# Patient Record
Sex: MALE | Race: BLACK OR AFRICAN AMERICAN | NOT HISPANIC OR LATINO | Employment: UNEMPLOYED | URBAN - METROPOLITAN AREA
[De-identification: names, ages, dates, MRNs, and addresses within clinical notes are randomized per-mention and may not be internally consistent; named-entity substitution may affect disease eponyms.]

---

## 2022-11-05 PROBLEM — E87.8 ELECTROLYTE ABNORMALITY: Status: ACTIVE | Noted: 2022-11-05

## 2022-11-05 PROBLEM — R73.9 HYPERGLYCEMIA: Status: ACTIVE | Noted: 2022-11-05

## 2022-11-05 PROBLEM — R00.0 TACHYCARDIA: Status: ACTIVE | Noted: 2022-11-05

## 2022-11-05 PROBLEM — G02: Status: ACTIVE | Noted: 2022-11-05

## 2022-11-06 PROBLEM — I48.91 ATRIAL FIBRILLATION WITH RVR: Status: ACTIVE | Noted: 2022-11-06

## 2022-11-06 PROBLEM — B20 HIV DISEASE: Status: ACTIVE | Noted: 2022-11-06

## 2022-11-06 PROBLEM — R56.9 SEIZURES COMPLICATING INFECTION: Status: ACTIVE | Noted: 2022-11-06

## 2022-11-06 PROBLEM — A53.0 POSITIVE RPR TEST: Status: ACTIVE | Noted: 2022-11-06

## 2022-11-06 PROBLEM — B99.9 SEIZURES COMPLICATING INFECTION: Status: ACTIVE | Noted: 2022-11-06

## 2022-11-06 PROBLEM — E87.1 HYPONATREMIA: Status: ACTIVE | Noted: 2022-11-05

## 2022-11-09 PROBLEM — E87.6 HYPOKALEMIA: Status: ACTIVE | Noted: 2022-11-09

## 2022-11-11 PROBLEM — G93.40 ENCEPHALOPATHY: Status: ACTIVE | Noted: 2022-11-11

## 2022-11-13 PROBLEM — E87.1 HYPONATREMIA: Status: RESOLVED | Noted: 2022-11-05 | Resolved: 2022-11-13

## 2022-11-13 PROBLEM — E87.6 HYPOKALEMIA: Status: RESOLVED | Noted: 2022-11-09 | Resolved: 2022-11-13

## 2022-11-14 PROBLEM — F32.9 CURRENT EPISODE OF MAJOR DEPRESSIVE DISORDER WITHOUT PRIOR EPISODE: Status: ACTIVE | Noted: 2022-11-14

## 2022-11-22 ENCOUNTER — TELEPHONE (OUTPATIENT)
Dept: INFECTIOUS DISEASES | Facility: CLINIC | Age: 27
End: 2022-11-22
Payer: COMMERCIAL

## 2022-11-22 NOTE — TELEPHONE ENCOUNTER
----- Message from Fay Moraes PA-C sent at 11/22/2022  4:16 PM CST -----  Yes, please schedule with Dr. Rush in 2 weeks.    ----- Message -----  From: Meghan Poe LPN  Sent: 11/22/2022   2:52 PM CST  To: Fay Moraes PA-C      ----- Message -----  From: Devi Chacon  Sent: 11/22/2022   2:11 PM CST  To: Victor Manuel GIBSON    Type:  Sooner Appointment Request    Caller is requesting a sooner appointment.  Caller declined first available appointment listed below.  Caller will not accept being placed on the waitlist and is requesting a message be sent to doctor.    Name of Caller:  pt  When is the first available appointment?  none  Symptoms:  hospital f/u dc 11/1 need a 2 week appt--please call and advise  Best Call Back Number:  [phone]    Additional Information:  thank you

## 2022-11-28 ENCOUNTER — TELEPHONE (OUTPATIENT)
Dept: INFECTIOUS DISEASES | Facility: CLINIC | Age: 27
End: 2022-11-28
Payer: COMMERCIAL

## 2022-11-28 NOTE — TELEPHONE ENCOUNTER
----- Message from Chago Herndon sent at 11/28/2022  3:04 PM CST -----  Contact: [facility]  Type: Needs Medical Advice  Who Called:  [facility]  Best Call Back Number: [phone]  Additional Information:  [facility] states pt needs to start get treated for Syphilis. Please advise.

## 2022-11-28 NOTE — TELEPHONE ENCOUNTER
Spoke with Rafaela and advised that patient had been treated at [facility] with 10 days of Rocephin, followed by 4 days of IV PCN, and then received 2.4 million Pen G Benzathine IM. She stated He was treated for Neuro, which was negative. Advised that I will forward Dr Rush's hospital note, then she can let me know if He needs further treatment.

## 2022-11-30 PROBLEM — R51.9 HEADACHE: Status: ACTIVE | Noted: 2022-11-30

## 2022-12-01 PROBLEM — G93.2 INCREASED INTRACRANIAL PRESSURE: Status: ACTIVE | Noted: 2022-11-30

## 2022-12-10 PROBLEM — E83.39 HYPERPHOSPHATEMIA: Status: ACTIVE | Noted: 2022-12-10

## 2022-12-10 PROBLEM — K92.0 COFFEE GROUND EMESIS: Status: ACTIVE | Noted: 2022-12-10

## 2022-12-10 PROBLEM — E83.42 HYPOMAGNESEMIA: Status: ACTIVE | Noted: 2022-12-10

## 2022-12-10 PROBLEM — R11.2 NAUSEA AND VOMITING: Status: ACTIVE | Noted: 2022-12-10

## 2022-12-13 ENCOUNTER — TELEPHONE (OUTPATIENT)
Dept: INFECTIOUS DISEASES | Facility: CLINIC | Age: 27
End: 2022-12-13
Payer: COMMERCIAL

## 2022-12-16 PROBLEM — E87.6 HYPOKALEMIA: Status: ACTIVE | Noted: 2022-12-16

## 2022-12-16 PROBLEM — E87.6 HYPOKALEMIA: Status: RESOLVED | Noted: 2022-11-09 | Resolved: 2022-12-16

## 2022-12-16 PROBLEM — E83.39 HYPERPHOSPHATEMIA: Status: RESOLVED | Noted: 2022-12-10 | Resolved: 2022-12-16

## 2022-12-16 PROBLEM — R11.2 NAUSEA AND VOMITING: Status: RESOLVED | Noted: 2022-12-10 | Resolved: 2022-12-16

## 2022-12-16 PROBLEM — A52.3 NEUROSYPHILIS: Status: ACTIVE | Noted: 2022-11-06

## 2022-12-16 PROBLEM — E83.42 HYPOMAGNESEMIA: Status: RESOLVED | Noted: 2022-12-10 | Resolved: 2022-12-16

## 2022-12-16 PROBLEM — B45.1 CRYPTOCOCCAL MENINGITIS: Status: ACTIVE | Noted: 2022-11-05

## 2022-12-19 ENCOUNTER — TELEPHONE (OUTPATIENT)
Dept: INFECTIOUS DISEASES | Facility: CLINIC | Age: 27
End: 2022-12-19
Payer: COMMERCIAL

## 2022-12-19 NOTE — TELEPHONE ENCOUNTER
Spoke with Ms Puneet and advised that Mr Abreu is currently hospitalized since 11/30. She stated since he only received Pen G 2.4 million Injection x 1 on 11/21/22, too much time has elapsed and must start over and receive the injections weekly x 3 weeks. Advised that I am forwarding this message to Dr Rush for review.

## 2022-12-19 NOTE — TELEPHONE ENCOUNTER
----- Message from Miller Rush MD sent at 12/19/2022 12:50 PM CST -----  Patient is in the hospital  ----- Message -----  From: Meghan Poe LPN  Sent: 12/19/2022  12:23 PM CST  To: Miller Rush MD      ----- Message -----  From: Petra Augustin  Sent: 12/19/2022  11:33 AM CST  To: Victor Manuel GIBSON    Who Called: [facility]    What is the reqeust in detail: Requesting call back to follow up on syphilis treatment for this patient. Please advise.     Can the clinic reply by [facility]? No    Best Call Back Number: [phone]    Additional Information:

## 2022-12-26 PROBLEM — N17.9 AKI (ACUTE KIDNEY INJURY): Status: ACTIVE | Noted: 2022-12-26

## 2022-12-27 PROBLEM — E43 SEVERE MALNUTRITION: Status: ACTIVE | Noted: 2022-12-27

## 2022-12-27 PROBLEM — R11.0 NAUSEA: Status: ACTIVE | Noted: 2022-12-10

## 2022-12-29 PROBLEM — D64.9 ANEMIA: Status: ACTIVE | Noted: 2022-12-29

## 2022-12-31 PROBLEM — R15.9 FECAL INCONTINENCE: Status: ACTIVE | Noted: 2022-12-31

## 2023-01-11 PROBLEM — R29.898 MUSCULAR DECONDITIONING: Status: ACTIVE | Noted: 2023-01-11

## 2023-01-16 ENCOUNTER — HOSPITAL ENCOUNTER (INPATIENT)
Facility: HOSPITAL | Age: 28
LOS: 20 days | Discharge: HOME HEALTH | DRG: 640 | End: 2023-02-07
Attending: EMERGENCY MEDICINE | Admitting: STUDENT IN AN ORGANIZED HEALTH CARE EDUCATION/TRAINING PROGRAM
Payer: COMMERCIAL

## 2023-01-16 DIAGNOSIS — A41.9 SEPSIS: ICD-10-CM

## 2023-01-16 DIAGNOSIS — B45.1 CRYPTOCOCCAL MENINGITIS: ICD-10-CM

## 2023-01-16 DIAGNOSIS — J18.9 PNEUMONIA: ICD-10-CM

## 2023-01-16 DIAGNOSIS — B20 AIDS: ICD-10-CM

## 2023-01-16 DIAGNOSIS — R05.9 COUGH, UNSPECIFIED TYPE: Primary | ICD-10-CM

## 2023-01-16 DIAGNOSIS — R50.9 FEVER, UNSPECIFIED FEVER CAUSE: ICD-10-CM

## 2023-01-16 DIAGNOSIS — E83.42 HYPOMAGNESEMIA: ICD-10-CM

## 2023-01-16 DIAGNOSIS — R53.81 PHYSICAL DECONDITIONING: ICD-10-CM

## 2023-01-16 DIAGNOSIS — R07.9 CHEST PAIN: ICD-10-CM

## 2023-01-16 DIAGNOSIS — D64.9 ANEMIA, UNSPECIFIED TYPE: ICD-10-CM

## 2023-01-16 DIAGNOSIS — R53.1 WEAKNESS: ICD-10-CM

## 2023-01-16 PROBLEM — I48.0 PAROXYSMAL ATRIAL FIBRILLATION: Status: ACTIVE | Noted: 2023-01-16

## 2023-01-16 LAB
ABO + RH BLD: ABNORMAL
ALBUMIN SERPL BCP-MCNC: 3 G/DL (ref 3.5–5.2)
ALP SERPL-CCNC: 76 U/L (ref 55–135)
ALT SERPL W/O P-5'-P-CCNC: 36 U/L (ref 10–44)
ANION GAP SERPL CALC-SCNC: 6 MMOL/L (ref 8–16)
ANISOCYTOSIS BLD QL SMEAR: SLIGHT
AST SERPL-CCNC: 30 U/L (ref 10–40)
BASOPHILS NFR BLD: 0 % (ref 0–1.9)
BILIRUB SERPL-MCNC: 0.4 MG/DL (ref 0.1–1)
BILIRUB UR QL STRIP: NEGATIVE
BLD GP AB SCN CELLS X3 SERPL QL: ABNORMAL
BLOOD GROUP ANTIBODIES SERPL: NORMAL
BUN SERPL-MCNC: 23 MG/DL (ref 6–20)
CALCIUM SERPL-MCNC: 9.3 MG/DL (ref 8.7–10.5)
CHLORIDE SERPL-SCNC: 103 MMOL/L (ref 95–110)
CLARITY UR: CLEAR
CO2 SERPL-SCNC: 26 MMOL/L (ref 23–29)
COLOR UR: YELLOW
CREAT SERPL-MCNC: 1 MG/DL (ref 0.5–1.4)
DIFFERENTIAL METHOD: ABNORMAL
EOSINOPHIL NFR BLD: 2 % (ref 0–8)
ERYTHROCYTE [DISTWIDTH] IN BLOOD BY AUTOMATED COUNT: 14.8 % (ref 11.5–14.5)
EST. GFR  (NO RACE VARIABLE): >60 ML/MIN/1.73 M^2
GLUCOSE SERPL-MCNC: 135 MG/DL (ref 70–110)
GLUCOSE SERPL-MCNC: 210 MG/DL (ref 70–110)
GLUCOSE UR QL STRIP: NEGATIVE
HCT VFR BLD AUTO: 24.1 % (ref 40–54)
HGB BLD-MCNC: 7.9 G/DL (ref 14–18)
HGB UR QL STRIP: NEGATIVE
IMM GRANULOCYTES # BLD AUTO: ABNORMAL K/UL (ref 0–0.04)
IMM GRANULOCYTES NFR BLD AUTO: ABNORMAL % (ref 0–0.5)
KETONES UR QL STRIP: NEGATIVE
LACTATE SERPL-SCNC: 0.9 MMOL/L (ref 0.5–1.9)
LEUKOCYTE ESTERASE UR QL STRIP: NEGATIVE
LYMPHOCYTES NFR BLD: 19 % (ref 18–48)
MAGNESIUM SERPL-MCNC: 1.4 MG/DL (ref 1.6–2.6)
MCH RBC QN AUTO: 27.1 PG (ref 27–31)
MCHC RBC AUTO-ENTMCNC: 32.8 G/DL (ref 32–36)
MCV RBC AUTO: 83 FL (ref 82–98)
METAMYELOCYTES NFR BLD MANUAL: 3 %
MONOCYTES NFR BLD: 7 % (ref 4–15)
MYELOCYTES NFR BLD MANUAL: 5 %
NEUTROPHILS NFR BLD: 58 % (ref 38–73)
NEUTS BAND NFR BLD MANUAL: 6 %
NITRITE UR QL STRIP: NEGATIVE
NRBC BLD-RTO: 0 /100 WBC
PH UR STRIP: 8 [PH] (ref 5–8)
PLATELET # BLD AUTO: 283 K/UL (ref 150–450)
PLATELET BLD QL SMEAR: ABNORMAL
PMV BLD AUTO: 8.9 FL (ref 9.2–12.9)
POTASSIUM SERPL-SCNC: 3.5 MMOL/L (ref 3.5–5.1)
PROT SERPL-MCNC: 6.3 G/DL (ref 6–8.4)
PROT UR QL STRIP: NEGATIVE
RBC # BLD AUTO: 2.92 M/UL (ref 4.6–6.2)
SODIUM SERPL-SCNC: 135 MMOL/L (ref 136–145)
SP GR UR STRIP: 1.01 (ref 1–1.03)
URN SPEC COLLECT METH UR: NORMAL
UROBILINOGEN UR STRIP-ACNC: NEGATIVE EU/DL
WBC # BLD AUTO: 17.7 K/UL (ref 3.9–12.7)

## 2023-01-16 PROCEDURE — G0378 HOSPITAL OBSERVATION PER HR: HCPCS

## 2023-01-16 PROCEDURE — 84145 PROCALCITONIN (PCT): CPT | Performed by: NURSE PRACTITIONER

## 2023-01-16 PROCEDURE — 99285 EMERGENCY DEPT VISIT HI MDM: CPT | Mod: 25

## 2023-01-16 PROCEDURE — 93010 EKG 12-LEAD: ICD-10-PCS | Mod: ,,, | Performed by: SPECIALIST

## 2023-01-16 PROCEDURE — 83735 ASSAY OF MAGNESIUM: CPT | Mod: 91 | Performed by: EMERGENCY MEDICINE

## 2023-01-16 PROCEDURE — 96366 THER/PROPH/DIAG IV INF ADDON: CPT

## 2023-01-16 PROCEDURE — 87040 BLOOD CULTURE FOR BACTERIA: CPT | Performed by: EMERGENCY MEDICINE

## 2023-01-16 PROCEDURE — 81003 URINALYSIS AUTO W/O SCOPE: CPT | Performed by: EMERGENCY MEDICINE

## 2023-01-16 PROCEDURE — 96365 THER/PROPH/DIAG IV INF INIT: CPT

## 2023-01-16 PROCEDURE — 80053 COMPREHEN METABOLIC PANEL: CPT | Mod: 91 | Performed by: EMERGENCY MEDICINE

## 2023-01-16 PROCEDURE — 85007 BL SMEAR W/DIFF WBC COUNT: CPT | Mod: 91 | Performed by: EMERGENCY MEDICINE

## 2023-01-16 PROCEDURE — 83605 ASSAY OF LACTIC ACID: CPT | Performed by: EMERGENCY MEDICINE

## 2023-01-16 PROCEDURE — 86900 BLOOD TYPING SEROLOGIC ABO: CPT | Performed by: EMERGENCY MEDICINE

## 2023-01-16 PROCEDURE — 87154 CUL TYP ID BLD PTHGN 6+ TRGT: CPT | Performed by: EMERGENCY MEDICINE

## 2023-01-16 PROCEDURE — 93010 ELECTROCARDIOGRAM REPORT: CPT | Mod: ,,, | Performed by: SPECIALIST

## 2023-01-16 PROCEDURE — 63600175 PHARM REV CODE 636 W HCPCS: Performed by: NURSE PRACTITIONER

## 2023-01-16 PROCEDURE — 96372 THER/PROPH/DIAG INJ SC/IM: CPT | Performed by: NURSE PRACTITIONER

## 2023-01-16 PROCEDURE — 96367 TX/PROPH/DG ADDL SEQ IV INF: CPT

## 2023-01-16 PROCEDURE — 86870 RBC ANTIBODY IDENTIFICATION: CPT | Performed by: EMERGENCY MEDICINE

## 2023-01-16 PROCEDURE — 82962 GLUCOSE BLOOD TEST: CPT

## 2023-01-16 PROCEDURE — 25000003 PHARM REV CODE 250: Performed by: NURSE PRACTITIONER

## 2023-01-16 PROCEDURE — 85027 COMPLETE CBC AUTOMATED: CPT | Mod: 91 | Performed by: EMERGENCY MEDICINE

## 2023-01-16 PROCEDURE — 93005 ELECTROCARDIOGRAM TRACING: CPT | Performed by: SPECIALIST

## 2023-01-16 PROCEDURE — 36415 COLL VENOUS BLD VENIPUNCTURE: CPT | Performed by: EMERGENCY MEDICINE

## 2023-01-16 PROCEDURE — 63600175 PHARM REV CODE 636 W HCPCS: Performed by: EMERGENCY MEDICINE

## 2023-01-16 RX ORDER — IBUPROFEN 200 MG
16 TABLET ORAL
Status: DISCONTINUED | OUTPATIENT
Start: 2023-01-16 | End: 2023-02-07 | Stop reason: HOSPADM

## 2023-01-16 RX ORDER — CEFEPIME HYDROCHLORIDE 1 G/50ML
2 INJECTION, SOLUTION INTRAVENOUS
Status: DISCONTINUED | OUTPATIENT
Start: 2023-01-16 | End: 2023-01-16

## 2023-01-16 RX ORDER — POLYETHYLENE GLYCOL 3350 17 G/17G
17 POWDER, FOR SOLUTION ORAL 2 TIMES DAILY PRN
Status: DISCONTINUED | OUTPATIENT
Start: 2023-01-16 | End: 2023-02-07 | Stop reason: HOSPADM

## 2023-01-16 RX ORDER — TALC
6 POWDER (GRAM) TOPICAL NIGHTLY PRN
Status: DISCONTINUED | OUTPATIENT
Start: 2023-01-16 | End: 2023-02-07 | Stop reason: HOSPADM

## 2023-01-16 RX ORDER — PANTOPRAZOLE SODIUM 40 MG/1
40 TABLET, DELAYED RELEASE ORAL
Status: DISCONTINUED | OUTPATIENT
Start: 2023-01-17 | End: 2023-02-07 | Stop reason: HOSPADM

## 2023-01-16 RX ORDER — VANCOMYCIN HCL IN 5 % DEXTROSE 1G/250ML
1000 PLASTIC BAG, INJECTION (ML) INTRAVENOUS ONCE
Status: COMPLETED | OUTPATIENT
Start: 2023-01-16 | End: 2023-01-16

## 2023-01-16 RX ORDER — CEFEPIME HYDROCHLORIDE 1 G/50ML
2 INJECTION, SOLUTION INTRAVENOUS ONCE
Status: COMPLETED | OUTPATIENT
Start: 2023-01-16 | End: 2023-01-16

## 2023-01-16 RX ORDER — HYDROCODONE BITARTRATE AND ACETAMINOPHEN 5; 325 MG/1; MG/1
1 TABLET ORAL EVERY 6 HOURS PRN
Status: DISCONTINUED | OUTPATIENT
Start: 2023-01-16 | End: 2023-02-07 | Stop reason: HOSPADM

## 2023-01-16 RX ORDER — FOLIC ACID 1 MG/1
1 TABLET ORAL DAILY
Status: DISCONTINUED | OUTPATIENT
Start: 2023-01-17 | End: 2023-02-07 | Stop reason: HOSPADM

## 2023-01-16 RX ORDER — LEVETIRACETAM 500 MG/1
1500 TABLET ORAL 2 TIMES DAILY
Status: DISCONTINUED | OUTPATIENT
Start: 2023-01-16 | End: 2023-02-07 | Stop reason: HOSPADM

## 2023-01-16 RX ORDER — VANCOMYCIN HCL IN 5 % DEXTROSE 1G/250ML
1000 PLASTIC BAG, INJECTION (ML) INTRAVENOUS
Status: DISCONTINUED | OUTPATIENT
Start: 2023-01-17 | End: 2023-01-17

## 2023-01-16 RX ORDER — CETIRIZINE HYDROCHLORIDE 10 MG/1
10 TABLET ORAL DAILY
Status: DISCONTINUED | OUTPATIENT
Start: 2023-01-17 | End: 2023-01-26

## 2023-01-16 RX ORDER — SULFAMETHOXAZOLE AND TRIMETHOPRIM 800; 160 MG/1; MG/1
1 TABLET ORAL
Status: DISCONTINUED | OUTPATIENT
Start: 2023-01-18 | End: 2023-01-22

## 2023-01-16 RX ORDER — PROCHLORPERAZINE EDISYLATE 5 MG/ML
5 INJECTION INTRAMUSCULAR; INTRAVENOUS EVERY 6 HOURS PRN
Status: DISCONTINUED | OUTPATIENT
Start: 2023-01-16 | End: 2023-02-07 | Stop reason: HOSPADM

## 2023-01-16 RX ORDER — LANOLIN ALCOHOL/MO/W.PET/CERES
100 CREAM (GRAM) TOPICAL DAILY
COMMUNITY

## 2023-01-16 RX ORDER — ENOXAPARIN SODIUM 100 MG/ML
40 INJECTION SUBCUTANEOUS EVERY 24 HOURS
Status: DISCONTINUED | OUTPATIENT
Start: 2023-01-16 | End: 2023-02-07 | Stop reason: HOSPADM

## 2023-01-16 RX ORDER — GUAIFENESIN 600 MG/1
600 TABLET, EXTENDED RELEASE ORAL 2 TIMES DAILY
Status: DISCONTINUED | OUTPATIENT
Start: 2023-01-16 | End: 2023-01-26

## 2023-01-16 RX ORDER — SIMETHICONE 80 MG
1 TABLET,CHEWABLE ORAL 4 TIMES DAILY PRN
Status: DISCONTINUED | OUTPATIENT
Start: 2023-01-16 | End: 2023-02-07 | Stop reason: HOSPADM

## 2023-01-16 RX ORDER — MULTIVITAMIN
1 TABLET ORAL DAILY
COMMUNITY

## 2023-01-16 RX ORDER — CYPROHEPTADINE HYDROCHLORIDE 4 MG/1
4 TABLET ORAL 2 TIMES DAILY
Status: DISCONTINUED | OUTPATIENT
Start: 2023-01-16 | End: 2023-01-27

## 2023-01-16 RX ORDER — ESCITALOPRAM OXALATE 10 MG/1
20 TABLET ORAL DAILY
Status: DISCONTINUED | OUTPATIENT
Start: 2023-01-17 | End: 2023-02-07 | Stop reason: HOSPADM

## 2023-01-16 RX ORDER — LANOLIN ALCOHOL/MO/W.PET/CERES
1 CREAM (GRAM) TOPICAL DAILY
Status: DISCONTINUED | OUTPATIENT
Start: 2023-01-17 | End: 2023-02-07 | Stop reason: HOSPADM

## 2023-01-16 RX ORDER — HYDROCODONE BITARTRATE AND ACETAMINOPHEN 5; 325 MG/1; MG/1
1 TABLET ORAL EVERY 4 HOURS PRN
COMMUNITY

## 2023-01-16 RX ORDER — INSULIN ASPART 100 [IU]/ML
1-10 INJECTION, SOLUTION INTRAVENOUS; SUBCUTANEOUS
Status: DISCONTINUED | OUTPATIENT
Start: 2023-01-16 | End: 2023-02-07 | Stop reason: HOSPADM

## 2023-01-16 RX ORDER — IBUPROFEN 200 MG
16-24 TABLET ORAL
COMMUNITY

## 2023-01-16 RX ORDER — SODIUM BICARBONATE 650 MG/1
650 TABLET ORAL 3 TIMES DAILY
Status: DISCONTINUED | OUTPATIENT
Start: 2023-01-17 | End: 2023-02-07 | Stop reason: HOSPADM

## 2023-01-16 RX ORDER — NALOXONE HCL 0.4 MG/ML
0.02 VIAL (ML) INJECTION
Status: DISCONTINUED | OUTPATIENT
Start: 2023-01-16 | End: 2023-02-07 | Stop reason: HOSPADM

## 2023-01-16 RX ORDER — FERROUS GLUCONATE 324(38)MG
650 TABLET ORAL EVERY OTHER DAY
COMMUNITY

## 2023-01-16 RX ORDER — MAGNESIUM SULFATE HEPTAHYDRATE 40 MG/ML
2 INJECTION, SOLUTION INTRAVENOUS
Status: DISCONTINUED | OUTPATIENT
Start: 2023-01-16 | End: 2023-02-07 | Stop reason: HOSPADM

## 2023-01-16 RX ORDER — MAG HYDROX/ALUMINUM HYD/SIMETH 200-200-20
30 SUSPENSION, ORAL (FINAL DOSE FORM) ORAL EVERY 4 HOURS PRN
COMMUNITY

## 2023-01-16 RX ORDER — CETIRIZINE HYDROCHLORIDE 10 MG/1
10 TABLET ORAL DAILY
Status: ON HOLD | COMMUNITY
End: 2023-02-07 | Stop reason: HOSPADM

## 2023-01-16 RX ORDER — MEGESTROL ACETATE 20 MG/1
40 TABLET ORAL DAILY
Status: DISCONTINUED | OUTPATIENT
Start: 2023-01-17 | End: 2023-02-07 | Stop reason: HOSPADM

## 2023-01-16 RX ORDER — HYDROCODONE BITARTRATE AND ACETAMINOPHEN 10; 325 MG/1; MG/1
1 TABLET ORAL EVERY 6 HOURS PRN
Status: DISCONTINUED | OUTPATIENT
Start: 2023-01-16 | End: 2023-02-07 | Stop reason: HOSPADM

## 2023-01-16 RX ORDER — METOCLOPRAMIDE 10 MG/1
10 TABLET ORAL
Status: DISCONTINUED | OUTPATIENT
Start: 2023-01-16 | End: 2023-01-28

## 2023-01-16 RX ORDER — ONDANSETRON 2 MG/ML
4 INJECTION INTRAMUSCULAR; INTRAVENOUS EVERY 6 HOURS PRN
Status: DISCONTINUED | OUTPATIENT
Start: 2023-01-16 | End: 2023-02-07 | Stop reason: HOSPADM

## 2023-01-16 RX ORDER — ENOXAPARIN SODIUM 100 MG/ML
40 INJECTION SUBCUTANEOUS DAILY
COMMUNITY

## 2023-01-16 RX ORDER — RISPERIDONE 1 MG/1
1 TABLET ORAL NIGHTLY
Status: DISCONTINUED | OUTPATIENT
Start: 2023-01-16 | End: 2023-01-27

## 2023-01-16 RX ORDER — GLUCAGON 1 MG
1 KIT INJECTION
Status: DISCONTINUED | OUTPATIENT
Start: 2023-01-16 | End: 2023-02-07 | Stop reason: HOSPADM

## 2023-01-16 RX ORDER — ACETAMINOPHEN 325 MG/1
650 TABLET ORAL EVERY 4 HOURS PRN
Status: DISCONTINUED | OUTPATIENT
Start: 2023-01-16 | End: 2023-02-07 | Stop reason: HOSPADM

## 2023-01-16 RX ORDER — SODIUM CHLORIDE 0.9 % (FLUSH) 0.9 %
10 SYRINGE (ML) INJECTION
Status: DISCONTINUED | OUTPATIENT
Start: 2023-01-16 | End: 2023-02-07 | Stop reason: HOSPADM

## 2023-01-16 RX ORDER — IBUPROFEN 200 MG
24 TABLET ORAL
Status: DISCONTINUED | OUTPATIENT
Start: 2023-01-16 | End: 2023-02-07 | Stop reason: HOSPADM

## 2023-01-16 RX ORDER — FLUTICASONE PROPIONATE 50 MCG
2 SPRAY, SUSPENSION (ML) NASAL 2 TIMES DAILY
COMMUNITY

## 2023-01-16 RX ORDER — DEXTROMETHORPHAN HYDROBROMIDE, GUAIFENESIN 5; 100 MG/5ML; MG/5ML
650 LIQUID ORAL EVERY 4 HOURS PRN
COMMUNITY

## 2023-01-16 RX ORDER — MAGNESIUM SULFATE HEPTAHYDRATE 40 MG/ML
4 INJECTION, SOLUTION INTRAVENOUS
Status: DISCONTINUED | OUTPATIENT
Start: 2023-01-16 | End: 2023-02-07 | Stop reason: HOSPADM

## 2023-01-16 RX ORDER — RISPERIDONE 0.25 MG/1
0.5 TABLET ORAL DAILY
Status: DISCONTINUED | OUTPATIENT
Start: 2023-01-17 | End: 2023-01-27

## 2023-01-16 RX ORDER — THIAMINE HCL 100 MG
100 TABLET ORAL DAILY
Status: DISCONTINUED | OUTPATIENT
Start: 2023-01-17 | End: 2023-01-17

## 2023-01-16 RX ORDER — FLUCONAZOLE 100 MG/1
800 TABLET ORAL DAILY
Status: DISCONTINUED | OUTPATIENT
Start: 2023-01-17 | End: 2023-02-07 | Stop reason: HOSPADM

## 2023-01-16 RX ORDER — CEFEPIME HYDROCHLORIDE 1 G/50ML
2 INJECTION, SOLUTION INTRAVENOUS
Status: DISCONTINUED | OUTPATIENT
Start: 2023-01-17 | End: 2023-01-17

## 2023-01-16 RX ORDER — POTASSIUM CHLORIDE 20 MEQ/1
20 TABLET, EXTENDED RELEASE ORAL
Status: COMPLETED | OUTPATIENT
Start: 2023-01-17 | End: 2023-01-17

## 2023-01-16 RX ORDER — IPRATROPIUM BROMIDE AND ALBUTEROL SULFATE 2.5; .5 MG/3ML; MG/3ML
3 SOLUTION RESPIRATORY (INHALATION) EVERY 4 HOURS PRN
Status: DISCONTINUED | OUTPATIENT
Start: 2023-01-16 | End: 2023-02-01

## 2023-01-16 RX ORDER — TALC
6 POWDER (GRAM) TOPICAL NIGHTLY PRN
COMMUNITY

## 2023-01-16 RX ADMIN — LEVETIRACETAM 1500 MG: 500 TABLET, FILM COATED ORAL at 09:01

## 2023-01-16 RX ADMIN — METOCLOPRAMIDE 10 MG: 10 TABLET ORAL at 09:01

## 2023-01-16 RX ADMIN — ENOXAPARIN SODIUM 40 MG: 100 INJECTION SUBCUTANEOUS at 10:01

## 2023-01-16 RX ADMIN — RISPERIDONE 1 MG: 1 TABLET ORAL at 09:01

## 2023-01-16 RX ADMIN — GUAIFENESIN 600 MG: 600 TABLET, EXTENDED RELEASE ORAL at 09:01

## 2023-01-16 RX ADMIN — CYPROHEPTADINE HYDROCHLORIDE 4 MG: 4 TABLET ORAL at 09:01

## 2023-01-16 RX ADMIN — INSULIN DETEMIR 10 UNITS: 100 INJECTION, SOLUTION SUBCUTANEOUS at 09:01

## 2023-01-16 RX ADMIN — MAGNESIUM SULFATE HEPTAHYDRATE 4 G: 40 INJECTION, SOLUTION INTRAVENOUS at 10:01

## 2023-01-16 RX ADMIN — VANCOMYCIN HYDROCHLORIDE 1000 MG: 1 INJECTION, POWDER, LYOPHILIZED, FOR SOLUTION INTRAVENOUS at 07:01

## 2023-01-16 RX ADMIN — CEFEPIME HYDROCHLORIDE 2 G: 2 INJECTION, SOLUTION INTRAVENOUS at 05:01

## 2023-01-16 NOTE — ED PROVIDER NOTES
Encounter Date: 1/16/2023       History     Chief Complaint   Patient presents with    Weakness     Reported low H&H and low magnesium level     HPI    Seen and evaluated.  Presents with a chief complaint of anemia and hypomagnesemia.  Patient was sent from skilled nursing facility when he was found to have a downtrending H&H as well as significant hypomagnesemia.  He received IV magnesium prior to arrival.  Here today, he feels generally unwell and fatigued.  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  Chart review shows that on January 11, 2023 he completed amphotericin and fluconazole.    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    Diabetes mellitus     type 2 x 2 months ago dx//    Neurosyphilis 11/06/2022    Paroxysmal atrial fibrillation     Seizures     Strabismus age 4-5//    os since accident     Vision abnormalities     left eye     Past Surgical History:   Procedure Laterality Date    CATARACT EXTRACTION W/  INTRAOCULAR LENS IMPLANT Left age 8    CIRCUMCISION      EYE SURGERY  4-5 yrs of age    toy hit eye os//     Family History   Problem Relation Age of Onset    Diabetes Mother     Diabetes Father     Hypertension Father     Diabetes Paternal Grandmother     Hypertension Paternal Grandmother     Diabetes Paternal Grandfather     Cancer Paternal Grandfather     Stroke Paternal Grandfather     Blindness Paternal Uncle     Amblyopia Neg Hx     Cataracts Neg Hx     Glaucoma Neg Hx     Macular degeneration Neg Hx     Retinal detachment Neg Hx     Strabismus Neg Hx     Thyroid disease Neg Hx      Social History     Tobacco Use    Smoking status: Never   Substance Use Topics    Alcohol use: No    Drug use: No     Review of Systems   Constitutional:  Positive for fatigue. Negative for fever.   HENT:  Negative for sore throat.     Respiratory:  Negative for shortness of breath.    Cardiovascular:  Negative for chest pain.   Gastrointestinal:  Negative for abdominal pain and nausea.   Genitourinary:  Negative for dysuria.   Musculoskeletal:  Negative for back pain.   Skin:  Negative for rash.   Neurological:  Positive for weakness (generalized).   Psychiatric/Behavioral:  Negative for confusion.      Physical Exam     Initial Vitals   BP Pulse Resp Temp SpO2   01/16/23 1621 01/16/23 1621 01/16/23 1621 01/17/23 0330 01/16/23 1621   103/61 85 (!) 22 97.8 °F (36.6 °C) 100 %      MAP       --                Physical Exam    Nursing note and vitals reviewed.  Constitutional: He appears well-developed and well-nourished.   HENT:   Head: Normocephalic and atraumatic.   Eyes: Conjunctivae are normal.   Neck: Neck supple.   Cardiovascular:  Normal rate and regular rhythm.           Pulmonary/Chest: No respiratory distress.   Abdominal: Abdomen is soft.   Musculoskeletal:         General: Normal range of motion.      Cervical back: Neck supple.     Neurological: He is alert and oriented to person, place, and time.   Skin: Skin is warm and dry.   Psychiatric: He has a normal mood and affect. His speech is normal.       ED Course   Procedures  Labs Reviewed   CBC W/ AUTO DIFFERENTIAL - Abnormal; Notable for the following components:       Result Value    WBC 17.70 (*)     RBC 2.92 (*)     Hemoglobin 7.9 (*)     Hematocrit 24.1 (*)     RDW 14.8 (*)     MPV 8.9 (*)     All other components within normal limits   COMPREHENSIVE METABOLIC PANEL - Abnormal; Notable for the following components:    Sodium 135 (*)     Glucose 135 (*)     BUN 23 (*)     Albumin 3.0 (*)     Anion Gap 6 (*)     All other components within normal limits   MAGNESIUM - Abnormal; Notable for the following components:    Magnesium 1.4 (*)     All other components within normal limits   C-REACTIVE PROTEIN - Abnormal; Notable for the following components:    CRP 2.00 (*)     All other  components within normal limits   BASIC METABOLIC PANEL - Abnormal; Notable for the following components:    Glucose 195 (*)     Anion Gap 6 (*)     All other components within normal limits    Narrative:     If not done in last 3 months   CBC W/ AUTO DIFFERENTIAL - Abnormal; Notable for the following components:    WBC 17.35 (*)     RBC 2.69 (*)     Hemoglobin 7.3 (*)     Hematocrit 22.3 (*)     RDW 14.9 (*)     MPV 8.9 (*)     Lymph % 12.0 (*)     Mono % 18.0 (*)     All other components within normal limits    Narrative:     If not done in last 3 months   TYPE & SCREEN - Abnormal; Notable for the following components:    Indirect [place] POS (*)     All other components within normal limits   POCT GLUCOSE - Abnormal; Notable for the following components:    POC Glucose 210 (*)     All other components within normal limits   POCT GLUCOSE - Abnormal; Notable for the following components:    POC Glucose 215 (*)     All other components within normal limits   POCT GLUCOSE - Abnormal; Notable for the following components:    POC Glucose 188 (*)     All other components within normal limits   CULTURE, BLOOD    Narrative:     Aerobic and anaerobic   CULTURE, BLOOD   RESPIRATORY INFECTION PANEL (PCR), NASOPHARYNGEAL   CULTURE, RESPIRATORY   MRSA SCREEN BY PCR   LACTIC ACID, PLASMA   URINALYSIS, REFLEX TO URINE CULTURE    Narrative:     Specimen Source->Urine   MAGNESIUM   PROCALCITONIN   MAGNESIUM    Narrative:     If not done in last 3 months   HEMOGLOBIN A1C    Narrative:     If not done in last 3 months   POTASSIUM   MAGNESIUM   POCT GLUCOSE, HAND-HELD DEVICE   POCT GLUCOSE, HAND-HELD DEVICE   POCT GLUCOSE, HAND-HELD DEVICE   POCT GLUCOSE, HAND-HELD DEVICE   POCT GLUCOSE, HAND-HELD DEVICE   ANTIBODY IDENTIFICATION   POCT GLUCOSE     EKG Readings: (Independently Interpreted)    Normal sinus rhythm 79 beats per minute no ST elevation no ST depressions normal conduction   ECG Results              EKG 12-lead (In process)   Result time 01/17/23 05:24:11      In process by Interface, Lab In [facility] (01/17/23 05:24:11)                   Narrative:    Test Reason : R53.1,    Vent. Rate : 079 BPM     Atrial Rate : 079 BPM     P-R Int : 144 ms          QRS Dur : 090 ms      QT Int : 402 ms       P-R-T Axes : 071 069 062 degrees     QTc Int : 460 ms    Normal sinus rhythm  Normal ECG  When compared with ECG of 12-JAN-2023 12:36,  Nonspecific T wave abnormality no longer evident in Lateral leads  QT has lengthened    Referred By: [email or web address]   SELF           Confirmed By:                                   Imaging Results              X-Ray Chest AP Portable (Final result)  Result time 01/16/23 15:47:10      Final result by Wilfrid Ta MD (01/16/23 15:47:10)                   Narrative:    HISTORY: Sepsis    FINDINGS: Portable chest radiograph at 1518 hours compared to 12/26/2022 shows right PICC with distal portion having flipped into the left brachiocephalic vein, the distal tip looped over the left brachiocephalic vein to the left of midline. The cardiomediastinal silhouette and pulmonary vasculature are within normal limits.    The lungs are normally expanded, with no consolidation, large pleural effusion, or evidence of pulmonary edema. No confluent infiltrates or pneumothorax. There are no significant osseous abnormalities.    IMPRESSION:  1. Right PICC as described.  2. No evidence of active cardiopulmonary disease.    Electronically signed by:  Wilfrid Ta MD  1/16/2023 3:47 PM CST Workstation: [id number]                                     Medications   cetirizine tablet 10 mg (10 mg Oral Given 1/17/23 0927)   cyproheptadine 4 mg tablet 4 mg (4 mg Oral Given 1/17/23 0931)   darunavir-cobicistat tablet 1 tablet (1 tablet Oral Not Given 1/17/23 0900)   emtricitabine-tenofovir alafen 200-25 mg Tab 1 tablet (1 tablet Oral Not Given 1/17/23 0900)   enoxaparin injection 40 mg (40 mg Subcutaneous Given 1/17/23 3231)   EScitalopram oxalate  tablet 20 mg (20 mg Oral Given 1/17/23 0926)   ferrous sulfate tablet 1 each (1 each Oral Given 1/17/23 0927)   fluconazole tablet 800 mg (800 mg Oral Given 1/17/23 0925)   folic acid tablet 1 mg (1 mg Oral Given 1/17/23 0927)   insulin detemir U-100 pen 10 Units (10 Units Subcutaneous Given 1/17/23 0932)   levETIRAcetam tablet 1,500 mg (1,500 mg Oral Given 1/17/23 0929)   megestroL tablet 40 mg (40 mg Oral Given 1/17/23 0930)   melatonin tablet 6 mg (has no administration in time range)   metoclopramide HCl tablet 10 mg (10 mg Oral Given 1/17/23 1731)   multivitamin tablet (1 tablet Oral Given 1/17/23 0928)   pantoprazole EC tablet 40 mg (40 mg Oral Given 1/17/23 0608)   risperiDONE tablet 0.5 mg (0.5 mg Oral Given 1/17/23 0929)   risperiDONE tablet 1 mg (1 mg Oral Given 1/16/23 2149)   sulfamethoxazole-trimethoprim 800-160mg per tablet 1 tablet (has no administration in time range)   sodium chloride 0.9% flush 10 mL (has no administration in time range)   albuterol-ipratropium 2.5 mg-0.5 mg/3 mL nebulizer solution 3 mL (has no administration in time range)   melatonin tablet 6 mg (has no administration in time range)   ondansetron injection 4 mg (has no administration in time range)   prochlorperazine injection Soln 5 mg (has no administration in time range)   polyethylene glycol packet 17 g (has no administration in time range)   simethicone chewable tablet 80 mg (has no administration in time range)   acetaminophen tablet 650 mg (has no administration in time range)   HYDROcodone-acetaminophen 5-325 mg per tablet 1 tablet (has no administration in time range)   HYDROcodone-acetaminophen  mg per tablet 1 tablet (has no administration in time range)   naloxone 0.4 mg/mL injection 0.02 mg (has no administration in time range)   potassium bicarbonate disintegrating tablet 50 mEq (50 mEq Oral Given 1/17/23 1237)   potassium bicarbonate disintegrating tablet 35 mEq (has no administration in time range)    potassium bicarbonate disintegrating tablet 60 mEq (has no administration in time range)   insulin aspart U-100 pen 1-10 Units (has no administration in time range)   glucose chewable tablet 16 g (has no administration in time range)   glucose chewable tablet 24 g (has no administration in time range)   dextrose 10% bolus 125 mL 125 mL (has no administration in time range)   dextrose 10% bolus 250 mL 250 mL (has no administration in time range)   glucagon (human recombinant) injection 1 mg (has no administration in time range)   sodium bicarbonate tablet 650 mg (650 mg Oral Given 1/17/23 1605)   guaiFENesin 12 hr tablet 600 mg (600 mg Oral Given 1/17/23 0927)   magnesium sulfate 2g in water 50mL IVPB (premix) (0 g Intravenous Stopped 1/17/23 1437)   magnesium sulfate 2g in water 50mL IVPB (premix) (0 g Intravenous Stopped 1/17/23 0022)   thiamine tablet 100 mg (100 mg Oral Given 1/17/23 0945)   amoxicillin-clavulanate 875-125mg per tablet 1 tablet (has no administration in time range)   fluticasone propionate 50 mcg/actuation nasal spray 100 mcg (100 mcg Each Nostril Not Given 1/17/23 1415)   cefepime in dextrose 5 % IVPB 2 g (0 g Intravenous Stopped 1/16/23 1815)   vancomycin in dextrose 5 % 1 gram/250 mL IVPB 1,000 mg (0 mg Intravenous Stopped 1/16/23 2000)   potassium chloride SA CR tablet 20 mEq (20 mEq Oral Given 1/17/23 0215)     Medical Decision Making:   Initial Assessment:   Seen and examined.  Presented with report of low magnesium.  His magnesium was 0.8 at the skilled nursing facility.  He has had chronic hypo magnesium.  He also has a history of AIDS with disseminated cryptococcal infection.  Currently on Bactrim.  Discussed with his infectious disease doctor.  Likely still needs to be on fluconazole.  Here today had an up trending leukocytosis as well as an increase in his absolute band percentage, so I covered him broadly with antibiotics.  My differential diagnosis includes electrolyte  "abnormalities, worsening of his disseminated cryptococcal infection, sepsis, chronic malnourishment.  I covered broadly with vancomycin and cefepime.  This was done given the abnormal findings on his CBC.  He also has a history of a previous GI bleed with a worsening anemia.  He has a type and screen that has been completed.  My initial plan was to transfer the patient to the hospital where his infectious disease doctor has privileges, but given that the patient was unable to be accepted at that hospital, and we have services here, I will admit the patient Internal Medicine and off facility.  He is hemodynamically stable.  He is not in any extremis.  Does appear to be chronically ill.  We have treated electrolyte abnormalities.  He will be admitted in guarded condition  Clinical Tests:   Lab Tests: Ordered and Reviewed  The following lab test(s) were unremarkable: CBC, CMP and Lactate  Radiological Study: Ordered and Reviewed  Medical Tests: Ordered and Reviewed  Sepsis Perfusion Assessment: "I attest a sepsis perfusion exam was performed within 6 hours of sepsis, severe sepsis, or septic shock presentation, following fluid resuscitation."                        Clinical Impression:   Final diagnoses:  [E83.42] Hypomagnesemia (Primary)  [D64.9] Anemia, unspecified type  [R53.1] Weakness  [J18.9] Pneumonia        ED Disposition Condition    Observation Stable                Avinash Howe Jr., MD  01/16/23 1905       Avinash Howe Jr., MD  01/16/23 1906       Avinash Howe Jr., MD  01/16/23 2245       Avinash Howe Jr., MD  01/17/23 2013    "

## 2023-01-16 NOTE — ED NOTES
Bed: MAYES 01  Expected date:   Expected time:   Means of arrival:   Comments:  EMS- Abnormal Labs (Low Mag, H&H, Oriented/Drowsy)

## 2023-01-17 PROBLEM — R53.1 WEAKNESS: Status: ACTIVE | Noted: 2023-01-17

## 2023-01-17 LAB
ANION GAP SERPL CALC-SCNC: 6 MMOL/L (ref 8–16)
ANISOCYTOSIS BLD QL SMEAR: SLIGHT
BASOPHILS NFR BLD: 1 % (ref 0–1.9)
BUN SERPL-MCNC: 18 MG/DL (ref 6–20)
CALCIUM SERPL-MCNC: 9.2 MG/DL (ref 8.7–10.5)
CHLORIDE SERPL-SCNC: 107 MMOL/L (ref 95–110)
CO2 SERPL-SCNC: 24 MMOL/L (ref 23–29)
CREAT SERPL-MCNC: 1 MG/DL (ref 0.5–1.4)
CRP SERPL-MCNC: 2 MG/DL
DIFFERENTIAL METHOD: ABNORMAL
EOSINOPHIL NFR BLD: 0 % (ref 0–8)
ERYTHROCYTE [DISTWIDTH] IN BLOOD BY AUTOMATED COUNT: 14.9 % (ref 11.5–14.5)
EST. GFR  (NO RACE VARIABLE): >60 ML/MIN/1.73 M^2
ESTIMATED AVG GLUCOSE: 126 MG/DL (ref 68–131)
GLUCOSE SERPL-MCNC: 188 MG/DL (ref 70–110)
GLUCOSE SERPL-MCNC: 195 MG/DL (ref 70–110)
GLUCOSE SERPL-MCNC: 215 MG/DL (ref 70–110)
GLUCOSE SERPL-MCNC: 91 MG/DL (ref 70–110)
HBA1C MFR BLD: 6 % (ref 4.5–6.2)
HCT VFR BLD AUTO: 22.3 % (ref 40–54)
HGB BLD-MCNC: 7.3 G/DL (ref 14–18)
HYPOCHROMIA BLD QL SMEAR: ABNORMAL
IMM GRANULOCYTES # BLD AUTO: ABNORMAL K/UL (ref 0–0.04)
IMM GRANULOCYTES NFR BLD AUTO: ABNORMAL % (ref 0–0.5)
LYMPHOCYTES NFR BLD: 12 % (ref 18–48)
MAGNESIUM SERPL-MCNC: 1.7 MG/DL (ref 1.6–2.6)
MAGNESIUM SERPL-MCNC: 1.8 MG/DL (ref 1.6–2.6)
MCH RBC QN AUTO: 27.1 PG (ref 27–31)
MCHC RBC AUTO-ENTMCNC: 32.7 G/DL (ref 32–36)
MCV RBC AUTO: 83 FL (ref 82–98)
METAMYELOCYTES NFR BLD MANUAL: 4 %
MONOCYTES NFR BLD: 18 % (ref 4–15)
MYELOCYTES NFR BLD MANUAL: 4 %
NEUTROPHILS NFR BLD: 54 % (ref 38–73)
NEUTS BAND NFR BLD MANUAL: 7 %
NRBC BLD-RTO: 0 /100 WBC
PLATELET # BLD AUTO: 250 K/UL (ref 150–450)
PLATELET BLD QL SMEAR: ABNORMAL
PMV BLD AUTO: 8.9 FL (ref 9.2–12.9)
POIKILOCYTOSIS BLD QL SMEAR: SLIGHT
POTASSIUM SERPL-SCNC: 3.6 MMOL/L (ref 3.5–5.1)
POTASSIUM SERPL-SCNC: 4.2 MMOL/L (ref 3.5–5.1)
PROCALCITONIN SERPL IA-MCNC: 0.15 NG/ML (ref 0–0.5)
RBC # BLD AUTO: 2.69 M/UL (ref 4.6–6.2)
SODIUM SERPL-SCNC: 137 MMOL/L (ref 136–145)
WBC # BLD AUTO: 17.35 K/UL (ref 3.9–12.7)

## 2023-01-17 PROCEDURE — 25000003 PHARM REV CODE 250: Performed by: NURSE PRACTITIONER

## 2023-01-17 PROCEDURE — G0378 HOSPITAL OBSERVATION PER HR: HCPCS

## 2023-01-17 PROCEDURE — 85027 COMPLETE CBC AUTOMATED: CPT | Performed by: NURSE PRACTITIONER

## 2023-01-17 PROCEDURE — 25000003 PHARM REV CODE 250: Performed by: STUDENT IN AN ORGANIZED HEALTH CARE EDUCATION/TRAINING PROGRAM

## 2023-01-17 PROCEDURE — 99223 1ST HOSP IP/OBS HIGH 75: CPT | Mod: ,,, | Performed by: INTERNAL MEDICINE

## 2023-01-17 PROCEDURE — 63600175 PHARM REV CODE 636 W HCPCS: Performed by: NURSE PRACTITIONER

## 2023-01-17 PROCEDURE — 87205 SMEAR GRAM STAIN: CPT | Performed by: NURSE PRACTITIONER

## 2023-01-17 PROCEDURE — 83036 HEMOGLOBIN GLYCOSYLATED A1C: CPT | Performed by: NURSE PRACTITIONER

## 2023-01-17 PROCEDURE — 82962 GLUCOSE BLOOD TEST: CPT

## 2023-01-17 PROCEDURE — 96366 THER/PROPH/DIAG IV INF ADDON: CPT

## 2023-01-17 PROCEDURE — 97530 THERAPEUTIC ACTIVITIES: CPT

## 2023-01-17 PROCEDURE — 25000242 PHARM REV CODE 250 ALT 637 W/ HCPCS: Performed by: INTERNAL MEDICINE

## 2023-01-17 PROCEDURE — 97165 OT EVAL LOW COMPLEX 30 MIN: CPT

## 2023-01-17 PROCEDURE — 80048 BASIC METABOLIC PNL TOTAL CA: CPT | Performed by: NURSE PRACTITIONER

## 2023-01-17 PROCEDURE — 63600175 PHARM REV CODE 636 W HCPCS: Performed by: STUDENT IN AN ORGANIZED HEALTH CARE EDUCATION/TRAINING PROGRAM

## 2023-01-17 PROCEDURE — 97161 PT EVAL LOW COMPLEX 20 MIN: CPT

## 2023-01-17 PROCEDURE — 99223 PR INITIAL HOSPITAL CARE,LEVL III: ICD-10-PCS | Mod: ,,, | Performed by: INTERNAL MEDICINE

## 2023-01-17 PROCEDURE — 25000003 PHARM REV CODE 250: Performed by: INTERNAL MEDICINE

## 2023-01-17 PROCEDURE — 87070 CULTURE OTHR SPECIMN AEROBIC: CPT | Performed by: NURSE PRACTITIONER

## 2023-01-17 PROCEDURE — 63700000 PHARM REV CODE 250 ALT 637 W/O HCPCS: Performed by: NURSE PRACTITIONER

## 2023-01-17 PROCEDURE — 86140 C-REACTIVE PROTEIN: CPT | Performed by: NURSE PRACTITIONER

## 2023-01-17 PROCEDURE — 83735 ASSAY OF MAGNESIUM: CPT | Performed by: NURSE PRACTITIONER

## 2023-01-17 PROCEDURE — 83735 ASSAY OF MAGNESIUM: CPT | Mod: 91 | Performed by: STUDENT IN AN ORGANIZED HEALTH CARE EDUCATION/TRAINING PROGRAM

## 2023-01-17 PROCEDURE — 84132 ASSAY OF SERUM POTASSIUM: CPT | Performed by: STUDENT IN AN ORGANIZED HEALTH CARE EDUCATION/TRAINING PROGRAM

## 2023-01-17 PROCEDURE — 96372 THER/PROPH/DIAG INJ SC/IM: CPT | Performed by: NURSE PRACTITIONER

## 2023-01-17 PROCEDURE — 85007 BL SMEAR W/DIFF WBC COUNT: CPT | Performed by: NURSE PRACTITIONER

## 2023-01-17 RX ORDER — THIAMINE HCL 100 MG
100 TABLET ORAL DAILY
Status: DISCONTINUED | OUTPATIENT
Start: 2023-01-17 | End: 2023-02-07 | Stop reason: HOSPADM

## 2023-01-17 RX ORDER — AMOXICILLIN AND CLAVULANATE POTASSIUM 875; 125 MG/1; MG/1
1 TABLET, FILM COATED ORAL EVERY 12 HOURS
Status: COMPLETED | OUTPATIENT
Start: 2023-01-17 | End: 2023-01-26

## 2023-01-17 RX ORDER — FLUTICASONE PROPIONATE 50 MCG
2 SPRAY, SUSPENSION (ML) NASAL 2 TIMES DAILY
Status: DISCONTINUED | OUTPATIENT
Start: 2023-01-17 | End: 2023-02-07 | Stop reason: HOSPADM

## 2023-01-17 RX ADMIN — LEVETIRACETAM 1500 MG: 500 TABLET, FILM COATED ORAL at 09:01

## 2023-01-17 RX ADMIN — RISPERIDONE 0.5 MG: 0.25 TABLET ORAL at 09:01

## 2023-01-17 RX ADMIN — POTASSIUM CHLORIDE 20 MEQ: 1500 TABLET, EXTENDED RELEASE ORAL at 12:01

## 2023-01-17 RX ADMIN — THERA TABS 1 TABLET: TAB at 09:01

## 2023-01-17 RX ADMIN — METOCLOPRAMIDE 10 MG: 10 TABLET ORAL at 07:01

## 2023-01-17 RX ADMIN — INSULIN DETEMIR 10 UNITS: 100 INJECTION, SOLUTION SUBCUTANEOUS at 10:01

## 2023-01-17 RX ADMIN — ESCITALOPRAM OXALATE 20 MG: 10 TABLET ORAL at 09:01

## 2023-01-17 RX ADMIN — FOLIC ACID 1 MG: 1 TABLET ORAL at 09:01

## 2023-01-17 RX ADMIN — GUAIFENESIN 600 MG: 600 TABLET, EXTENDED RELEASE ORAL at 09:01

## 2023-01-17 RX ADMIN — MEGESTROL ACETATE 40 MG: 20 TABLET ORAL at 09:01

## 2023-01-17 RX ADMIN — SODIUM BICARBONATE 650 MG TABLET 650 MG: at 09:01

## 2023-01-17 RX ADMIN — METOCLOPRAMIDE 10 MG: 10 TABLET ORAL at 10:01

## 2023-01-17 RX ADMIN — CYPROHEPTADINE HYDROCHLORIDE 4 MG: 4 TABLET ORAL at 09:01

## 2023-01-17 RX ADMIN — CETIRIZINE HYDROCHLORIDE 10 MG: 10 TABLET, FILM COATED ORAL at 09:01

## 2023-01-17 RX ADMIN — METOCLOPRAMIDE 10 MG: 10 TABLET ORAL at 12:01

## 2023-01-17 RX ADMIN — AMOXICILLIN AND CLAVULANATE POTASSIUM 1 TABLET: 875; 125 TABLET, FILM COATED ORAL at 09:01

## 2023-01-17 RX ADMIN — MAGNESIUM SULFATE HEPTAHYDRATE 2 G: 40 INJECTION, SOLUTION INTRAVENOUS at 12:01

## 2023-01-17 RX ADMIN — PANTOPRAZOLE SODIUM 40 MG: 40 TABLET, DELAYED RELEASE ORAL at 06:01

## 2023-01-17 RX ADMIN — SODIUM BICARBONATE 650 MG TABLET 650 MG: at 04:01

## 2023-01-17 RX ADMIN — POTASSIUM BICARBONATE 50 MEQ: 977.5 TABLET, EFFERVESCENT ORAL at 12:01

## 2023-01-17 RX ADMIN — RISPERIDONE 1 MG: 1 TABLET ORAL at 09:01

## 2023-01-17 RX ADMIN — ENOXAPARIN SODIUM 40 MG: 100 INJECTION SUBCUTANEOUS at 05:01

## 2023-01-17 RX ADMIN — POTASSIUM CHLORIDE 20 MEQ: 1500 TABLET, EXTENDED RELEASE ORAL at 02:01

## 2023-01-17 RX ADMIN — INSULIN DETEMIR 10 UNITS: 100 INJECTION, SOLUTION SUBCUTANEOUS at 09:01

## 2023-01-17 RX ADMIN — THIAMINE HCL TAB 100 MG 100 MG: 100 TAB at 09:01

## 2023-01-17 RX ADMIN — FLUTICASONE PROPIONATE 100 MCG: 50 SPRAY, METERED NASAL at 09:01

## 2023-01-17 RX ADMIN — VANCOMYCIN HYDROCHLORIDE 1000 MG: 1 INJECTION, POWDER, LYOPHILIZED, FOR SOLUTION INTRAVENOUS at 09:01

## 2023-01-17 RX ADMIN — FLUCONAZOLE 800 MG: 100 TABLET ORAL at 09:01

## 2023-01-17 RX ADMIN — FERROUS SULFATE TAB 325 MG (65 MG ELEMENTAL FE) 1 EACH: 325 (65 FE) TAB at 09:01

## 2023-01-17 RX ADMIN — CEFEPIME HYDROCHLORIDE 2 G: 2 INJECTION, SOLUTION INTRAVENOUS at 01:01

## 2023-01-17 RX ADMIN — CEFEPIME HYDROCHLORIDE 2 G: 2 INJECTION, SOLUTION INTRAVENOUS at 11:01

## 2023-01-17 RX ADMIN — METOCLOPRAMIDE 10 MG: 10 TABLET ORAL at 05:01

## 2023-01-17 NOTE — ASSESSMENT & PLAN NOTE
Repeat CBC in a.m., patient had episodes of bleeding, coffee-ground emesis due to any fungal therapy, this was resolved, monitor for bleeding and transfuse as needed

## 2023-01-17 NOTE — CONSULTS
Consult Note  Infectious Disease    Reason for Consult:  leukocytosis    HPI: El Abreu is a 27 y.o. 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  he was last admitted to [facility] November 30th with vomiting and headache, increased intracranial pressure.  This LP had a positive Sirisha ink opening pressure was 23.  He was then committed to a 6 week course of AmBisome completed 1/10, which was interrupted with AC but resumed and he was discharged to [facility] on January 10th.  He did require an additional LP for fluid removal and reduction of pressure on 12/13 (Sirisha ink was still positive but the culture was negative.  The last positive culture was 11/21).  He has been continued on fluconazole 800 mg daily since 01/11 and has been receiving Pneumocystis prophylaxis and appetite stimulants and antidepressants.  MRI brain on 01/01 showed stability of the right globus pallidus cryptococcal pseudocyst.  He was additionally treated for neurosyphilis 3 doses of Bicillin after an appropriate course of IV penicillin G.  He has not been eating well, withdrawn, seen by Psychiatry late December and started on risperidone.  He was seen in the [facility] emergency room 1/14 when his mandible became stuck open.  This was reduced in their emergency room he was sent back to the [facility].  He was sent to our emergency room yesterday for severe hypomagnesemia, 0.8 While in the emergency room he was noted to have a rising white blood cell count, from normal on 01/08 to 14 on 01/09 to 17 on 01/16 additionally his hemoglobin was 7.3.  An infectious workup was commenced and CRP was 2.0, lactic acid 0.9, procalcitonin 0.15.  Chest x-ray was negative, blood cultures are negative, respiratory PCR is not yet been  collected and he is not produce any sputum but this is not likely relevant..vanc and cefepime were begun.   Unfortunately our hospital is full and he has been waiting in the hallway outside the emergency room.  He is incontinent of urine, and urinalysis was negative.  Magnesium is now 1.7    He feels much improved, tells me that he ate breakfast and lunch. He does endorse some small amount of yellow sputum and he does find that his sinuses are congested. It is relevant to note that he had pansinusitis on MRI brain 1/1.         Review of patient's allergies indicates:  No Known Allergies  Past Medical History:   Diagnosis Date    AIDS 11/6/2022    Cryptococcal meningitis 11/5/2022    Diabetes mellitus     type 2 x 2 months ago dx//    Fungal meningitis     Human immunodeficiency virus (HIV) disease     Neurosyphilis 11/6/2022    Paroxysmal atrial fibrillation     Seizures     Strabismus age 4-5//    os since accident     Vision abnormalities     left eye     Past Surgical History:   Procedure Laterality Date    CATARACT EXTRACTION W/  INTRAOCULAR LENS IMPLANT Left age 8    CIRCUMCISION      EYE SURGERY  4-5 yrs of age    toy hit eye os//     Social History     Socioeconomic History    Marital status: Single   Tobacco Use    Smoking status: Never   Substance and Sexual Activity    Alcohol use: No    Drug use: No     Social Determinants of Health     Financial Resource Strain: Low Risk     Difficulty of Paying Living Expenses: Not very hard   Food Insecurity: No Food Insecurity    Worried About Running Out of Food in the Last Year: Never true    Ran Out of Food in the Last Year: Never true   Transportation Needs: No Transportation Needs    Lack of Transportation (Medical): No    Lack of Transportation (Non-Medical): No   Physical Activity: Sufficiently Active    Days of Exercise per Week: 7 days    Minutes of Exercise per Session: 30 min   Stress: No Stress Concern Present    Feeling of Stress  : Not at all   Social Connections: Socially Isolated    Frequency of Communication with Friends and Family: More than three times a week    Frequency of Social Gatherings with Friends and Family: More than three times a week    Attends Cheondoism Services: Never    Active Member of Clubs or Organizations: No    Marital Status: Never    Housing Stability: Unknown    Unable to Pay for Housing in the Last Year: No    Number of Places Lived in the Last Year: 1     Family History   Problem Relation Age of Onset    Diabetes Mother     Diabetes Father     Hypertension Father     Diabetes Paternal Grandmother     Hypertension Paternal Grandmother     Diabetes Paternal Grandfather     Cancer Paternal Grandfather     Stroke Paternal Grandfather     Blindness Paternal Uncle     Amblyopia Neg Hx     Cataracts Neg Hx     Glaucoma Neg Hx     Macular degeneration Neg Hx     Retinal detachment Neg Hx     Strabismus Neg Hx     Thyroid disease Neg Hx          Review of Systems:   No chills, fever, sweats, but he has had weight loss  No change in vision, loss of vision or diplopia    sinus congestion, no purulent nasal discharge, with post nasal drip no facial pain and yellow sputum  No pain in mouth or throat.   No chest pain, palpitations, syncope   cough, small amount of yellow sputum production, noshortness of breath, dyspnea on exertion, pleurisy, hemoptysis  No nausea, vomiting, diarrhea,  , or focal abd pain,  No dysphagia, odynophagia. Appetite is improved today  No dysuria, hematuria, strangury, but has had some incontinence     No swelling of joints, redness of joints, injuries, or new focal pain  No unusual headaches, dizziness, vertigo,    History of anxiety, depression,    History of diabetes  No bleeding,  malignancy, unusual bruising  No new rashes, lesions, or wounds     No recent/prior steroids  Outdoor activities:n/a  Travel:   Implants: none  Antibiotic History: see HPI     EXAM &  DIAGNOSTICS REVIEWED:   Vitals:     Temp:  [97.7 °F (36.5 °C)-98.7 °F (37.1 °C)]   Temp: 98.7 °F (37.1 °C) (01/17/23 1700)  Pulse: 83 (01/17/23 1700)  Resp: 16 (01/17/23 1700)  BP: (!) 110/57 (01/17/23 1700)  SpO2: 99 % (01/17/23 1700)    Intake/Output Summary (Last 24 hours) at 1/17/2023 1757  Last data filed at 1/17/2023 1437  Gross per 24 hour   Intake 510 ml   Output 750 ml   Net -240 ml       General:  In NAD. Alert and attentive, cooperative, comfortable, completely non toxic  Eyes:  Anicteric, PERRL, EOMI  ENT:  No ulcers, exudates, thrush, nares patent, nasal congestion is evident  Neck:  supple, no masses or adenopathy appreciated  Lungs: Clear, no consolidation, rales, wheezes, rub  Heart:  RRR, no gallop/murmur/rub noted  Abd:  Soft, NT, ND, normal BS, no masses or organomegaly appreciated.  :  Voids , no flank tenderness  Musc:  Joints without effusion, swelling, erythema, synovitis, with mild generalized muscle wasting.   Skin:  No rashes. No palmar or plantar lesions. No subungual petechiae  Neuro:             Alert, attentive, speech fluent, face symmetric, moves all extremities, no focal weakness.   Psych: Calm, cooperative  Lymphatic:     No cervical, supraclavicular,  nodes  Extrem: No edema, erythema, phlebitis, cellulitis, warm and well perfused  VAD:  Right arm midline     Isolation:  none  Wound: none      Lines/Tubes/Drains:    General Labs reviewed:  Recent Labs   Lab 01/16/23  0450 01/16/23  1533 01/17/23  0448   WBC 17.26* 17.70* 17.35*   HGB 7.3* 7.9* 7.3*   HCT 22.0* 24.1* 22.3*    283 250       Recent Labs   Lab 01/13/23  0631 01/16/23  0450 01/16/23  1533 01/17/23  0448    133* 135* 137   K 3.6 3.6 3.5 3.6    102 103 107   CO2 21* 22* 26 24   BUN 29* 22* 23* 18   CREATININE 1.8* 1.1 1.0 1.0   CALCIUM 10.4 9.1 9.3 9.2   PROT 6.8 5.9* 6.3  --    BILITOT 0.3 0.2 0.4  --    ALKPHOS 90 71 76  --    ALT 39 31 36  --    AST 31 28 30  --      Recent Labs   Lab  01/17/23  0104   CRP 2.00*         Micro:  Microbiology Results (last 7 days)       Procedure Component Value Units Date/Time    Blood culture x two cultures. Draw prior to antibiotics. [[id number]] Collected: 01/16/23 1455    Order Status: Completed Specimen: Blood from Line, PICC Right Basilic Updated: 01/17/23 1632     Blood Culture, Routine No Growth to date      No Growth to date    Narrative:      Aerobic and anaerobic    Culture, Respiratory with Gram Stain [[id number]] Collected: 01/17/23 1456    Order Status: Sent Specimen: Sputum Updated: 01/17/23 1507    MRSA Screen by PCR [[id number]]     Order Status: No result Specimen: Nasopharyngeal Swab from Nasal     Respiratory Infection Panel (PCR), Nasopharyngeal [[id number]]     Order Status: No result Specimen: Nasopharyngeal Swab     Blood culture x two cultures. Draw prior to antibiotics. [[id number]] Collected: 01/16/23 1527    Order Status: Sent Specimen: Blood from Line, PICC Right Basilic Updated: 01/16/23 1527            Imaging Reviewed:   CXR   MRI brain 1/1    Cardiology:    IMPRESSION & PLAN   1. Severe hypomagnesemia, corrected   Electrolyte abnormalities likely from ampho B    2. Cryptococcal meningitis, now controlled, s/p 6 weeks of ambisome and continues on high dose oral fluconazole   Cryptococcoma, stable  3. Cachexia with failure to thrive, with improvement in appetite today  4. Pansinusitis, may be cause of leukocytosis        Recommendations:  He should remain on diflucan 800 mg per day  He should remain on bactrim DS TIW for PJP prophylaxis and his anti-retrovirals ASAP  I am stopping the vanc and cefepime and substituting augmentin for his sinusitis plus flonase  I believe [facility]  MRSA nasal screen and sputum culture if obtainable    Medical Decision Making during this encounter was  [_] Low Complexity  [_] Moderate Complexity  [xxx  ] High Complexity

## 2023-01-17 NOTE — PLAN OF CARE
Goals to be met by: 23     Patient will increase functional independence with mobility by performin. Supine to sit with Supervision  2. Sit to stand transfer with Supervision  3. Bed to chair transfer with Supervision using Rolling Walker  4. Gait  x 150 feet with Supervision using Rolling Walker.

## 2023-01-17 NOTE — PROGRESS NOTES
Pharmacist Renal Dose Adjustment Note    El Abreu is a 27 y.o. male being treated with the medication cefepime     Patient Data:    Vital Signs (Most Recent):  Pulse: 83 (01/16/23 1700)  Resp: (!) 23 (01/16/23 1700)  BP: 109/64 (01/16/23 1700)  SpO2: 100 % (01/16/23 1700)   Vital Signs (72h Range):  Temp:  [97.6 °F (36.4 °C)-99.7 °F (37.6 °C)]   Pulse:  [78-90]   Resp:  [15-23]   BP: (103-120)/(55-67)   SpO2:  [97 %-100 %]      Recent Labs   Lab 01/13/23  0631 01/16/23  0450 01/16/23  1533   CREATININE 1.8* 1.1 1.0     Serum creatinine: 1 mg/dL 01/16/23 1533  Estimated creatinine clearance: 82.8 mL/min    Per [facility] renal dosing protocol:     Previous Order: cefepime 2 g Q 12 hours    Will be changed to:     New Order:cefepime 2 g Q 8 hours,    Due to: crcl > 60 ml/min    Renal dose adjustments performed as noted above.    We will continue monitoring and adjusting as necessary.    Pharmacist: Imtiaz Oconnell, PharmD  Ext: [phone]

## 2023-01-17 NOTE — H&P
[facility] - Emergency Dept  Hospital Medicine  History & Physical    Patient Name: El Abreu  MRN: [id number]  Patient Class: OP- Observation  Admission Date: 1/16/2023  Attending Physician: Anais Ibanez MD   Primary Care Provider: [facility]         Patient information was obtained from patient and ER records.     Subjective:     Principal Problem:Pneumonia    Chief Complaint:   Chief Complaint   Patient presents with    Weakness     Reported low H&H and low magnesium level        HPI: El Abreu is a 27-year-old male with chronic medical problems including HIV/aids and diabetes mellitus who recently had a long hospitalization at [facility] with cryptococcal meningitis and neurosyphilis.  Hospitalization was complicated by acute kidney injury, GI bleed, and malnutrition and conditioning and he was transitioned to TCU for continued treatment of fungal meningitis and rehab. He was sent to [facility] Emergency room for evaluation of low magnesium hemoglobin.  Patient is sitting up in bed eating a [employer] chicken sandwich.  He states he is very weak in his unable to stand.  He said he has been in bed for weeks.  He denies headache vision changes, denies shortness of breath but states that he is had a cough brownish base sputum.  He denies fevers or chills, states he gets dizzy when he tries to stand or sit up.  Denies any trouble voiding or diarrhea.  Magnesium was replaced, initially 0.8, last 1 today 1.4, WBC elevated at 17.7, hemoglobin 7.9, in the last 8 days range was 8.2-9.2.  Potassium decreased at 3.5, glucose 135, creatinine 1.0 and estimated GFR greater than 60.  Creatinine clearance 82.8.  Vital signs with temperature 97.6°, heart rate 83, respiratory rate 23, blood pressure 109/64 and O2 sat 100.  Chest x-ray with nothing acute.  He was started on cefepime, vancomycin and lactated Ringer's bolus.  ED physician spoke with  Infectious Disease doctor at [facility] who recommended transfer there but they were on diversion.  Patient will be admitted here for treatment and we will try to transfer as able.    He originally presented to [facility] with lethargy and headaches on 11/05.  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  He was initially started on amphotericin B and flucytosine for 14 days of induction, broad-spectrum antibiotics including ceftriaxone twice daily and acyclovir.  CSF VDRL negative but treponemal antibodies on CSF were positive CD4 count 7, HIV viral load 48,000 K, genotype [id number]. R: DDI, hepatitis-B C and B antigen negative, GC chlamydia negative toxoplasma negative, CMV PCR negative.  He was also placed on atovaquone initially and then changed to Bactrim DS 3 times weekly for PJP prophylaxis.  After completing 14 days of induction with amphotericin and flucytosine he was switched to 800 mg oral daily fluconazole for 8 weeks through January 13 for consolidation and then was to continue fluconazole 200 mg for long-term suppression.  He also completed 10 days of IV ceftriaxone and then 4 days of IV penicillin Recommended starting ART as outpatient after treatment with cryptococcal meningitis for 4-5 weeks.  He was started on Keppra and Vimpat seizures.  He was discharged from [facility] on 11/21 and returned 11/30 with headache in [place] ink stain was still positive and was found to only be on 400 of fluconazole instead of 800 a day.  He was found have crypto and was started again on amphotericin B and flucytosine for 6 weeks.  Apparently he was noncompliant in the hospital and eventually did switch to AmBisome and fluconazole 800 orally until January 11th.  He was started on ART  but was noncompliant so it was held again for a time and he was seen by  psychiatry and meds were adjusted.   He had problems with kidney function and was switched to atovaquone.  He repeated a course of weekly Bicillin LA injections x3 doses and completed treatment on .  Symtuza. (Descovy + Prezcobix) was started on .  He was discharged to skilled nursing to continue treatment after improving renal failure, nausea, coffee-ground and was also seen by Psychiatry.          Past Medical History:   Diagnosis Date    Diabetes mellitus     type 2 x 2 months ago dx//    Fungal meningitis     Human immunodeficiency virus (HIV) disease     Paroxysmal atrial fibrillation     Seizures     Strabismus age 4-5//    os since accident     Vision abnormalities     left eye       Past Surgical History:   Procedure Laterality Date    CATARACT EXTRACTION W/  INTRAOCULAR LENS IMPLANT Left age 8    CIRCUMCISION      EYE SURGERY  4-5 yrs of age    toy hit eye os//       Review of patient's allergies indicates:  No Known Allergies    Current Facility-Administered Medications on File Prior to Encounter   Medication    [MAR Hold - Suspended Admission] acetaminophen tablet 650 mg    [MAR Hold - Suspended Admission] aluminum-magnesium hydroxide-simethicone 200-200-20 mg/5 mL suspension 30 mL    [MAR Hold - Suspended Admission] cetirizine tablet 10 mg    [MAR Hold - Suspended Admission] cyproheptadine 4 mg tablet 4 mg    [MAR Hold - Suspended Admission] darunavir-cobicistat tablet 1 tablet    [] dextrose 5 % and 0.45 % NaCl infusion    [MAR Hold - Suspended Admission] emtricitabine-tenofovir alafen 200-25 mg Tab    [MAR Hold - Suspended Admission] enoxaparin injection 40 mg    [MAR Hold - Suspended Admission] EScitalopram oxalate tablet 20 mg    [MAR Hold - Suspended Admission] ferrous sulfate tablet 2 each    [MAR Hold - Suspended Admission] fluconazole tablet 800 mg    [MAR Hold - Suspended Admission] fluticasone propionate 50 mcg/actuation nasal spray 100 mcg    [MAR Hold -  Suspended Admission] folic acid tablet 1 mg    [MAR Hold - Suspended Admission] glucagon (human recombinant) injection 1 mg    [MAR Hold - Suspended Admission] glucose chewable tablet 16 g    [MAR Hold - Suspended Admission] glucose chewable tablet 24 g    [MAR Hold - Suspended Admission] HYDROcodone-acetaminophen 5-325 mg per tablet 1 tablet    [MAR Hold - Suspended Admission] insulin aspart U-100 pen 1-10 Units    [MAR Hold - Suspended Admission] L.acidophil,parac-S.therm-Bif. (Risaquad) Cap 1 capsule    [MAR Hold - Suspended Admission] levETIRAcetam tablet 1,500 mg    [COMPLETED] magnesium sulfate 2g in water 50mL IVPB (premix)    [MAR Hold - Suspended Admission] megestroL tablet 40 mg    [MAR Hold - Suspended Admission] melatonin tablet 6 mg    [MAR Hold - Suspended Admission] metoclopramide HCl tablet 10 mg    [MAR Hold - Suspended Admission] multivitamin tablet    [MAR Hold - Suspended Admission] ondansetron disintegrating tablet 4 mg    [MAR Hold - Suspended Admission] ondansetron disintegrating tablet 8 mg    [MAR Hold - Suspended Admission] pantoprazole EC tablet 40 mg    [MAR Hold - Suspended Admission] potassium bicarbonate disintegrating tablet 50 mEq    [MAR Hold - Suspended Admission] risperiDONE tablet 0.5 mg    [MAR Hold - Suspended Admission] risperiDONE tablet 1 mg    [MAR Hold - Suspended Admission] sodium bicarbonate tablet 650 mg    [MAR Hold - Suspended Admission] sodium chloride 0.9% flush 10 mL    [MAR Hold - Suspended Admission] sodium chloride 0.9% flush 10 mL    And    [MAR Hold - Suspended Admission] sodium chloride 0.9% flush 10 mL    [MAR Hold - Suspended Admission] sulfamethoxazole-trimethoprim 800-160mg per tablet 1 tablet    [MAR Hold - Suspended Admission] thiamine tablet 100 mg     Current Outpatient Medications on File Prior to Encounter   Medication Sig    acetaminophen (TYLENOL) 650 MG TbSR Take 650 mg by mouth every 4 (four) hours as needed.     aluminum-magnesium hydroxide-simethicone (MAALOX ADVANCED) 200-200-20 mg/5 mL Susp Take 30 mLs by mouth every 4 (four) hours as needed.    cetirizine (ZYRTEC) 10 MG tablet Take 10 mg by mouth once daily.    cyproheptadine (PERIACTIN) 4 mg tablet Take 1 tablet (4 mg total) by mouth 2 (two) times daily.    darunavir-cobicistat (PREZCOBIX) 800-150 mg-mg Tab tablet Take 1 tablet by mouth once daily.    emtricitabine-tenofovir alafen (DESCOVY) 200-25 mg Tab Take 1 tablet by mouth once daily.    enoxaparin (LOVENOX) 40 mg/0.4 mL Syrg Inject 40 mg into the skin once daily.    EScitalopram oxalate (LEXAPRO) 20 MG tablet Take 1 tablet (20 mg total) by mouth once daily.    ferrous gluconate (FERGON) 324 MG tablet Take 650 mg by mouth every other day.    fluconazole (DIFLUCAN) 200 MG Tab Take 4 tablets (800 mg total) by mouth once daily.    fluticasone propionate (FLONASE) 50 mcg/actuation nasal spray 2 sprays by Each Nostril route 2 (two) times a day.    folic acid (FOLVITE) 1 MG tablet Take 1 tablet (1 mg total) by mouth once daily.    glucagon 1 mg/mL SolR Inject 1 mg into the muscle as needed.    glucose 4 GM chewable tablet Take 16-24 g by mouth as needed for Low blood sugar.    HYDROcodone-acetaminophen (NORCO) 5-325 mg per tablet Take 1 tablet by mouth every 4 (four) hours as needed for Pain.    insulin aspart U-100 (NOVOLOG) 100 unit/mL (3 mL) InPn pen Inject 15 Units into the skin 3 (three) times daily. (Patient taking differently: Inject 1-10 Units into the skin 4 (four) times daily before meals and nightly.)    insulin detemir U-100 (LEVEMIR FLEXTOUCH) 100 unit/mL (3 mL) SubQ InPn pen Inject 10 Units into the skin 2 (two) times daily.    Lactobacillus rhamnosus GG (CULTURELLE) 10 billion cell capsule Take 1 capsule by mouth once daily.    levETIRAcetam (KEPPRA) 750 MG Tab Take 2 tablets (1,500 mg total) by mouth 2 (two) times daily.    megestroL (MEGACE) 40 MG Tab Take 1 tablet (40 mg total) by  mouth once daily.    melatonin (MELATIN) 3 mg tablet Take 6 mg by mouth nightly as needed for Insomnia.    metoclopramide HCl (REGLAN) 10 MG tablet Take 1 tablet (10 mg total) by mouth 4 (four) times daily with meals and nightly.    multivitamin (ONE DAILY MULTIVITAMIN) per tablet Take 1 tablet by mouth once daily.    ondansetron (ZOFRAN-ODT) 8 MG TbDL Take 1 tablet (8 mg total) by mouth every 8 (eight) hours. for 10 days    pantoprazole (PROTONIX) 40 MG tablet Take 1 tablet (40 mg total) by mouth once daily.    potassium bicarbonate (K-LYTE) disintegrating tablet Take 50 mEq by mouth once daily. Dissolve tab in 4oz cold water prior to administration    risperiDONE (RISPERDAL) 0.5 MG Tab Take 1 tablet (0.5 mg total) by mouth once daily.    risperiDONE 0.25 mg TbDL Take 4 tablets (1 mg total) by mouth every evening.    sodium bicarbonate 650 MG tablet Take 2 tablets (1,300 mg total) by mouth 3 (three) times daily.    sulfamethoxazole-trimethoprim 800-160mg (BACTRIM DS) 800-160 mg Tab Take 1 tablet by mouth every Mon, Wed, Fri.    thiamine 100 MG tablet Take 100 mg by mouth once daily.    blood sugar diagnostic Strp To check BG 4 times daily, and PRN hypoglycemia to use with insurance preferred meter    blood-glucose meter (ONETOUCH VERIO METER) Misc To check Blood sugars 4 times daily  , DX E 11.65    ferrous sulfate 324 mg (65 mg iron) TbEC Take 1 tablet (324 mg total) by mouth once daily.    lancets Misc To check BG 4 times daily, and PRN low blood sugars to use with insurance preferred meter     Family History       Problem Relation (Age of Onset)    Blindness Paternal Uncle    Cancer Paternal Grandfather    Diabetes Mother, Father, Paternal Grandmother, Paternal Grandfather    Hypertension Father, Paternal Grandmother    Stroke Paternal Grandfather          Tobacco Use    Smoking status: Never    Smokeless tobacco: Not on file   Substance and Sexual Activity    Alcohol use: No    Drug use: No     Sexual activity: Not on file     Review of Systems   All other systems reviewed and are negative.  Twelve point review of systems obtained and negative except as stated above in HPI     Objective:     Vital Signs (Most Recent):  Pulse: 83 (01/16/23 1700)  Resp: (!) 23 (01/16/23 1700)  BP: 109/64 (01/16/23 1700)  SpO2: 100 % (01/16/23 1700)   Vital Signs (24h Range):  Temp:  [97.6 °F (36.4 °C)] 97.6 °F (36.4 °C)  Pulse:  [78-90] 83  Resp:  [15-23] 23  SpO2:  [98 %-100 %] 100 %  BP: (103-114)/(55-64) 109/64     Weight: 52.3 kg (115 lb 3.2 oz)  Body mass index is 14.4 kg/m².    Physical Exam  Vitals and nursing note reviewed.   Constitutional:       Comments: Thin, generally weak, sitting up in bed feeding self, he is in no acute distress, poor historian.   HENT:      Head: Normocephalic.      Right Ear: External ear normal.      Left Ear: External ear normal.      Nose: Nose normal.      Mouth/Throat:      Mouth: Mucous membranes are moist.      Pharynx: Oropharynx is clear.   Eyes:      Conjunctiva/sclera: Conjunctivae normal.   Cardiovascular:      Rate and Rhythm: Normal rate and regular rhythm.      Pulses: Normal pulses.      Heart sounds: Normal heart sounds.   Pulmonary:      Effort: Pulmonary effort is normal.      Comments: Bilateral breath sounds with scattered rhonchi posteriorly, occasional expiratory wheezes anterior, no rales or crackles, no tachypnea or increased work of breathing, he appears to be breathing comfortably on room air.  Abdominal:      General: Bowel sounds are normal.      Palpations: Abdomen is soft.      Tenderness: There is no abdominal tenderness.   Musculoskeletal:         General: Normal range of motion.      Cervical back: Normal range of motion.      Comments: Equal strength bilaterally though generally weak.  Thin extremities with poor bulk.   Skin:     General: Skin is warm and dry.      Capillary Refill: Capillary refill takes less than 2 seconds.   Neurological:       Mental Status: He is alert.      Comments: He is oriented to person place and time, a is very vague about any of his medical history.   Psychiatric:         Attention and Perception: He is inattentive.         Mood and Affect: Affect is flat.         Speech: Speech normal.         Behavior: Behavior is slowed.         Thought Content: Thought content normal.         Cognition and Memory: Cognition normal.           Significant Labs: All pertinent labs within the past 24 hours have been reviewed.    A1C:   Recent Labs   Lab 11/05/22  1532   HGBA1C 6.4*     Bilirubin:   Recent Labs   Lab 01/10/23  0516 01/11/23  0901 01/13/23  0631 01/16/23  0450 01/16/23  1533   BILITOT 0.2 0.3 0.3 0.2 0.4     CBC:   Recent Labs   Lab 01/16/23  0450 01/16/23  1533   WBC 17.26* 17.70*   HGB 7.3* 7.9*   HCT 22.0* 24.1*    283     CMP:   Recent Labs   Lab 01/16/23  0450 01/16/23  1533   * 135*   K 3.6 3.5    103   CO2 22* 26   * 135*   BUN 22* 23*   CREATININE 1.1 1.0   CALCIUM 9.1 9.3   PROT 5.9* 6.3   ALBUMIN 2.6* 3.0*   BILITOT 0.2 0.4   ALKPHOS 71 76   AST 28 30   ALT 31 36   ANIONGAP 9 6*     Prealbumin:   Recent Labs   Lab 01/16/23  0450   PREALBUMIN 19*       Significant Imaging: I have reviewed all pertinent imaging results/findings within the past 24 hours.    X-Ray Chest AP Portable [[id number]]Collected: 01/16/23 1503     FINDINGS: Portable chest radiograph at 1518 hours compared to 12/26/2022 shows right PICC with distal portion having flipped into the left brachiocephalic vein, the distal tip looped over the left brachiocephalic vein to the left of midline. The cardiomediastinal silhouette and pulmonary vasculature are within normal limits.     The lungs are normally expanded, with no consolidation, large pleural effusion, or evidence of pulmonary edema. No confluent infiltrates or pneumothorax. There are no significant osseous abnormalities.     IMPRESSION:   1. Right PICC as described.   2. No  evidence of active cardiopulmonary disease.     Scheduled Meds:   [START ON 1/17/2023] ceFEPime (MAXIPIME) IVPB  2 g Intravenous Q8H    [START ON 1/17/2023] cetirizine  10 mg Oral Daily    cyproheptadine  4 mg Oral BID    [START ON 1/17/2023] darunavir-cobicistat  1 tablet Oral Daily    [START ON 1/17/2023] emtricitabine-tenofovir alafen  1 tablet Oral Daily    enoxaparin  40 mg Subcutaneous Daily    [START ON 1/17/2023] EScitalopram oxalate  20 mg Oral Daily    [START ON 1/17/2023] ferrous sulfate  1 tablet Oral Daily    [START ON 1/17/2023] fluconazole  800 mg Oral Daily    [START ON 1/17/2023] folic acid  1 mg Oral Daily    guaiFENesin  600 mg Oral BID    insulin detemir U-100  10 Units Subcutaneous BID    levETIRAcetam  1,500 mg Oral BID    [START ON 1/17/2023] megestroL  40 mg Oral Daily    metoclopramide HCl  10 mg Oral QID (WM & HS)    [START ON 1/17/2023] multivitamin  1 tablet Oral Daily    [START ON 1/17/2023] pantoprazole  40 mg Oral Before breakfast    [START ON 1/17/2023] potassium chloride  20 mEq Oral Q2H    [START ON 1/17/2023] risperiDONE  0.5 mg Oral Daily    risperiDONE  1 mg Oral QHS    [START ON 1/17/2023] sodium bicarbonate  650 mg Oral TID    [START ON 1/18/2023] sulfamethoxazole-trimethoprim 800-160mg  1 tablet Oral Every Mon, Wed, Fri    [START ON 1/17/2023] thiamine  100 mg Oral Daily     Continuous Infusions:  PRN Meds:.acetaminophen, albuterol-ipratropium, dextrose 10%, dextrose 10%, glucagon (human recombinant), glucose, glucose, HYDROcodone-acetaminophen, HYDROcodone-acetaminophen, insulin aspart U-100, magnesium sulfate IVPB, magnesium sulfate IVPB, melatonin, melatonin, naloxone, ondansetron, polyethylene glycol, potassium bicarbonate, potassium bicarbonate, potassium bicarbonate, prochlorperazine, simethicone, sodium chloride 0.9%, Pharmacy to dose Vancomycin consult **AND** vancomycin - pharmacy to dose    Assessment/Plan:     * Pneumonia  Concern for  pneumonia, we will continue cefepime and vancomycin, procalcitonin and CRP is pending, upper respiratory PCR has been in, pulse ox every 4 hours, oxygen per protocol, currently on room air      AIDS  Patient went for the 48k viral load and 7 CD4 count, just diagnosed with HIV in November, found to have cryptococcal meningitis, started on anti-retroviral therapy on December 23rd, currently on Descovy and Prezcobix, we will resume, consult Infectious Disease, continue Bactrim DS 3 times weekly for PJP prophylaxis    Cryptococcal meningitis  We will continue fluconazole 800 mg oral daily until seen by infectious disease, per Infectious Disease notes he may have completed this and transition to 200 mg oral daily for long-term suppression, consult to Infectious Disease      Hypokalemia  Potassium 3.5, we will give 40 mEq oral and repeat BNP in a.m.      Seizures complicating infection  Resume Keppra, seizure precautions      Hypomagnesemia  Replaced in ED, repeat magnesium in a.m. and replace with IV magnesium as needed per protocol      Anemia  Repeat CBC in a.m., patient had episodes of bleeding, coffee-ground emesis due to any fungal therapy, this was resolved, monitor for bleeding and transfuse as needed      Paroxysmal atrial fibrillation  Currently in sinus rhythm in the 70s, monitor on telemetry        Type 2 diabetes mellitus without retinopathy  Resume basal insulin, glucose checks a.c. and HS, resume moderate dose sliding scale insulin    Physical deconditioning  PT OT consult      VTE prophylaxis:  SCDs, enoxaparin 40 mg subcu once daily    Patient was examined at 1950    Code status: Full code      Cathi Mittal NP  Department of Hospital Medicine   [facility] - Emergency Dept

## 2023-01-17 NOTE — SUBJECTIVE & OBJECTIVE
Past Medical History:   Diagnosis Date    Diabetes mellitus     type 2 x 2 months ago dx//    Fungal meningitis     Human immunodeficiency virus (HIV) disease     Paroxysmal atrial fibrillation     Seizures     Strabismus age 4-5//    os since accident     Vision abnormalities     left eye       Past Surgical History:   Procedure Laterality Date    CATARACT EXTRACTION W/  INTRAOCULAR LENS IMPLANT Left age 8    CIRCUMCISION      EYE SURGERY  4-6 yrs of age    toy hit eye os//       Review of patient's allergies indicates:  No Known Allergies    Current Facility-Administered Medications on File Prior to Encounter   Medication    [MAR Hold - Suspended Admission] acetaminophen tablet 650 mg    [MAR Hold - Suspended Admission] aluminum-magnesium hydroxide-simethicone 200-200-20 mg/5 mL suspension 30 mL    [MAR Hold - Suspended Admission] cetirizine tablet 10 mg    [MAR Hold - Suspended Admission] cyproheptadine 4 mg tablet 4 mg    [MAR Hold - Suspended Admission] darunavir-cobicistat tablet 1 tablet    [] dextrose 5 % and 0.45 % NaCl infusion    [MAR Hold - Suspended Admission] emtricitabine-tenofovir alafen 200-25 mg Tab    [MAR Hold - Suspended Admission] enoxaparin injection 40 mg    [MAR Hold - Suspended Admission] EScitalopram oxalate tablet 20 mg    [MAR Hold - Suspended Admission] ferrous sulfate tablet 2 each    [MAR Hold - Suspended Admission] fluconazole tablet 800 mg    [MAR Hold - Suspended Admission] fluticasone propionate 50 mcg/actuation nasal spray 100 mcg    [MAR Hold - Suspended Admission] folic acid tablet 1 mg    [MAR Hold - Suspended Admission] glucagon (human recombinant) injection 1 mg    [MAR Hold - Suspended Admission] glucose chewable tablet 16 g    [MAR Hold - Suspended Admission] glucose chewable tablet 24 g    [MAR Hold - Suspended Admission] HYDROcodone-acetaminophen 5-325 mg per tablet 1 tablet    [MAR Hold - Suspended Admission] insulin aspart U-100 pen 1-10 Units    [MAR Hold -  Suspended Admission] L.acidophil,parac-S.therm-Bif. (Risaquad) Cap 1 capsule    [MAR Hold - Suspended Admission] levETIRAcetam tablet 1,500 mg    [COMPLETED] magnesium sulfate 2g in water 50mL IVPB (premix)    [MAR Hold - Suspended Admission] megestroL tablet 40 mg    [MAR Hold - Suspended Admission] melatonin tablet 6 mg    [MAR Hold - Suspended Admission] metoclopramide HCl tablet 10 mg    [MAR Hold - Suspended Admission] multivitamin tablet    [MAR Hold - Suspended Admission] ondansetron disintegrating tablet 4 mg    [MAR Hold - Suspended Admission] ondansetron disintegrating tablet 8 mg    [MAR Hold - Suspended Admission] pantoprazole EC tablet 40 mg    [MAR Hold - Suspended Admission] potassium bicarbonate disintegrating tablet 50 mEq    [MAR Hold - Suspended Admission] risperiDONE tablet 0.5 mg    [MAR Hold - Suspended Admission] risperiDONE tablet 1 mg    [MAR Hold - Suspended Admission] sodium bicarbonate tablet 650 mg    [MAR Hold - Suspended Admission] sodium chloride 0.9% flush 10 mL    [MAR Hold - Suspended Admission] sodium chloride 0.9% flush 10 mL    And    [MAR Hold - Suspended Admission] sodium chloride 0.9% flush 10 mL    [MAR Hold - Suspended Admission] sulfamethoxazole-trimethoprim 800-160mg per tablet 1 tablet    [MAR Hold - Suspended Admission] thiamine tablet 100 mg     Current Outpatient Medications on File Prior to Encounter   Medication Sig    acetaminophen (TYLENOL) 650 MG TbSR Take 650 mg by mouth every 4 (four) hours as needed.    aluminum-magnesium hydroxide-simethicone (MAALOX ADVANCED) 200-200-20 mg/5 mL Susp Take 30 mLs by mouth every 4 (four) hours as needed.    cetirizine (ZYRTEC) 10 MG tablet Take 10 mg by mouth once daily.    cyproheptadine (PERIACTIN) 4 mg tablet Take 1 tablet (4 mg total) by mouth 2 (two) times daily.    darunavir-cobicistat (PREZCOBIX) 800-150 mg-mg Tab tablet Take 1 tablet by mouth once daily.    emtricitabine-tenofovir alafen (DESCOVY) 200-25 mg Tab Take 1  tablet by mouth once daily.    enoxaparin (LOVENOX) 40 mg/0.4 mL Syrg Inject 40 mg into the skin once daily.    EScitalopram oxalate (LEXAPRO) 20 MG tablet Take 1 tablet (20 mg total) by mouth once daily.    ferrous gluconate (FERGON) 324 MG tablet Take 650 mg by mouth every other day.    fluconazole (DIFLUCAN) 200 MG Tab Take 4 tablets (800 mg total) by mouth once daily.    fluticasone propionate (FLONASE) 50 mcg/actuation nasal spray 2 sprays by Each Nostril route 2 (two) times a day.    folic acid (FOLVITE) 1 MG tablet Take 1 tablet (1 mg total) by mouth once daily.    glucagon 1 mg/mL SolR Inject 1 mg into the muscle as needed.    glucose 4 GM chewable tablet Take 16-24 g by mouth as needed for Low blood sugar.    HYDROcodone-acetaminophen (NORCO) 5-325 mg per tablet Take 1 tablet by mouth every 4 (four) hours as needed for Pain.    insulin aspart U-100 (NOVOLOG) 100 unit/mL (3 mL) InPn pen Inject 15 Units into the skin 3 (three) times daily. (Patient taking differently: Inject 1-10 Units into the skin 4 (four) times daily before meals and nightly.)    insulin detemir U-100 (LEVEMIR FLEXTOUCH) 100 unit/mL (3 mL) SubQ InPn pen Inject 10 Units into the skin 2 (two) times daily.    Lactobacillus rhamnosus GG (CULTURELLE) 10 billion cell capsule Take 1 capsule by mouth once daily.    levETIRAcetam (KEPPRA) 750 MG Tab Take 2 tablets (1,500 mg total) by mouth 2 (two) times daily.    megestroL (MEGACE) 40 MG Tab Take 1 tablet (40 mg total) by mouth once daily.    melatonin (MELATIN) 3 mg tablet Take 6 mg by mouth nightly as needed for Insomnia.    metoclopramide HCl (REGLAN) 10 MG tablet Take 1 tablet (10 mg total) by mouth 4 (four) times daily with meals and nightly.    multivitamin (ONE DAILY MULTIVITAMIN) per tablet Take 1 tablet by mouth once daily.    ondansetron (ZOFRAN-ODT) 8 MG TbDL Take 1 tablet (8 mg total) by mouth every 8 (eight) hours. for 10 days    pantoprazole (PROTONIX) 40 MG tablet Take 1 tablet (40  mg total) by mouth once daily.    potassium bicarbonate (K-LYTE) disintegrating tablet Take 50 mEq by mouth once daily. Dissolve tab in 4oz cold water prior to administration    risperiDONE (RISPERDAL) 0.5 MG Tab Take 1 tablet (0.5 mg total) by mouth once daily.    risperiDONE 0.25 mg TbDL Take 4 tablets (1 mg total) by mouth every evening.    sodium bicarbonate 650 MG tablet Take 2 tablets (1,300 mg total) by mouth 3 (three) times daily.    sulfamethoxazole-trimethoprim 800-160mg (BACTRIM DS) 800-160 mg Tab Take 1 tablet by mouth every Mon, Wed, Fri.    thiamine 100 MG tablet Take 100 mg by mouth once daily.    blood sugar diagnostic Strp To check BG 4 times daily, and PRN hypoglycemia to use with insurance preferred meter    blood-glucose meter (ONETOUCH VERIO METER) Misc To check Blood sugars 4 times daily  , DX E 11.65    ferrous sulfate 324 mg (65 mg iron) TbEC Take 1 tablet (324 mg total) by mouth once daily.    lancets Misc To check BG 4 times daily, and PRN low blood sugars to use with insurance preferred meter     Family History       Problem Relation (Age of Onset)    Blindness Paternal Uncle    Cancer Paternal Grandfather    Diabetes Mother, Father, Paternal Grandmother, Paternal Grandfather    Hypertension Father, Paternal Grandmother    Stroke Paternal Grandfather          Tobacco Use    Smoking status: Never    Smokeless tobacco: Not on file   Substance and Sexual Activity    Alcohol use: No    Drug use: No    Sexual activity: Not on file     Review of Systems   All other systems reviewed and are negative.  Twelve point review of systems obtained and negative except as stated above in HPI     Objective:     Vital Signs (Most Recent):  Pulse: 83 (01/16/23 1700)  Resp: (!) 23 (01/16/23 1700)  BP: 109/64 (01/16/23 1700)  SpO2: 100 % (01/16/23 1700)   Vital Signs (24h Range):  Temp:  [97.6 °F (36.4 °C)] 97.6 °F (36.4 °C)  Pulse:  [78-90] 83  Resp:  [15-23] 23  SpO2:  [98 %-100 %] 100 %  BP:  (103-114)/(55-64) 109/64     Weight: 52.3 kg (115 lb 3.2 oz)  Body mass index is 14.4 kg/m².    Physical Exam  Vitals and nursing note reviewed.   Constitutional:       Comments: Thin, generally weak, sitting up in bed feeding self, he is in no acute distress, poor historian.   HENT:      Head: Normocephalic.      Right Ear: External ear normal.      Left Ear: External ear normal.      Nose: Nose normal.      Mouth/Throat:      Mouth: Mucous membranes are moist.      Pharynx: Oropharynx is clear.   Eyes:      Conjunctiva/sclera: Conjunctivae normal.   Cardiovascular:      Rate and Rhythm: Normal rate and regular rhythm.      Pulses: Normal pulses.      Heart sounds: Normal heart sounds.   Pulmonary:      Effort: Pulmonary effort is normal.      Comments: Bilateral breath sounds with scattered rhonchi posteriorly, occasional expiratory wheezes anterior, no rales or crackles, no tachypnea or increased work of breathing, he appears to be breathing comfortably on room air.  Abdominal:      General: Bowel sounds are normal.      Palpations: Abdomen is soft.      Tenderness: There is no abdominal tenderness.   Musculoskeletal:         General: Normal range of motion.      Cervical back: Normal range of motion.      Comments: Equal strength bilaterally though generally weak.  Thin extremities with poor bulk.   Skin:     General: Skin is warm and dry.      Capillary Refill: Capillary refill takes less than 2 seconds.   Neurological:      Mental Status: He is alert.      Comments: He is oriented to person place and time, a is very vague about any of his medical history.   Psychiatric:         Attention and Perception: He is inattentive.         Mood and Affect: Affect is flat.         Speech: Speech normal.         Behavior: Behavior is slowed.         Thought Content: Thought content normal.         Cognition and Memory: Cognition normal.           Significant Labs: All pertinent labs within the past 24 hours have been  reviewed.    A1C:   Recent Labs   Lab 11/05/22  1532   HGBA1C 6.4*     Bilirubin:   Recent Labs   Lab 01/10/23  0516 01/11/23  0901 01/13/23  0631 01/16/23  0450 01/16/23  1533   BILITOT 0.2 0.3 0.3 0.2 0.4     CBC:   Recent Labs   Lab 01/16/23  0450 01/16/23  1533   WBC 17.26* 17.70*   HGB 7.3* 7.9*   HCT 22.0* 24.1*    283     CMP:   Recent Labs   Lab 01/16/23  0450 01/16/23  1533   * 135*   K 3.6 3.5    103   CO2 22* 26   * 135*   BUN 22* 23*   CREATININE 1.1 1.0   CALCIUM 9.1 9.3   PROT 5.9* 6.3   ALBUMIN 2.6* 3.0*   BILITOT 0.2 0.4   ALKPHOS 71 76   AST 28 30   ALT 31 36   ANIONGAP 9 6*     Prealbumin:   Recent Labs   Lab 01/16/23  0450   PREALBUMIN 19*       Significant Imaging: I have reviewed all pertinent imaging results/findings within the past 24 hours.

## 2023-01-17 NOTE — PT/OT/SLP EVAL
Occupational Therapy   Evaluation    Name: El Abreu  MRN: [id number]  Admitting Diagnosis: Pneumonia  Recent Surgery: * No surgery found *      Recommendations:     Discharge Recommendations: nursing facility, skilled  Discharge Equipment Recommendations:   (TBD next level of care)  Barriers to discharge:  None    Assessment:     El Abreu is a 27 y.o. male with a medical diagnosis of Pneumonia.  Pt agreeable to OT evaluation this AM. Performance deficits affecting function: weakness, impaired endurance, impaired self care skills, impaired functional mobility, gait instability, impaired balance, decreased safety awareness, decreased coordination, impaired cardiopulmonary response to activity.      Rehab Prognosis: Fair; patient would benefit from acute skilled OT services to address these deficits and reach maximum level of function.       Plan:     Patient to be seen 5 x/week to address the above listed problems via self-care/home management, therapeutic activities, therapeutic exercises  Plan of Care Expires: 02/17/23  Plan of Care Reviewed with: patient    Subjective     Chief Complaint: none stated  Patient/Family Comments/goals: none stated    Occupational Profile:  Living Environment: Pt lives with father in a mobile home with 4 steps to enter. Pt has a tub/shower combo and standard height toilet  Previous level of function: Independent with ADLs, IADLs, and functional mobility; Pt's sister cooks.  Pt transferred here from SNF facility with plans to return at d/c  Roles and Routines: son; works from home as a  for Medicare  Equipment Used at Home: walker, rolling  Assistance upon Discharge: yes, from facility    Pain/Comfort:  Pain Rating 1: 0/10    Patients cultural, spiritual, Evangelical conflicts given the current situation:      Objective:     Communicated with: nursing prior to session.  Patient found HOB elevated with peripheral IV, telemetry upon OT entry to  room.    General Precautions: Standard, fall  Orthopedic Precautions: N/A  Braces: N/A  Respiratory Status: Room air    Occupational Performance:    Activities of Daily Living:  Feeding:  setup assistance bed level to grab cup of water from OT and drink from cup with no spillage or difficulty noted    Grooming: setup assistance bed level      Cognitive/Visual Perceptual:  Cognitive/Psychosocial Skills:     -       Follows Commands/attention:Follows one-step commands  -       Communication: difficult to understand  -       Mood/Affect/Coping skills/emotional control: Appropriate to situation, Cooperative, and Flat affect    Physical Exam:  Upper Extremity Range of Motion:     -       Right Upper Extremity: WFL  -       Left Upper Extremity: WFL  Upper Extremity Strength:    -       Right Upper Extremity: WFL  -       Left Upper Extremity: WFL   Strength:    -       Right Upper Extremity: WFL  -       Left Upper Extremity: WFL  Fine Motor Coordination:    -       Intact  Gross motor coordination:   WFL    AMPAC 6 Click ADL:  AMPAC Total Score: 16    Treatment & Education:  Pt educated on role of OT/POC, importance of OOB/EOB activity, use of call bell, and safety during ADLs, transfers, and functional mobility.    Patient left HOB elevated with all lines intact and call button in reach    GOALS:   Multidisciplinary Problems       Occupational Therapy Goals          Problem: Occupational Therapy    Goal Priority Disciplines Outcome Interventions   Occupational Therapy Goal     OT, PT/OT     Description: Goals to be met by: 2/17/23     Patient will increase functional independence with ADLs by performing:    UE Dressing with Supervision.  LE Dressing with Supervision and Assistive Devices as needed.  Grooming while standing at sink with Supervision.  Toileting from bedside commode with Supervision for hygiene and clothing management.   Toilet transfer to bedside commode with Supervision.  Upper extremity exercise  program x10 reps per handout, with supervision.                         History:     Past Medical History:   Diagnosis Date    Diabetes mellitus     type 2 x 2 months ago dx//    Fungal meningitis     Human immunodeficiency virus (HIV) disease     Paroxysmal atrial fibrillation     Seizures     Strabismus age 5-5//    os since accident     Vision abnormalities     left eye         Past Surgical History:   Procedure Laterality Date    CATARACT EXTRACTION W/  INTRAOCULAR LENS IMPLANT Left age 8    CIRCUMCISION      EYE SURGERY  4-6 yrs of age    toy hit eye os//       Time Tracking:     OT Date of Treatment: 01/17/23  OT Start Time: 1132  OT Stop Time: 1139  OT Total Time (min): 7 min    Billable Minutes:Evaluation 7    1/17/2023

## 2023-01-17 NOTE — ASSESSMENT & PLAN NOTE
Concern for pneumonia, we will continue cefepime and vancomycin, procalcitonin and CRP is pending, upper respiratory PCR has been in, pulse ox every 4 hours, oxygen per protocol, currently on room air

## 2023-01-17 NOTE — PLAN OF CARE
[facility] - Emergency Dept  Initial Discharge Assessment       Primary Care Provider: [facility]    Admission Diagnosis: Pneumonia [J18.9]    Admission Date: 1/16/2023  Expected Discharge Date:     Social work intern met with Pt at bedside to complete discharge assessment. Pt AAOx4s. Demographics, PCP, and insurance verified. No home health. No dialysis. Pt reports ability to complete ADLs with assistance of equipment including rolling walker and bath bench. Pt verbalized plan to discharge back to [facility]. DAILEY discussed and signed with pt. Pt has no other needs to be addressed at this time.     Payor: BLUE CROSS BLUE SHIELD / Plan: BCBS ALL OUT OF STATE / Product Type: PPO /     Extended Emergency Contact Information  Primary Emergency Contact: El Valerio  Address: [address] [place], [place] [id number] [place] [address]  Home Phone: [phone]  Mobile Phone: [phone]  Relation: Father  Secondary Emergency Contact: Arlin Cheung  Mobile Phone: [phone]  Relation: Step parent    Discharge Plan A: Skilled Nursing Facility  Discharge Plan B: Skilled Nursing Facility      [id number] [place] [id number]  Phone: [phone] Fax: [phone]      Initial Assessment (most recent)       Adult Discharge Assessment - 01/17/23 1129          Discharge Assessment    Assessment Type Discharge Planning Assessment     Confirmed/corrected address, phone number and insurance Yes     Confirmed Demographics Correct on Facesheet     Source of Information patient     Does patient/caregiver understand observation status Yes     Communicated ZULMA with patient/caregiver Yes     Reason For Admission readmission to hospital     People in Home alone     Facility Arrived From: [facility]     Do you expect to return to your current living situation? Yes     Do you have help at  home or someone to help you manage your care at home? Yes     Who are your caregiver(s) and their phone number(s)? [facility]     Prior to hospitilization cognitive status: Alert/Oriented     Current cognitive status: Alert/Oriented     Walking or Climbing Stairs ambulation difficulty, requires equipment     Mobility Management rolling walker     Dressing/Bathing bathing difficulty, requires equipment     Dressing/Bathing Management bath bench     Home Accessibility wheelchair accessible     Home Layout Able to live on 1st floor     Equipment Currently Used at Home walker, rolling;bath bench     Readmission within 30 days? Yes     Patient currently being followed by outpatient case management? No     Do you currently have service(s) that help you manage your care at home? No     Do you take prescription medications? Yes     Do you have prescription coverage? Yes     Coverage BLUE CROSS BLUE SHIELD - BCBS ALL OUT OF STATE     Do you have any problems affording any of your prescribed medications? No     Is the patient taking medications as prescribed? yes     Who is going to help you get home at discharge? facility     How do you get to doctors appointments? other (see comments)   facility    Are you on dialysis? No     Do you take coumadin? No     Discharge Plan A Skilled Nursing Facility     Discharge Plan B Skilled Nursing Facility     DME Needed Upon Discharge  none     Discharge Plan discussed with: Patient                     Readmission Assessment (most recent)       Readmission Assessment - 01/17/23 1134          Readmission    Why were you hospitalized in the last 30 days? Fungal meningitis     Why were you readmitted? Related to previous admission     When you left the hospital how did you feel? felt weak     When you left the hospital where did you go? SNF     Did patient/caregiver refused recommended DC plan? No     Tell me about what happened between when you left the hospital and the day  you returned. still feeling weak     When did you start not feeling well? yesterday     Did you try to manage your symptoms your self? No     Why? ER     Did you try to see or did see a doctor or nurse before you came? No     Why? ER     Did you have  a follow-up appointment on discharge? No     Was this a planned readmission? No

## 2023-01-17 NOTE — HPI
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  Hospitalization was complicated by acute kidney injury, GI bleed, and malnutrition and conditioning and he was transitioned to TCU for continued treatment of fungal meningitis and rehab. He was sent to [facility] Emergency room for evaluation of low magnesium hemoglobin.  Patient is sitting up in bed eating a [employer] chicken sandwich.  He states he is very weak in his unable to stand.  He said he has been in bed for weeks.  He denies headache vision changes, denies shortness of breath but states that he is had a cough brownish base sputum.  He denies fevers or chills, states he gets dizzy when he tries to stand or sit up.  Denies any trouble voiding or diarrhea.  Magnesium was replaced, initially 0.8, last 1 today 1.4, WBC elevated at 17.7, hemoglobin 7.9, in the last 8 days range was 8.2-9.2.  Potassium decreased at 3.5, glucose 135, creatinine 1.0 and estimated GFR greater than 60.  Creatinine clearance 82.8.  Vital signs with temperature 97.6°, heart rate 83, respiratory rate 23, blood pressure 109/64 and O2 sat 100.  Chest x-ray with nothing acute.  He was started on cefepime, vancomycin and lactated Ringer's bolus.  ED physician spoke with Infectious Disease doctor at [facility] who recommended transfer there but they were on diversion.  Patient will be admitted here for treatment and we will try to transfer as able.    He originally presented to [facility] with lethargy and headaches on 11/05.  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  He was initially started on amphotericin B and flucytosine for 14 days of  induction, broad-spectrum antibiotics including ceftriaxone twice daily and acyclovir.  CSF VDRL negative but treponemal antibodies on CSF were positive CD4 count 7, HIV viral load 48,000 K, genotype [id number]. R: DDI, hepatitis-B C and B antigen negative, GC chlamydia negative toxoplasma negative, CMV PCR negative.  He was also placed on atovaquone initially and then changed to Bactrim DS 3 times weekly for PJP prophylaxis.  After completing 14 days of induction with amphotericin and flucytosine he was switched to 800 mg oral daily fluconazole for 8 weeks through January 13 for consolidation and then was to continue fluconazole 200 mg for long-term suppression.  He also completed 10 days of IV ceftriaxone and then 4 days of IV penicillin Recommended starting ART as outpatient after treatment with cryptococcal meningitis for 4-5 weeks.  He was started on Keppra and Vimpat seizures.  He was discharged from [facility] on 11/21 and returned 11/30 with headache in [place] ink stain was still positive and was found to only be on 400 of fluconazole instead of 800 a day.  He was found have crypto and was started again on amphotericin B and flucytosine for 6 weeks.  Apparently he was noncompliant in the hospital and eventually did switch to AmBisome and fluconazole 800 orally until January 11th.  He was started on ART  but was noncompliant so it was held again for a time and he was seen by psychiatry and meds were adjusted.   He had problems with kidney function and was switched to atovaquone.  He repeated a course of weekly Bicillin LA injections x3 doses and completed treatment on 01/03.  Symtuza. (Descovy + Prezcobix) was started on 12/23.  He was discharged to skilled nursing to continue treatment after improving renal failure, nausea, coffee-ground and was also seen by Psychiatry.

## 2023-01-17 NOTE — PT/OT/SLP EVAL
Physical Therapy Evaluation    Patient Name:  El Abreu   MRN:  [id number]    Recommendations:     Discharge Recommendations: nursing facility, skilled   Discharge Equipment Recommendations:  (TBD)   Barriers to discharge: Decreased caregiver support    Assessment:     El Abreu is a 27 y.o. male admitted with a medical diagnosis of Pneumonia.  He presents with the following impairments/functional limitations: weakness, impaired endurance, impaired self care skills, impaired functional mobility, gait instability, impaired balance, decreased safety awareness, impaired coordination.    Pt found supine on stretcher in ED and agreeable to working with PT. Pt A & O x  4 and has the following co-morbidities: DM, a-Fib, Seizure Disorder, AIDS, Hx Cryptococcal Meningitis, TMJ dislocation 2 days ago.  Pt tolerated session fairly with slurred speech (per pt started approximately 2 weeks ago) and required minimal A of 2 persons for safe mobilization during session today. Pt would benefit from acute PT during hospitalization to increase strength, endurance and safety with mobility and would benefit from returning to SNF prior to discharge home.      Rehab Prognosis: Fair; patient would benefit from acute skilled PT services to address these deficits and reach maximum level of function.    Recent Surgery: * No surgery found *      Plan:     During this hospitalization, patient to be seen 6 x/week to address the identified rehab impairments via gait training, therapeutic activities, therapeutic exercises, neuromuscular re-education and progress toward the following goals:    Plan of Care Expires:  02/17/23    Subjective     Chief Complaint: SOB prior to admit  Patient/Family Comments/goals: return to SNF  Pain/Comfort:  Pain Rating 1: 0/10  Pain Rating Post-Intervention 1: 0/10    Patients cultural, spiritual, Denominational conflicts given the current situation:      Living Environment:  Pt lives with his parents in a  mobile home with 4 steps to enter.  Prior to admission, patients level of function was independent, driving and gainfully employed until recently.  Equipment used at home: walker, rolling.  DME owned (not currently used): none.  Upon discharge, patient will have assistance from his parents.    Objective:     Communicated with RN prior to session.  Patient found supine with peripheral IV, telemetry  upon PT entry to room.    General Precautions: Standard, diabetic, fall, seizure, other (see comments) (Standard)  Orthopedic Precautions:N/A   Braces: N/A  Respiratory Status: Room air    Exams:  Cognitive Exam:  Patient is oriented to Person, Place, Time, and Situation  RLE ROM: WFL  RLE Strength: grossly 4- to 4/5  LLE ROM: WFL  LLE Strength: grossly 4- to 4/5    Functional Mobility:  Bed Mobility:     Supine to Sit: minimum assistance  Sit to Supine: minimum assistance  Transfers:     Sit to Stand:  minimum assistance and of 2 persons with rolling walker  Gait: side steps R & L w/ RW and minimal A of 2 persons for balance due to pt leaning posteriorly.      AM-PAC 6 CLICK MOBILITY  Total Score:16       Treatment & Education:  Pt was educated on the following: call light use, importance of OOB activity and functional mobility to negate the negative effects of prolonged bed rest during this hospitalization, safe transfers/ambulation and discharge planning recommendations/options.      Patient left HOB elevated with all lines intact, call button in reach, and RN notified.    GOALS:   Multidisciplinary Problems       Physical Therapy Goals          Problem: Physical Therapy    Goal Priority Disciplines Outcome Goal Variances Interventions   Physical Therapy Goal     PT, PT/OT      Description: Goals to be met by: 23     Patient will increase functional independence with mobility by performin. Supine to sit with Supervision  2. Sit to stand transfer with Supervision  3. Bed to chair transfer with Supervision  using Rolling Walker  4. Gait  x 150 feet with Supervision using Rolling Walker.                              History:     Past Medical History:   Diagnosis Date    Diabetes mellitus     type 2 x 2 months ago dx//    Fungal meningitis     Human immunodeficiency virus (HIV) disease     Paroxysmal atrial fibrillation     Seizures     Strabismus age 4-5//    os since accident     Vision abnormalities     left eye       Past Surgical History:   Procedure Laterality Date    CATARACT EXTRACTION W/  INTRAOCULAR LENS IMPLANT Left age 8    CIRCUMCISION      EYE SURGERY  4-5 yrs of age    toy hit eye os//       Time Tracking:     PT Received On: 01/17/23  PT Start Time: 0950     PT Stop Time: 1007  PT Total Time (min): 17 min     Billable Minutes: Evaluation 8 and Therapeutic Activity 9      01/17/2023

## 2023-01-17 NOTE — ASSESSMENT & PLAN NOTE
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

## 2023-01-17 NOTE — ASSESSMENT & PLAN NOTE
We will continue fluconazole 800 mg oral daily until seen by infectious disease, per Infectious Disease notes he may have completed this and transition to 200 mg oral daily for long-term suppression, consult to Infectious Disease

## 2023-01-17 NOTE — PROGRESS NOTES
VANCOMYCIN PHARMACOKINETIC NOTE:  Vancomycin Day # 1    Objective/Assessment:    Diagnosis/Indication for Vancomycin:Pneumonia      27 y.o., male; Actual Body Weight = 52.3 kg (115 lb 3.2 oz).    The patient has the following labs:  1/16/2023 Estimated Creatinine Clearance: 82.8 mL/min (based on SCr of 1 mg/dL). Lab Results   Component Value Date    BUN 23 (H) 01/16/2023     Lab Results   Component Value Date    WBC 17.70 (H) 01/16/2023          Plan:  Adjust vancomycin dose and/or frequency based on the patient's actual weight and renal function:  Initiate Vancomycin 1000 mg IV every 12 hours.  Orders have been entered into patient's chart.        Vancomycin trough level has been ordered for 1/18 @ 0800, prior to 4th dose.     Pharmacy will manage vancomycin therapy, monitor serum vancomycin levels, monitor renal function and adjust regimen as necessary.    Thank you for allowing us to participate in this patient's care.     Imtiaz Oconnell 1/16/2023   Department of Pharmacy  Ext [phone]

## 2023-01-18 LAB
ACINETOBACTER CALCOACETICUS/BAUMANNII COMPLEX: NOT DETECTED
ANION GAP SERPL CALC-SCNC: 8 MMOL/L (ref 8–16)
ANISOCYTOSIS BLD QL SMEAR: SLIGHT
BACTERIA BLD CULT: ABNORMAL
BACTEROIDES FRAGILIS: NOT DETECTED
BASOPHILS NFR BLD: 0 % (ref 0–1.9)
BUN SERPL-MCNC: 19 MG/DL (ref 6–20)
CALCIUM SERPL-MCNC: 9.5 MG/DL (ref 8.7–10.5)
CANDIDA ALBICANS: NOT DETECTED
CANDIDA AURIS: NOT DETECTED
CANDIDA GLABRATA: NOT DETECTED
CANDIDA KRUSEI: NOT DETECTED
CANDIDA PARAPSILOSIS: NOT DETECTED
CANDIDA TROPICALIS: NOT DETECTED
CHLORIDE SERPL-SCNC: 108 MMOL/L (ref 95–110)
CO2 SERPL-SCNC: 21 MMOL/L (ref 23–29)
CREAT SERPL-MCNC: 0.9 MG/DL (ref 0.5–1.4)
CRYPTOCOCCUS NEOFORMANS/GATTII: NOT DETECTED
CTX-M GENE: NOT DETECTED
DACRYOCYTES BLD QL SMEAR: ABNORMAL
DIFFERENTIAL METHOD: ABNORMAL
ENTEROBACTER CLOACAE COMPLEX: NOT DETECTED
ENTEROBACTERALES: NOT DETECTED
ENTEROCOCCUS FAECALIS: NOT DETECTED
ENTEROCOCCUS FAECIUM: NOT DETECTED
EOSINOPHIL NFR BLD: 0 % (ref 0–8)
ERYTHROCYTE [DISTWIDTH] IN BLOOD BY AUTOMATED COUNT: 15 % (ref 11.5–14.5)
ESCHERICHIA COLI: NOT DETECTED
EST. GFR  (NO RACE VARIABLE): >60 ML/MIN/1.73 M^2
GLUCOSE SERPL-MCNC: 138 MG/DL (ref 70–110)
GLUCOSE SERPL-MCNC: 146 MG/DL (ref 70–110)
GLUCOSE SERPL-MCNC: 159 MG/DL (ref 70–110)
GLUCOSE SERPL-MCNC: 161 MG/DL (ref 70–110)
GLUCOSE SERPL-MCNC: 210 MG/DL (ref 70–110)
HAEMOPHILUS INFLUENZAE: NOT DETECTED
HCT VFR BLD AUTO: 23.3 % (ref 40–54)
HGB BLD-MCNC: 7.5 G/DL (ref 14–18)
HYPOCHROMIA BLD QL SMEAR: ABNORMAL
IMM GRANULOCYTES # BLD AUTO: ABNORMAL K/UL (ref 0–0.04)
IMM GRANULOCYTES NFR BLD AUTO: ABNORMAL % (ref 0–0.5)
IMP GENE: NOT DETECTED
KLEBSIELLA AEROGENES: NOT DETECTED
KLEBSIELLA OXYTOCA: NOT DETECTED
KLEBSIELLA PNEUMONIAE GROUP: NOT DETECTED
KPC: NOT DETECTED
LISTERIA MONOCYTOGENES: NOT DETECTED
LYMPHOCYTES NFR BLD: 10 % (ref 18–48)
MAGNESIUM SERPL-MCNC: 1.1 MG/DL (ref 1.6–2.6)
MAGNESIUM SERPL-MCNC: 1.5 MG/DL (ref 1.6–2.6)
MCH RBC QN AUTO: 26.7 PG (ref 27–31)
MCHC RBC AUTO-ENTMCNC: 32.2 G/DL (ref 32–36)
MCR-1: NOT DETECTED
MCV RBC AUTO: 83 FL (ref 82–98)
MEC A/C AND MREJ (MRSA): NOT DETECTED
MEC A/C: DETECTED
METAMYELOCYTES NFR BLD MANUAL: 2 %
MONOCYTES NFR BLD: 29 % (ref 4–15)
MRSA SCREEN BY PCR: NEGATIVE
MYELOCYTES NFR BLD MANUAL: 8 %
NDM: NOT DETECTED
NEISSERIA MENINGITIDIS: NOT DETECTED
NEUTROPHILS NFR BLD: 48 % (ref 38–73)
NEUTS BAND NFR BLD MANUAL: 3 %
NRBC BLD-RTO: 0 /100 WBC
OVALOCYTES BLD QL SMEAR: ABNORMAL
OXA-48-LIKE: NOT DETECTED
PLATELET # BLD AUTO: 229 K/UL (ref 150–450)
PLATELET BLD QL SMEAR: ABNORMAL
PMV BLD AUTO: 8.6 FL (ref 9.2–12.9)
POIKILOCYTOSIS BLD QL SMEAR: SLIGHT
POTASSIUM SERPL-SCNC: 3.9 MMOL/L (ref 3.5–5.1)
PROTEUS SPECIES: NOT DETECTED
PSEUDOMONAS AERUGINOSA: NOT DETECTED
RBC # BLD AUTO: 2.81 M/UL (ref 4.6–6.2)
SALMONELLA SP: NOT DETECTED
SERRATIA MARCESCENS: NOT DETECTED
SODIUM SERPL-SCNC: 137 MMOL/L (ref 136–145)
STAPHYLOCOCCUS AUREUS: NOT DETECTED
STAPHYLOCOCCUS EPIDERMIDIS: DETECTED
STAPHYLOCOCCUS LUGDUNESIS: NOT DETECTED
STAPHYLOCOCCUS SPECIES: ABNORMAL
STENOTROPHOMONAS MALTOPHILIA: NOT DETECTED
STREPTOCOCCUS AGALACTIAE: NOT DETECTED
STREPTOCOCCUS PNEUMONIAE: NOT DETECTED
STREPTOCOCCUS PYOGENES: NOT DETECTED
STREPTOCOCCUS SPECIES: NOT DETECTED
VAN A/B: NOT DETECTED
VIM: NOT DETECTED
WBC # BLD AUTO: 15.35 K/UL (ref 3.9–12.7)

## 2023-01-18 PROCEDURE — 25000003 PHARM REV CODE 250: Performed by: INTERNAL MEDICINE

## 2023-01-18 PROCEDURE — 99232 PR SUBSEQUENT HOSPITAL CARE,LEVL II: ICD-10-PCS | Mod: ,,, | Performed by: INTERNAL MEDICINE

## 2023-01-18 PROCEDURE — 83735 ASSAY OF MAGNESIUM: CPT | Performed by: NURSE PRACTITIONER

## 2023-01-18 PROCEDURE — 87641 MR-STAPH DNA AMP PROBE: CPT | Performed by: INTERNAL MEDICINE

## 2023-01-18 PROCEDURE — 25000003 PHARM REV CODE 250: Performed by: NURSE PRACTITIONER

## 2023-01-18 PROCEDURE — 63700000 PHARM REV CODE 250 ALT 637 W/O HCPCS: Performed by: NURSE PRACTITIONER

## 2023-01-18 PROCEDURE — 36415 COLL VENOUS BLD VENIPUNCTURE: CPT | Performed by: NURSE PRACTITIONER

## 2023-01-18 PROCEDURE — 63600175 PHARM REV CODE 636 W HCPCS: Performed by: NURSE PRACTITIONER

## 2023-01-18 PROCEDURE — 12000002 HC ACUTE/MED SURGE SEMI-PRIVATE ROOM

## 2023-01-18 PROCEDURE — 83735 ASSAY OF MAGNESIUM: CPT | Mod: 91 | Performed by: STUDENT IN AN ORGANIZED HEALTH CARE EDUCATION/TRAINING PROGRAM

## 2023-01-18 PROCEDURE — 97530 THERAPEUTIC ACTIVITIES: CPT | Mod: CQ

## 2023-01-18 PROCEDURE — 85027 COMPLETE CBC AUTOMATED: CPT | Performed by: NURSE PRACTITIONER

## 2023-01-18 PROCEDURE — 94761 N-INVAS EAR/PLS OXIMETRY MLT: CPT

## 2023-01-18 PROCEDURE — 36415 COLL VENOUS BLD VENIPUNCTURE: CPT | Performed by: STUDENT IN AN ORGANIZED HEALTH CARE EDUCATION/TRAINING PROGRAM

## 2023-01-18 PROCEDURE — 85007 BL SMEAR W/DIFF WBC COUNT: CPT | Performed by: NURSE PRACTITIONER

## 2023-01-18 PROCEDURE — 99232 SBSQ HOSP IP/OBS MODERATE 35: CPT | Mod: ,,, | Performed by: INTERNAL MEDICINE

## 2023-01-18 PROCEDURE — 96366 THER/PROPH/DIAG IV INF ADDON: CPT

## 2023-01-18 PROCEDURE — 99900031 HC PATIENT EDUCATION (STAT)

## 2023-01-18 PROCEDURE — 25000003 PHARM REV CODE 250: Performed by: STUDENT IN AN ORGANIZED HEALTH CARE EDUCATION/TRAINING PROGRAM

## 2023-01-18 PROCEDURE — 96372 THER/PROPH/DIAG INJ SC/IM: CPT | Performed by: NURSE PRACTITIONER

## 2023-01-18 PROCEDURE — 94640 AIRWAY INHALATION TREATMENT: CPT

## 2023-01-18 PROCEDURE — 80048 BASIC METABOLIC PNL TOTAL CA: CPT | Performed by: NURSE PRACTITIONER

## 2023-01-18 RX ORDER — SODIUM CHLORIDE 9 MG/ML
INJECTION, SOLUTION INTRAVENOUS CONTINUOUS
Status: DISCONTINUED | OUTPATIENT
Start: 2023-01-18 | End: 2023-01-22

## 2023-01-18 RX ADMIN — SODIUM BICARBONATE 650 MG TABLET 650 MG: at 02:01

## 2023-01-18 RX ADMIN — INSULIN DETEMIR 10 UNITS: 100 INJECTION, SOLUTION SUBCUTANEOUS at 09:01

## 2023-01-18 RX ADMIN — PANTOPRAZOLE SODIUM 40 MG: 40 TABLET, DELAYED RELEASE ORAL at 06:01

## 2023-01-18 RX ADMIN — SODIUM CHLORIDE: 0.9 INJECTION, SOLUTION INTRAVENOUS at 05:01

## 2023-01-18 RX ADMIN — FOLIC ACID 1 MG: 1 TABLET ORAL at 08:01

## 2023-01-18 RX ADMIN — INSULIN DETEMIR 10 UNITS: 100 INJECTION, SOLUTION SUBCUTANEOUS at 08:01

## 2023-01-18 RX ADMIN — SODIUM BICARBONATE 650 MG TABLET 650 MG: at 08:01

## 2023-01-18 RX ADMIN — METOCLOPRAMIDE 10 MG: 10 TABLET ORAL at 09:01

## 2023-01-18 RX ADMIN — INSULIN ASPART 2 UNITS: 100 INJECTION, SOLUTION INTRAVENOUS; SUBCUTANEOUS at 09:01

## 2023-01-18 RX ADMIN — SULFAMETHOXAZOLE AND TRIMETHOPRIM 1 TABLET: 800; 160 TABLET ORAL at 04:01

## 2023-01-18 RX ADMIN — CYPROHEPTADINE HYDROCHLORIDE 4 MG: 4 TABLET ORAL at 09:01

## 2023-01-18 RX ADMIN — AMOXICILLIN AND CLAVULANATE POTASSIUM 1 TABLET: 875; 125 TABLET, FILM COATED ORAL at 08:01

## 2023-01-18 RX ADMIN — ESCITALOPRAM OXALATE 20 MG: 10 TABLET ORAL at 08:01

## 2023-01-18 RX ADMIN — FLUCONAZOLE 800 MG: 100 TABLET ORAL at 08:01

## 2023-01-18 RX ADMIN — THERA TABS 1 TABLET: TAB at 08:01

## 2023-01-18 RX ADMIN — THIAMINE HCL TAB 100 MG 100 MG: 100 TAB at 08:01

## 2023-01-18 RX ADMIN — FERROUS SULFATE TAB 325 MG (65 MG ELEMENTAL FE) 1 EACH: 325 (65 FE) TAB at 08:01

## 2023-01-18 RX ADMIN — LEVETIRACETAM 1500 MG: 500 TABLET, FILM COATED ORAL at 09:01

## 2023-01-18 RX ADMIN — METOCLOPRAMIDE 10 MG: 10 TABLET ORAL at 04:01

## 2023-01-18 RX ADMIN — GUAIFENESIN 600 MG: 600 TABLET, EXTENDED RELEASE ORAL at 08:01

## 2023-01-18 RX ADMIN — MAGNESIUM SULFATE HEPTAHYDRATE 2 G: 40 INJECTION, SOLUTION INTRAVENOUS at 04:01

## 2023-01-18 RX ADMIN — LEVETIRACETAM 1500 MG: 500 TABLET, FILM COATED ORAL at 08:01

## 2023-01-18 RX ADMIN — MEGESTROL ACETATE 40 MG: 20 TABLET ORAL at 08:01

## 2023-01-18 RX ADMIN — FLUTICASONE PROPIONATE 100 MCG: 50 SPRAY, METERED NASAL at 08:01

## 2023-01-18 RX ADMIN — METOCLOPRAMIDE 10 MG: 10 TABLET ORAL at 08:01

## 2023-01-18 RX ADMIN — GUAIFENESIN 600 MG: 600 TABLET, EXTENDED RELEASE ORAL at 09:01

## 2023-01-18 RX ADMIN — CYPROHEPTADINE HYDROCHLORIDE 4 MG: 4 TABLET ORAL at 08:01

## 2023-01-18 RX ADMIN — AMOXICILLIN AND CLAVULANATE POTASSIUM 1 TABLET: 875; 125 TABLET, FILM COATED ORAL at 09:01

## 2023-01-18 RX ADMIN — CETIRIZINE HYDROCHLORIDE 10 MG: 10 TABLET, FILM COATED ORAL at 08:01

## 2023-01-18 RX ADMIN — MAGNESIUM SULFATE HEPTAHYDRATE 4 G: 40 INJECTION, SOLUTION INTRAVENOUS at 08:01

## 2023-01-18 RX ADMIN — METOCLOPRAMIDE 10 MG: 10 TABLET ORAL at 12:01

## 2023-01-18 RX ADMIN — FLUTICASONE PROPIONATE 100 MCG: 50 SPRAY, METERED NASAL at 09:01

## 2023-01-18 RX ADMIN — RISPERIDONE 0.5 MG: 0.25 TABLET ORAL at 08:01

## 2023-01-18 RX ADMIN — ENOXAPARIN SODIUM 40 MG: 100 INJECTION SUBCUTANEOUS at 04:01

## 2023-01-18 RX ADMIN — SODIUM BICARBONATE 650 MG TABLET 650 MG: at 09:01

## 2023-01-18 NOTE — PROGRESS NOTES
Consult Note  Infectious Disease    Reason for Consult:  leukocytosis    HPI: El Abreu is a 27 y.o. 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  he was last admitted to [facility] November 30th with vomiting and headache, increased intracranial pressure.  This LP had a positive Sirisha ink opening pressure was 23.  He was then committed to a 6 week course of AmBisome completed 1/10, which was interrupted with AC but resumed and he was discharged to [facility] on January 10th.  He did require an additional LP for fluid removal and reduction of pressure on 12/13 (Sirisha ink was still positive but the culture was negative.  The last positive culture was 11/21).  He has been continued on fluconazole 800 mg daily since 01/11 and has been receiving Pneumocystis prophylaxis and appetite stimulants and antidepressants.  MRI brain on 01/01 showed stability of the right globus pallidus cryptococcal pseudocyst.  He was additionally treated for neurosyphilis 3 doses of Bicillin after an appropriate course of IV penicillin G.  He has not been eating well, withdrawn, seen by Psychiatry late December and started on risperidone.  He was seen in the [facility] emergency room 1/14 when his mandible became stuck open.  This was reduced in their emergency room he was sent back to the [facility].  He was sent to our emergency room yesterday for severe hypomagnesemia, 0.8 While in the emergency room he was noted to have a rising white blood cell count, from normal on 01/08 to 14 on 01/09 to 17 on 01/16 additionally his hemoglobin was 7.3.  An infectious workup was commenced and CRP was 2.0, lactic acid 0.9, procalcitonin 0.15.  Chest x-ray was negative, blood cultures are negative, respiratory PCR is not yet been  collected and he is not produce any sputum but this is not likely relevant..vanc and cefepime were begun.   Unfortunately our hospital is full and he has been waiting in the hallway outside the emergency room.  He is incontinent of urine, and urinalysis was negative.  Magnesium is now 1.7    He feels much improved, tells me that he ate breakfast and lunch. He does endorse some small amount of yellow sputum and he does find that his sinuses are congested. It is relevant to note that he had pansinusitis on MRI brain 1/1.     1/18: interim reviewed. He is eating well. He had a syncopal episode while working with PT. He states that this has happened before, but does not happen consistently when he gets up. He is still congested but not really expectorating. Sputum sample was not purulent(saliva) and culture has only normal burak. He is not receiving his HIV antivirals here(not on formulary) though these were available at [facility]. Repeated CXR and it is still clear.        EXAM & DIAGNOSTICS REVIEWED:   Vitals:     Temp:  [97.4 °F (36.3 °C)-99 °F (37.2 °C)]   Temp: 97.4 °F (36.3 °C) (01/18/23 1659)  Pulse: 98 (01/18/23 1659)  Resp: 18 (01/18/23 1659)  BP: 121/78 (01/18/23 1659)  SpO2: 100 % (01/18/23 1659)    Intake/Output Summary (Last 24 hours) at 1/18/2023 1714  Last data filed at 1/18/2023 1630  Gross per 24 hour   Intake 1020 ml   Output 1320 ml   Net -300 ml       General:  In NAD. Alert and attentive, cooperative, comfortable, completely non toxic  Eyes:  Anicteric, EOMI  ENT:  No ulcers, exudates, thrush, nares patent, nasal congestion is evident  Neck:  supple,   Lungs: Loose rhonchi, no consolidation  Heart:  RRR, no gallop/murmur/rub noted  Abd:  Soft, NT, ND, normal BS, no masses or organomegaly appreciated.  :  Voids , no flank tenderness  Musc:  Joints without effusion, swelling, erythema, synovitis, with mild generalized muscle wasting.   Skin:  No rashes. Dry skin. Poor turgor  Neuro:             Alert,  attentive, speech fluent, face symmetric, moves all extremities, no focal weakness.   Psych: Calm, cooperative  Lymphatic:        Extrem: No edema, erythema, phlebitis, cellulitis, warm and well perfused  VAD:  Right arm midline     Isolation:  none  Wound: none      General Labs reviewed:  Recent Labs   Lab 01/16/23  1533 01/17/23  0448 01/18/23  0521   WBC 17.70* 17.35* 15.35*   HGB 7.9* 7.3* 7.5*   HCT 24.1* 22.3* 23.3*    250 229       Recent Labs   Lab 01/13/23  0631 01/16/23  0450 01/16/23  1533 01/17/23  0448 01/17/23  1725 01/18/23  0521    133* 135* 137  --  137   K 3.6 3.6 3.5 3.6 4.2 3.9    102 103 107  --  108   CO2 21* 22* 26 24  --  21*   BUN 29* 22* 23* 18  --  19   CREATININE 1.8* 1.1 1.0 1.0  --  0.9   CALCIUM 10.4 9.1 9.3 9.2  --  9.5   PROT 6.8 5.9* 6.3  --   --   --    BILITOT 0.3 0.2 0.4  --   --   --    ALKPHOS 90 71 76  --   --   --    ALT 39 31 36  --   --   --    AST 31 28 30  --   --   --      Recent Labs   Lab 01/17/23  0104   CRP 2.00*         Micro:  Microbiology Results (last 7 days)       Procedure Component Value Units Date/Time    Culture, Respiratory with Gram Stain [[id number]] Collected: 01/17/23 1456    Order Status: Completed Specimen: Sputum Updated: 01/18/23 1120     Respiratory Culture Normal respiratory burak     Gram Stain (Respiratory) <10 epithelial cells per low power field.     Gram Stain (Respiratory) Rare WBC's     Gram Stain (Respiratory) Rare Gram positive cocci    Blood culture x two cultures. Draw prior to antibiotics. [[id number]]  (Abnormal) Collected: 01/16/23 1455    Order Status: Completed Specimen: Blood from Line, PICC Right Basilic Updated: 01/18/23 0726     Blood Culture, Routine Gram stain evans bottle: Gram positive cocci in clusters resembling Staph      Results called to and read back by: Donald Guerrero RN 1E.      01/18/2023  00:19 TGC      COAGULASE-NEGATIVE STAPHYLOCOCCUS SPECIES  Organism is a probable contaminant       Narrative:      Aerobic and anaerobic    Rapid Organism ID by PCR (from Blood culture) [[id number]]  (Abnormal) Collected: 01/16/23 1455    Order Status: Completed Updated: 01/18/23 0041     Enterococcus faecials Not Detected     Enterococcus faecium Not Detected     Listeria Monocytogenes Not Detected     Staphylococcus spp. See species for ID     Comment: CORRECTED RESULT; previously reported as Not Detected on 01/18/2023   at 00:38.          Staphylococcus aureus Not Detected     Staphylococcus epidermidis Detected     Comment: CORRECTED RESULT; previously reported as Not Detected on 01/18/2023   at 00:38.          Staphylococcus lugdunensis Not Detected     Streptococcus species Not Detected     Streptococcus agalactiae Not Detected     Streptococcus pneumoniae Not Detected     Streptococcus pyogenes Not Detected     Acinetobacter calcoaceticus/baumannii complex Not Detected     Bacteroides fragilis Not Detected     Enterobacerales Not Detected     Enterobacter cloacae complex Not Detected     Escherichia Not Detected     Klebsiella aerogenes Not Detected     Klebsiella oxytoca Not Detected     Klebsiella pneumoniae group Not Detected     Proteus Not Detected     Salmonella sp Not Detected     Serratia marcescens Not Detected     Haemophilus influenzae Not Detected     Neisseria meningtidis Not Detected     Pseudomonas aeruginosa Not Detected     Stenotrophomonas maltophilia Not Detected     Candida albicans Not Detected     Candida auris Not Detected     Candida glabrata Not Detected     Comment: CORRECTED RESULT; previously reported as Detected on 01/18/2023 at   00:38.          Arti krusei Not Detected     Candida Parapsilosis Not Detected     Candida Tropicalis Not Detected     Cryptococcus neoformans/gattii Not Detected     CTX-M Gene Not Detected     Comment: CORRECTED RESULT; previously reported as Test not applicable on   01/18/2023 at 00:38.          IMP Gene Not Detected     Comment: CORRECTED  RESULT; previously reported as Test not applicable on   01/18/2023 at 00:38.          KPC  Not Detected     Comment: CORRECTED RESULT; previously reported as Test not applicable on   01/18/2023 at 00:38.          mcr-1  Not Detected     Comment: CORRECTED RESULT; previously reported as Test not applicable on   01/18/2023 at 00:38.          mec A/C Detected     Comment: CORRECTED RESULT; previously reported as Test not applicable on   01/18/2023 at 00:38.          mec A/C and MREJ (MRSA) Not Detected     Comment: CORRECTED RESULT; previously reported as Test not applicable on   01/18/2023 at 00:38.          NDM Not Detected     Comment: CORRECTED RESULT; previously reported as Test not applicable on   01/18/2023 at 00:38.          OXA-48-like Not Detected     Comment: CORRECTED RESULT; previously reported as Test not applicable on   01/18/2023 at 00:38.          van A/B Not Detected     Comment: CORRECTED RESULT; previously reported as Test not applicable on   01/18/2023 at 00:38.          VIM Not Detected     Comment: CORRECTED RESULT; previously reported as Test not applicable on   01/18/2023 at 00:38.         Narrative:      Aerobic and anaerobic    MRSA Screen by PCR [[id number]]     Order Status: No result Specimen: Nasopharyngeal Swab from Nasal     Respiratory Infection Panel (PCR), Nasopharyngeal [[id number]]     Order Status: No result Specimen: Nasopharyngeal Swab     Blood culture x two cultures. Draw prior to antibiotics. [[id number]] Collected: 01/16/23 1527    Order Status: Sent Specimen: Blood from Line, PICC Right Basilic Updated: 01/16/23 1521            Imaging Reviewed:   CXR   MRI brain 1/1    Cardiology:    IMPRESSION & PLAN   1. Severe hypomagnesemia, corrected   Electrolyte abnormalities likely from ampho B    2. Cryptococcal meningitis, now controlled, s/p 6 weeks of ambisome and continues on high dose oral fluconazole   Cryptococcoma, stable  3. Cachexia with failure to thrive, with improvement  in appetite today  4. Pansinusitis, may be cause of leukocytosis, improved, CRP 2.0  5. Syncope while working with PT, ?vagal or orthostatic        Recommendations:   remain on diflucan 800 mg per day   remain on bactrim DS TIW for PJP prophylaxis and his anti-retrovirals ASAP   augmentin for his sinusitis plus flonase  I believe he can go back to the [facility]     D/w Dr. Vu    Medical Decision Making during this encounter was  [_] Low Complexity  [_] Moderate Complexity  [xxx  ] High Complexity

## 2023-01-18 NOTE — PLAN OF CARE
01/18/23 0846   Patient Assessment/Suction   Level of Consciousness (AVPU) alert   Respiratory Effort Normal;Unlabored   Expansion/Accessory Muscles/Retractions no use of accessory muscles;no retractions   All Lung Fields Breath Sounds diminished   PRE-TX-O2   Device (Oxygen Therapy) room air   SpO2 100 %   Pulse Oximetry Type Intermittent   $ Pulse Oximetry - Multiple Charge Pulse Oximetry - Multiple   Pulse 95   Resp 16   Aerosol Therapy   $ Aerosol Therapy Charges Aerosol Treatment   Education   $ Education Bronchodilator;15 min

## 2023-01-18 NOTE — PT/OT/SLP PROGRESS
"Physical Therapy Treatment    Patient Name:  El Abreu   MRN:  [id number]    Recommendations:     Discharge Recommendations: nursing facility, skilled  Discharge Equipment Recommendations:  (TBD)  Barriers to discharge: None    Assessment:     El Abreu is a 27 y.o. male admitted with a medical diagnosis of Pneumonia.  He presents with the following impairments/functional limitations: weakness, impaired endurance, impaired self care skills, impaired functional mobility, gait instability, impaired balance, decreased safety awareness, decreased coordination, impaired cardiopulmonary response to activity.  Pt sleeping L sidelying, roused easily to voice, agreed to attempt standing and/or gait with RW assist.    Supine to sit transfer to EOB, CGA. Displayed sitting posture of trunk and neck rotated to L; SBA for balance. Unable to turn head to R past midline.     Within 15-20 seconds of sitting, onset of loss of postural tone, lack of righting responses, and therapist unable to elicit core recruitment. Onset of vacant stare to L side, verbally non-responsive, and diaphoretic. Was returned to supine with totalA.    Immediate return to responsiveness; supine /74,  "I felt dizzy"    Returned to sit EOB with CGA. /77. Symptoms again returned of L vacant stare, L nystagmus, nonresponsiveness, loss of postural tone within 15 sec of sitting up. Total A to supine; PABLO Graves called to assess.     Will progress mobility as medically appropriate.      Rehab Prognosis: Fair; patient would benefit from acute skilled PT services to address these deficits and reach maximum level of function.    Recent Surgery: * No surgery found *      Plan:     During this hospitalization, patient to be seen 6 x/week to address the identified rehab impairments via gait training, therapeutic activities, therapeutic exercises, neuromuscular re-education and progress toward the following goals:    Plan of Care Expires:  " "23    Subjective     Chief Complaint: "dizzy" when sitting up  Patient/Family Comments/goals: progress mobility  Pain/Comfort:  Pain Rating 1: 0/10  Pain Rating Post-Intervention 1: 0/10      Objective:     Communicated with PABLO Graves prior to session.  Patient found left sidelying with peripheral IV, telemetry, bed alarm upon PT entry to room.     General Precautions: Standard, fall  Orthopedic Precautions: N/A  Braces: N/A  Respiratory Status: Room air     Functional Mobility:  Bed Mobility:     Supine to Sit: contact guard assistance x2 trials  Sit to Supine: total assistance x 2 trials, due to decreased responsiveness in sitting   Transfers:     Sit to Stand:  unable to attempt    Gait: unable to attempt      AM-PAC 6 CLICK MOBILITY          Treatment & Education:  -Pt educ in role of physical therapy during acute care stay, fall prevention, call light    Patient left HOB elevated with all lines intact, call button in reach, bed alarm on, and PABLO Graves notified..    GOALS:   Multidisciplinary Problems       Physical Therapy Goals          Problem: Physical Therapy    Goal Priority Disciplines Outcome Goal Variances Interventions   Physical Therapy Goal     PT, PT/OT      Description: Goals to be met by: 23     Patient will increase functional independence with mobility by performin. Supine to sit with Supervision  2. Sit to stand transfer with Supervision  3. Bed to chair transfer with Supervision using Rolling Walker  4. Gait  x 150 feet with Supervision using Rolling Walker.                              Time Tracking:     PT Received On: 23  PT Start Time: 1416     PT Stop Time: 1435  PT Total Time (min): 19 min     Billable Minutes: Therapeutic Activity 19    Treatment Type: Treatment  PT/PTA: PTA     PTA Visit Number: 1     2023  "

## 2023-01-18 NOTE — CONSULTS
"Former[facility]  Adult Nutrition   Consult Note (Nutrition Education)     SUMMARY     Recommendations  Recommendation/Intervention:   1. Continue Diabetic diet. RD increased kcal level to 2200 / day per previou facility order.   2. Added Glucerna with meals and Khurram BID as per pta.   3. Continue appetite stimulant, thiamine, folic acid, MVI and probiotic per prior regimen.   4. Consider adding Severe Malnutrition to problem list.  5. Encouraged patient to drink supplement and consume adequate meals. Pt states he follows diabetic diet. (Per consult due to diet trigger).    Goals: 1. Patient to consume >/= EEN / EPN with po meals and supplements. 2. No further weight loss. 3. Labs trend to target range.  Nutrition Goal Status: new    Dietitian Rounds Brief  Patient admit with N/V, dx pneumonia. Patient with history of DM with A1c wdl; history of AIDS, PAF, HTN and unplanned weight loss. Patient  noted to have 15% , 20 lb wt loss in 1 week if weights between facilities correct; pt with history per dietitian nutrition assessment of Severe Malnutrition. RD agrees Severe Malnutrition in the context of chronic disease related to severe muscle wasting, subcutaneous fat loss, < 75% intake in 1 month, and > 7.5% wt loss in 3 months.     "Pt meets criteria for Severe Chronic Malnutrition d/t intake < 75% for > 1 month; 13% wt loss within 3 months; muscle wasting visible in upper and lower extremities; temporal wasting; severe body fat loss and reduced  strength."   Per RD assessment prior to admit----1/11/23  RD added Glucerna with meals and Khurram BID, increased kcal/day.     Diet order:   Current Diet Order: 2200 kcal Diabetic diet   Oral Nutrition Supplement: Glucerna with meals ; Khurram BID     Evaluation of Received Nutrient/Fluid Intake  Energy Calories Required: not meeting needs  Protein Required: not meeting needs  Fluid Required: not meeting needs  Tolerance: tolerating     % Intake of Estimated Energy " "Needs: 25 - 50 %  % Meal Intake: 25 - 50 %      Intake/Output Summary (Last 24 hours) at 1/18/2023 1156  Last data filed at 1/18/2023 0628  Gross per 24 hour   Intake 840 ml   Output 1550 ml   Net -710 ml        Anthropometrics  Temp: 97.6 °F (36.4 °C)  Height Method: Stated  Height: 6' 3" (190.5 cm)  Height (inches): 75 in  Weight Method: Bed Scale  Weight: 52.2 kg (115 lb 1.3 oz)  Weight (lb): 115.08 lb  Ideal Body Weight (IBW), Male: 196 lb  % Ideal Body Weight, Male (lb): 58.71 %  BMI (Calculated): 14.4       Estimated/Assessed Needs  Weight Used For Calorie Calculations: 52.2 kg (115 lb 1.3 oz)  Energy Calorie Requirements (kcal): 1766-3789 (35-40)     Protein Requirements:  (1.5-2.0)  Weight Used For Protein Calculations: 52.2 kg (115 lb 1.3 oz)     Estimated Fluid Requirement Method: RDA Method  RDA Method (mL): 1827       Reason for Assessment  Reason For Assessment: consult (diet educaion trigger-- pneumonia and diabetes)  Diagnosis: infection/sepsis, other (see comments), pulmonary disease (Pneumonia)  Relevant Medical History: diabetes, AIDS, PAF, fungal meningitis    Nutrition/Diet History  Food Allergies: NKFA  Factors Affecting Nutritional Intake: decreased appetite    Nutrition Risk Screen  Nutrition Risk Screen: unintentional loss of 10 lbs or more in the past 2 months     MST Score: 5  Have you recently lost weight without trying?: Yes: 34 lbs or more  Weight loss score: 4  Have you been eating poorly because of a decreased appetite?: Yes  Appetite score: 1       Weight History:  Wt Readings from Last 5 Encounters:   01/18/23 52.2 kg (115 lb 1.3 oz)   01/15/23 52.3 kg (115 lb 3.2 oz)   01/13/23 61 kg (134 lb 7.7 oz)   01/02/23 60.1 kg (132 lb 7.9 oz)   11/25/22 68.9 kg (152 lb)        Lab/Procedures/Meds: Pertinent Labs/Meds Reviewed    Medications:Pertinent Medications Reviewed  Scheduled Meds:   amoxicillin-clavulanate 875-125mg  1 tablet Oral Q12H    cetirizine  10 mg Oral Daily    " cyproheptadine  4 mg Oral BID    darunavir-cobicistat  1 tablet Oral Daily    emtricitabine-tenofovir alafen  1 tablet Oral Daily    enoxaparin  40 mg Subcutaneous Daily    EScitalopram oxalate  20 mg Oral Daily    ferrous sulfate  1 tablet Oral Daily    fluconazole  800 mg Oral Daily    fluticasone propionate  2 spray Each Nostril BID    folic acid  1 mg Oral Daily    guaiFENesin  600 mg Oral BID    insulin detemir U-100  10 Units Subcutaneous BID    levETIRAcetam  1,500 mg Oral BID    megestroL  40 mg Oral Daily    metoclopramide HCl  10 mg Oral QID (WM & HS)    multivitamin  1 tablet Oral Daily    pantoprazole  40 mg Oral Before breakfast    risperiDONE  0.5 mg Oral Daily    risperiDONE  1 mg Oral QHS    sodium bicarbonate  650 mg Oral TID    sulfamethoxazole-trimethoprim 800-160mg  1 tablet Oral Every Mon, Wed, Fri    thiamine  100 mg Oral Daily     Continuous Infusions:  PRN Meds:.acetaminophen, albuterol-ipratropium, dextrose 10%, dextrose 10%, glucagon (human recombinant), glucose, glucose, HYDROcodone-acetaminophen, HYDROcodone-acetaminophen, insulin aspart U-100, magnesium sulfate IVPB, magnesium sulfate IVPB, melatonin, melatonin, naloxone, ondansetron, polyethylene glycol, potassium bicarbonate, potassium bicarbonate, potassium bicarbonate, prochlorperazine, simethicone, sodium chloride 0.9%    Labs: Pertinent Labs Reviewed  Clinical Chemistry:  Recent Labs   Lab 01/13/23  0631 01/16/23  0450 01/16/23  1453 01/16/23  1533 01/17/23  0448 01/18/23  0521    133*  --  135*   < > 137   K 3.6 3.6  --  3.5   < > 3.9    102  --  103   < > 108   CO2 21* 22*  --  26   < > 21*    142*  --  135*   < > 210*   BUN 29* 22*  --  23*   < > 19   CREATININE 1.8* 1.1  --  1.0   < > 0.9   CALCIUM 10.4 9.1  --  9.3   < > 9.5   PROT 6.8 5.9*  --  6.3  --   --    ALBUMIN 3.0* 2.6*  --  3.0*  --   --    BILITOT 0.3 0.2  --  0.4  --   --    ALKPHOS 90 71  --  76  --   --    AST 31 28  --  30  --   --    ALT 39  31  --  36  --   --    ANIONGAP 12 9  --  6*   < > 8   MG 1.2* 0.8*  0.8*   < >  --    < > 1.1*   PHOS 4.2 3.2  --   --   --   --     < > = values in this interval not displayed.     CBC:   Recent Labs   Lab 01/18/23  0521   WBC 15.35*   RBC 2.81*   HGB 7.5*   HCT 23.3*      MCV 83   MCH 26.7*   MCHC 32.2     Lipid Panel:  No results for input(s): CHOL, HDL, LDLCALC, TRIG, CHOLHDL in the last 168 hours.  Cardiac Profile:  No results for input(s): BNP, CPK, CPKMB, TROPONINI, CKTOTAL in the last 168 hours.  Inflammatory Labs:  Recent Labs   Lab 01/17/23  0104   CRP 2.00*     Diabetes:  Recent Labs   Lab 01/15/23  1625 01/16/23  0432 01/16/23  1122 01/17/23  0448   HGBA1C  --   --   --  6.0   POCTGLUCOSE 180* 119* 179*  --      Thyroid & Parathyroid:  No results for input(s): TSH, FREET4, M6DIFYI, G1TWOIV, THYROIDAB in the last 168 hours.    Monitor and Evaluation  Food and Nutrient Intake: energy intake, food and beverage intake  Food and Nutrient Adminstration: diet order  Knowledge/Beliefs/Attitudes: food and nutrition knowledge/skill  Physical Activity and Function: nutrition-related ADLs and IADLs  Anthropometric Measurements: weight, weight change, body mass index  Biochemical Data, Medical Tests and Procedures: electrolyte and renal panel, gastrointestinal profile, glucose/endocrine profile, inflammatory profile, lipid profile  Nutrition-Focused Physical Findings: overall appearance     Nutrition Risk  Level of Risk/Frequency of Follow-up: moderate - high     Nutrition Follow-Up  RD Follow-up?: Yes      Teetee Cabrales RD, HERB 01/18/2023 11:56 AM

## 2023-01-18 NOTE — PLAN OF CARE
CIARAN spoke with Mishel at [facility] this morning.  Per Mishel, patient is too acute for SNF, now waiting on peer to peer with insurance to try and get the patient approved for LTAC.  If approved, Mishel has an LTAC bed for him today.  CM following.        01/18/23 1038   Post-Acute Status   Post-Acute Authorization Placement   Post-Acute Placement Status Pending payor review/awaiting authorization (if required)   Patient choice form signed by patient/caregiver List with quality metrics by geographic area provided   Discharge Delays (!) Payor Issues   Discharge Plan   Discharge Plan A Long-term acute care facility (LTAC)   Discharge Plan B Skilled Nursing Facility

## 2023-01-18 NOTE — NURSING
Report called to floor nurse. Patient transported to room 1221, tele on patient and belongings sent.

## 2023-01-18 NOTE — PT/OT/SLP PROGRESS
Occupational Therapy      Patient Name:  El Abreu   MRN:  [id number]    Patient not seen today secondary to sleeping. Will follow-up next scheduled service date.    1/18/2023

## 2023-01-18 NOTE — PROGRESS NOTES
[facility] Medicine  Progress Note    Patient name: El Abreu  MRN: [id number]  Admit Date: 1/16/2023   LOS: 0 days     SUBJECTIVE:     Principal problem: Pneumonia    1/17- patient resting in bed, currently no rooms available so patient is in hallway.  Atttempted transfer to [facility] but no rooms available.  Plan is to get patient back to the SNF/LTAC he was at prior to arrival.  CM in contact with LTAC but doubt he goes today.       Interval History:      Scheduled Meds:   amoxicillin-clavulanate 875-125mg  1 tablet Oral Q12H    cetirizine  10 mg Oral Daily    cyproheptadine  4 mg Oral BID    darunavir-cobicistat  1 tablet Oral Daily    emtricitabine-tenofovir alafen  1 tablet Oral Daily    enoxaparin  40 mg Subcutaneous Daily    EScitalopram oxalate  20 mg Oral Daily    ferrous sulfate  1 tablet Oral Daily    fluconazole  800 mg Oral Daily    fluticasone propionate  2 spray Each Nostril BID    folic acid  1 mg Oral Daily    guaiFENesin  600 mg Oral BID    insulin detemir U-100  10 Units Subcutaneous BID    levETIRAcetam  1,500 mg Oral BID    megestroL  40 mg Oral Daily    metoclopramide HCl  10 mg Oral QID (WM & HS)    multivitamin  1 tablet Oral Daily    pantoprazole  40 mg Oral Before breakfast    risperiDONE  0.5 mg Oral Daily    risperiDONE  1 mg Oral QHS    sodium bicarbonate  650 mg Oral TID    [START ON 1/18/2023] sulfamethoxazole-trimethoprim 800-160mg  1 tablet Oral Every Mon, Wed, Fri    thiamine  100 mg Oral Daily     Continuous Infusions:  PRN Meds:acetaminophen, albuterol-ipratropium, dextrose 10%, dextrose 10%, glucagon (human recombinant), glucose, glucose, HYDROcodone-acetaminophen, HYDROcodone-acetaminophen, insulin aspart U-100, magnesium sulfate IVPB, magnesium sulfate IVPB, melatonin, melatonin, naloxone, ondansetron, polyethylene glycol, potassium bicarbonate, potassium bicarbonate, potassium bicarbonate, prochlorperazine, simethicone, sodium chloride  0.9%    Review of patient's allergies indicates:  No Known Allergies    Review of Systems: As per interval history    OBJECTIVE:     Vital Signs (Most Recent)  Temp: 98 °F (36.7 °C) (01/17/23 1919)  Pulse: 92 (01/17/23 1919)  Resp: 16 (01/17/23 1919)  BP: 116/68 (01/17/23 1919)  SpO2: 100 % (01/17/23 1919)    Vital Signs Range (Last 24H):  Temp:  [97.7 °F (36.5 °C)-98.7 °F (37.1 °C)]   Pulse:  [80-99]   Resp:  [14-26]   BP: ()/(44-68)   SpO2:  [98 %-100 %]     I & O (Last 24H):  Intake/Output Summary (Last 24 hours) at 1/17/2023 2238  Last data filed at 1/17/2023 2233  Gross per 24 hour   Intake 750 ml   Output 750 ml   Net 0 ml       Physical Exam:  General: Patient resting comfortably, chronically ill appearing.   ENT: Hearing grossly intact. No discharge from ears. No nasal discharge.   CVS: RRR. No LE edema BL  Lungs:  No tachypnea or accessory muscle use.  Clear to auscultation bilaterally  Abdomen:  Soft, nontender and nondistended.  No organomegaly  Neuro: AOx3. Moves all extremities. Gen weakness, speech is slow.  Follows commands. Responds appropriately       Laboratory:  I have reviewed all pertinent lab results within the past 24 hours.    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        Seizures complicating infection  Resume Keppra, seizure precautions        Hypomagnesemia  Replaced in ED, repeat magnesium in a.m. and replace with IV magnesium as needed per  protocol        Anemia  Repeat CBC in a.m., patient had episodes of bleeding, coffee-ground emesis due to any fungal therapy, this was resolved, monitor for bleeding and transfuse as needed        Paroxysmal atrial fibrillation  Currently in sinus rhythm in the 70s, monitor on telemetry           Type 2 diabetes mellitus without retinopathy  Resume basal insulin, glucose checks a.c. and HS, resume moderate dose sliding scale insulin     Physical deconditioning  PT OT consult   SNF vs LTAC     Active Hospital Problems    Diagnosis  POA    *Pneumonia [J18.9]  Unknown    Weakness [R53.1]  Unknown    Physical deconditioning [R53.81]  Unknown    Paroxysmal atrial fibrillation [I48.0]  Unknown    Anemia [D64.9]  Yes    Hypokalemia [E87.6]  Yes    Hypomagnesemia [E83.42]  Yes    AIDS [B20]  Unknown    Seizures complicating infection [B99.9, R56.9]  Yes    Cryptococcal meningitis [B45.1]  Unknown    Type 2 diabetes mellitus without retinopathy [E11.9]  Yes      Resolved Hospital Problems   No resolved problems to display.           VTE Risk Mitigation (From admission, onward)           Ordered     enoxaparin injection 40 mg  Daily         01/16/23 2104                        Department Hospital Medicine  [facility]  Buddy Vu MD  Date of service: 01/17/2023

## 2023-01-19 LAB
ANION GAP SERPL CALC-SCNC: 5 MMOL/L (ref 8–16)
BACTERIA SPEC AEROBE CULT: NORMAL
BASOPHILS # BLD AUTO: ABNORMAL K/UL (ref 0–0.2)
BASOPHILS NFR BLD: 0 % (ref 0–1.9)
BUN SERPL-MCNC: 15 MG/DL (ref 6–20)
CALCIUM SERPL-MCNC: 9.4 MG/DL (ref 8.7–10.5)
CHLORIDE SERPL-SCNC: 106 MMOL/L (ref 95–110)
CO2 SERPL-SCNC: 22 MMOL/L (ref 23–29)
CREAT SERPL-MCNC: 0.8 MG/DL (ref 0.5–1.4)
DIFFERENTIAL METHOD: ABNORMAL
EOSINOPHIL # BLD AUTO: ABNORMAL K/UL (ref 0–0.5)
EOSINOPHIL NFR BLD: 1 % (ref 0–8)
ERYTHROCYTE [DISTWIDTH] IN BLOOD BY AUTOMATED COUNT: 14.9 % (ref 11.5–14.5)
EST. GFR  (NO RACE VARIABLE): >60 ML/MIN/1.73 M^2
GLUCOSE SERPL-MCNC: 100 MG/DL (ref 70–110)
GLUCOSE SERPL-MCNC: 127 MG/DL (ref 70–110)
GLUCOSE SERPL-MCNC: 157 MG/DL (ref 70–110)
GLUCOSE SERPL-MCNC: 168 MG/DL (ref 70–110)
GLUCOSE SERPL-MCNC: 95 MG/DL (ref 70–110)
GRAM STN SPEC: NORMAL
HCT VFR BLD AUTO: 23.5 % (ref 40–54)
HGB BLD-MCNC: 7.7 G/DL (ref 14–18)
IMM GRANULOCYTES # BLD AUTO: ABNORMAL K/UL (ref 0–0.04)
IMM GRANULOCYTES NFR BLD AUTO: ABNORMAL % (ref 0–0.5)
LYMPHOCYTES # BLD AUTO: ABNORMAL K/UL (ref 1–4.8)
LYMPHOCYTES NFR BLD: 15 % (ref 18–48)
MAGNESIUM SERPL-MCNC: 1.5 MG/DL (ref 1.6–2.6)
MAGNESIUM SERPL-MCNC: 2.3 MG/DL (ref 1.6–2.6)
MCH RBC QN AUTO: 27.3 PG (ref 27–31)
MCHC RBC AUTO-ENTMCNC: 32.8 G/DL (ref 32–36)
MCV RBC AUTO: 83 FL (ref 82–98)
METAMYELOCYTES NFR BLD MANUAL: 3 %
MONOCYTES # BLD AUTO: ABNORMAL K/UL (ref 0.3–1)
MONOCYTES NFR BLD: 34 % (ref 4–15)
MYELOCYTES NFR BLD MANUAL: 12 %
NEUTROPHILS # BLD AUTO: ABNORMAL K/UL (ref 1.8–7.7)
NEUTROPHILS NFR BLD: 35 % (ref 38–73)
NRBC BLD-RTO: 0 /100 WBC
OVALOCYTES BLD QL SMEAR: ABNORMAL
PLATELET # BLD AUTO: 244 K/UL (ref 150–450)
PLATELET BLD QL SMEAR: ABNORMAL
PMV BLD AUTO: 9.1 FL (ref 9.2–12.9)
POTASSIUM SERPL-SCNC: 4 MMOL/L (ref 3.5–5.1)
RBC # BLD AUTO: 2.82 M/UL (ref 4.6–6.2)
SODIUM SERPL-SCNC: 133 MMOL/L (ref 136–145)
WBC # BLD AUTO: 17.95 K/UL (ref 3.9–12.7)

## 2023-01-19 PROCEDURE — 82962 GLUCOSE BLOOD TEST: CPT

## 2023-01-19 PROCEDURE — 97530 THERAPEUTIC ACTIVITIES: CPT | Mod: CQ

## 2023-01-19 PROCEDURE — 63600175 PHARM REV CODE 636 W HCPCS: Performed by: NURSE PRACTITIONER

## 2023-01-19 PROCEDURE — 63700000 PHARM REV CODE 250 ALT 637 W/O HCPCS: Performed by: NURSE PRACTITIONER

## 2023-01-19 PROCEDURE — 80048 BASIC METABOLIC PNL TOTAL CA: CPT | Performed by: NURSE PRACTITIONER

## 2023-01-19 PROCEDURE — 25000003 PHARM REV CODE 250: Performed by: NURSE PRACTITIONER

## 2023-01-19 PROCEDURE — 83735 ASSAY OF MAGNESIUM: CPT | Performed by: NURSE PRACTITIONER

## 2023-01-19 PROCEDURE — 25000003 PHARM REV CODE 250: Performed by: STUDENT IN AN ORGANIZED HEALTH CARE EDUCATION/TRAINING PROGRAM

## 2023-01-19 PROCEDURE — 63700000 PHARM REV CODE 250 ALT 637 W/O HCPCS: Performed by: INTERNAL MEDICINE

## 2023-01-19 PROCEDURE — 85007 BL SMEAR W/DIFF WBC COUNT: CPT | Performed by: NURSE PRACTITIONER

## 2023-01-19 PROCEDURE — 99900035 HC TECH TIME PER 15 MIN (STAT)

## 2023-01-19 PROCEDURE — 25000003 PHARM REV CODE 250: Performed by: INTERNAL MEDICINE

## 2023-01-19 PROCEDURE — 99900031 HC PATIENT EDUCATION (STAT)

## 2023-01-19 PROCEDURE — 85027 COMPLETE CBC AUTOMATED: CPT | Performed by: NURSE PRACTITIONER

## 2023-01-19 PROCEDURE — 12000002 HC ACUTE/MED SURGE SEMI-PRIVATE ROOM

## 2023-01-19 PROCEDURE — 94761 N-INVAS EAR/PLS OXIMETRY MLT: CPT

## 2023-01-19 PROCEDURE — 97110 THERAPEUTIC EXERCISES: CPT | Mod: CQ

## 2023-01-19 RX ORDER — EMTRICITABINE AND TENOFOVIR DISOPROXIL FUMARATE 200; 300 MG/1; MG/1
1 TABLET, FILM COATED ORAL DAILY
Status: DISCONTINUED | OUTPATIENT
Start: 2023-01-19 | End: 2023-02-07 | Stop reason: HOSPADM

## 2023-01-19 RX ADMIN — SODIUM CHLORIDE: 0.9 INJECTION, SOLUTION INTRAVENOUS at 02:01

## 2023-01-19 RX ADMIN — DARUNAVIR ETHANOLATE AND COBICISTAT 1 TABLET: 800; 150 TABLET, FILM COATED ORAL at 05:01

## 2023-01-19 RX ADMIN — INSULIN DETEMIR 10 UNITS: 100 INJECTION, SOLUTION SUBCUTANEOUS at 08:01

## 2023-01-19 RX ADMIN — SODIUM BICARBONATE 650 MG TABLET 650 MG: at 02:01

## 2023-01-19 RX ADMIN — AMOXICILLIN AND CLAVULANATE POTASSIUM 1 TABLET: 875; 125 TABLET, FILM COATED ORAL at 08:01

## 2023-01-19 RX ADMIN — CYPROHEPTADINE HYDROCHLORIDE 4 MG: 4 TABLET ORAL at 08:01

## 2023-01-19 RX ADMIN — MAGNESIUM SULFATE HEPTAHYDRATE 2 G: 40 INJECTION, SOLUTION INTRAVENOUS at 02:01

## 2023-01-19 RX ADMIN — THIAMINE HCL TAB 100 MG 100 MG: 100 TAB at 08:01

## 2023-01-19 RX ADMIN — GUAIFENESIN 600 MG: 600 TABLET, EXTENDED RELEASE ORAL at 08:01

## 2023-01-19 RX ADMIN — FLUTICASONE PROPIONATE 100 MCG: 50 SPRAY, METERED NASAL at 08:01

## 2023-01-19 RX ADMIN — FOLIC ACID 1 MG: 1 TABLET ORAL at 08:01

## 2023-01-19 RX ADMIN — THERA TABS 1 TABLET: TAB at 08:01

## 2023-01-19 RX ADMIN — SODIUM CHLORIDE: 0.9 INJECTION, SOLUTION INTRAVENOUS at 05:01

## 2023-01-19 RX ADMIN — FERROUS SULFATE TAB 325 MG (65 MG ELEMENTAL FE) 1 EACH: 325 (65 FE) TAB at 08:01

## 2023-01-19 RX ADMIN — METOCLOPRAMIDE 10 MG: 10 TABLET ORAL at 11:01

## 2023-01-19 RX ADMIN — RISPERIDONE 0.5 MG: 0.25 TABLET ORAL at 08:01

## 2023-01-19 RX ADMIN — AMOXICILLIN AND CLAVULANATE POTASSIUM 1 TABLET: 875; 125 TABLET, FILM COATED ORAL at 09:01

## 2023-01-19 RX ADMIN — METOCLOPRAMIDE 10 MG: 10 TABLET ORAL at 09:01

## 2023-01-19 RX ADMIN — PANTOPRAZOLE SODIUM 40 MG: 40 TABLET, DELAYED RELEASE ORAL at 05:01

## 2023-01-19 RX ADMIN — ENOXAPARIN SODIUM 40 MG: 100 INJECTION SUBCUTANEOUS at 05:01

## 2023-01-19 RX ADMIN — INSULIN ASPART 2 UNITS: 100 INJECTION, SOLUTION INTRAVENOUS; SUBCUTANEOUS at 11:01

## 2023-01-19 RX ADMIN — SODIUM BICARBONATE 650 MG TABLET 650 MG: at 08:01

## 2023-01-19 RX ADMIN — LEVETIRACETAM 1500 MG: 500 TABLET, FILM COATED ORAL at 08:01

## 2023-01-19 RX ADMIN — FLUCONAZOLE 800 MG: 100 TABLET ORAL at 08:01

## 2023-01-19 RX ADMIN — MEGESTROL ACETATE 40 MG: 20 TABLET ORAL at 08:01

## 2023-01-19 RX ADMIN — EMTRICITABINE AND TENOFOVIR DISOPROXIL FUMARATE 1 TABLET: 200; 300 TABLET, FILM COATED ORAL at 05:01

## 2023-01-19 RX ADMIN — METOCLOPRAMIDE 10 MG: 10 TABLET ORAL at 05:01

## 2023-01-19 RX ADMIN — METOCLOPRAMIDE 10 MG: 10 TABLET ORAL at 08:01

## 2023-01-19 RX ADMIN — SODIUM CHLORIDE 500 ML: 0.9 INJECTION, SOLUTION INTRAVENOUS at 02:01

## 2023-01-19 RX ADMIN — ESCITALOPRAM OXALATE 20 MG: 10 TABLET ORAL at 08:01

## 2023-01-19 RX ADMIN — CETIRIZINE HYDROCHLORIDE 10 MG: 10 TABLET, FILM COATED ORAL at 08:01

## 2023-01-19 RX ADMIN — RISPERIDONE 1 MG: 1 TABLET ORAL at 08:01

## 2023-01-19 NOTE — PROGRESS NOTES
[facility] Medicine  Progress Note    Patient name: El Abreu  MRN: [id number]  Admit Date: 1/16/2023   LOS: 0 days     SUBJECTIVE:     Principal problem:     1/18- syncope with PT today.  Nursing states his PO intake is good.  Will give some IVF.  [facility] and resume his antiretrovirals as soon as possible.  His insurance is denying authorization despite the fact that he was transferred from there.  Peer to peer per LTAC physician    1/17- patient resting in bed, currently no rooms available so patient is in hallway.  Atttempted transfer to [facility] but no rooms available.  Plan is to get patient back to the SNF/LTAC he was at prior to arrival.  CM in contact with LTAC but doubt he goes today.       Interval History:      Scheduled Meds:   amoxicillin-clavulanate 875-125mg  1 tablet Oral Q12H    cetirizine  10 mg Oral Daily    cyproheptadine  4 mg Oral BID    darunavir-cobicistat  1 tablet Oral Daily    emtricitabine-tenofovir alafen  1 tablet Oral Daily    enoxaparin  40 mg Subcutaneous Daily    EScitalopram oxalate  20 mg Oral Daily    ferrous sulfate  1 tablet Oral Daily    fluconazole  800 mg Oral Daily    fluticasone propionate  2 spray Each Nostril BID    folic acid  1 mg Oral Daily    guaiFENesin  600 mg Oral BID    insulin detemir U-100  10 Units Subcutaneous BID    levETIRAcetam  1,500 mg Oral BID    megestroL  40 mg Oral Daily    metoclopramide HCl  10 mg Oral QID (WM & HS)    multivitamin  1 tablet Oral Daily    pantoprazole  40 mg Oral Before breakfast    risperiDONE  0.5 mg Oral Daily    risperiDONE  1 mg Oral QHS    sodium bicarbonate  650 mg Oral TID    sulfamethoxazole-trimethoprim 800-160mg  1 tablet Oral Every Mon, Wed, Fri    thiamine  100 mg Oral Daily     Continuous Infusions:   sodium chloride 0.9% 100 mL/hr at 01/18/23 1745     PRN Meds:acetaminophen, albuterol-ipratropium, dextrose 10%, dextrose 10%, glucagon (human  recombinant), glucose, glucose, HYDROcodone-acetaminophen, HYDROcodone-acetaminophen, insulin aspart U-100, magnesium sulfate IVPB, magnesium sulfate IVPB, melatonin, melatonin, naloxone, ondansetron, polyethylene glycol, potassium bicarbonate, potassium bicarbonate, potassium bicarbonate, prochlorperazine, simethicone, sodium chloride 0.9%    Review of patient's allergies indicates:  No Known Allergies    Review of Systems: As per interval history    OBJECTIVE:     Vital Signs (Most Recent)  Temp: 97.4 °F (36.3 °C) (01/18/23 1659)  Pulse: 98 (01/18/23 1659)  Resp: 18 (01/18/23 1659)  BP: 121/78 (01/18/23 1659)  SpO2: 100 % (01/18/23 1659)    Vital Signs Range (Last 24H):  Temp:  [97.4 °F (36.3 °C)-99 °F (37.2 °C)]   Pulse:  [79-98]   Resp:  [15-19]   BP: (101-146)/(62-78)   SpO2:  [96 %-100 %]     I & O (Last 24H):  Intake/Output Summary (Last 24 hours) at 1/18/2023 2041  Last data filed at 1/18/2023 1630  Gross per 24 hour   Intake 1020 ml   Output 1320 ml   Net -300 ml         Physical Exam:  General: Patient resting comfortably, chronically ill appearing.   ENT: Hearing grossly intact. No discharge from ears. No nasal discharge.   CVS: RRR. No LE edema BL  Lungs:  No tachypnea or accessory muscle use.  Clear to auscultation bilaterally  Abdomen:  Soft, nontender and nondistended.  No organomegaly  Neuro: AOx3. Moves all extremities. Gen weakness, speech is slow.  Follows commands. Responds appropriately       Laboratory:  I have reviewed all pertinent lab results within the past 24 hours.    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        Seizures complicating infection  Resume Keppra, seizure precautions        Hypomagnesemia  Replaced in ED, repeat magnesium in a.m. and replace with IV magnesium as needed per protocol        Anemia  Repeat CBC in a.m., patient had episodes of bleeding, coffee-ground emesis due to any fungal therapy, this was resolved, monitor for bleeding and transfuse as needed        Paroxysmal atrial fibrillation  Currently in sinus rhythm in the 70s, monitor on telemetry           Type 2 diabetes mellitus without retinopathy  Resume basal insulin, glucose checks a.c. and HS, resume moderate dose sliding scale insulin     Physical deconditioning  PT OT consult   SNF vs LTAC     Active Hospital Problems    Diagnosis  POA    *Pneumonia [J18.9]  Unknown    Weakness [R53.1]  Unknown    Physical deconditioning [R53.81]  Unknown    Paroxysmal atrial fibrillation [I48.0]  Unknown    Anemia [D64.9]  Yes    Hypokalemia [E87.6]  Yes    Hypomagnesemia [E83.42]  Yes    AIDS [B20]  Unknown    Seizures complicating infection [B99.9, R56.9]  Yes    Cryptococcal meningitis [B45.1]  Unknown    Type 2 diabetes mellitus without retinopathy [E11.9]  Yes      Resolved Hospital Problems   No resolved problems to display.           VTE Risk Mitigation (From admission, onward)           Ordered     Place CARMEN hose  Until discontinued         01/18/23 1830     enoxaparin injection 40 mg  Daily         01/16/23 2100                        Department Hospital Medicine  [facility]  Buddy Vu MD  Date of service: 01/18/2023

## 2023-01-19 NOTE — PROGRESS NOTES
[facility] Medicine  Progress Note    Patient name: El Abreu  MRN: [id number]  Admit Date: 1/16/2023   LOS: 1 day     SUBJECTIVE:     Interval History:   1/19: Patient seen and examined.  Patient denies fever or chest pain.  No nausea or vomiting.  He reports being comfortable at this time.      1/18- syncope with PT today.  Nursing states his PO intake is good.  Will give some IVF.  He [facility] to [facility] back to [facility] and resume his antiretrovirals as soon as possible.  His insurance is denying authorization despite the fact that he was transferred from there.  Peer to peer per LTAC physician    1/17- patient resting in bed, currently no rooms available so patient is in hallway.  Atttempted transfer to [facility] but no rooms available.  Plan is to get patient back to the SNF/LTAC he was at prior to arrival.  CM in contact with LTAC but doubt he goes today.       Review of Systems: As per interval history    OBJECTIVE:     Vitals:    01/19/23 1150 01/19/23 1350 01/19/23 1355 01/19/23 1537   BP: 111/74 111/72 (!) 85/57 121/73   Patient Position:  Sitting Standing    Pulse: 93   87   Resp: 18   18   Temp: 97.6 °F (36.4 °C)   98.8 °F (37.1 °C)   TempSrc: Oral   Oral   SpO2: 100%   100%   Weight:       Height:             Physical Exam:  General: Patient resting comfortably, chronically ill appearing.   ENT:  Was mucous membranes  CVS: RRR.  2+ radial pulses  Lungs:  Comfortable work of breathing.  Clear to auscultation bilaterally  Abdomen:  Soft, nontender and nondistended.   Neuro: AOx3. Moves all extremities. Gen weakness, speech is slow.  Follows commands. Responds appropriately       Laboratory:  I have reviewed all pertinent lab results within the past 24 hours.    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   Serial labs and replace as needed     Seizures complicating infection  Resume Keppra, seizure precautions        Hypomagnesemia  Replaced in ED, repeat magnesium in a.m. and replace with IV magnesium as needed per protocol        Anemia  Repeat CBC in a.m., patient had episodes of bleeding, coffee-ground emesis due to any fungal therapy, this was resolved, monitor for bleeding and transfuse as needed  Continue serial labs        Paroxysmal atrial fibrillation  Currently in sinus rhythm in the 70s, monitor on telemetry  Monitor           Type 2 diabetes mellitus without retinopathy  Resume basal insulin, glucose checks a.c. and HS, resume moderate dose sliding scale insulin  Monitor regimen and titrate as needed     Physical deconditioning  PT OT consult   SNF vs LTAC       Active Hospital Problems    Diagnosis  POA    *Pneumonia [J18.9]  Unknown    Weakness [R53.1]  Unknown    Physical deconditioning [R53.81]  Unknown    Paroxysmal atrial fibrillation [I48.0]  Unknown    Anemia [D64.9]  Yes    Hypokalemia [E87.6]  Yes    Hypomagnesemia [E83.42]  Yes    AIDS [B20]  Unknown    Seizures complicating infection [B99.9, R56.9]  Yes    Cryptococcal meningitis [B45.1]  Unknown    Type 2 diabetes mellitus without retinopathy [E11.9]  Yes      Resolved Hospital Problems   No resolved problems to display.           VTE Risk Mitigation (From admission, onward)           Ordered     Place CARMEN hose  Until discontinued         01/18/23 1830     enoxaparin  injection 40 mg  Daily         01/16/23 2104                        Department Hospital Medicine  [facility]  Ruben Burgess MD  Date of service: 01/19/2023

## 2023-01-19 NOTE — PLAN OF CARE
01/19/23 0848   Discharge Reassessment   Assessment Type Discharge Planning Reassessment   Did the patient's condition or plan change since previous assessment? Yes   Discharge Plan discussed with: Patient   Communicated ZULMA with patient/caregiver Yes   Discharge Plan A Long-term acute care facility (LTAC)   Discharge Plan B Skilled Nursing Facility   DME Needed Upon Discharge  none   Discharge Barriers Identified None   Why the patient remains in the hospital Requires continued medical care   Post-Acute Status   Post-Acute Authorization Placement   Post-Acute Placement Status Pending payor review/awaiting authorization (if required)   Discharge Delays (!) Payor Issues     Case Management sent secure chat to [employer] inquiring about authorization for [facility]. Awaiting response at this time. CM to follow     1539 - Peer to peer for ltac denied. CM blasted out SNF referrals to 25 facilities. Awaiting accepting facility at this time. CM to follow

## 2023-01-19 NOTE — PROGRESS NOTES
Consult Note  Infectious Disease    Reason for Consult:  leukocytosis    HPI: El Abreu is a 27 y.o. 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  he was last admitted to [facility] November 30th with vomiting and headache, increased intracranial pressure.  This LP had a positive Sirisha ink opening pressure was 23.  He was then committed to a 6 week course of AmBisome completed 1/10, which was interrupted with AC but resumed and he was discharged to [facility] on January 10th.  He did require an additional LP for fluid removal and reduction of pressure on 12/13 (Sirisha ink was still positive but the culture was negative.  The last positive culture was 11/21).  He has been continued on fluconazole 800 mg daily since 01/11 and has been receiving Pneumocystis prophylaxis and appetite stimulants and antidepressants.  MRI brain on 01/01 showed stability of the right globus pallidus cryptococcal pseudocyst.  He was additionally treated for neurosyphilis 3 doses of Bicillin after an appropriate course of IV penicillin G.  He has not been eating well, withdrawn, seen by Psychiatry late December and started on risperidone.  He was seen in the [facility] emergency room 1/14 when his mandible became stuck open.  This was reduced in their emergency room he was sent back to the [facility].  He was sent to our emergency room yesterday for severe hypomagnesemia, 0.8 While in the emergency room he was noted to have a rising white blood cell count, from normal on 01/08 to 14 on 01/09 to 17 on 01/16 additionally his hemoglobin was 7.3.  An infectious workup was commenced and CRP was 2.0, lactic acid 0.9, procalcitonin 0.15.  Chest x-ray was negative, blood cultures are negative, respiratory PCR is not yet been  collected and he is not produce any sputum but this is not likely relevant..vanc and cefepime were begun.   Unfortunately our hospital is full and he has been waiting in the hallway outside the emergency room.  He is incontinent of urine, and urinalysis was negative.  Magnesium is now 1.7    He feels much improved, tells me that he ate breakfast and lunch. He does endorse some small amount of yellow sputum and he does find that his sinuses are congested. It is relevant to note that he had pansinusitis on MRI brain 1/1.     1/18: interim reviewed. He is eating well. He had a syncopal episode while working with PT. He states that this has happened before, but does not happen consistently when he gets up. He is still congested but not really expectorating. Sputum sample was not purulent(saliva) and culture has only normal burak. He is not receiving his HIV antivirals here(not on formulary) though these were available at [facility]. Repeated CXR and it is still clear.   1/20:  interim reviewed. WBC a little higher , peripheral smear noted but unsure of the relevance of the findings.  He is sleeping deeply, worked with physical therapy much more successfully today he is eating reasonably well and antivirals were resumed.  He endorses that his cough is improved.  Denies diarrhea. Blood culture from 1/16, has coag neg Staph in 1/4 bottles drawn from picc line. The picc line is 47 days old, placed 12/4.        EXAM & DIAGNOSTICS REVIEWED:   Vitals:     Temp:  [97.4 °F (36.3 °C)-99.6 °F (37.6 °C)]   Temp: 98.8 °F (37.1 °C) (01/19/23 1537)  Pulse: 87 (01/19/23 1537)  Resp: 18 (01/19/23 1537)  BP: 121/73 (01/19/23 1537)  SpO2: 100 % (01/19/23 1537)    Intake/Output Summary (Last 24 hours) at 1/19/2023 1612  Last data filed at 1/19/2023 1256  Gross per 24 hour   Intake 2000 ml   Output 1960 ml   Net 40 ml       General:  In NAD.  Arousable but still sleepy cooperative, comfortable, completely non toxic  Eyes:  Anicteric,  EOMI  ENT:  No ulcers, exudates, thrush, nares patent, nasal congestion is evident  Neck:  supple,   Lungs: Much clearer no consolidation  Heart:  RRR, no gallop/murmur/rub noted  Abd:  Soft, NT, ND, normal BS, no masses or organomegaly appreciated.  :  Voids , no flank tenderness  Musc:  Joints without effusion, swelling, erythema, synovitis, with mild generalized muscle wasting.   Skin:  No rashes. Dry skin. Poor turgor  Neuro:            Arousable but still sleepy, speech fluent, face symmetric, moves all extremities, no focal weakness.   Psych: Calm, cooperative  Lymphatic:        Extrem: No edema, erythema, phlebitis, cellulitis, warm and well perfused  VAD:  Right arm picc 12/4    Isolation:  none  Wound: none      General Labs reviewed:  Recent Labs   Lab 01/17/23  0448 01/18/23  0521 01/19/23  0453   WBC 17.35* 15.35* 17.95*   HGB 7.3* 7.5* 7.7*   HCT 22.3* 23.3* 23.5*    229 244       Recent Labs   Lab 01/13/23  0631 01/16/23  0450 01/16/23  1533 01/17/23  0448 01/17/23  1725 01/18/23  0521 01/19/23  0453    133* 135* 137  --  137 133*   K 3.6 3.6 3.5 3.6 4.2 3.9 4.0    102 103 107  --  108 106   CO2 21* 22* 26 24  --  21* 22*   BUN 29* 22* 23* 18  --  19 15   CREATININE 1.8* 1.1 1.0 1.0  --  0.9 0.8   CALCIUM 10.4 9.1 9.3 9.2  --  9.5 9.4   PROT 6.8 5.9* 6.3  --   --   --   --    BILITOT 0.3 0.2 0.4  --   --   --   --    ALKPHOS 90 71 76  --   --   --   --    ALT 39 31 36  --   --   --   --    AST 31 28 30  --   --   --   --      Recent Labs   Lab 01/17/23  0104   CRP 2.00*         Micro:  Microbiology Results (last 7 days)       Procedure Component Value Units Date/Time    Culture, Respiratory with Gram Stain [[id number]] Collected: 01/17/23 1456    Order Status: Completed Specimen: Sputum Updated: 01/19/23 0756     Respiratory Culture Normal respiratory burak     Gram Stain (Respiratory) <10 epithelial cells per low power field.     Gram Stain (Respiratory) Rare WBC's     Gram Stain  (Respiratory) Rare Gram positive cocci    MRSA Screen by PCR [[id number]] Collected: 01/18/23 2101    Order Status: Completed Specimen: Nasopharyngeal Swab from Nasal Updated: 01/18/23 2352     MRSA SCREEN BY PCR Negative    Blood culture x two cultures. Draw prior to antibiotics. [[id number]]  (Abnormal) Collected: 01/16/23 1455    Order Status: Completed Specimen: Blood from Line, PICC Right Basilic Updated: 01/18/23 0726     Blood Culture, Routine Gram stain evans bottle: Gram positive cocci in clusters resembling Staph      Results called to and read back by: Donald Guerrero RN 1E.      01/18/2023  00:19 TGC      COAGULASE-NEGATIVE STAPHYLOCOCCUS SPECIES  Organism is a probable contaminant      Narrative:      Aerobic and anaerobic    Rapid Organism ID by PCR (from Blood culture) [[id number]]  (Abnormal) Collected: 01/16/23 1455    Order Status: Completed Updated: 01/18/23 0041     Enterococcus faecials Not Detected     Enterococcus faecium Not Detected     Listeria Monocytogenes Not Detected     Staphylococcus spp. See species for ID     Comment: CORRECTED RESULT; previously reported as Not Detected on 01/18/2023   at 00:38.          Staphylococcus aureus Not Detected     Staphylococcus epidermidis Detected     Comment: CORRECTED RESULT; previously reported as Not Detected on 01/18/2023   at 00:38.          Staphylococcus lugdunensis Not Detected     Streptococcus species Not Detected     Streptococcus agalactiae Not Detected     Streptococcus pneumoniae Not Detected     Streptococcus pyogenes Not Detected     Acinetobacter calcoaceticus/baumannii complex Not Detected     Bacteroides fragilis Not Detected     Enterobacerales Not Detected     Enterobacter cloacae complex Not Detected     Escherichia Not Detected     Klebsiella aerogenes Not Detected     Klebsiella oxytoca Not Detected     Klebsiella pneumoniae group Not Detected     Proteus Not Detected     Salmonella sp Not Detected     Serratia marcescens Not  Detected     Haemophilus influenzae Not Detected     Neisseria meningtidis Not Detected     Pseudomonas aeruginosa Not Detected     Stenotrophomonas maltophilia Not Detected     Candida albicans Not Detected     Candida auris Not Detected     Candida glabrata Not Detected     Comment: CORRECTED RESULT; previously reported as Detected on 01/18/2023 at   00:38.          Arti krusei Not Detected     Candida Parapsilosis Not Detected     Candida Tropicalis Not Detected     Cryptococcus neoformans/gattii Not Detected     CTX-M Gene Not Detected     Comment: CORRECTED RESULT; previously reported as Test not applicable on   01/18/2023 at 00:38.          IMP Gene Not Detected     Comment: CORRECTED RESULT; previously reported as Test not applicable on   01/18/2023 at 00:38.          KPC  Not Detected     Comment: CORRECTED RESULT; previously reported as Test not applicable on   01/18/2023 at 00:38.          mcr-1  Not Detected     Comment: CORRECTED RESULT; previously reported as Test not applicable on   01/18/2023 at 00:38.          mec A/C Detected     Comment: CORRECTED RESULT; previously reported as Test not applicable on   01/18/2023 at 00:38.          mec A/C and MREJ (MRSA) Not Detected     Comment: CORRECTED RESULT; previously reported as Test not applicable on   01/18/2023 at 00:38.          NDM Not Detected     Comment: CORRECTED RESULT; previously reported as Test not applicable on   01/18/2023 at 00:38.          OXA-48-like Not Detected     Comment: CORRECTED RESULT; previously reported as Test not applicable on   01/18/2023 at 00:38.          van A/B Not Detected     Comment: CORRECTED RESULT; previously reported as Test not applicable on   01/18/2023 at 00:38.          VIM Not Detected     Comment: CORRECTED RESULT; previously reported as Test not applicable on   01/18/2023 at 00:38.         Narrative:      Aerobic and anaerobic    Respiratory Infection Panel (PCR), Nasopharyngeal [[id number]]     Order  Status: No result Specimen: Nasopharyngeal Swab     Blood culture x two cultures. Draw prior to antibiotics. [[id number]] Collected: 01/16/23 1527    Order Status: Sent Specimen: Blood from Line, PICC Right Basilic Updated: 01/16/23 1527            Imaging Reviewed:   CXR   MRI brain 1/1    Cardiology:    IMPRESSION & PLAN   1. Severe hypomagnesemia, corrected   Electrolyte abnormalities likely from ampho B    2. Cryptococcal meningitis, now controlled, s/p 6 weeks of ambisome and continues on high dose oral fluconazole   Cryptococcoma, stable  3. Cachexia with failure to thrive, with improvement in appetite today  4. Pansinusitis, may be cause of leukocytosis, improved, CRP 2.0  5. Syncope while working with PT, ?vagal or orthostatic  6. Leukocytosis, persistent. Positive blood culture which may be a contaminant,         Recommendations:   Repeat blood culture from picc  Midline or PIV then remove the picc and culture the tip  daptomycin  remain on diflucan 800 mg per day, bactrim DS TIW for PJP prophylaxis and his anti-retrovirals     augmentin for his sinusitis plus flonase    LTAc     D/w Dr. Burgess, nursing    Medical Decision Making during this encounter was  [_] Low Complexity  [_] Moderate Complexity  [xxx  ] High Complexity

## 2023-01-19 NOTE — PLAN OF CARE
Problem: Adult Inpatient Plan of Care  Goal: Plan of Care Review  Outcome: Ongoing, Progressing  Goal: Patient-Specific Goal (Individualized)  Outcome: Ongoing, Progressing  Goal: Absence of Hospital-Acquired Illness or Injury  Outcome: Ongoing, Progressing  Goal: Optimal Comfort and Wellbeing  Outcome: Ongoing, Progressing  Goal: Readiness for Transition of Care  Outcome: Ongoing, Progressing     Problem: Fall Injury Risk  Goal: Absence of Fall and Fall-Related Injury  Outcome: Ongoing, Progressing     Problem: Skin Injury Risk Increased  Goal: Skin Health and Integrity  Outcome: Ongoing, Progressing     Problem: Diabetes Comorbidity  Goal: Blood Glucose Level Within Targeted Range  Outcome: Ongoing, Progressing     Problem: Fluid and Electrolyte Imbalance (Acute Kidney Injury/Impairment)  Goal: Fluid and Electrolyte Balance  Outcome: Ongoing, Progressing     Problem: Oral Intake Inadequate (Acute Kidney Injury/Impairment)  Goal: Optimal Nutrition Intake  Outcome: Ongoing, Progressing     Problem: Renal Function Impairment (Acute Kidney Injury/Impairment)  Goal: Effective Renal Function  Outcome: Ongoing, Progressing     Problem: Fluid Imbalance (Pneumonia)  Goal: Fluid Balance  Outcome: Ongoing, Progressing     Problem: Infection (Pneumonia)  Goal: Resolution of Infection Signs and Symptoms  Outcome: Ongoing, Progressing     Problem: Respiratory Compromise (Pneumonia)  Goal: Effective Oxygenation and Ventilation  Outcome: Ongoing, Progressing     Problem: Infection  Goal: Absence of Infection Signs and Symptoms  Outcome: Ongoing, Progressing

## 2023-01-19 NOTE — PT/OT/SLP PROGRESS
Physical Therapy Treatment    Patient Name:  El Abreu   MRN:  [id number]    Recommendations:     Discharge Recommendations: nursing facility, skilled  Discharge Equipment Recommendations:  (TBD)  Barriers to discharge:  increased assist with mobility    Assessment:     El Abreu is a 27 y.o. male admitted with a medical diagnosis of Pneumonia.  He presents with the following impairments/functional limitations: weakness, impaired endurance, impaired self care skills, impaired functional mobility, gait instability, impaired balance, decreased safety awareness, decreased coordination, impaired cardiopulmonary response to activity.    Pt agreeable to visit. Pt wanting to attempt to ambulate. Pt educated that he needs to build up his tolerance for sitting before attempting ambulation due to episode of unresponsiveness when he sat up yesterday. Pt required min assist for supine to sit and sit to stand transfers for safety.    Pt able to tolerate static sitting for over a minute with no complaints of dizziness. BP taken at 111/72. Pt eager to attempt to stand. Pt transferred to standing with min assist and RW. Pt reported no dizziness upon initially standing and able to obtain BP at 85/57, but within 30 seconds pt with onset of loss of postural tone, delayed responses, c/o dizziness and blurry vision. Pt required total assist to return to sitting EOB with increased responsiveness but continued complaint of dizziness and blurry vision. Pt returned to supine with total assist. After return to supine pt reports that dizziness and blurry vision have resolved. RN entered and was informed of episode and pt's BP.    Pt eager to do more therapy and agreed to supine LE TE.    Rehab Prognosis: Fair; patient would benefit from acute skilled PT services to address these deficits and reach maximum level of function.    Recent Surgery: * No surgery found *      Plan:     During this hospitalization, patient to be seen 6  x/week to address the identified rehab impairments via gait training, therapeutic activities, therapeutic exercises, neuromuscular re-education and progress toward the following goals:    Plan of Care Expires:  02/17/23    Subjective     Chief Complaint: dizziness and blurry vision in standing  Patient/Family Comments/goals: pt wanting to be able to participate more in therapy so he can get back home  Pain/Comfort:  Pain Rating 1: 0/10      Objective:     Communicated with RN prior to session.  Patient found HOB elevated with peripheral IV, telemetry, bed alarm upon PT entry to room.     General Precautions: Standard, fall  Orthopedic Precautions: N/A  Braces: N/A  Respiratory Status: Room air     Functional Mobility:  Bed Mobility:     Rolling Left:  contact guard assistance  Rolling Right: contact guard assistance  Supine to Sit: minimum assistance  Sit to Supine: total assistance and of 2 persons  Transfers:     Sit to Stand:  minimum assistance with rolling walker and total assist to return to sitting as pt lost postural tone and reported dizziness and blurry vision with delayed responsiveness  Balance: static standing with Simeon to total assist as pt lost postural tone and became less responsive      AM-PAC 6 CLICK MOBILITY          Treatment & Education:  Pt educated on importance of time OOB, importance of intermittent mobility, safe techniques for transfers/ambulation, discharge recommendations/options, and use of call light for assistance and fall prevention.  Pt tolerated supine LE TE x 10 each including SLR, hip add/abd, heel slides, bridges, and ankle DF/PF with verbal cuing for proper form and pacing for optimal strengthening.      Patient left HOB elevated with all lines intact, call button in reach, bed alarm on, and RN notified..    GOALS:   Multidisciplinary Problems       Physical Therapy Goals          Problem: Physical Therapy    Goal Priority Disciplines Outcome Goal Variances Interventions    Physical Therapy Goal     PT, PT/OT      Description: Goals to be met by: 23     Patient will increase functional independence with mobility by performin. Supine to sit with Supervision  2. Sit to stand transfer with Supervision  3. Bed to chair transfer with Supervision using Rolling Walker  4. Gait  x 150 feet with Supervision using Rolling Walker.                              Time Tracking:     PT Received On: 23  PT Start Time: 1342     PT Stop Time: 1409  PT Total Time (min): 27 min     Billable Minutes: Therapeutic Activity 15 and Therapeutic Exercise 12    Treatment Type: Treatment  PT/PTA: PTA     PTA Visit Number: 2     2023

## 2023-01-19 NOTE — PLAN OF CARE
01/19/23 0807   Patient Assessment/Suction   Level of Consciousness (AVPU) alert   Respiratory Effort Normal;Unlabored   Expansion/Accessory Muscles/Retractions no use of accessory muscles;no retractions   All Lung Fields Breath Sounds diminished   PRE-TX-O2   Device (Oxygen Therapy) room air   SpO2 97 %   Pulse Oximetry Type Intermittent   $ Pulse Oximetry - Multiple Charge Pulse Oximetry - Multiple   Pulse 77   Resp 16   Aerosol Therapy   $ Aerosol Therapy Charges PRN treatment not required   Education   $ Education Bronchodilator;15 min

## 2023-01-19 NOTE — PLAN OF CARE
Problem: Physical Therapy  Goal: Physical Therapy Goal  Description: Goals to be met by: 23     Patient will increase functional independence with mobility by performin. Supine to sit with Supervision  2. Sit to stand transfer with Supervision  3. Bed to chair transfer with Supervision using Rolling Walker  4. Gait  x 150 feet with Supervision using Rolling Walker.         Outcome: Ongoing, Progressing

## 2023-01-20 LAB
ADENOVIRUS: NOT DETECTED
ANION GAP SERPL CALC-SCNC: 5 MMOL/L (ref 8–16)
ANISOCYTOSIS BLD QL SMEAR: SLIGHT
BASOPHILS NFR BLD: 0 % (ref 0–1.9)
BORDETELLA PARAPERTUSSIS (IS1001): NOT DETECTED
BORDETELLA PERTUSSIS (PTXP): NOT DETECTED
BUN SERPL-MCNC: 14 MG/DL (ref 6–20)
CALCIUM SERPL-MCNC: 9.1 MG/DL (ref 8.7–10.5)
CHLAMYDIA PNEUMONIAE: NOT DETECTED
CHLORIDE SERPL-SCNC: 108 MMOL/L (ref 95–110)
CO2 SERPL-SCNC: 22 MMOL/L (ref 23–29)
CORONAVIRUS 229E, COMMON COLD VIRUS: NOT DETECTED
CORONAVIRUS HKU1, COMMON COLD VIRUS: NOT DETECTED
CORONAVIRUS NL63, COMMON COLD VIRUS: NOT DETECTED
CORONAVIRUS OC43, COMMON COLD VIRUS: NOT DETECTED
CREAT SERPL-MCNC: 0.8 MG/DL (ref 0.5–1.4)
DIFFERENTIAL METHOD: ABNORMAL
EOSINOPHIL NFR BLD: 2 % (ref 0–8)
ERYTHROCYTE [DISTWIDTH] IN BLOOD BY AUTOMATED COUNT: 15 % (ref 11.5–14.5)
EST. GFR  (NO RACE VARIABLE): >60 ML/MIN/1.73 M^2
FLUBV RNA NPH QL NAA+NON-PROBE: NOT DETECTED
GLUCOSE SERPL-MCNC: 84 MG/DL (ref 70–110)
HCT VFR BLD AUTO: 22.4 % (ref 40–54)
HGB BLD-MCNC: 7.4 G/DL (ref 14–18)
HPIV1 RNA NPH QL NAA+NON-PROBE: NOT DETECTED
HPIV2 RNA NPH QL NAA+NON-PROBE: NOT DETECTED
HPIV3 RNA NPH QL NAA+NON-PROBE: NOT DETECTED
HPIV4 RNA NPH QL NAA+NON-PROBE: NOT DETECTED
HUMAN METAPNEUMOVIRUS: NOT DETECTED
HYPOCHROMIA BLD QL SMEAR: ABNORMAL
IMM GRANULOCYTES # BLD AUTO: ABNORMAL K/UL (ref 0–0.04)
IMM GRANULOCYTES NFR BLD AUTO: ABNORMAL % (ref 0–0.5)
INFLUENZA A (SUBTYPES H1,H1-2009,H3): NOT DETECTED
LYMPHOCYTES NFR BLD: 5 % (ref 18–48)
MAGNESIUM SERPL-MCNC: 1.2 MG/DL (ref 1.6–2.6)
MCH RBC QN AUTO: 27.6 PG (ref 27–31)
MCHC RBC AUTO-ENTMCNC: 33 G/DL (ref 32–36)
MCV RBC AUTO: 84 FL (ref 82–98)
MONOCYTES NFR BLD: 18 % (ref 4–15)
MYCOPLASMA PNEUMONIAE: NOT DETECTED
MYELOCYTES NFR BLD MANUAL: 3 %
NEUTROPHILS NFR BLD: 70 % (ref 38–73)
NEUTS BAND NFR BLD MANUAL: 2 %
NRBC BLD-RTO: 0 /100 WBC
OVALOCYTES BLD QL SMEAR: ABNORMAL
PLATELET # BLD AUTO: 246 K/UL (ref 150–450)
PLATELET BLD QL SMEAR: ABNORMAL
PMV BLD AUTO: 8.8 FL (ref 9.2–12.9)
POTASSIUM SERPL-SCNC: 3.7 MMOL/L (ref 3.5–5.1)
RBC # BLD AUTO: 2.68 M/UL (ref 4.6–6.2)
RESPIRATORY INFECTION PANEL SOURCE: NORMAL
RSV RNA NPH QL NAA+NON-PROBE: NOT DETECTED
RV+EV RNA NPH QL NAA+NON-PROBE: NOT DETECTED
SARS-COV-2 RNA RESP QL NAA+PROBE: NOT DETECTED
SODIUM SERPL-SCNC: 135 MMOL/L (ref 136–145)
TARGETS BLD QL SMEAR: ABNORMAL
WBC # BLD AUTO: 19.02 K/UL (ref 3.9–12.7)

## 2023-01-20 PROCEDURE — 85007 BL SMEAR W/DIFF WBC COUNT: CPT | Performed by: NURSE PRACTITIONER

## 2023-01-20 PROCEDURE — 63600175 PHARM REV CODE 636 W HCPCS: Mod: TB,JG | Performed by: INTERNAL MEDICINE

## 2023-01-20 PROCEDURE — 87798 DETECT AGENT NOS DNA AMP: CPT | Mod: 59 | Performed by: NURSE PRACTITIONER

## 2023-01-20 PROCEDURE — 85027 COMPLETE CBC AUTOMATED: CPT | Performed by: NURSE PRACTITIONER

## 2023-01-20 PROCEDURE — 99232 PR SUBSEQUENT HOSPITAL CARE,LEVL II: ICD-10-PCS | Mod: ,,, | Performed by: INTERNAL MEDICINE

## 2023-01-20 PROCEDURE — 97530 THERAPEUTIC ACTIVITIES: CPT

## 2023-01-20 PROCEDURE — 87070 CULTURE OTHR SPECIMN AEROBIC: CPT | Performed by: INTERNAL MEDICINE

## 2023-01-20 PROCEDURE — 25000003 PHARM REV CODE 250: Performed by: STUDENT IN AN ORGANIZED HEALTH CARE EDUCATION/TRAINING PROGRAM

## 2023-01-20 PROCEDURE — 99232 SBSQ HOSP IP/OBS MODERATE 35: CPT | Mod: ,,, | Performed by: INTERNAL MEDICINE

## 2023-01-20 PROCEDURE — 80048 BASIC METABOLIC PNL TOTAL CA: CPT | Performed by: NURSE PRACTITIONER

## 2023-01-20 PROCEDURE — 87040 BLOOD CULTURE FOR BACTERIA: CPT | Performed by: INTERNAL MEDICINE

## 2023-01-20 PROCEDURE — 25000003 PHARM REV CODE 250: Performed by: INTERNAL MEDICINE

## 2023-01-20 PROCEDURE — 63600175 PHARM REV CODE 636 W HCPCS: Performed by: NURSE PRACTITIONER

## 2023-01-20 PROCEDURE — 87633 RESP VIRUS 12-25 TARGETS: CPT | Performed by: NURSE PRACTITIONER

## 2023-01-20 PROCEDURE — 25000003 PHARM REV CODE 250: Performed by: NURSE PRACTITIONER

## 2023-01-20 PROCEDURE — 97110 THERAPEUTIC EXERCISES: CPT

## 2023-01-20 PROCEDURE — 83735 ASSAY OF MAGNESIUM: CPT | Performed by: NURSE PRACTITIONER

## 2023-01-20 PROCEDURE — 12000002 HC ACUTE/MED SURGE SEMI-PRIVATE ROOM

## 2023-01-20 PROCEDURE — 36415 COLL VENOUS BLD VENIPUNCTURE: CPT | Performed by: INTERNAL MEDICINE

## 2023-01-20 PROCEDURE — 63700000 PHARM REV CODE 250 ALT 637 W/O HCPCS: Performed by: NURSE PRACTITIONER

## 2023-01-20 PROCEDURE — 63700000 PHARM REV CODE 250 ALT 637 W/O HCPCS: Performed by: INTERNAL MEDICINE

## 2023-01-20 RX ADMIN — METOCLOPRAMIDE 10 MG: 10 TABLET ORAL at 09:01

## 2023-01-20 RX ADMIN — THIAMINE HCL TAB 100 MG 100 MG: 100 TAB at 09:01

## 2023-01-20 RX ADMIN — FERROUS SULFATE TAB 325 MG (65 MG ELEMENTAL FE) 1 EACH: 325 (65 FE) TAB at 09:01

## 2023-01-20 RX ADMIN — SODIUM BICARBONATE 650 MG TABLET 650 MG: at 08:01

## 2023-01-20 RX ADMIN — SODIUM CHLORIDE 1000 ML: 0.9 INJECTION, SOLUTION INTRAVENOUS at 12:01

## 2023-01-20 RX ADMIN — ACETAMINOPHEN 650 MG: 325 TABLET ORAL at 06:01

## 2023-01-20 RX ADMIN — AMOXICILLIN AND CLAVULANATE POTASSIUM 1 TABLET: 875; 125 TABLET, FILM COATED ORAL at 08:01

## 2023-01-20 RX ADMIN — LEVETIRACETAM 1500 MG: 500 TABLET, FILM COATED ORAL at 08:01

## 2023-01-20 RX ADMIN — AMOXICILLIN AND CLAVULANATE POTASSIUM 1 TABLET: 875; 125 TABLET, FILM COATED ORAL at 09:01

## 2023-01-20 RX ADMIN — GUAIFENESIN 600 MG: 600 TABLET, EXTENDED RELEASE ORAL at 09:01

## 2023-01-20 RX ADMIN — CYPROHEPTADINE HYDROCHLORIDE 4 MG: 4 TABLET ORAL at 08:01

## 2023-01-20 RX ADMIN — CETIRIZINE HYDROCHLORIDE 10 MG: 10 TABLET, FILM COATED ORAL at 09:01

## 2023-01-20 RX ADMIN — DAPTOMYCIN 400 MG: 500 INJECTION, POWDER, LYOPHILIZED, FOR SOLUTION INTRAVENOUS at 08:01

## 2023-01-20 RX ADMIN — METOCLOPRAMIDE 10 MG: 10 TABLET ORAL at 12:01

## 2023-01-20 RX ADMIN — SODIUM CHLORIDE: 0.9 INJECTION, SOLUTION INTRAVENOUS at 01:01

## 2023-01-20 RX ADMIN — CYPROHEPTADINE HYDROCHLORIDE 4 MG: 4 TABLET ORAL at 09:01

## 2023-01-20 RX ADMIN — INSULIN DETEMIR 10 UNITS: 100 INJECTION, SOLUTION SUBCUTANEOUS at 09:01

## 2023-01-20 RX ADMIN — DARUNAVIR ETHANOLATE AND COBICISTAT 1 TABLET: 800; 150 TABLET, FILM COATED ORAL at 09:01

## 2023-01-20 RX ADMIN — FLUTICASONE PROPIONATE 100 MCG: 50 SPRAY, METERED NASAL at 09:01

## 2023-01-20 RX ADMIN — EMTRICITABINE AND TENOFOVIR DISOPROXIL FUMARATE 1 TABLET: 200; 300 TABLET, FILM COATED ORAL at 09:01

## 2023-01-20 RX ADMIN — MAGNESIUM SULFATE HEPTAHYDRATE 4 G: 40 INJECTION, SOLUTION INTRAVENOUS at 09:01

## 2023-01-20 RX ADMIN — THERA TABS 1 TABLET: TAB at 09:01

## 2023-01-20 RX ADMIN — GUAIFENESIN 600 MG: 600 TABLET, EXTENDED RELEASE ORAL at 08:01

## 2023-01-20 RX ADMIN — SULFAMETHOXAZOLE AND TRIMETHOPRIM 1 TABLET: 800; 160 TABLET ORAL at 05:01

## 2023-01-20 RX ADMIN — FLUCONAZOLE 800 MG: 100 TABLET ORAL at 09:01

## 2023-01-20 RX ADMIN — METOCLOPRAMIDE 10 MG: 10 TABLET ORAL at 05:01

## 2023-01-20 RX ADMIN — RISPERIDONE 1 MG: 1 TABLET ORAL at 08:01

## 2023-01-20 RX ADMIN — SODIUM BICARBONATE 650 MG TABLET 650 MG: at 05:01

## 2023-01-20 RX ADMIN — ESCITALOPRAM OXALATE 20 MG: 10 TABLET ORAL at 09:01

## 2023-01-20 RX ADMIN — METOCLOPRAMIDE 10 MG: 10 TABLET ORAL at 08:01

## 2023-01-20 RX ADMIN — FOLIC ACID 1 MG: 1 TABLET ORAL at 09:01

## 2023-01-20 RX ADMIN — MEGESTROL ACETATE 40 MG: 20 TABLET ORAL at 09:01

## 2023-01-20 RX ADMIN — LEVETIRACETAM 1500 MG: 500 TABLET, FILM COATED ORAL at 09:01

## 2023-01-20 RX ADMIN — PANTOPRAZOLE SODIUM 40 MG: 40 TABLET, DELAYED RELEASE ORAL at 05:01

## 2023-01-20 RX ADMIN — RISPERIDONE 0.5 MG: 0.25 TABLET ORAL at 09:01

## 2023-01-20 RX ADMIN — SODIUM BICARBONATE 650 MG TABLET 650 MG: at 09:01

## 2023-01-20 RX ADMIN — ENOXAPARIN SODIUM 40 MG: 100 INJECTION SUBCUTANEOUS at 05:01

## 2023-01-20 NOTE — PT/OT/SLP PROGRESS
Occupational Therapy   Treatment    Name: El Abreu  MRN: [id number]  Admitting Diagnosis:  Pneumonia       Recommendations:     Discharge Recommendations: nursing facility, skilled, rehabilitation facility  Discharge Equipment Recommendations:  other (see comments) (TBD at next level)  Barriers to discharge:  None    Assessment:     El Abreu is a 27 y.o. male with a medical diagnosis of Pneumonia.  Performance deficits affecting function are weakness, impaired endurance, gait instability, impaired functional mobility, impaired cardiopulmonary response to activity, decreased lower extremity function.     Rehab Prognosis:  Fair; patient would benefit from acute skilled OT services to address these deficits and reach maximum level of function.       Plan:     Patient to be seen 3 x/week to address the above listed problems via self-care/home management, therapeutic activities, therapeutic exercises  Plan of Care Expires: 02/17/23  Plan of Care Reviewed with: patient    Subjective     Pain/Comfort:  Pain Rating 1: 0/10  Pain Rating Post-Intervention 1: 0/10    Objective:     Communicated with: nurse prior to session.  Patient found supine with bed alarm, peripheral IV, telemetry upon OT entry to room.    General Precautions: Standard, fall    Orthopedic Precautions:N/A  Braces: N/A  Respiratory Status: Room air     Occupational Performance:     Bed Mobility:    Patient completed Rolling/Turning to Left with  supervision  Patient completed Rolling/Turning to Right with supervision     Therapeutic Exercises:  Patient performed upper body exercises utilizing theraband with head of bed slightly elevated. Tolerated well.    Treatment & Education:  Patient was educated on the importance of movement and exercises during recovery, discharge planning and goals, and demonstrated use of call bell bell for assistance.    Patient left supine with all lines intact and call button in reach    GOALS:   Multidisciplinary  Problems       Occupational Therapy Goals          Problem: Occupational Therapy    Goal Priority Disciplines Outcome Interventions   Occupational Therapy Goal     OT, PT/OT     Description: Goals to be met by: 2/17/23     Patient will increase functional independence with ADLs by performing:    UE Dressing with Supervision.  LE Dressing with Supervision and Assistive Devices as needed.  Grooming while standing at sink with Supervision.  Toileting from bedside commode with Supervision for hygiene and clothing management.   Toilet transfer to bedside commode with Supervision.  Upper extremity exercise program x10 reps per handout, with supervision.                         Time Tracking:     OT Date of Treatment: 01/20/23  OT Start Time: 0939  OT Stop Time: 0951  OT Total Time (min): 12 min    Billable Minutes:Therapeutic Exercise 12    OT/JACOB: OT          1/20/2023

## 2023-01-20 NOTE — PT/OT/SLP PROGRESS
Physical Therapy Treatment    Patient Name:  El Abreu   MRN:  [id number]    Recommendations:     Discharge Recommendations: nursing facility, skilled versus rehabilitation facility pending progress  Discharge Equipment Recommendations:  (TBD at next level of care)  Barriers to discharge: None    Assessment:     El Abreu is a 27 y.o. male admitted with a medical diagnosis of Pneumonia.  He presents with the following impairments/functional limitations: weakness, impaired endurance, impaired self care skills, impaired functional mobility, gait instability, impaired balance, decreased upper extremity function, decreased lower extremity function, impaired cardiopulmonary response to activity. Patient is agreeable to participation with PT treatment. He was found supine with education provided regarding elevating the HOB and performing LE exercises in anticipation of transfers. Supine /68. He performed 20 reps of bilateral LE therex with breaks taken as needed. He requires Denia for supine to sit transfer with increased time to perform and patient denying dizziness with transfer. BP sitting edge of bed 122/75 with no dizziness. He requires Denia x2 for sit to stand transfer with RW, bed elevated, and VC for hand placement. Standing BP of 90/60 and HR of 146, but patient asymptomatic. He returned to sitting EOB with BP taken of 105/64. He returned to bed with Denia to lift lower extremities into the bed. Patient is very motivated for therapy and hopes to ambulate soon. Goal for tomorrow is side steps at the EOB pending BP. Bed alarm on and RN notified that patient would benefit from SCDs.     Rehab Prognosis: Good; patient would benefit from acute skilled PT services to address these deficits and reach maximum level of function.    Recent Surgery: * No surgery found *      Plan:     During this hospitalization, patient to be seen 6 x/week to address the identified rehab impairments via gait training,  therapeutic activities, therapeutic exercises, neuromuscular re-education and progress toward the following goals:    Plan of Care Expires:  02/17/23    Subjective     Chief Complaint: states therapy is the only thing preventing him from going home   Patient/Family Comments/goals: walk so he can go home   Pain/Comfort:  Pain Rating 1: 0/10      Objective:     Communicated with PABLO Lamas prior to session.  Patient found supine with bed alarm, peripheral IV, telemetry upon PT entry to room.     General Precautions: Standard, fall  Orthopedic Precautions: N/A  Braces: N/A  Respiratory Status: Room air     Functional Mobility:  Bed Mobility:     Supine to Sit: minimum assistance and VC for increased time to allow body to adjust to change in position   Transfers:     Sit to Stand:  minimum assistance and of 2 persons with rolling walker and VC for hand placement with bed elevated  Balance: ModA while performing therex sitting EOB with posterior lean despite VC to correct       AM-PAC 6 CLICK MOBILITY  Turning over in bed (including adjusting bedclothes, sheets and blankets)?: 3  Sitting down on and standing up from a chair with arms (e.g., wheelchair, bedside commode, etc.): 2  Moving from lying on back to sitting on the side of the bed?: 3  Moving to and from a bed to a chair (including a wheelchair)?: 1  Need to walk in hospital room?: 1  Climbing 3-5 steps with a railing?: 1  Basic Mobility Total Score: 11       Treatment & Education:  Patient was educated on the importance of OOB activity and functional mobility to negate negative effects of prolonged bed rest during hospitalization, safe transfers and ambulation, and D/C planning     Education provided regarding keeping the HOB elevated as tolerated and performing ankle pumps and LE AROM as tolerated     20 reps bilateral ankle pumps, heel slides, ans straight leg raises    10 reps seated long arc quads and seated marches with ModA for sitting balance     Patient  left HOB elevated with all lines intact, call button in reach, bed alarm on, and RN notified..    GOALS:   Multidisciplinary Problems       Physical Therapy Goals          Problem: Physical Therapy    Goal Priority Disciplines Outcome Goal Variances Interventions   Physical Therapy Goal     PT, PT/OT Ongoing, Progressing     Description: Goals to be met by: 23     Patient will increase functional independence with mobility by performin. Supine to sit with Supervision  2. Sit to stand transfer with Supervision  3. Bed to chair transfer with Supervision using Rolling Walker  4. Gait  x 150 feet with Supervision using Rolling Walker.                              Time Tracking:     PT Received On: 23  PT Start Time: 1041     PT Stop Time: 1117  PT Total Time (min): 36 min     Billable Minutes: Therapeutic Activity 16 and Therapeutic Exercise 20    Treatment Type: Treatment  PT/PTA: PT     PTA Visit Number: 0     2023

## 2023-01-20 NOTE — PROGRESS NOTES
[facility]  Progress Note    Patient name: El Abreu  MRN: [id number]  Admit Date: 1/16/2023   LOS: 2 days     SUBJECTIVE:     Interval History:   1/20: Patient seen and examined.  Father at bedside.  Patient denies fever.  Denies chest pain or shortness of breath.  Tolerating diet without nausea or vomiting.    1/19: Patient seen and examined.  Patient denies fever or chest pain.  No nausea or vomiting.  He reports being comfortable at this time.      1/18- syncope with PT today.  Nursing states his PO intake is good.  Will give some IVF.  He wishes to go back to [facility] and resume his antiretrovirals as soon as possible.  His insurance is denying authorization despite the fact that he was transferred from there.  Peer to peer per LTAC physician    1/17- patient resting in bed, currently no rooms available so patient is in hallway.  Atttempted transfer to [facility] but no rooms available.  Plan is to get patient back to the SNF/LTAC he was at prior to arrival.  CM in contact with LTAC but doubt he goes today.       Review of Systems:  3 point review of systems reviewed and negative except as per interval history above    OBJECTIVE:     Vitals:    01/19/23 2340 01/20/23 0506 01/20/23 0756 01/20/23 1135   BP: 114/68 117/76 117/76 123/77   Patient Position:       Pulse: 93 78 91 88   Resp: 16 17 18 18   Temp: 98.8 °F (37.1 °C) 98.5 °F (36.9 °C) 98.7 °F (37.1 °C) 97.7 °F (36.5 °C)   TempSrc:   Oral Oral   SpO2: 97% 96% 99% 97%   Weight:       Height:             Physical Exam:  General: Patient resting comfortably, chronically ill appearing.  No  ENT:  Moist mucous membranes  Eyes:  Anicteric sclera, no conjunctival discharge  CVS: RRR.  2+ radial pulses  Lungs:  Comfortable work of breathing.  Clear to auscultation bilaterally  Abdomen:  Soft, nontender and nondistended.   Neuro: AOx3. Moves all extremities. Gen weakness, speech is slow.  Follows commands. Responds  appropriately   Musculoskeletal:  Thin and frail-appearing, diffuse muscle wasting  Psych: Mood Calm, affect restricted, insight fair    Laboratory:  I have reviewed all pertinent lab results within the past 24 hours.    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, started on anti-retroviral therapy on December 23rd,   resume Descovy and Prezcobix  consult Infectious Disease,  continue Bactrim DS 3 times weekly for PJP prophylaxis  continue fluconazole 800 mg oral daily   Appreciate ID recs   CM consulted for dc planning  Continue current treatment regimen, discussed with the case management, patient ultimately needs placement    Possible bacteremia versus contaminated blood culture       Initial blood culture with coag-negative staph 1/2 bottles from PICC line       Daptomycin initiation       Will need new mid/PICC line with subsequent removal of old PICC line and culture tip       Follow-up culture data       Appreciate infectious Disease, case discussed with Dr. Ny    Sinusitis       Continue oral Augmentin     Hypokalemia  Serial labs and replace as needed     Seizures complicating infection  Resume Keppra, seizure precautions        Hypomagnesemia  Replaced in ED, repeat magnesium in a.m. and replace with IV magnesium as needed per protocol  Persistent hypomagnesemia today, continue electrolyte replacement with serial labs        Anemia  Repeat CBC in a.m., patient had episodes of bleeding, coffee-ground emesis due to any fungal therapy, this was resolved, monitor for bleeding and transfuse as needed  Continue serial labs        Paroxysmal atrial fibrillation  Currently in sinus rhythm  in the 70s, monitor on telemetry  Monitor           Type 2 diabetes mellitus without retinopathy  Resume basal insulin, glucose checks a.c. and HS, resume moderate dose sliding scale insulin  Monitor regimen and titrate as needed     Physical deconditioning  PT OT consult   SNF vs LTAC   Will need placement for ongoing care    This patient is high risk based on severe exacerbation of chronic illness; acute illness with risk to life;     Active Hospital Problems    Diagnosis  POA    Bacteremia and sinusitis  Unknown    Weakness [R53.1]  Unknown    Physical deconditioning [R53.81]  Unknown    Paroxysmal atrial fibrillation [I48.0]  Unknown    Anemia [D64.9]  Yes    Hypokalemia [E87.6]  Yes    Hypomagnesemia [E83.42]  Yes    AIDS [B20]  Unknown    Seizures complicating infection [B99.9, R56.9]  Yes    Cryptococcal meningitis [B45.1]  Unknown    Type 2 diabetes mellitus without retinopathy [E11.9]  Yes      Resolved Hospital Problems   No resolved problems to display.           VTE Risk Mitigation (From admission, onward)           Ordered     Place CARMEN hose  Until discontinued         01/18/23 1830     enoxaparin injection 40 mg  Daily         01/16/23 6111                        Department Hospital Medicine  [facility]  Ruben Burgess MD  Date of service: 01/20/2023

## 2023-01-20 NOTE — NURSING
Assessed patient - alert and oriented - worked with PT today - stood at bedside - still has some hypotension when standing - BP sitting and lying is fine - vital signs within normal parameters - no complaints of pain at this time - is awaiting placement to a facility for continued rehab - no visible signs or symptoms of distress - will continue to monitor throughout shift for any changes in condition - JEZ Garces RN

## 2023-01-20 NOTE — PLAN OF CARE
01/20/23 0906   Discharge Reassessment   Assessment Type Discharge Planning Reassessment   Did the patient's condition or plan change since previous assessment? Yes   Discharge Plan discussed with: Parent(s)   Communicated ZULMA with patient/caregiver Yes   Discharge Plan A Skilled Nursing Facility   Discharge Plan B Long-term acute care facility (LTAC)   DME Needed Upon Discharge  none   Discharge Barriers Identified None   Why the patient remains in the hospital Placement issues;Insurance issues   Post-Acute Status   Post-Acute Authorization Placement   Post-Acute Placement Status Referrals Sent   Coverage BCBS [place] of [place]   Discharge Delays (!) Payor Issues     CM blasted out SNF referrals on yesterday, 01/19/2023. Multiple denials noted this AM in careport due to patient's needs exceeding care facility can provide. Awaiting responses from other facilities at this time. CM to notify patient's insurance today about above. CM to follow     1342 - patient denied by 12+facilites :  [facility]  [facility]  [facility]  [facility] re[facility]  [facility]  [facility]  [facility]      CM awaiting accepting SNF facility. Patients needs exceeding SNF capabilities.

## 2023-01-20 NOTE — RESPIRATORY THERAPY
01/19/23 2010   Patient Assessment/Suction   Level of Consciousness (AVPU) alert   Respiratory Effort Unlabored   Expansion/Accessory Muscles/Retractions no use of accessory muscles   All Lung Fields Breath Sounds diminished   PRE-TX-O2   Device (Oxygen Therapy) room air   SpO2 98 %   Pulse Oximetry Type Intermittent   $ Pulse Oximetry - Multiple Charge Pulse Oximetry - Multiple   Aerosol Therapy   $ Aerosol Therapy Charges PRN treatment not required   Education   $ Education Bronchodilator;DME Nebulizer;15 min   Respiratory Evaluation   $ Care Plan Tech Time 15 min

## 2023-01-20 NOTE — PROGRESS NOTES
[facility]  Adult Nutrition   Progress Note (Follow-Up)    SUMMARY      Recommendations:   1. Continue Diabetic diet. RD increased kcal level to 2200 / day per previou facility order.   2. Added Glucerna with meals and Khurram BID as per pta.   3. Continue appetite stimulant, thiamine, folic acid, MVI and probiotic per prior regimen.   4. Consider adding Severe Malnutrition to problem list.    Goals:   Goals: 1. Patient to consume >/= EEN / EPN with po meals and supplements. 2. No further weight loss. 3. Labs trend to target range. Progressing    Dietitian Rounds Brief  Patient is eating most of meals per nursing. Last BM 1/19/23. Patient making requests to kitchen. Silvia for chips, reviewed glucose labs. Labs are improved. Plan is for dc pending placement. RD to follow as needed.    Diet order: Diabetic 2200 kcal diet.     Oral Supplement: Glucerna with meals. Khurram BID    % Intake of Estimated Energy Needs: 75 - 100 %  % Meal Intake: 75 - 100 %    Estimated/Assessed Needs  Weight Used For Calorie Calculations: 52.2 kg (115 lb 1.3 oz)  Energy Calorie Requirements (kcal): 3720-6006 (35-40)     Protein Requirements:  (1.5-2.0)  Weight Used For Protein Calculations: 52.2 kg (115 lb 1.3 oz)     Estimated Fluid Requirement Method: RDA Method  RDA Method (mL): 1827       Weight History:  Wt Readings from Last 5 Encounters:   01/18/23 52.2 kg (115 lb 1.3 oz)   01/15/23 52.3 kg (115 lb 3.2 oz)   01/13/23 61 kg (134 lb 7.7 oz)   01/02/23 60.1 kg (132 lb 7.9 oz)   11/25/22 68.9 kg (152 lb)        Reason for Assessment  Reason For Assessment: consult (diet educaion trigger-- pneumonia and diabetes)  Diagnosis: infection/sepsis, other (see comments), pulmonary disease (Pneumonia)  Relevant Medical History: diabetes, AIDS, PAF, fungal meningitis    Medications:Pertinent Medications Reviewed  Scheduled Meds:   amoxicillin-clavulanate 875-125mg  1 tablet Oral Q12H    cetirizine  10 mg Oral Daily     cyproheptadine  4 mg Oral BID    darunavir-cobicistat  1 tablet Oral Daily    emtricitabine-tenofovir 200-300 mg  1 tablet Oral Daily    enoxaparin  40 mg Subcutaneous Daily    EScitalopram oxalate  20 mg Oral Daily    ferrous sulfate  1 tablet Oral Daily    fluconazole  800 mg Oral Daily    fluticasone propionate  2 spray Each Nostril BID    folic acid  1 mg Oral Daily    guaiFENesin  600 mg Oral BID    insulin detemir U-100  10 Units Subcutaneous BID    levETIRAcetam  1,500 mg Oral BID    megestroL  40 mg Oral Daily    metoclopramide HCl  10 mg Oral QID (WM & HS)    multivitamin  1 tablet Oral Daily    pantoprazole  40 mg Oral Before breakfast    risperiDONE  0.5 mg Oral Daily    risperiDONE  1 mg Oral QHS    sodium bicarbonate  650 mg Oral TID    sulfamethoxazole-trimethoprim 800-160mg  1 tablet Oral Every Mon, Wed, Fri    thiamine  100 mg Oral Daily     Continuous Infusions:   sodium chloride 0.9% 1,000 mL (01/20/23 1221)     PRN Meds:.acetaminophen, albuterol-ipratropium, dextrose 10%, dextrose 10%, glucagon (human recombinant), glucose, glucose, HYDROcodone-acetaminophen, HYDROcodone-acetaminophen, insulin aspart U-100, magnesium sulfate IVPB, magnesium sulfate IVPB, melatonin, melatonin, naloxone, ondansetron, polyethylene glycol, potassium bicarbonate, potassium bicarbonate, potassium bicarbonate, prochlorperazine, simethicone, sodium chloride 0.9%    Labs: Pertinent Labs Reviewed  Clinical Chemistry:  Recent Labs   Lab 01/16/23  0450 01/16/23  1453 01/16/23  1533 01/17/23  0448 01/20/23  0551   *  --  135*   < > 135*   K 3.6  --  3.5   < > 3.7     --  103   < > 108   CO2 22*  --  26   < > 22*   *  --  135*   < > 84   BUN 22*  --  23*   < > 14   CREATININE 1.1  --  1.0   < > 0.8   CALCIUM 9.1  --  9.3   < > 9.1   PROT 5.9*  --  6.3  --   --    ALBUMIN 2.6*  --  3.0*  --   --    BILITOT 0.2  --  0.4  --   --    ALKPHOS 71  --  76  --   --    AST 28  --  30  --   --    ALT 31  --  36  --    --    ANIONGAP 9  --  6*   < > 5*   MG 0.8*  0.8*   < >  --    < > 1.2*   PHOS 3.2  --   --   --   --     < > = values in this interval not displayed.     CBC:   Recent Labs   Lab 01/20/23  0551   WBC 19.02*   RBC 2.68*   HGB 7.4*   HCT 22.4*      MCV 84   MCH 27.6   MCHC 33.0     Inflammatory Labs:  Recent Labs   Lab 01/17/23  0104   CRP 2.00*     Diabetes:  Recent Labs   Lab 01/15/23  1625 01/16/23  0432 01/16/23  1122 01/17/23  0448   HGBA1C  --   --   --  6.0   POCTGLUCOSE 180* 119* 179*  --      Monitor and Evaluation  Food and Nutrient Intake: energy intake, food and beverage intake  Food and Nutrient Adminstration: diet order  Knowledge/Beliefs/Attitudes: food and nutrition knowledge/skill  Physical Activity and Function: nutrition-related ADLs and IADLs  Anthropometric Measurements: weight, weight change, body mass index  Biochemical Data, Medical Tests and Procedures: electrolyte and renal panel, gastrointestinal profile, glucose/endocrine profile, inflammatory profile, lipid profile  Nutrition-Focused Physical Findings: overall appearance     Nutrition Risk  Level of Risk/Frequency of Follow-up: moderate   Nutrition Follow-Up  RD Follow-up?: Yes    Teetee Cabrales RD 01/20/2023 1:58 PM

## 2023-01-21 LAB
ANION GAP SERPL CALC-SCNC: 6 MMOL/L (ref 8–16)
ANISOCYTOSIS BLD QL SMEAR: SLIGHT
BASOPHILS NFR BLD: 0 % (ref 0–1.9)
BUN SERPL-MCNC: 12 MG/DL (ref 6–20)
CALCIUM SERPL-MCNC: 9.1 MG/DL (ref 8.7–10.5)
CHLORIDE SERPL-SCNC: 109 MMOL/L (ref 95–110)
CO2 SERPL-SCNC: 21 MMOL/L (ref 23–29)
CREAT SERPL-MCNC: 0.7 MG/DL (ref 0.5–1.4)
DIFFERENTIAL METHOD: ABNORMAL
EOSINOPHIL NFR BLD: 0 % (ref 0–8)
ERYTHROCYTE [DISTWIDTH] IN BLOOD BY AUTOMATED COUNT: 14.9 % (ref 11.5–14.5)
EST. GFR  (NO RACE VARIABLE): >60 ML/MIN/1.73 M^2
GLUCOSE SERPL-MCNC: 112 MG/DL (ref 70–110)
GLUCOSE SERPL-MCNC: 130 MG/DL (ref 70–110)
GLUCOSE SERPL-MCNC: 150 MG/DL (ref 70–110)
GLUCOSE SERPL-MCNC: 159 MG/DL (ref 70–110)
GLUCOSE SERPL-MCNC: 163 MG/DL (ref 70–110)
GLUCOSE SERPL-MCNC: 174 MG/DL (ref 70–110)
GLUCOSE SERPL-MCNC: 179 MG/DL (ref 70–110)
GLUCOSE SERPL-MCNC: 220 MG/DL (ref 70–110)
GLUCOSE SERPL-MCNC: 90 MG/DL (ref 70–110)
HCT VFR BLD AUTO: 24.1 % (ref 40–54)
HGB BLD-MCNC: 7.7 G/DL (ref 14–18)
IMM GRANULOCYTES # BLD AUTO: ABNORMAL K/UL (ref 0–0.04)
IMM GRANULOCYTES NFR BLD AUTO: ABNORMAL % (ref 0–0.5)
LYMPHOCYTES NFR BLD: 11 % (ref 18–48)
MAGNESIUM SERPL-MCNC: 1.9 MG/DL (ref 1.6–2.6)
MCH RBC QN AUTO: 26.4 PG (ref 27–31)
MCHC RBC AUTO-ENTMCNC: 32 G/DL (ref 32–36)
MCV RBC AUTO: 83 FL (ref 82–98)
METAMYELOCYTES NFR BLD MANUAL: 3 %
MONOCYTES NFR BLD: 4 % (ref 4–15)
MYELOCYTES NFR BLD MANUAL: 6 %
NEUTROPHILS NFR BLD: 67 % (ref 38–73)
NEUTS BAND NFR BLD MANUAL: 9 %
NRBC BLD-RTO: 0 /100 WBC
PLATELET # BLD AUTO: 258 K/UL (ref 150–450)
PLATELET BLD QL SMEAR: ABNORMAL
PMV BLD AUTO: 8.8 FL (ref 9.2–12.9)
POIKILOCYTOSIS BLD QL SMEAR: SLIGHT
POTASSIUM SERPL-SCNC: 3.8 MMOL/L (ref 3.5–5.1)
RBC # BLD AUTO: 2.92 M/UL (ref 4.6–6.2)
SODIUM SERPL-SCNC: 136 MMOL/L (ref 136–145)
WBC # BLD AUTO: 19.52 K/UL (ref 3.9–12.7)

## 2023-01-21 PROCEDURE — 63700000 PHARM REV CODE 250 ALT 637 W/O HCPCS: Performed by: INTERNAL MEDICINE

## 2023-01-21 PROCEDURE — 63600175 PHARM REV CODE 636 W HCPCS: Mod: TB,JG | Performed by: INTERNAL MEDICINE

## 2023-01-21 PROCEDURE — 25000003 PHARM REV CODE 250: Performed by: STUDENT IN AN ORGANIZED HEALTH CARE EDUCATION/TRAINING PROGRAM

## 2023-01-21 PROCEDURE — 83735 ASSAY OF MAGNESIUM: CPT | Performed by: NURSE PRACTITIONER

## 2023-01-21 PROCEDURE — 25000003 PHARM REV CODE 250: Performed by: INTERNAL MEDICINE

## 2023-01-21 PROCEDURE — 63700000 PHARM REV CODE 250 ALT 637 W/O HCPCS: Performed by: NURSE PRACTITIONER

## 2023-01-21 PROCEDURE — 25000003 PHARM REV CODE 250: Performed by: NURSE PRACTITIONER

## 2023-01-21 PROCEDURE — 36415 COLL VENOUS BLD VENIPUNCTURE: CPT | Performed by: NURSE PRACTITIONER

## 2023-01-21 PROCEDURE — 97530 THERAPEUTIC ACTIVITIES: CPT | Mod: CQ

## 2023-01-21 PROCEDURE — 63600175 PHARM REV CODE 636 W HCPCS: Performed by: NURSE PRACTITIONER

## 2023-01-21 PROCEDURE — 12000002 HC ACUTE/MED SURGE SEMI-PRIVATE ROOM

## 2023-01-21 PROCEDURE — 80048 BASIC METABOLIC PNL TOTAL CA: CPT | Performed by: NURSE PRACTITIONER

## 2023-01-21 PROCEDURE — 85027 COMPLETE CBC AUTOMATED: CPT | Performed by: NURSE PRACTITIONER

## 2023-01-21 PROCEDURE — 85007 BL SMEAR W/DIFF WBC COUNT: CPT | Performed by: NURSE PRACTITIONER

## 2023-01-21 RX ADMIN — ENOXAPARIN SODIUM 40 MG: 100 INJECTION SUBCUTANEOUS at 05:01

## 2023-01-21 RX ADMIN — SODIUM BICARBONATE 650 MG TABLET 650 MG: at 09:01

## 2023-01-21 RX ADMIN — DARUNAVIR ETHANOLATE AND COBICISTAT 1 TABLET: 800; 150 TABLET, FILM COATED ORAL at 09:01

## 2023-01-21 RX ADMIN — INSULIN DETEMIR 10 UNITS: 100 INJECTION, SOLUTION SUBCUTANEOUS at 09:01

## 2023-01-21 RX ADMIN — CETIRIZINE HYDROCHLORIDE 10 MG: 10 TABLET, FILM COATED ORAL at 09:01

## 2023-01-21 RX ADMIN — THIAMINE HCL TAB 100 MG 100 MG: 100 TAB at 09:01

## 2023-01-21 RX ADMIN — RISPERIDONE 1 MG: 1 TABLET ORAL at 09:01

## 2023-01-21 RX ADMIN — CYPROHEPTADINE HYDROCHLORIDE 4 MG: 4 TABLET ORAL at 09:01

## 2023-01-21 RX ADMIN — METOCLOPRAMIDE 10 MG: 10 TABLET ORAL at 05:01

## 2023-01-21 RX ADMIN — FLUCONAZOLE 800 MG: 100 TABLET ORAL at 09:01

## 2023-01-21 RX ADMIN — FLUTICASONE PROPIONATE 100 MCG: 50 SPRAY, METERED NASAL at 09:01

## 2023-01-21 RX ADMIN — THERA TABS 1 TABLET: TAB at 09:01

## 2023-01-21 RX ADMIN — ESCITALOPRAM OXALATE 20 MG: 10 TABLET ORAL at 09:01

## 2023-01-21 RX ADMIN — FOLIC ACID 1 MG: 1 TABLET ORAL at 09:01

## 2023-01-21 RX ADMIN — PANTOPRAZOLE SODIUM 40 MG: 40 TABLET, DELAYED RELEASE ORAL at 05:01

## 2023-01-21 RX ADMIN — GUAIFENESIN 600 MG: 600 TABLET, EXTENDED RELEASE ORAL at 09:01

## 2023-01-21 RX ADMIN — METOCLOPRAMIDE 10 MG: 10 TABLET ORAL at 09:01

## 2023-01-21 RX ADMIN — RISPERIDONE 0.5 MG: 0.25 TABLET ORAL at 09:01

## 2023-01-21 RX ADMIN — AMOXICILLIN AND CLAVULANATE POTASSIUM 1 TABLET: 875; 125 TABLET, FILM COATED ORAL at 09:01

## 2023-01-21 RX ADMIN — SODIUM BICARBONATE 650 MG TABLET 650 MG: at 05:01

## 2023-01-21 RX ADMIN — EMTRICITABINE AND TENOFOVIR DISOPROXIL FUMARATE 1 TABLET: 200; 300 TABLET, FILM COATED ORAL at 09:01

## 2023-01-21 RX ADMIN — METOCLOPRAMIDE 10 MG: 10 TABLET ORAL at 12:01

## 2023-01-21 RX ADMIN — DAPTOMYCIN 400 MG: 500 INJECTION, POWDER, LYOPHILIZED, FOR SOLUTION INTRAVENOUS at 09:01

## 2023-01-21 RX ADMIN — LEVETIRACETAM 1500 MG: 500 TABLET, FILM COATED ORAL at 09:01

## 2023-01-21 RX ADMIN — FERROUS SULFATE TAB 325 MG (65 MG ELEMENTAL FE) 1 EACH: 325 (65 FE) TAB at 09:01

## 2023-01-21 RX ADMIN — MEGESTROL ACETATE 40 MG: 20 TABLET ORAL at 09:01

## 2023-01-21 NOTE — PT/OT/SLP PROGRESS
Physical Therapy Treatment    Patient Name:  El Abreu   MRN:  [id number]    Recommendations:     Discharge Recommendations: nursing facility, skilled, rehabilitation facility  Discharge Equipment Recommendations:  (TBD at next level of care)  Barriers to discharge:  increased assist with mobility    Assessment:     El Abreu is a 27 y.o. male admitted with a medical diagnosis of Pneumonia.  He presents with the following impairments/functional limitations: weakness, impaired endurance, impaired self care skills, impaired functional mobility, gait instability, impaired balance, decreased upper extremity function, decreased lower extremity function, impaired cardiopulmonary response to activity.    Pt with HOB flat and blanket over head. Pt agreeable to visit. Blood pressure taken in supine at 113/53 with HR 87. HOB elevated and pt transferred to sitting with min assist. BP taken in sitting at 104/61 with . Pt sat EOB for a couple minutes with no complaints of dizziness. Pt required min assist x2 for sit to stand with RW. BP taken at 91/41 with  and asymptomatic for approximately 40 seconds before eyes began to roll back and pt reports dizziness. Pt assisted to sit EOB with reports of decreased dizziness and eyes no longer rolling back. Pt tolerated sitting for less than a minute before eyes began rolling back and dizziness returned. Pt total assist x 2 to return to supine with report that dizziness resolved. Pt educated on elevating HOB as much as possible and left with HOB elevated. RN entered room and informed about positive orthostatics.    Rehab Prognosis: Fair; patient would benefit from acute skilled PT services to address these deficits and reach maximum level of function.    Recent Surgery: * No surgery found *      Plan:     During this hospitalization, patient to be seen 6 x/week to address the identified rehab impairments via gait training, therapeutic activities, therapeutic  exercises, neuromuscular re-education and progress toward the following goals:    Plan of Care Expires:  23    Subjective     Chief Complaint: pt wanting to know why he gets so dizzy and educated about orthostatics  Patient/Family Comments/goals: to get better  Pain/Comfort:  Pain Rating 1: 0/10      Objective:     Communicated with RN prior to session.  Patient found supine with bed alarm, peripheral IV, telemetry upon PT entry to room.     General Precautions: Standard, fall  Orthopedic Precautions: N/A  Braces: N/A  Respiratory Status: Room air     Functional Mobility:  Bed Mobility:     Supine to Sit: minimum assistance  Sit to Supine: total assistance and of 2 persons  Transfers:     Sit to Stand:  minimum assistance and of 2 persons with rolling walker  Balance: pt able to maintain balance with min assist and RW until he became dizzy and required assist to return to sitting EOB.      AM-PAC 6 CLICK MOBILITY          Treatment & Education:  Pt educated on importance of time OOB and elevating HOB, importance of intermittent mobility, safe techniques for transfers/ambulation, discharge recommendations/options, and use of call light for assistance and fall prevention.      Patient left HOB elevated with all lines intact, call button in reach, bed alarm on, and RN present..    GOALS:   Multidisciplinary Problems       Physical Therapy Goals          Problem: Physical Therapy    Goal Priority Disciplines Outcome Goal Variances Interventions   Physical Therapy Goal     PT, PT/OT Ongoing, Progressing     Description: Goals to be met by: 23     Patient will increase functional independence with mobility by performin. Supine to sit with Supervision  2. Sit to stand transfer with Supervision  3. Bed to chair transfer with Supervision using Rolling Walker  4. Gait  x 150 feet with Supervision using Rolling Walker.                              Time Tracking:     PT Received On: 23  PT Start Time:  1513     PT Stop Time: 1531  PT Total Time (min): 18 min     Billable Minutes: Therapeutic Activity 18    Treatment Type: Treatment  PT/PTA: PTA     PTA Visit Number: 1     01/21/2023

## 2023-01-21 NOTE — PROGRESS NOTES
[facility]  Progress Note    Patient name: El Abreu  MRN: [id number]  Admit Date: 1/16/2023   LOS: 3 days     SUBJECTIVE:     Principal problem: AIDS    Interval History:  Patient was seen and examined at bedside, denies any new symptoms, had a syncopal event with physical therapy couple days ago and is now afraid of walking.  Otherwise hemodynamically stable, no acute events overnight as per nursing staff    Scheduled Meds:   amoxicillin-clavulanate 875-125mg  1 tablet Oral Q12H    cetirizine  10 mg Oral Daily    cyproheptadine  4 mg Oral BID    DAPTOmycin (CUBICIN) IV (PEDS and ADULTS)  400 mg Intravenous Q24H    darunavir-cobicistat  1 tablet Oral Daily    emtricitabine-tenofovir 200-300 mg  1 tablet Oral Daily    enoxaparin  40 mg Subcutaneous Daily    EScitalopram oxalate  20 mg Oral Daily    ferrous sulfate  1 tablet Oral Daily    fluconazole  800 mg Oral Daily    fluticasone propionate  2 spray Each Nostril BID    folic acid  1 mg Oral Daily    guaiFENesin  600 mg Oral BID    insulin detemir U-100  10 Units Subcutaneous BID    levETIRAcetam  1,500 mg Oral BID    megestroL  40 mg Oral Daily    metoclopramide HCl  10 mg Oral QID (WM & HS)    multivitamin  1 tablet Oral Daily    pantoprazole  40 mg Oral Before breakfast    risperiDONE  0.5 mg Oral Daily    risperiDONE  1 mg Oral QHS    sodium bicarbonate  650 mg Oral TID    sulfamethoxazole-trimethoprim 800-160mg  1 tablet Oral Every Mon, Wed, Fri    thiamine  100 mg Oral Daily     Continuous Infusions:   sodium chloride 0.9% 1,000 mL (01/20/23 1221)     PRN Meds:acetaminophen, albuterol-ipratropium, dextrose 10%, dextrose 10%, glucagon (human recombinant), glucose, glucose, HYDROcodone-acetaminophen, HYDROcodone-acetaminophen, insulin aspart U-100, magnesium sulfate IVPB, magnesium sulfate IVPB, melatonin, melatonin, naloxone, ondansetron, polyethylene glycol, potassium bicarbonate, potassium bicarbonate, potassium  bicarbonate, prochlorperazine, simethicone, sodium chloride 0.9%    Review of patient's allergies indicates:  No Known Allergies    Review of Systems: As per interval history    OBJECTIVE:     Vital Signs (Most Recent)  Temp: 98.6 °F (37 °C) (01/21/23 1702)  Pulse: 92 (01/21/23 1702)  Resp: 15 (01/21/23 1702)  BP: 114/65 (01/21/23 1702)  SpO2: 98 % (01/21/23 1702)    Vital Signs Range (Last 24H):  Temp:  [97.8 °F (36.6 °C)-99.5 °F (37.5 °C)]   Pulse:  [85-93]   Resp:  [15-20]   BP: (109-124)/(65-74)   SpO2:  [98 %-100 %]     I & O (Last 24H):No intake or output data in the 24 hours ending 01/21/23 1722    Physical Exam:  General:  Chronically ill-appearing gentleman, cachectic  Eyes: No conjunctival injection. No scleral icterus.  ENT: Hearing grossly intact. No discharge from ears. No nasal discharge.   Neck: Supple, trachea midline. No JVD  CVS: RRR. No LE edema BL  Lungs:  No tachypnea or accessory muscle use.  Clear to auscultation bilaterally  Abdomen:  Soft, nontender and nondistended.  No organomegaly  Neuro: AOx3. Moves all extremities. Follows commands. Responds appropriately       Laboratory:  I have reviewed all pertinent lab results within the past 24 hours.    Diagnostic Results:  Reviewed all imaging    ASSESSMENT/PLAN:     27-year-old very unfortunate gentleman who recently had very prolonged hospital stay due to cryptococcal meningitis requiring serial lumbar punctures, neuro syphilis and hospital stay was complicated by AC, GI bleed, malnutrition and severe deconditioning and was transition to LTAC.  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         Plan:   Very unfortunate case of AIDS with recent prolonged hospital stay for cryptococcal meningitis and neurosyphilis came with severe lethargy, anemia and critical hypomagnesemia  Anti-retroviral, antifungal and antibiotics as per Infectious Disease  One blood culture positive for coag-negative staph, PICC line was removed and sent for tissue culture  Remains on daptomycin for coag-negative bacteremia-further tailoring as per Infectious Disease  Follow-up on repeat cultures  Remains on Bactrim for PJP prophylaxis and Augmentin for sinusitis  On Diflucan for recent cryptococcal meningitis  Aggressive p.o. nutrition, improve pulmonary toilet  Out of bed to chair  PT, OT  Gentle IV hydration, avoid antihypertensives  Further management as per clinical course    VTE Risk Mitigation (From admission, onward)           Ordered     Place CARMEN hose  Until discontinued         01/18/23 1830     enoxaparin injection 40 mg  Daily         01/16/23 9368                        Department Hospital Medicine  [facility]  Maximiliano Ellison MD  Date of service: 01/21/2023

## 2023-01-21 NOTE — NURSING
Assessed patient - alert but lethargic - has been using urinal - but has been incontinent of bowel - picc line was removed last night and he currently has a 20 g to his LFA that is infusing NS at 100 ml/hr - he is on oral and IV antibiotics - picc line tip was cultured and sent to lab - awaiting results for infectious disease - patient is to work with PT today - and they have been alerted to  notify me if he has any episodes of orthostatic hypotension - currently vital signs are stable - no complaints of pain at this time - no visible signs or symptoms of distress - will continue to monitor throughout shift for any changes in condition - JEZ Garces RN

## 2023-01-22 LAB
ANION GAP SERPL CALC-SCNC: 7 MMOL/L (ref 8–16)
ANISOCYTOSIS BLD QL SMEAR: SLIGHT
BASOPHILS # BLD AUTO: ABNORMAL K/UL (ref 0–0.2)
BASOPHILS NFR BLD: 0 % (ref 0–1.9)
BUN SERPL-MCNC: 10 MG/DL (ref 6–20)
CALCIUM SERPL-MCNC: 8.9 MG/DL (ref 8.7–10.5)
CHLORIDE SERPL-SCNC: 110 MMOL/L (ref 95–110)
CO2 SERPL-SCNC: 18 MMOL/L (ref 23–29)
CREAT SERPL-MCNC: 0.8 MG/DL (ref 0.5–1.4)
DIFFERENTIAL METHOD: ABNORMAL
EOSINOPHIL # BLD AUTO: ABNORMAL K/UL (ref 0–0.5)
EOSINOPHIL NFR BLD: 0 % (ref 0–8)
ERYTHROCYTE [DISTWIDTH] IN BLOOD BY AUTOMATED COUNT: 15.2 % (ref 11.5–14.5)
EST. GFR  (NO RACE VARIABLE): >60 ML/MIN/1.73 M^2
GLUCOSE SERPL-MCNC: 124 MG/DL (ref 70–110)
GLUCOSE SERPL-MCNC: 139 MG/DL (ref 70–110)
GLUCOSE SERPL-MCNC: 151 MG/DL (ref 70–110)
GLUCOSE SERPL-MCNC: 152 MG/DL (ref 70–110)
GLUCOSE SERPL-MCNC: 172 MG/DL (ref 70–110)
HCT VFR BLD AUTO: 23.5 % (ref 40–54)
HGB BLD-MCNC: 7.5 G/DL (ref 14–18)
IMM GRANULOCYTES # BLD AUTO: ABNORMAL K/UL (ref 0–0.04)
IMM GRANULOCYTES NFR BLD AUTO: ABNORMAL % (ref 0–0.5)
LYMPHOCYTES # BLD AUTO: ABNORMAL K/UL (ref 1–4.8)
LYMPHOCYTES NFR BLD: 10 % (ref 18–48)
MAGNESIUM SERPL-MCNC: 1.2 MG/DL (ref 1.6–2.6)
MCH RBC QN AUTO: 26.9 PG (ref 27–31)
MCHC RBC AUTO-ENTMCNC: 31.9 G/DL (ref 32–36)
MCV RBC AUTO: 84 FL (ref 82–98)
METAMYELOCYTES NFR BLD MANUAL: 4 %
MONOCYTES # BLD AUTO: ABNORMAL K/UL (ref 0.3–1)
MONOCYTES NFR BLD: 3 % (ref 4–15)
MYELOCYTES NFR BLD MANUAL: 8 %
NEUTROPHILS # BLD AUTO: ABNORMAL K/UL (ref 1.8–7.7)
NEUTROPHILS NFR BLD: 65 % (ref 38–73)
NEUTS BAND NFR BLD MANUAL: 10 %
NRBC BLD-RTO: 0 /100 WBC
PLATELET # BLD AUTO: 248 K/UL (ref 150–450)
PLATELET BLD QL SMEAR: ABNORMAL
PMV BLD AUTO: 9.6 FL (ref 9.2–12.9)
POIKILOCYTOSIS BLD QL SMEAR: SLIGHT
POTASSIUM SERPL-SCNC: 3.5 MMOL/L (ref 3.5–5.1)
RBC # BLD AUTO: 2.79 M/UL (ref 4.6–6.2)
SODIUM SERPL-SCNC: 135 MMOL/L (ref 136–145)
WBC # BLD AUTO: 20.91 K/UL (ref 3.9–12.7)

## 2023-01-22 PROCEDURE — 63600175 PHARM REV CODE 636 W HCPCS: Performed by: NURSE PRACTITIONER

## 2023-01-22 PROCEDURE — 63600175 PHARM REV CODE 636 W HCPCS: Performed by: HOSPITALIST

## 2023-01-22 PROCEDURE — 25000003 PHARM REV CODE 250: Performed by: NURSE PRACTITIONER

## 2023-01-22 PROCEDURE — 25000003 PHARM REV CODE 250: Performed by: INTERNAL MEDICINE

## 2023-01-22 PROCEDURE — 85007 BL SMEAR W/DIFF WBC COUNT: CPT | Performed by: NURSE PRACTITIONER

## 2023-01-22 PROCEDURE — 83735 ASSAY OF MAGNESIUM: CPT | Performed by: NURSE PRACTITIONER

## 2023-01-22 PROCEDURE — 99233 SBSQ HOSP IP/OBS HIGH 50: CPT | Mod: ,,, | Performed by: STUDENT IN AN ORGANIZED HEALTH CARE EDUCATION/TRAINING PROGRAM

## 2023-01-22 PROCEDURE — 36415 COLL VENOUS BLD VENIPUNCTURE: CPT | Performed by: NURSE PRACTITIONER

## 2023-01-22 PROCEDURE — 25000003 PHARM REV CODE 250: Performed by: STUDENT IN AN ORGANIZED HEALTH CARE EDUCATION/TRAINING PROGRAM

## 2023-01-22 PROCEDURE — 12000002 HC ACUTE/MED SURGE SEMI-PRIVATE ROOM

## 2023-01-22 PROCEDURE — 94761 N-INVAS EAR/PLS OXIMETRY MLT: CPT

## 2023-01-22 PROCEDURE — 80048 BASIC METABOLIC PNL TOTAL CA: CPT | Performed by: NURSE PRACTITIONER

## 2023-01-22 PROCEDURE — 25000003 PHARM REV CODE 250: Performed by: HOSPITALIST

## 2023-01-22 PROCEDURE — 99233 PR SUBSEQUENT HOSPITAL CARE,LEVL III: ICD-10-PCS | Mod: ,,, | Performed by: STUDENT IN AN ORGANIZED HEALTH CARE EDUCATION/TRAINING PROGRAM

## 2023-01-22 PROCEDURE — 63700000 PHARM REV CODE 250 ALT 637 W/O HCPCS: Performed by: NURSE PRACTITIONER

## 2023-01-22 PROCEDURE — 85027 COMPLETE CBC AUTOMATED: CPT | Performed by: NURSE PRACTITIONER

## 2023-01-22 PROCEDURE — 63700000 PHARM REV CODE 250 ALT 637 W/O HCPCS: Performed by: INTERNAL MEDICINE

## 2023-01-22 RX ORDER — SODIUM CHLORIDE AND POTASSIUM CHLORIDE 150; 900 MG/100ML; MG/100ML
INJECTION, SOLUTION INTRAVENOUS CONTINUOUS
Status: DISCONTINUED | OUTPATIENT
Start: 2023-01-22 | End: 2023-01-23

## 2023-01-22 RX ORDER — SODIUM,POTASSIUM PHOSPHATES 280-250MG
2 POWDER IN PACKET (EA) ORAL
Status: COMPLETED | OUTPATIENT
Start: 2023-01-22 | End: 2023-01-22

## 2023-01-22 RX ORDER — SULFAMETHOXAZOLE AND TRIMETHOPRIM 800; 160 MG/1; MG/1
1 TABLET ORAL DAILY
Status: DISCONTINUED | OUTPATIENT
Start: 2023-01-23 | End: 2023-02-07 | Stop reason: HOSPADM

## 2023-01-22 RX ORDER — MAGNESIUM SULFATE HEPTAHYDRATE 40 MG/ML
4 INJECTION, SOLUTION INTRAVENOUS ONCE
Status: COMPLETED | OUTPATIENT
Start: 2023-01-22 | End: 2023-01-22

## 2023-01-22 RX ADMIN — SODIUM BICARBONATE 650 MG TABLET 650 MG: at 08:01

## 2023-01-22 RX ADMIN — CYPROHEPTADINE HYDROCHLORIDE 4 MG: 4 TABLET ORAL at 08:01

## 2023-01-22 RX ADMIN — FOLIC ACID 1 MG: 1 TABLET ORAL at 09:01

## 2023-01-22 RX ADMIN — FERROUS SULFATE TAB 325 MG (65 MG ELEMENTAL FE) 1 EACH: 325 (65 FE) TAB at 09:01

## 2023-01-22 RX ADMIN — THERA TABS 1 TABLET: TAB at 09:01

## 2023-01-22 RX ADMIN — ENOXAPARIN SODIUM 40 MG: 100 INJECTION SUBCUTANEOUS at 04:01

## 2023-01-22 RX ADMIN — INSULIN DETEMIR 10 UNITS: 100 INJECTION, SOLUTION SUBCUTANEOUS at 08:01

## 2023-01-22 RX ADMIN — SODIUM BICARBONATE 650 MG TABLET 650 MG: at 09:01

## 2023-01-22 RX ADMIN — POTASSIUM, SODIUM PHOSPHATES 280 MG-160 MG-250 MG ORAL POWDER PACKET 2 PACKET: POWDER IN PACKET at 12:01

## 2023-01-22 RX ADMIN — SODIUM CHLORIDE AND POTASSIUM CHLORIDE: .9; .15 SOLUTION INTRAVENOUS at 12:01

## 2023-01-22 RX ADMIN — LEVETIRACETAM 1500 MG: 500 TABLET, FILM COATED ORAL at 08:01

## 2023-01-22 RX ADMIN — PANTOPRAZOLE SODIUM 40 MG: 40 TABLET, DELAYED RELEASE ORAL at 05:01

## 2023-01-22 RX ADMIN — ESCITALOPRAM OXALATE 20 MG: 10 TABLET ORAL at 09:01

## 2023-01-22 RX ADMIN — METOCLOPRAMIDE 10 MG: 10 TABLET ORAL at 12:01

## 2023-01-22 RX ADMIN — LEVETIRACETAM 1500 MG: 500 TABLET, FILM COATED ORAL at 09:01

## 2023-01-22 RX ADMIN — MAGNESIUM SULFATE HEPTAHYDRATE 4 G: 40 INJECTION, SOLUTION INTRAVENOUS at 09:01

## 2023-01-22 RX ADMIN — HYDROCODONE BITARTRATE AND ACETAMINOPHEN 1 TABLET: 5; 325 TABLET ORAL at 05:01

## 2023-01-22 RX ADMIN — METOCLOPRAMIDE 10 MG: 10 TABLET ORAL at 08:01

## 2023-01-22 RX ADMIN — GUAIFENESIN 600 MG: 600 TABLET, EXTENDED RELEASE ORAL at 09:01

## 2023-01-22 RX ADMIN — EMTRICITABINE AND TENOFOVIR DISOPROXIL FUMARATE 1 TABLET: 200; 300 TABLET, FILM COATED ORAL at 09:01

## 2023-01-22 RX ADMIN — METOCLOPRAMIDE 10 MG: 10 TABLET ORAL at 04:01

## 2023-01-22 RX ADMIN — METOCLOPRAMIDE 10 MG: 10 TABLET ORAL at 09:01

## 2023-01-22 RX ADMIN — AMOXICILLIN AND CLAVULANATE POTASSIUM 1 TABLET: 875; 125 TABLET, FILM COATED ORAL at 08:01

## 2023-01-22 RX ADMIN — THIAMINE HCL TAB 100 MG 100 MG: 100 TAB at 09:01

## 2023-01-22 RX ADMIN — SODIUM CHLORIDE AND POTASSIUM CHLORIDE: .9; .15 SOLUTION INTRAVENOUS at 09:01

## 2023-01-22 RX ADMIN — ONDANSETRON 4 MG: 2 INJECTION INTRAMUSCULAR; INTRAVENOUS at 04:01

## 2023-01-22 RX ADMIN — GUAIFENESIN 600 MG: 600 TABLET, EXTENDED RELEASE ORAL at 08:01

## 2023-01-22 RX ADMIN — CETIRIZINE HYDROCHLORIDE 10 MG: 10 TABLET, FILM COATED ORAL at 09:01

## 2023-01-22 RX ADMIN — FLUTICASONE PROPIONATE 100 MCG: 50 SPRAY, METERED NASAL at 09:01

## 2023-01-22 RX ADMIN — ONDANSETRON 4 MG: 2 INJECTION INTRAMUSCULAR; INTRAVENOUS at 10:01

## 2023-01-22 RX ADMIN — POTASSIUM, SODIUM PHOSPHATES 280 MG-160 MG-250 MG ORAL POWDER PACKET 2 PACKET: POWDER IN PACKET at 08:01

## 2023-01-22 RX ADMIN — AMOXICILLIN AND CLAVULANATE POTASSIUM 1 TABLET: 875; 125 TABLET, FILM COATED ORAL at 09:01

## 2023-01-22 RX ADMIN — CYPROHEPTADINE HYDROCHLORIDE 4 MG: 4 TABLET ORAL at 09:01

## 2023-01-22 RX ADMIN — DARUNAVIR ETHANOLATE AND COBICISTAT 1 TABLET: 800; 150 TABLET, FILM COATED ORAL at 09:01

## 2023-01-22 RX ADMIN — FLUCONAZOLE 800 MG: 100 TABLET ORAL at 09:01

## 2023-01-22 RX ADMIN — RISPERIDONE 1 MG: 1 TABLET ORAL at 08:01

## 2023-01-22 RX ADMIN — RISPERIDONE 0.5 MG: 0.25 TABLET ORAL at 09:01

## 2023-01-22 RX ADMIN — POTASSIUM, SODIUM PHOSPHATES 280 MG-160 MG-250 MG ORAL POWDER PACKET 2 PACKET: POWDER IN PACKET at 04:01

## 2023-01-22 RX ADMIN — SODIUM BICARBONATE 650 MG TABLET 650 MG: at 04:01

## 2023-01-22 RX ADMIN — INSULIN DETEMIR 10 UNITS: 100 INJECTION, SOLUTION SUBCUTANEOUS at 09:01

## 2023-01-22 RX ADMIN — FLUTICASONE PROPIONATE 100 MCG: 50 SPRAY, METERED NASAL at 08:01

## 2023-01-22 RX ADMIN — MEGESTROL ACETATE 40 MG: 20 TABLET ORAL at 09:01

## 2023-01-22 NOTE — PROGRESS NOTES
[facility] Medicine  Progress Note    Patient name: El Abreu  MRN: [id number]  Admit Date: 1/16/2023   LOS: 4 days     SUBJECTIVE:     Principal problem: AIDS    Interval History:  Patient was seen and examined at bedside, denies any new symptoms, had 1 episode of nonbilious nonbloody vomiting today.  Very poor p.o. intake.  Otherwise hemodynamically stable, no acute events overnight as per nursing staff    Scheduled Meds:   amoxicillin-clavulanate 875-125mg  1 tablet Oral Q12H    cetirizine  10 mg Oral Daily    cyproheptadine  4 mg Oral BID    DAPTOmycin (CUBICIN) IV (PEDS and ADULTS)  400 mg Intravenous Q24H    darunavir-cobicistat  1 tablet Oral Daily    emtricitabine-tenofovir 200-300 mg  1 tablet Oral Daily    enoxaparin  40 mg Subcutaneous Daily    EScitalopram oxalate  20 mg Oral Daily    ferrous sulfate  1 tablet Oral Daily    fluconazole  800 mg Oral Daily    fluticasone propionate  2 spray Each Nostril BID    folic acid  1 mg Oral Daily    guaiFENesin  600 mg Oral BID    insulin detemir U-100  10 Units Subcutaneous BID    levETIRAcetam  1,500 mg Oral BID    [START ON 1/23/2023] magnesium chloride  128 mg Oral Daily    megestroL  40 mg Oral Daily    metoclopramide HCl  10 mg Oral QID (WM & HS)    multivitamin  1 tablet Oral Daily    pantoprazole  40 mg Oral Before breakfast    potassium, sodium phosphates  2 packet Oral QID (AC & HS)    risperiDONE  0.5 mg Oral Daily    risperiDONE  1 mg Oral QHS    sodium bicarbonate  650 mg Oral TID    sulfamethoxazole-trimethoprim 800-160mg  1 tablet Oral Every Mon, Wed, Fri    thiamine  100 mg Oral Daily     Continuous Infusions:   sodium chloride 0.9% 1,000 mL (01/20/23 1221)     PRN Meds:acetaminophen, albuterol-ipratropium, dextrose 10%, dextrose 10%, glucagon (human recombinant), glucose, glucose, HYDROcodone-acetaminophen, HYDROcodone-acetaminophen, insulin aspart U-100, magnesium sulfate IVPB, magnesium sulfate IVPB, melatonin,  melatonin, naloxone, ondansetron, polyethylene glycol, potassium bicarbonate, potassium bicarbonate, potassium bicarbonate, prochlorperazine, simethicone, sodium chloride 0.9%    Review of patient's allergies indicates:  No Known Allergies    Review of Systems: As per interval history    OBJECTIVE:     Vital Signs (Most Recent)  Temp: 98 °F (36.7 °C) (01/22/23 1145)  Pulse: 82 (01/22/23 1145)  Resp: 16 (01/22/23 1145)  BP: (!) 147/76 (01/22/23 1145)  SpO2: 100 % (01/22/23 1145)    Vital Signs Range (Last 24H):  Temp:  [97.8 °F (36.6 °C)-98.6 °F (37 °C)]   Pulse:  []   Resp:  [12-18]   BP: (114-147)/(64-84)   SpO2:  [98 %-100 %]     I & O (Last 24H):  Intake/Output Summary (Last 24 hours) at 1/22/2023 1233  Last data filed at 1/22/2023 0433  Gross per 24 hour   Intake 1000 ml   Output 1375 ml   Net -375 ml       Physical Exam:  General:  Chronically ill-appearing gentleman, cachectic  Eyes: No conjunctival injection. No scleral icterus.  ENT: Hearing grossly intact. No discharge from ears. No nasal discharge.   Neck: Supple, trachea midline. No JVD  CVS: RRR. No LE edema BL  Lungs:  No tachypnea or accessory muscle use.  Clear to auscultation bilaterally  Abdomen:  Soft, nontender and nondistended.  No organomegaly  Neuro: AOx3. Moves all extremities. Follows commands. Responds appropriately       Laboratory:  I have reviewed all pertinent lab results within the past 24 hours.    Diagnostic Results:  Reviewed all imaging    ASSESSMENT/PLAN:     27-year-old very unfortunate gentleman who recently had very prolonged hospital stay due to cryptococcal meningitis requiring serial lumbar punctures, neuro syphilis and hospital stay was complicated by AC, GI bleed, malnutrition and severe deconditioning and was transition to LTAC.  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         Plan:   Very unfortunate case of AIDS with recent prolonged hospital stay for cryptococcal meningitis and neurosyphilis came with severe lethargy, anemia and critical hypomagnesemia  Anti-retroviral, antifungal and antibiotics as per Infectious Disease  One blood culture positive for coag-negative staph, PICC line was removed and sent for tissue culture  Remains on daptomycin for coag-negative bacteremia-further tailoring as per Infectious Disease  Follow-up on repeat cultures  Remains on Bactrim for PJP prophylaxis and Augmentin for sinusitis  On Diflucan for recent cryptococcal meningitis  Aggressive p.o. nutrition, improve pulmonary toilet  Out of bed to chair  PT, OT  Gentle IV hydration, avoid antihypertensives  Further management as per clinical course    VTE Risk Mitigation (From admission, onward)           Ordered     Place CARMEN hose  Until discontinued         01/18/23 1830     enoxaparin injection 40 mg  Daily         01/16/23 210                        Department Hospital Medicine  [facility]  Maximiliano Ellison MD  Date of service: 01/22/2023

## 2023-01-22 NOTE — NURSING
Patient had one large episode of tan undigested food emesis. Patient cleaned, gown and linens changed, PRN medication given, states feeling better currently. Patient currently in bed with cover over his head, NAD noted, will continue to monitor.

## 2023-01-22 NOTE — CONSULTS
Progress Note  Infectious Disease    Reason for Consult:  leukocytosis    HPI: El Abreu is a 27 y.o. 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  he was last admitted to [facility] November 30th with vomiting and headache, increased intracranial pressure.  This LP had a positive Sirisha ink opening pressure was 23.  He was then committed to a 6 week course of AmBisome completed 1/10, which was interrupted with AC but resumed and he was discharged to [facility] on January 10th.  He did require an additional LP for fluid removal and reduction of pressure on 12/13 (Sirisha ink was still positive but the culture was negative.  The last positive culture was 11/21).  He has been continued on fluconazole 800 mg daily since 01/11 and has been receiving Pneumocystis prophylaxis and appetite stimulants and antidepressants.  MRI brain on 01/01 showed stability of the right globus pallidus cryptococcal pseudocyst.  He was additionally treated for neurosyphilis 3 doses of Bicillin after an appropriate course of IV penicillin G.  He has not been eating well, withdrawn, seen by Psychiatry late December and started on risperidone.  He was seen in the [facility] emergency room 1/14 when his mandible became stuck open.  This was reduced in their emergency room he was sent back to the [facility].  He was sent to our emergency room yesterday for severe hypomagnesemia, 0.8 While in the emergency room he was noted to have a rising white blood cell count, from normal on 01/08 to 14 on 01/09 to 17 on 01/16 additionally his hemoglobin was 7.3.  An infectious workup was commenced and CRP was 2.0, lactic acid 0.9, procalcitonin 0.15.  Chest x-ray was negative, blood cultures are negative, respiratory PCR is not yet been  collected and he is not produce any sputum but this is not likely relevant..vanc and cefepime were begun.   Unfortunately our hospital is full and he has been waiting in the hallway outside the emergency room.  He is incontinent of urine, and urinalysis was negative.  Magnesium is now 1.7    He feels much improved, tells me that he ate breakfast and lunch. He does endorse some small amount of yellow sputum and he does find that his sinuses are congested. It is relevant to note that he had pansinusitis on MRI brain 1/1.     1/18: interim reviewed. He is eating well. He had a syncopal episode while working with PT. He states that this has happened before, but does not happen consistently when he gets up. He is still congested but not really expectorating. Sputum sample was not purulent(saliva) and culture has only normal burak. He is not receiving his HIV antivirals here(not on formulary) though these were available at [facility]. Repeated CXR and it is still clear.   1/20:  interim reviewed. WBC a little higher , peripheral smear noted but unsure of the relevance of the findings.  He is sleeping deeply, worked with physical therapy much more successfully today he is eating reasonably well and antivirals were resumed.  He endorses that his cough is improved.  Denies diarrhea. Blood culture from 1/16, has coag neg Staph in 1/4 bottles drawn from picc line. The picc line is 47 days old, placed 12/4.      1/21-22 (Deep): interim reviewed, discussed with Dr Ny, patient seen and examined at bedside.  States he vomited earlier this morning, he was feeling very nauseous.  He denies headaches, no fever or chills.  Slightly hypertensive, afebrile.  Adequate urinary output, 1 episode of stool recorded in the last 24 hours.  Labs reviewed, persistent leukocytosis 20.9, lymphocytic predominance 10%, no left shift, bands 10%, H&H 7.5/23.5, platelet count 248.  Stable kidney function, magnesium 1.2.  Micro reviewed, blood cultures  from 01/20 no growth to date, pending final.  Tip culture from PICC which was removed 1/20 no growth.  Respiratory viral panel not detected.    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  :  Voids , no flank tenderness  Musc:  Joints without effusion, swelling, erythema, synovitis, with mild generalized muscle wasting.   Skin:  No rashes. Dry skin. Poor turgor  Neuro:            Arousable, speech fluent, face symmetric, moves all extremities, no focal weakness.   Psych:  Calm, cooperative  Lymphatic:        Extrem: No edema, erythema, phlebitis, cellulitis, warm and well perfused  VAD:  Right arm picc 12/4-->removed 1/20   Isolation:  none  Wound: none      General Labs reviewed:  Recent Labs   Lab 01/20/23  0551 01/21/23  0515 01/22/23  0523   WBC 19.02* 19.52* 20.91*   HGB 7.4* 7.7* 7.5*   HCT 22.4* 24.1* 23.5*    258 248       Recent Labs   Lab 01/16/23  0450 01/16/23  1533 01/17/23  0448 01/20/23  0551 01/21/23  0515 01/22/23  0523   * 135*   < > 135* 136 135*   K 3.6 3.5   < > 3.7 3.8 3.5    103   < > 108 109 110   CO2 22* 26   < > 22* 21* 18*   BUN 22* 23*   < > 14 12 10   CREATININE 1.1 1.0   < > 0.8 0.7 0.8   CALCIUM  9.1 9.3   < > 9.1 9.1 8.9   PROT 5.9* 6.3  --   --   --   --    BILITOT 0.2 0.4  --   --   --   --    ALKPHOS 71 76  --   --   --   --    ALT 31 36  --   --   --   --    AST 28 30  --   --   --   --     < > = values in this interval not displayed.     Recent Labs   Lab 01/17/23  0104   CRP 2.00*     Estimated Creatinine Clearance: 140.3 mL/min (based on SCr of 0.8 mg/dL).    Micro:  Microbiology Results (last 7 days)       Procedure Component Value Units Date/Time    IV catheter culture [[id number]] Collected: 01/20/23 1920    Order Status: Completed Specimen: Catheter Tip, PICC Updated: 01/22/23 0729     Aerobic Culture - Cath tip No growth    Blood culture [[id number]] Collected: 01/20/23 2051    Order Status: Completed Specimen: Blood from Antecubital, Left Arm Updated: 01/21/23 2232     Blood Culture, Routine No Growth to date      No Growth to date    Narrative:      Drawn from PICC line    Respiratory Infection Panel (PCR), Nasopharyngeal [[id number]] Collected: 01/20/23 2031    Order Status: Completed Specimen: Nasopharyngeal Swab Updated: 01/20/23 2242     Respiratory Infection Panel Source NP swab     Adenovirus Not Detected     Coronavirus 229E, Common Cold Virus Not Detected     Coronavirus HKU1, Common Cold Virus Not Detected     Coronavirus NL63, Common Cold Virus Not Detected     Coronavirus OC43, Common Cold Virus Not Detected     Comment: The Coronavirus strains detected in this test cause the common cold.  These strains are not the COVID-19 (novel Coronavirus)strain   associated with the respiratory disease outbreak.          SARS-CoV2 (COVID-19) Qualitative PCR Not Detected     Human Metapneumovirus Not Detected     Human Rhinovirus/Enterovirus Not Detected     Influenza A (subtypes H1, H1-2009,H3) Not Detected     Influenza B Not Detected     Parainfluenza Virus 1 Not Detected     Parainfluenza Virus 2 Not Detected     Parainfluenza Virus 3 Not Detected     Parainfluenza Virus 4 Not Detected      Respiratory Syncytial Virus Not Detected     Bordetella Parapertussis (TP7573) Not Detected     Bordetella pertussis (ptxP) Not Detected     Chlamydia pneumoniae Not Detected     Mycoplasma pneumoniae Not Detected     Comment: Respiratory Infection Panel testing performed by Multiplex PCR.       Narrative:      Respiratory Infection Panel source->NP Swab    Culture, Respiratory with Gram Stain [[id number]] Collected: 01/17/23 1456    Order Status: Completed Specimen: Sputum Updated: 01/19/23 0756     Respiratory Culture Normal respiratory burak     Gram Stain (Respiratory) <10 epithelial cells per low power field.     Gram Stain (Respiratory) Rare WBC's     Gram Stain (Respiratory) Rare Gram positive cocci    MRSA Screen by PCR [[id number]] Collected: 01/18/23 2101    Order Status: Completed Specimen: Nasopharyngeal Swab from Nasal Updated: 01/18/23 2352     MRSA SCREEN BY PCR Negative    Blood culture x two cultures. Draw prior to antibiotics. [[id number]]  (Abnormal) Collected: 01/16/23 1455    Order Status: Completed Specimen: Blood from Line, PICC Right Basilic Updated: 01/18/23 0726     Blood Culture, Routine Gram stain evans bottle: Gram positive cocci in clusters resembling Staph      Results called to and read back by: Donald Guerrero RN 1E.      01/18/2023  00:19 TGC      COAGULASE-NEGATIVE STAPHYLOCOCCUS SPECIES  Organism is a probable contaminant      Narrative:      Aerobic and anaerobic    Rapid Organism ID by PCR (from Blood culture) [[id number]]  (Abnormal) Collected: 01/16/23 1455    Order Status: Completed Updated: 01/18/23 0041     Enterococcus faecials Not Detected     Enterococcus faecium Not Detected     Listeria Monocytogenes Not Detected     Staphylococcus spp. See species for ID     Comment: CORRECTED RESULT; previously reported as Not Detected on 01/18/2023   at 00:38.          Staphylococcus aureus Not Detected     Staphylococcus epidermidis Detected     Comment: CORRECTED RESULT; previously  reported as Not Detected on 01/18/2023   at 00:38.          Staphylococcus lugdunensis Not Detected     Streptococcus species Not Detected     Streptococcus agalactiae Not Detected     Streptococcus pneumoniae Not Detected     Streptococcus pyogenes Not Detected     Acinetobacter calcoaceticus/baumannii complex Not Detected     Bacteroides fragilis Not Detected     Enterobacerales Not Detected     Enterobacter cloacae complex Not Detected     Escherichia Not Detected     Klebsiella aerogenes Not Detected     Klebsiella oxytoca Not Detected     Klebsiella pneumoniae group Not Detected     Proteus Not Detected     Salmonella sp Not Detected     Serratia marcescens Not Detected     Haemophilus influenzae Not Detected     Neisseria meningtidis Not Detected     Pseudomonas aeruginosa Not Detected     Stenotrophomonas maltophilia Not Detected     Candida albicans Not Detected     Candida auris Not Detected     Candida glabrata Not Detected     Comment: CORRECTED RESULT; previously reported as Detected on 01/18/2023 at   00:38.          Arti krusei Not Detected     Candida Parapsilosis Not Detected     Candida Tropicalis Not Detected     Cryptococcus neoformans/gattii Not Detected     CTX-M Gene Not Detected     Comment: CORRECTED RESULT; previously reported as Test not applicable on   01/18/2023 at 00:38.          IMP Gene Not Detected     Comment: CORRECTED RESULT; previously reported as Test not applicable on   01/18/2023 at 00:38.          KPC  Not Detected     Comment: CORRECTED RESULT; previously reported as Test not applicable on   01/18/2023 at 00:38.          mcr-1  Not Detected     Comment: CORRECTED RESULT; previously reported as Test not applicable on   01/18/2023 at 00:38.          mec A/C Detected     Comment: CORRECTED RESULT; previously reported as Test not applicable on   01/18/2023 at 00:38.          mec A/C and MREJ (MRSA) Not Detected     Comment: CORRECTED RESULT; previously reported as Test not  applicable on   01/18/2023 at 00:38.          NDM Not Detected     Comment: CORRECTED RESULT; previously reported as Test not applicable on   01/18/2023 at 00:38.          OXA-48-like Not Detected     Comment: CORRECTED RESULT; previously reported as Test not applicable on   01/18/2023 at 00:38.          van A/B Not Detected     Comment: CORRECTED RESULT; previously reported as Test not applicable on   01/18/2023 at 00:38.          VIM Not Detected     Comment: CORRECTED RESULT; previously reported as Test not applicable on   01/18/2023 at 00:38.         Narrative:      Aerobic and anaerobic    Blood culture x two cultures. Draw prior to antibiotics. [[id number]] Collected: 01/16/23 1527    Order Status: Sent Specimen: Blood from Line, PICC Right Basilic Updated: 01/16/23 1527            Imaging Reviewed:   CXR   MRI brain 1/1    Cardiology:    IMPRESSION & PLAN     Severe hypomagnesemia, last Mg 1.2   Electrolyte abnormalities likely from ampho B    2. HIV/AIDS C3 - Cryptococcal meningitis, now controlled, s/p 6 weeks of ambisome and continues on high dose oral fluconazole   Cryptococcoma, stable   CD4 11/5 7 - VL 48,127   On Prezcobix/Descovy    3. Cachexia with failure to thrive   On supplements     4. Pansinusitis, cannot exclude possible cause of persistent leukocytosis   CRP 2.0    5. Syncope while working with PT, ? vagal or orthostatic    6. CoNS - Staph epidermidis 1/2 bottles, s/p PICC removal 1/20 - likely a contaminant       Recommendations:  Stop Daptomycin, CoNS likely a contaminant  Continue Diflucan 800 mg per day for Crypto  Bactrim DS adjusted to daily, CD4 <200 for PJP  Continue Prezcobix/Desxcvy  CD4 count and VL ordered with AM labs   PT/OT as tolerated  Patient would benefit from LTAC      D/w Dr Ellison    Medical Decision Making during this encounter was  [_] Low Complexity  [_] Moderate Complexity  [xxx  ] High Complexity

## 2023-01-22 NOTE — NURSING
Assessed patient - alert and oriented - patient is still experiencing nausea and vomiting - mag is being replaced today for level of 1.2 - patient is complaining of rib pain to the right and has been given oral pain medication - vital signs within normal parameters - did have one episode of vomiting this morning - had some undigested medication in it - no visible signs or symptoms of distress - will continue to monitor throughout shift for any changes in condition - JEZ Garces RN

## 2023-01-23 LAB
ANION GAP SERPL CALC-SCNC: 7 MMOL/L (ref 8–16)
ANISOCYTOSIS BLD QL SMEAR: SLIGHT
BASOPHILS # BLD AUTO: ABNORMAL K/UL (ref 0–0.2)
BASOPHILS NFR BLD: 0 % (ref 0–1.9)
BUN SERPL-MCNC: 10 MG/DL (ref 6–20)
CALCIUM SERPL-MCNC: 9.3 MG/DL (ref 8.7–10.5)
CHLORIDE SERPL-SCNC: 112 MMOL/L (ref 95–110)
CO2 SERPL-SCNC: 19 MMOL/L (ref 23–29)
CREAT SERPL-MCNC: 0.6 MG/DL (ref 0.5–1.4)
DIFFERENTIAL METHOD: ABNORMAL
EOSINOPHIL # BLD AUTO: ABNORMAL K/UL (ref 0–0.5)
EOSINOPHIL NFR BLD: 2 % (ref 0–8)
ERYTHROCYTE [DISTWIDTH] IN BLOOD BY AUTOMATED COUNT: 15 % (ref 11.5–14.5)
EST. GFR  (NO RACE VARIABLE): >60 ML/MIN/1.73 M^2
GLUCOSE SERPL-MCNC: 125 MG/DL (ref 70–110)
GLUCOSE SERPL-MCNC: 137 MG/DL (ref 70–110)
GLUCOSE SERPL-MCNC: 160 MG/DL (ref 70–110)
GLUCOSE SERPL-MCNC: 78 MG/DL (ref 70–110)
GLUCOSE SERPL-MCNC: 86 MG/DL (ref 70–110)
HCT VFR BLD AUTO: 22.4 % (ref 40–54)
HGB BLD-MCNC: 7.5 G/DL (ref 14–18)
IMM GRANULOCYTES # BLD AUTO: ABNORMAL K/UL (ref 0–0.04)
IMM GRANULOCYTES NFR BLD AUTO: ABNORMAL % (ref 0–0.5)
LYMPHOCYTES # BLD AUTO: ABNORMAL K/UL (ref 1–4.8)
LYMPHOCYTES NFR BLD: 12 % (ref 18–48)
MAGNESIUM SERPL-MCNC: 1.4 MG/DL (ref 1.6–2.6)
MCH RBC QN AUTO: 28.1 PG (ref 27–31)
MCHC RBC AUTO-ENTMCNC: 33.5 G/DL (ref 32–36)
MCV RBC AUTO: 84 FL (ref 82–98)
MONOCYTES # BLD AUTO: ABNORMAL K/UL (ref 0.3–1)
MONOCYTES NFR BLD: 18 % (ref 4–15)
NEUTROPHILS # BLD AUTO: ABNORMAL K/UL (ref 1.8–7.7)
NEUTROPHILS NFR BLD: 66 % (ref 38–73)
NEUTS BAND NFR BLD MANUAL: 2 %
NRBC BLD-RTO: 0 /100 WBC
OVALOCYTES BLD QL SMEAR: ABNORMAL
PLATELET # BLD AUTO: 312 K/UL (ref 150–450)
PLATELET BLD QL SMEAR: ABNORMAL
PMV BLD AUTO: 8.4 FL (ref 9.2–12.9)
POTASSIUM SERPL-SCNC: 3.5 MMOL/L (ref 3.5–5.1)
RBC # BLD AUTO: 2.67 M/UL (ref 4.6–6.2)
SODIUM SERPL-SCNC: 138 MMOL/L (ref 136–145)
TARGETS BLD QL SMEAR: ABNORMAL
WBC # BLD AUTO: 15.9 K/UL (ref 3.9–12.7)

## 2023-01-23 PROCEDURE — 99233 SBSQ HOSP IP/OBS HIGH 50: CPT | Mod: ,,, | Performed by: STUDENT IN AN ORGANIZED HEALTH CARE EDUCATION/TRAINING PROGRAM

## 2023-01-23 PROCEDURE — 99900035 HC TECH TIME PER 15 MIN (STAT)

## 2023-01-23 PROCEDURE — 94761 N-INVAS EAR/PLS OXIMETRY MLT: CPT

## 2023-01-23 PROCEDURE — 83735 ASSAY OF MAGNESIUM: CPT | Performed by: NURSE PRACTITIONER

## 2023-01-23 PROCEDURE — 25000003 PHARM REV CODE 250: Performed by: NURSE PRACTITIONER

## 2023-01-23 PROCEDURE — 97535 SELF CARE MNGMENT TRAINING: CPT

## 2023-01-23 PROCEDURE — 86361 T CELL ABSOLUTE COUNT: CPT | Performed by: STUDENT IN AN ORGANIZED HEALTH CARE EDUCATION/TRAINING PROGRAM

## 2023-01-23 PROCEDURE — 99233 PR SUBSEQUENT HOSPITAL CARE,LEVL III: ICD-10-PCS | Mod: ,,, | Performed by: STUDENT IN AN ORGANIZED HEALTH CARE EDUCATION/TRAINING PROGRAM

## 2023-01-23 PROCEDURE — 63700000 PHARM REV CODE 250 ALT 637 W/O HCPCS: Performed by: INTERNAL MEDICINE

## 2023-01-23 PROCEDURE — 99900031 HC PATIENT EDUCATION (STAT)

## 2023-01-23 PROCEDURE — 97110 THERAPEUTIC EXERCISES: CPT | Mod: CQ

## 2023-01-23 PROCEDURE — 80048 BASIC METABOLIC PNL TOTAL CA: CPT | Performed by: NURSE PRACTITIONER

## 2023-01-23 PROCEDURE — 85007 BL SMEAR W/DIFF WBC COUNT: CPT | Performed by: NURSE PRACTITIONER

## 2023-01-23 PROCEDURE — 63600175 PHARM REV CODE 636 W HCPCS: Performed by: NURSE PRACTITIONER

## 2023-01-23 PROCEDURE — 25000003 PHARM REV CODE 250: Performed by: INTERNAL MEDICINE

## 2023-01-23 PROCEDURE — 97530 THERAPEUTIC ACTIVITIES: CPT

## 2023-01-23 PROCEDURE — 87536 HIV-1 QUANT&REVRSE TRNSCRPJ: CPT | Performed by: STUDENT IN AN ORGANIZED HEALTH CARE EDUCATION/TRAINING PROGRAM

## 2023-01-23 PROCEDURE — 25000003 PHARM REV CODE 250: Performed by: STUDENT IN AN ORGANIZED HEALTH CARE EDUCATION/TRAINING PROGRAM

## 2023-01-23 PROCEDURE — 25000003 PHARM REV CODE 250: Performed by: HOSPITALIST

## 2023-01-23 PROCEDURE — 85027 COMPLETE CBC AUTOMATED: CPT | Performed by: NURSE PRACTITIONER

## 2023-01-23 PROCEDURE — 12000002 HC ACUTE/MED SURGE SEMI-PRIVATE ROOM

## 2023-01-23 PROCEDURE — 63700000 PHARM REV CODE 250 ALT 637 W/O HCPCS: Performed by: NURSE PRACTITIONER

## 2023-01-23 PROCEDURE — 63600175 PHARM REV CODE 636 W HCPCS: Performed by: HOSPITALIST

## 2023-01-23 PROCEDURE — 97116 GAIT TRAINING THERAPY: CPT | Mod: CQ

## 2023-01-23 RX ORDER — DEXTROSE MONOHYDRATE, SODIUM CHLORIDE, AND POTASSIUM CHLORIDE 50; 2.98; 4.5 G/1000ML; G/1000ML; G/1000ML
INJECTION, SOLUTION INTRAVENOUS CONTINUOUS
Status: DISCONTINUED | OUTPATIENT
Start: 2023-01-23 | End: 2023-01-25

## 2023-01-23 RX ORDER — MAGNESIUM SULFATE HEPTAHYDRATE 40 MG/ML
2 INJECTION, SOLUTION INTRAVENOUS ONCE
Status: COMPLETED | OUTPATIENT
Start: 2023-01-23 | End: 2023-01-23

## 2023-01-23 RX ADMIN — AMOXICILLIN AND CLAVULANATE POTASSIUM 1 TABLET: 875; 125 TABLET, FILM COATED ORAL at 10:01

## 2023-01-23 RX ADMIN — MAGNESIUM SULFATE HEPTAHYDRATE 2 G: 40 INJECTION, SOLUTION INTRAVENOUS at 10:01

## 2023-01-23 RX ADMIN — DARUNAVIR ETHANOLATE AND COBICISTAT 1 TABLET: 800; 150 TABLET, FILM COATED ORAL at 10:01

## 2023-01-23 RX ADMIN — SODIUM BICARBONATE 650 MG TABLET 650 MG: at 10:01

## 2023-01-23 RX ADMIN — PANTOPRAZOLE SODIUM 40 MG: 40 TABLET, DELAYED RELEASE ORAL at 05:01

## 2023-01-23 RX ADMIN — ENOXAPARIN SODIUM 40 MG: 100 INJECTION SUBCUTANEOUS at 04:01

## 2023-01-23 RX ADMIN — SODIUM BICARBONATE 650 MG TABLET 650 MG: at 09:01

## 2023-01-23 RX ADMIN — METOCLOPRAMIDE 10 MG: 10 TABLET ORAL at 04:01

## 2023-01-23 RX ADMIN — FOLIC ACID 1 MG: 1 TABLET ORAL at 10:01

## 2023-01-23 RX ADMIN — POTASSIUM BICARBONATE 50 MEQ: 977.5 TABLET, EFFERVESCENT ORAL at 10:01

## 2023-01-23 RX ADMIN — FLUCONAZOLE 800 MG: 100 TABLET ORAL at 10:01

## 2023-01-23 RX ADMIN — SULFAMETHOXAZOLE AND TRIMETHOPRIM 1 TABLET: 800; 160 TABLET ORAL at 10:01

## 2023-01-23 RX ADMIN — METOCLOPRAMIDE 10 MG: 10 TABLET ORAL at 09:01

## 2023-01-23 RX ADMIN — THIAMINE HCL TAB 100 MG 100 MG: 100 TAB at 10:01

## 2023-01-23 RX ADMIN — METOCLOPRAMIDE 10 MG: 10 TABLET ORAL at 11:01

## 2023-01-23 RX ADMIN — GUAIFENESIN 600 MG: 600 TABLET, EXTENDED RELEASE ORAL at 10:01

## 2023-01-23 RX ADMIN — RISPERIDONE 0.5 MG: 0.25 TABLET ORAL at 10:01

## 2023-01-23 RX ADMIN — CYPROHEPTADINE HYDROCHLORIDE 4 MG: 4 TABLET ORAL at 10:01

## 2023-01-23 RX ADMIN — MAGNESIUM 64 MG (MAGNESIUM CHLORIDE) TABLET,DELAYED RELEASE 128 MG: at 10:01

## 2023-01-23 RX ADMIN — FLUTICASONE PROPIONATE 100 MCG: 50 SPRAY, METERED NASAL at 10:01

## 2023-01-23 RX ADMIN — LEVETIRACETAM 1500 MG: 500 TABLET, FILM COATED ORAL at 09:01

## 2023-01-23 RX ADMIN — EMTRICITABINE AND TENOFOVIR DISOPROXIL FUMARATE 1 TABLET: 200; 300 TABLET, FILM COATED ORAL at 10:01

## 2023-01-23 RX ADMIN — RISPERIDONE 1 MG: 1 TABLET ORAL at 09:01

## 2023-01-23 RX ADMIN — THERA TABS 1 TABLET: TAB at 10:01

## 2023-01-23 RX ADMIN — SODIUM BICARBONATE 650 MG TABLET 650 MG: at 02:01

## 2023-01-23 RX ADMIN — INSULIN DETEMIR 10 UNITS: 100 INJECTION, SOLUTION SUBCUTANEOUS at 09:01

## 2023-01-23 RX ADMIN — POTASSIUM BICARBONATE 50 MEQ: 977.5 TABLET, EFFERVESCENT ORAL at 02:01

## 2023-01-23 RX ADMIN — DEXTROSE, SODIUM CHLORIDE, AND POTASSIUM CHLORIDE: 5; .45; .3 INJECTION INTRAVENOUS at 09:01

## 2023-01-23 RX ADMIN — POTASSIUM BICARBONATE 50 MEQ: 977.5 TABLET, EFFERVESCENT ORAL at 09:01

## 2023-01-23 RX ADMIN — INSULIN DETEMIR 10 UNITS: 100 INJECTION, SOLUTION SUBCUTANEOUS at 10:01

## 2023-01-23 RX ADMIN — ESCITALOPRAM OXALATE 20 MG: 10 TABLET ORAL at 10:01

## 2023-01-23 RX ADMIN — FERROUS SULFATE TAB 325 MG (65 MG ELEMENTAL FE) 1 EACH: 325 (65 FE) TAB at 10:01

## 2023-01-23 RX ADMIN — DEXTROSE, SODIUM CHLORIDE, AND POTASSIUM CHLORIDE: 5; .45; .3 INJECTION INTRAVENOUS at 10:01

## 2023-01-23 RX ADMIN — FLUTICASONE PROPIONATE 100 MCG: 50 SPRAY, METERED NASAL at 09:01

## 2023-01-23 RX ADMIN — CETIRIZINE HYDROCHLORIDE 10 MG: 10 TABLET, FILM COATED ORAL at 10:01

## 2023-01-23 RX ADMIN — CYPROHEPTADINE HYDROCHLORIDE 4 MG: 4 TABLET ORAL at 09:01

## 2023-01-23 RX ADMIN — METOCLOPRAMIDE 10 MG: 10 TABLET ORAL at 07:01

## 2023-01-23 RX ADMIN — LEVETIRACETAM 1500 MG: 500 TABLET, FILM COATED ORAL at 10:01

## 2023-01-23 RX ADMIN — AMOXICILLIN AND CLAVULANATE POTASSIUM 1 TABLET: 875; 125 TABLET, FILM COATED ORAL at 09:01

## 2023-01-23 RX ADMIN — MEGESTROL ACETATE 40 MG: 20 TABLET ORAL at 10:01

## 2023-01-23 RX ADMIN — GUAIFENESIN 600 MG: 600 TABLET, EXTENDED RELEASE ORAL at 09:01

## 2023-01-23 RX ADMIN — MAGNESIUM SULFATE HEPTAHYDRATE 4 G: 40 INJECTION, SOLUTION INTRAVENOUS at 05:01

## 2023-01-23 NOTE — PROGRESS NOTES
Progress Note  Infectious Disease    Reason for Consult:  leukocytosis    HPI: El Abreu is a 27 y.o. 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  he was last admitted to [facility] November 30th with vomiting and headache, increased intracranial pressure.  This LP had a positive Sirisha ink opening pressure was 23.  He was then committed to a 6 week course of AmBisome completed 1/10, which was interrupted with AC but resumed and he was discharged to [facility] on January 10th.  He did require an additional LP for fluid removal and reduction of pressure on 12/13 (Sirisha ink was still positive but the culture was negative.  The last positive culture was 11/21).  He has been continued on fluconazole 800 mg daily since 01/11 and has been receiving Pneumocystis prophylaxis and appetite stimulants and antidepressants.  MRI brain on 01/01 showed stability of the right globus pallidus cryptococcal pseudocyst.  He was additionally treated for neurosyphilis 3 doses of Bicillin after an appropriate course of IV penicillin G.  He has not been eating well, withdrawn, seen by Psychiatry late December and started on risperidone.  He was seen in the [facility] emergency room 1/14 when his mandible became stuck open.  This was reduced in their emergency room he was sent back to the [facility].  He was sent to our emergency room yesterday for severe hypomagnesemia, 0.8 While in the emergency room he was noted to have a rising white blood cell count, from normal on 01/08 to 14 on 01/09 to 17 on 01/16 additionally his hemoglobin was 7.3.  An infectious workup was commenced and CRP was 2.0, lactic acid 0.9, procalcitonin 0.15.  Chest x-ray was negative, blood cultures are negative, respiratory PCR is not yet been  collected and he is not produce any sputum but this is not likely relevant..vanc and cefepime were begun.   Unfortunately our hospital is full and he has been waiting in the hallway outside the emergency room.  He is incontinent of urine, and urinalysis was negative.  Magnesium is now 1.7    He feels much improved, tells me that he ate breakfast and lunch. He does endorse some small amount of yellow sputum and he does find that his sinuses are congested. It is relevant to note that he had pansinusitis on MRI brain 1/1.     1/18: interim reviewed. He is eating well. He had a syncopal episode while working with PT. He states that this has happened before, but does not happen consistently when he gets up. He is still congested but not really expectorating. Sputum sample was not purulent(saliva) and culture has only normal burak. He is not receiving his HIV antivirals here(not on formulary) though these were available at [facility]. Repeated CXR and it is still clear.   1/20:  interim reviewed. WBC a little higher , peripheral smear noted but unsure of the relevance of the findings.  He is sleeping deeply, worked with physical therapy much more successfully today he is eating reasonably well and antivirals were resumed.  He endorses that his cough is improved.  Denies diarrhea. Blood culture from 1/16, has coag neg Staph in 1/4 bottles drawn from picc line. The picc line is 47 days old, placed 12/4.      1/21-22 (Deep): interim reviewed, discussed with Dr Ny, patient seen and examined at bedside.  States he vomited earlier this morning, he was feeling very nauseous.  He denies headaches, no fever or chills.  Slightly hypertensive, afebrile.  Adequate urinary output, 1 episode of stool recorded in the last 24 hours.  Labs reviewed, persistent leukocytosis 20.9, lymphocytic predominance 10%, no left shift, bands 10%, H&H 7.5/23.5, platelet count 248.  Stable kidney function, magnesium 1.2.  Micro reviewed, blood cultures  from 01/20 no growth to date, pending final.  Tip culture from PICC which was removed 1/20 no growth.  Respiratory viral panel not detected.    1/23:  Interim reviewed, patient seen examined at bedside, states he is feeling a little stronger today, was able to walk around with physical therapy, states he had 2-3/10 frontal headache this morning that is now improved.  Hemodynamically stable, afebrile.  Labs reviewed, leukocytosis down to 15.9, lymphocytic predominance 12%, bands 2%, H&H 7.5/32.4, platelet count 312.  Magnesium 1.4, stable potassium, and kidney function.  CD4 count and viral load in process.    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  :  Voids , no flank tenderness  Musc:  Joints without effusion, swelling, erythema, synovitis, with mild generalized muscle wasting.   Skin:  No rashes. Dry skin. Poor turgor  Neuro:             Arousable, speech fluent, face symmetric, moves all extremities, no focal weakness.   Psych:  Calm, cooperative  Lymphatic:        Extrem: No edema, erythema, phlebitis, cellulitis, warm and well perfused  VAD:  Peripheral IV  Isolation:  none  Wound: none      General Labs reviewed:  Recent Labs    Lab 01/21/23  0515 01/22/23  0523 01/23/23  0430   WBC 19.52* 20.91* 15.90*   HGB 7.7* 7.5* 7.5*   HCT 24.1* 23.5* 22.4*    248 312       Recent Labs   Lab 01/21/23  0515 01/22/23  0523 01/23/23  0430    135* 138   K 3.8 3.5 3.5    110 112*   CO2 21* 18* 19*   BUN 12 10 10   CREATININE 0.7 0.8 0.6   CALCIUM 9.1 8.9 9.3     Recent Labs   Lab 01/17/23  0104   CRP 2.00*     Estimated Creatinine Clearance: 187.1 mL/min (based on SCr of 0.6 mg/dL).    Micro:  Microbiology Results (last 7 days)       Procedure Component Value Units Date/Time    IV catheter culture [[id number]] Collected: 01/20/23 1920    Order Status: Completed Specimen: Catheter Tip, PICC Updated: 01/23/23 0639     Aerobic Culture - Cath tip No growth    Blood culture [[id number]] Collected: 01/20/23 2051    Order Status: Completed Specimen: Blood from Antecubital, Left Arm Updated: 01/22/23 2232     Blood Culture, Routine No Growth to date      No Growth to date      No Growth to date    Narrative:      Drawn from PICC line    Respiratory Infection Panel (PCR), Nasopharyngeal [[id number]] Collected: 01/20/23 2031    Order Status: Completed Specimen: Nasopharyngeal Swab Updated: 01/20/23 2242     Respiratory Infection Panel Source NP swab     Adenovirus Not Detected     Coronavirus 229E, Common Cold Virus Not Detected     Coronavirus HKU1, Common Cold Virus Not Detected     Coronavirus NL63, Common Cold Virus Not Detected     Coronavirus OC43, Common Cold Virus Not Detected     Comment: The Coronavirus strains detected in this test cause the common cold.  These strains are not the COVID-19 (novel Coronavirus)strain   associated with the respiratory disease outbreak.          SARS-CoV2 (COVID-19) Qualitative PCR Not Detected     Human Metapneumovirus Not Detected     Human Rhinovirus/Enterovirus Not Detected     Influenza A (subtypes H1, H1-2009,H3) Not Detected     Influenza B Not Detected     Parainfluenza Virus 1 Not Detected      Parainfluenza Virus 2 Not Detected     Parainfluenza Virus 3 Not Detected     Parainfluenza Virus 4 Not Detected     Respiratory Syncytial Virus Not Detected     Bordetella Parapertussis (DH1647) Not Detected     Bordetella pertussis (ptxP) Not Detected     Chlamydia pneumoniae Not Detected     Mycoplasma pneumoniae Not Detected     Comment: Respiratory Infection Panel testing performed by Multiplex PCR.       Narrative:      Respiratory Infection Panel source->NP Swab    Culture, Respiratory with Gram Stain [[id number]] Collected: 01/17/23 1456    Order Status: Completed Specimen: Sputum Updated: 01/19/23 0756     Respiratory Culture Normal respiratory burak     Gram Stain (Respiratory) <10 epithelial cells per low power field.     Gram Stain (Respiratory) Rare WBC's     Gram Stain (Respiratory) Rare Gram positive cocci    MRSA Screen by PCR [[id number]] Collected: 01/18/23 2101    Order Status: Completed Specimen: Nasopharyngeal Swab from Nasal Updated: 01/18/23 2352     MRSA SCREEN BY PCR Negative    Blood culture x two cultures. Draw prior to antibiotics. [[id number]]  (Abnormal) Collected: 01/16/23 1455    Order Status: Completed Specimen: Blood from Line, PICC Right Basilic Updated: 01/18/23 0726     Blood Culture, Routine Gram stain evans bottle: Gram positive cocci in clusters resembling Staph      Results called to and read back by: Donald Guerrero RN 1E.      01/18/2023  00:19 TGC      COAGULASE-NEGATIVE STAPHYLOCOCCUS SPECIES  Organism is a probable contaminant      Narrative:      Aerobic and anaerobic    Rapid Organism ID by PCR (from Blood culture) [[id number]]  (Abnormal) Collected: 01/16/23 1455    Order Status: Completed Updated: 01/18/23 0041     Enterococcus faecials Not Detected     Enterococcus faecium Not Detected     Listeria Monocytogenes Not Detected     Staphylococcus spp. See species for ID     Comment: CORRECTED RESULT; previously reported as Not Detected on 01/18/2023   at 00:38.           Staphylococcus aureus Not Detected     Staphylococcus epidermidis Detected     Comment: CORRECTED RESULT; previously reported as Not Detected on 01/18/2023   at 00:38.          Staphylococcus lugdunensis Not Detected     Streptococcus species Not Detected     Streptococcus agalactiae Not Detected     Streptococcus pneumoniae Not Detected     Streptococcus pyogenes Not Detected     Acinetobacter calcoaceticus/baumannii complex Not Detected     Bacteroides fragilis Not Detected     Enterobacerales Not Detected     Enterobacter cloacae complex Not Detected     Escherichia Not Detected     Klebsiella aerogenes Not Detected     Klebsiella oxytoca Not Detected     Klebsiella pneumoniae group Not Detected     Proteus Not Detected     Salmonella sp Not Detected     Serratia marcescens Not Detected     Haemophilus influenzae Not Detected     Neisseria meningtidis Not Detected     Pseudomonas aeruginosa Not Detected     Stenotrophomonas maltophilia Not Detected     Candida albicans Not Detected     Candida auris Not Detected     Candida glabrata Not Detected     Comment: CORRECTED RESULT; previously reported as Detected on 01/18/2023 at   00:38.          Arti krusei Not Detected     Candida Parapsilosis Not Detected     Candida Tropicalis Not Detected     Cryptococcus neoformans/gattii Not Detected     CTX-M Gene Not Detected     Comment: CORRECTED RESULT; previously reported as Test not applicable on   01/18/2023 at 00:38.          IMP Gene Not Detected     Comment: CORRECTED RESULT; previously reported as Test not applicable on   01/18/2023 at 00:38.          KPC  Not Detected     Comment: CORRECTED RESULT; previously reported as Test not applicable on   01/18/2023 at 00:38.          mcr-1  Not Detected     Comment: CORRECTED RESULT; previously reported as Test not applicable on   01/18/2023 at 00:38.          mec A/C Detected     Comment: CORRECTED RESULT; previously reported as Test not applicable on   01/18/2023 at  00:38.          mec A/C and MREJ (MRSA) Not Detected     Comment: CORRECTED RESULT; previously reported as Test not applicable on   01/18/2023 at 00:38.          NDM Not Detected     Comment: CORRECTED RESULT; previously reported as Test not applicable on   01/18/2023 at 00:38.          OXA-48-like Not Detected     Comment: CORRECTED RESULT; previously reported as Test not applicable on   01/18/2023 at 00:38.          van A/B Not Detected     Comment: CORRECTED RESULT; previously reported as Test not applicable on   01/18/2023 at 00:38.          VIM Not Detected     Comment: CORRECTED RESULT; previously reported as Test not applicable on   01/18/2023 at 00:38.         Narrative:      Aerobic and anaerobic            Imaging Reviewed:   CXR   MRI brain 1/1    Cardiology:    IMPRESSION & PLAN     Severe hypomagnesemia, last Mg 1.4   Electrolyte abnormalities likely from ampho B    2. HIV/AIDS C3 - Cryptococcal meningitis, now controlled, s/p 6 weeks of ambisome and continues on high dose oral fluconazole   Cryptococcoma, stable   CD4 11/5 7 - VL 48,127 - repeat CD4 and VL in process    On Prezcobix/Descovy   PPD 1/14 negative    3. Cachexia with failure to thrive   On supplements     4. Pansinusitis, cannot exclude possible cause of persistent leukocytosis; trending down today 15.9  CRP 2.0    5. Syncope while working with PT, ? vagal or orthostatic    6. CoNS - Staph epidermidis 1/2 bottles, s/p PICC removal 1/20 - likely a contaminant       Recommendations:  Continue Diflucan 800 mg per day for Crypto will need at least 12 months of therapy along with ART   Bactrim DS 1 tab daily, CD4 <200 for PJP  Can complete 10 days of Augmentin, end date 1/26  Continue Prezcobix/Descovy daily   Follow CD4 count and VL  PT/OT as tolerated  LTAC vs SNF to continue PT vs home with PT     D/w Dr Ellison    Medical Decision Making during this encounter was  [_] Low Complexity  [_] Moderate Complexity  [xxx  ] High Complexity

## 2023-01-23 NOTE — PLAN OF CARE
01/23/23 0856   Discharge Reassessment   Assessment Type Discharge Planning Reassessment   Did the patient's condition or plan change since previous assessment? Yes   Discharge Plan discussed with: Parent(s)   Communicated ZULMA with patient/caregiver Yes   Discharge Plan A Long-term acute care facility (LTAC)   Discharge Plan B Skilled Nursing Facility   Why the patient remains in the hospital Placement issues;Insurance issues   Post-Acute Status   Post-Acute Authorization Placement   Post-Acute Placement Status Referrals Sent   Discharge Delays (!) Payor Issues     Case Management noted additional denials to SNF facilities via careport due to medical complexity. Per Mishel, patient accepted to [facility] and will re-submit for ltac auth d/t unable to find accepting SNF facility. CM to closely follow    1012 - CM spoke with Nancy at [facility] to notify of no accepting SNF facilities due to medical complexity. Nancy stated she will escalate to the medical director and call me back. Lucia [phone] ex. [phone]

## 2023-01-23 NOTE — CARE UPDATE
01/23/23 0854   Patient Assessment/Suction   Level of Consciousness (AVPU) alert   Respiratory Effort Normal;Unlabored   Expansion/Accessory Muscles/Retractions no use of accessory muscles;no retractions;expansion symmetric   All Lung Fields Breath Sounds clear;diminished   Rhythm/Pattern, Respiratory unlabored;pattern regular;depth regular;chest wiggle adequate;no shortness of breath reported   Cough Frequency infrequent   Cough Type good   PRE-TX-O2   Device (Oxygen Therapy) room air   SpO2 97 %   Pulse Oximetry Type Intermittent   $ Pulse Oximetry - Multiple Charge Pulse Oximetry - Multiple   Pulse 82   Resp 18   Aerosol Therapy   $ Aerosol Therapy Charges PRN treatment not required   Education   $ Education Bronchodilator;15 min   Respiratory Evaluation   $ Care Plan Tech Time 15 min

## 2023-01-23 NOTE — PLAN OF CARE
01/22/23 0910   Patient Assessment/Suction   Level of Consciousness (AVPU) alert   Respiratory Effort Unlabored   Expansion/Accessory Muscles/Retractions no use of accessory muscles   All Lung Fields Breath Sounds coarse   Rhythm/Pattern, Respiratory depth regular;pattern regular   Cough Frequency infrequent   PRE-TX-O2   Device (Oxygen Therapy) room air   SpO2 98 %   Pulse Oximetry Type Intermittent   $ Pulse Oximetry - Multiple Charge Pulse Oximetry - Multiple   Oximetry Probe Site Applied   Pulse 96   Resp 20

## 2023-01-23 NOTE — PROGRESS NOTES
[facility] Medicine  Progress Note    Patient name: El Abreu  MRN: [id number]  Admit Date: 1/16/2023   LOS: 5 days     SUBJECTIVE:     Principal problem: AIDS    Interval History:  Patient was seen and examined at bedside, denies any new symptoms, subjectively feels better, patient has may ambulated with physical therapy  improving p.o. intake, Otherwise hemodynamically stable, no acute events overnight as per nursing staff    Scheduled Meds:   amoxicillin-clavulanate 875-125mg  1 tablet Oral Q12H    cetirizine  10 mg Oral Daily    cyproheptadine  4 mg Oral BID    darunavir-cobicistat  1 tablet Oral Daily    emtricitabine-tenofovir 200-300 mg  1 tablet Oral Daily    enoxaparin  40 mg Subcutaneous Daily    EScitalopram oxalate  20 mg Oral Daily    ferrous sulfate  1 tablet Oral Daily    fluconazole  800 mg Oral Daily    fluticasone propionate  2 spray Each Nostril BID    folic acid  1 mg Oral Daily    guaiFENesin  600 mg Oral BID    insulin detemir U-100  10 Units Subcutaneous BID    levETIRAcetam  1,500 mg Oral BID    magnesium chloride  128 mg Oral Daily    magnesium sulfate IVPB  2 g Intravenous Once    megestroL  40 mg Oral Daily    metoclopramide HCl  10 mg Oral QID (WM & HS)    multivitamin  1 tablet Oral Daily    pantoprazole  40 mg Oral Before breakfast    potassium bicarbonate  50 mEq Oral TID    risperiDONE  0.5 mg Oral Daily    risperiDONE  1 mg Oral QHS    sodium bicarbonate  650 mg Oral TID    sulfamethoxazole-trimethoprim 800-160mg  1 tablet Oral Daily    thiamine  100 mg Oral Daily     Continuous Infusions:   dextrose 5 % and 0.45 % NaCl with KCl 40 mEq       PRN Meds:acetaminophen, albuterol-ipratropium, dextrose 10%, dextrose 10%, glucagon (human recombinant), glucose, glucose, HYDROcodone-acetaminophen, HYDROcodone-acetaminophen, insulin aspart U-100, magnesium sulfate IVPB, magnesium sulfate IVPB, melatonin, melatonin, naloxone, ondansetron, polyethylene glycol,  potassium bicarbonate, potassium bicarbonate, potassium bicarbonate, prochlorperazine, simethicone, sodium chloride 0.9%    Review of patient's allergies indicates:  No Known Allergies    Review of Systems: As per interval history    OBJECTIVE:     Vital Signs (Most Recent)  Temp: 98.7 °F (37.1 °C) (01/23/23 0734)  Pulse: 82 (01/23/23 0854)  Resp: 18 (01/23/23 0854)  BP: 127/76 (01/23/23 0734)  SpO2: 97 % (01/23/23 0854)    Vital Signs Range (Last 24H):  Temp:  [98 °F (36.7 °C)-98.8 °F (37.1 °C)]   Pulse:  [78-93]   Resp:  [16-18]   BP: (102-147)/(59-77)   SpO2:  [97 %-100 %]     I & O (Last 24H):  Intake/Output Summary (Last 24 hours) at 1/23/2023 0954  Last data filed at 1/23/2023 0742  Gross per 24 hour   Intake 1400 ml   Output 1750 ml   Net -350 ml         Physical Exam:  General:  Chronically ill-appearing gentleman, cachectic  Eyes: No conjunctival injection. No scleral icterus.  ENT: Hearing grossly intact. No discharge from ears. No nasal discharge.   Neck: Supple, trachea midline. No JVD  CVS: RRR. No LE edema BL  Lungs:  No tachypnea or accessory muscle use.  Clear to auscultation bilaterally  Abdomen:  Soft, nontender and nondistended.  No organomegaly  Neuro: AOx3. Moves all extremities. Follows commands. Responds appropriately       Laboratory:  I have reviewed all pertinent lab results within the past 24 hours.    Diagnostic Results:  Reviewed all imaging    ASSESSMENT/PLAN:     27-year-old very unfortunate gentleman who recently had very prolonged hospital stay due to cryptococcal meningitis requiring serial lumbar punctures, neuro syphilis and hospital stay was complicated by AC, GI bleed, malnutrition and severe deconditioning and was transition to LTAC.  He was sent to our emergency room by LTAC due to critical hypomagnesemia and anemia causing severe lethargy and unable to stand up    Active Hospital Problems    Diagnosis  POA    *AIDS [B20]  Yes    Weakness [R53.1]  Yes    Physical  deconditioning [R53.81]  Yes    Paroxysmal atrial fibrillation [I48.0]  Yes    Anemia [D64.9]  Yes    Hypokalemia [E87.6]  Yes    Hypomagnesemia [E83.42]  Yes    Seizures complicating infection [B99.9, R56.9]  Yes    Cryptococcal meningitis [B45.1]  Yes    Type 2 diabetes mellitus without retinopathy [E11.9]  Yes      Resolved Hospital Problems   No resolved problems to display.         Plan:   Very unfortunate case of AIDS with recent prolonged hospital stay for cryptococcal meningitis and neurosyphilis came with severe lethargy, anemia and critical hypomagnesemia  Anti-retroviral, antifungal and antibiotics as per Infectious Disease  One blood culture positive for coag-negative staph, PICC line was removed and sent for tissue culture  Noted discontinuation of daptomycin and increasing dose of Bactrim by Infectious Disease  Follow-up on repeat cultures  Remains on Bactrim for PJP prophylaxis and Augmentin for sinusitis  On Diflucan for recent cryptococcal meningitis  Aggressive p.o. nutrition, improve pulmonary toilet  Out of bed to chair  PT, OT  Gentle IV hydration, avoid antihypertensives, aggressive electrolyte replacement  Further management as per clinical course    VTE Risk Mitigation (From admission, onward)           Ordered     Place CARMEN hose  Until discontinued         01/18/23 1830     enoxaparin injection 40 mg  Daily         01/16/23 2101                        Department Hospital Medicine  [facility]  Maximiliano Ellison MD  Date of service: 01/23/2023

## 2023-01-23 NOTE — PT/OT/SLP PROGRESS
Occupational Therapy   Treatment    Name: El Abreu  MRN: [id number]  Admitting Diagnosis:  AIDS       Recommendations:     Discharge Recommendations: rehabilitation facility, nursing facility, skilled  Discharge Equipment Recommendations:  other (see comments) (TBD at next level)  Barriers to discharge:  None    Assessment:     El Abreu is a 27 y.o. male with a medical diagnosis of AIDS.  Pt agreeable to OT therapy session this AM. Performance deficits affecting function are weakness, impaired endurance, impaired self care skills, impaired functional mobility, gait instability, impaired balance, impaired cardiopulmonary response to activity. Pt's BP improved this date and pt with less complaints of dizziness throughout session. BP supine at start of session: 120/80; EOB: 131/69; standin/68; while standing at sink: 84/35 (started to feel slightly lightheaded); after returning to bed with HOB raised: 120/75.     Rehab Prognosis:  Good; patient would benefit from acute skilled OT services to address these deficits and reach maximum level of function.       Plan:     Patient to be seen 5 x/week to address the above listed problems via self-care/home management, therapeutic activities, therapeutic exercises  Plan of Care Expires: 23  Plan of Care Reviewed with: patient    Subjective     Pain/Comfort:  Pain Rating 1: 0/10    Objective:     Communicated with: nursing prior to session.  Patient found HOB elevated with bed alarm, peripheral IV, telemetry upon OT entry to room.    General Precautions: Standard, fall    Orthopedic Precautions:N/A  Braces: N/A  Respiratory Status: Room air     Occupational Performance:     Bed Mobility:    Patient completed Supine to Sit with minimum assistance  Patient completed Sit to Supine with minimum assistance     Functional Mobility/Transfers:  Patient completed Sit <> Stand Transfer with minimum assistance  with  rolling walker   Functional Mobility: pt amb in  room ~15 feet with RW with minimum assistance with no LOB or SOB    Activities of Daily Living:  Grooming: minimum assistance standing at sink to wash face; Pt began to feel lightheaded while standing at sink and OT assisted patient back to bed. Pt never lost consciousness and was able to talk and follow all commands while feeling lightheaded. All symptoms resolved once returned to supine with HOB raised.     Treatment & Education:  Pt educated on role of OT/POC, importance of OOB/EOB activity, use of call bell, importance of keeping HOB raised throughout the day, and safety during ADLs, transfers, and functional mobility.    Patient left HOB elevated with all lines intact, call button in reach, and bed alarm on    GOALS:   Multidisciplinary Problems       Occupational Therapy Goals          Problem: Occupational Therapy    Goal Priority Disciplines Outcome Interventions   Occupational Therapy Goal     OT, PT/OT     Description: Goals to be met by: 2/17/23     Patient will increase functional independence with ADLs by performing:    UE Dressing with Supervision.  LE Dressing with Supervision and Assistive Devices as needed.  Grooming while standing at sink with Supervision.  Toileting from bedside commode with Supervision for hygiene and clothing management.   Toilet transfer to bedside commode with Supervision.  Upper extremity exercise program x10 reps per handout, with supervision.                         Time Tracking:     OT Date of Treatment: 01/23/23  OT Start Time: 0935  OT Stop Time: 1000  OT Total Time (min): 25 min    Billable Minutes:Self Care/Home Management 10  Therapeutic Activity 15    OT/JACOB: OT          1/23/2023

## 2023-01-23 NOTE — PT/OT/SLP PROGRESS
Physical Therapy Treatment    Patient Name:  El Abreu   MRN:  [id number]    Recommendations:     Discharge Recommendations: nursing facility, skilled, rehabilitation facility  Discharge Equipment Recommendations:  (TBD at next level of care)  Barriers to discharge:  increased assist with mobility    Assessment:     El Abreu is a 27 y.o. male admitted with a medical diagnosis of AIDS.  He presents with the following impairments/functional limitations: weakness, impaired endurance, impaired self care skills, impaired functional mobility, gait instability, impaired balance, decreased upper extremity function, decreased lower extremity function, impaired cardiopulmonary response to activity.    Pt with HOB elevated and agreeable to visit. Pt's BP taken with HOB elevated at 121/76 with HR 96. Pt transferred to sitting EOB with min assist, most assist for upper body. BP taken in sitting at 120/74 with . Pt with no complaints of dizziness. Pt performed sit to stand with RW and min assist. BP taken in standing at 116/77 with . Pt with no complaints of dizziness. Pt ambulated 25' x 2 with RW and min to mod assist secondary to a couple episodes of LOB due to poor foot placement, seated rest break in between with no c/o dizziness. BP taken after first trial at 121/69 with  and after second trial at 93/51 with . Pt return to bed with HOB elevated and /87 with . Pt tolerated session well with improvements in BP. Pt reports that he has been elevating the HOB more while lying in bed.    Rehab Prognosis: Fair; patient would benefit from acute skilled PT services to address these deficits and reach maximum level of function.    Recent Surgery: * No surgery found *      Plan:     During this hospitalization, patient to be seen 6 x/week to address the identified rehab impairments via gait training, therapeutic activities, therapeutic exercises, neuromuscular re-education and progress  toward the following goals:    Plan of Care Expires:  23    Subjective     Chief Complaint: pt reports his balance is off from lying in the bed so long  Patient/Family Comments/goals: to get stronger  Pain/Comfort:  Pain Rating 1: 0/10      Objective:     Communicated with RN prior to session.  Patient found HOB elevated with bed alarm, peripheral IV, telemetry upon PT entry to room.     General Precautions: Standard, fall  Orthopedic Precautions: N/A  Braces: N/A  Respiratory Status: Room air     Functional Mobility:  Bed Mobility:     Supine to Sit: minimum assistance  Sit to Supine: contact guard assistance  Transfers:     Sit to Stand:  minimum assistance with rolling walker  Gait: 2 x 25' with RW and min-modA due to episodes of LOB from poor foot placement. No c/o dizziness.      AM-PAC 6 CLICK MOBILITY          Treatment & Education:  Pt educated on importance of time OOB, importance of intermittent mobility, safe techniques for transfers/ambulation, discharge recommendations/options, and use of call light for assistance and fall prevention.      Patient left HOB elevated with all lines intact, call button in reach, bed alarm on, and RN notified..    GOALS:   Multidisciplinary Problems       Physical Therapy Goals          Problem: Physical Therapy    Goal Priority Disciplines Outcome Goal Variances Interventions   Physical Therapy Goal     PT, PT/OT Ongoing, Progressing     Description: Goals to be met by: 23     Patient will increase functional independence with mobility by performin. Supine to sit with Supervision  2. Sit to stand transfer with Supervision  3. Bed to chair transfer with Supervision using Rolling Walker  4. Gait  x 150 feet with Supervision using Rolling Walker.                              Time Tracking:     PT Received On: 23  PT Start Time: 1151     PT Stop Time: 1209  PT Total Time (min): 18 min     Billable Minutes: Gait Training 18    Treatment Type:  Treatment  PT/PTA: PTA     PTA Visit Number: 2     01/23/2023

## 2023-01-24 LAB
ANION GAP SERPL CALC-SCNC: 8 MMOL/L (ref 8–16)
ANISOCYTOSIS BLD QL SMEAR: SLIGHT
BACTERIA CATH TIP CULT: NO GROWTH
BASOPHILS # BLD AUTO: 0 X10E3/UL (ref 0–0.2)
BASOPHILS # BLD AUTO: ABNORMAL K/UL (ref 0–0.2)
BASOPHILS NFR BLD AUTO: 0 %
BASOPHILS NFR BLD: 0 % (ref 0–1.9)
BUN SERPL-MCNC: 12 MG/DL (ref 6–20)
CALCIUM SERPL-MCNC: 9.4 MG/DL (ref 8.7–10.5)
CD3+CD4+ CELLS # BLD: 122 /UL (ref 359–1519)
CD3+CD4+ CELLS NFR BLD: 6.4 % (ref 30.8–58.5)
CHLORIDE SERPL-SCNC: 105 MMOL/L (ref 95–110)
CO2 SERPL-SCNC: 20 MMOL/L (ref 23–29)
CREAT SERPL-MCNC: 0.8 MG/DL (ref 0.5–1.4)
DIFFERENTIAL METHOD: ABNORMAL
EOSINOPHIL # BLD AUTO: 0.2 X10E3/UL (ref 0–0.4)
EOSINOPHIL # BLD AUTO: ABNORMAL K/UL (ref 0–0.5)
EOSINOPHIL NFR BLD AUTO: 1 %
EOSINOPHIL NFR BLD: 2 % (ref 0–8)
ERYTHROCYTE [DISTWIDTH] IN BLOOD BY AUTOMATED COUNT: 14 % (ref 11.6–15.4)
ERYTHROCYTE [DISTWIDTH] IN BLOOD BY AUTOMATED COUNT: 14.8 % (ref 11.5–14.5)
EST. GFR  (NO RACE VARIABLE): >60 ML/MIN/1.73 M^2
GLUCOSE SERPL-MCNC: 102 MG/DL (ref 70–110)
GLUCOSE SERPL-MCNC: 107 MG/DL (ref 70–110)
GLUCOSE SERPL-MCNC: 174 MG/DL (ref 70–110)
GLUCOSE SERPL-MCNC: 196 MG/DL (ref 70–110)
GLUCOSE SERPL-MCNC: 209 MG/DL (ref 70–110)
HCT VFR BLD AUTO: 22.2 % (ref 40–54)
HCT VFR BLD AUTO: 24.2 % (ref 37.5–51)
HGB BLD-MCNC: 7.3 G/DL (ref 14–18)
HGB BLD-MCNC: 7.7 G/DL (ref 13–17.7)
HYPOCHROMIA BLD QL SMEAR: ABNORMAL
IMM GRANULOCYTES # BLD AUTO: ABNORMAL K/UL (ref 0–0.04)
IMM GRANULOCYTES NFR BLD AUTO: ABNORMAL % (ref 0–0.5)
IMMATURE CELLS: ABNORMAL
LYMPHOCYTES # BLD AUTO: 1.9 X10E3/UL (ref 0.7–3.1)
LYMPHOCYTES # BLD AUTO: ABNORMAL K/UL (ref 1–4.8)
LYMPHOCYTES NFR BLD AUTO: 12 %
LYMPHOCYTES NFR BLD: 10 % (ref 18–48)
MAGNESIUM SERPL-MCNC: 1.6 MG/DL (ref 1.6–2.6)
MCH RBC QN AUTO: 27.4 PG (ref 27–31)
MCH RBC QN AUTO: 28.1 PG (ref 26.6–33)
MCHC RBC AUTO-ENTMCNC: 31.8 G/DL (ref 31.5–35.7)
MCHC RBC AUTO-ENTMCNC: 32.9 G/DL (ref 32–36)
MCV RBC AUTO: 84 FL (ref 82–98)
MCV RBC AUTO: 88 FL (ref 79–97)
METAMYELOCYTES NFR BLD MANUAL: 2 %
MONOCYTES # BLD AUTO: 4.1 X10E3/UL (ref 0.1–0.9)
MONOCYTES # BLD AUTO: ABNORMAL K/UL (ref 0.3–1)
MONOCYTES NFR BLD AUTO: 26 %
MONOCYTES NFR BLD: 16 % (ref 4–15)
MORPHOLOGY BLD-IMP: ABNORMAL
MYELOCYTES NFR BLD MANUAL: 4 %
NEUTROPHILS # BLD AUTO: 8.7 X10E3/UL (ref 1.4–7)
NEUTROPHILS NFR BLD AUTO: 55 %
NEUTROPHILS NFR BLD: 63 % (ref 38–73)
NEUTS BAND NFR BLD MANUAL: 2 %
NRBC BLD-RTO: 0 /100 WBC
PLATELET # BLD AUTO: 317 X10E3/UL (ref 150–450)
PLATELET # BLD AUTO: 351 K/UL (ref 150–450)
PLATELET BLD QL SMEAR: ABNORMAL
PMV BLD AUTO: 8.4 FL (ref 9.2–12.9)
POTASSIUM SERPL-SCNC: 4.5 MMOL/L (ref 3.5–5.1)
PROMYELOCYTES NFR BLD MANUAL: 1 %
RBC # BLD AUTO: 2.66 M/UL (ref 4.6–6.2)
RBC # BLD AUTO: 2.74 X10E6/UL (ref 4.14–5.8)
SODIUM SERPL-SCNC: 133 MMOL/L (ref 136–145)
TARGETS BLD QL SMEAR: ABNORMAL
WBC # BLD AUTO: 13.23 K/UL (ref 3.9–12.7)
WBC # BLD AUTO: 15.8 X10E3/UL (ref 3.4–10.8)

## 2023-01-24 PROCEDURE — 25000003 PHARM REV CODE 250: Performed by: STUDENT IN AN ORGANIZED HEALTH CARE EDUCATION/TRAINING PROGRAM

## 2023-01-24 PROCEDURE — 99900035 HC TECH TIME PER 15 MIN (STAT)

## 2023-01-24 PROCEDURE — 83735 ASSAY OF MAGNESIUM: CPT | Performed by: NURSE PRACTITIONER

## 2023-01-24 PROCEDURE — 97535 SELF CARE MNGMENT TRAINING: CPT

## 2023-01-24 PROCEDURE — 80048 BASIC METABOLIC PNL TOTAL CA: CPT | Performed by: NURSE PRACTITIONER

## 2023-01-24 PROCEDURE — 36415 COLL VENOUS BLD VENIPUNCTURE: CPT | Performed by: NURSE PRACTITIONER

## 2023-01-24 PROCEDURE — 63600175 PHARM REV CODE 636 W HCPCS: Performed by: NURSE PRACTITIONER

## 2023-01-24 PROCEDURE — 25000003 PHARM REV CODE 250: Performed by: HOSPITALIST

## 2023-01-24 PROCEDURE — 97530 THERAPEUTIC ACTIVITIES: CPT

## 2023-01-24 PROCEDURE — 94761 N-INVAS EAR/PLS OXIMETRY MLT: CPT

## 2023-01-24 PROCEDURE — 25000003 PHARM REV CODE 250: Performed by: NURSE PRACTITIONER

## 2023-01-24 PROCEDURE — 99233 PR SUBSEQUENT HOSPITAL CARE,LEVL III: ICD-10-PCS | Mod: ,,, | Performed by: STUDENT IN AN ORGANIZED HEALTH CARE EDUCATION/TRAINING PROGRAM

## 2023-01-24 PROCEDURE — 63700000 PHARM REV CODE 250 ALT 637 W/O HCPCS: Performed by: NURSE PRACTITIONER

## 2023-01-24 PROCEDURE — 85007 BL SMEAR W/DIFF WBC COUNT: CPT | Performed by: NURSE PRACTITIONER

## 2023-01-24 PROCEDURE — 63700000 PHARM REV CODE 250 ALT 637 W/O HCPCS: Performed by: INTERNAL MEDICINE

## 2023-01-24 PROCEDURE — 85027 COMPLETE CBC AUTOMATED: CPT | Performed by: NURSE PRACTITIONER

## 2023-01-24 PROCEDURE — 25000003 PHARM REV CODE 250: Performed by: INTERNAL MEDICINE

## 2023-01-24 PROCEDURE — 63600175 PHARM REV CODE 636 W HCPCS: Performed by: HOSPITALIST

## 2023-01-24 PROCEDURE — 99900031 HC PATIENT EDUCATION (STAT)

## 2023-01-24 PROCEDURE — 99233 SBSQ HOSP IP/OBS HIGH 50: CPT | Mod: ,,, | Performed by: STUDENT IN AN ORGANIZED HEALTH CARE EDUCATION/TRAINING PROGRAM

## 2023-01-24 PROCEDURE — 12000002 HC ACUTE/MED SURGE SEMI-PRIVATE ROOM

## 2023-01-24 RX ORDER — MAGNESIUM SULFATE HEPTAHYDRATE 40 MG/ML
2 INJECTION, SOLUTION INTRAVENOUS ONCE
Status: COMPLETED | OUTPATIENT
Start: 2023-01-24 | End: 2023-01-24

## 2023-01-24 RX ADMIN — METOCLOPRAMIDE 10 MG: 10 TABLET ORAL at 04:01

## 2023-01-24 RX ADMIN — AMOXICILLIN AND CLAVULANATE POTASSIUM 1 TABLET: 875; 125 TABLET, FILM COATED ORAL at 09:01

## 2023-01-24 RX ADMIN — FLUCONAZOLE 800 MG: 100 TABLET ORAL at 09:01

## 2023-01-24 RX ADMIN — LEVETIRACETAM 1500 MG: 500 TABLET, FILM COATED ORAL at 08:01

## 2023-01-24 RX ADMIN — SULFAMETHOXAZOLE AND TRIMETHOPRIM 1 TABLET: 800; 160 TABLET ORAL at 09:01

## 2023-01-24 RX ADMIN — AMOXICILLIN AND CLAVULANATE POTASSIUM 1 TABLET: 875; 125 TABLET, FILM COATED ORAL at 08:01

## 2023-01-24 RX ADMIN — LEVETIRACETAM 1500 MG: 500 TABLET, FILM COATED ORAL at 09:01

## 2023-01-24 RX ADMIN — EMTRICITABINE AND TENOFOVIR DISOPROXIL FUMARATE 1 TABLET: 200; 300 TABLET, FILM COATED ORAL at 09:01

## 2023-01-24 RX ADMIN — FERROUS SULFATE TAB 325 MG (65 MG ELEMENTAL FE) 1 EACH: 325 (65 FE) TAB at 09:01

## 2023-01-24 RX ADMIN — MAGNESIUM 64 MG (MAGNESIUM CHLORIDE) TABLET,DELAYED RELEASE 128 MG: at 09:01

## 2023-01-24 RX ADMIN — RISPERIDONE 1 MG: 1 TABLET ORAL at 08:01

## 2023-01-24 RX ADMIN — INSULIN DETEMIR 10 UNITS: 100 INJECTION, SOLUTION SUBCUTANEOUS at 09:01

## 2023-01-24 RX ADMIN — CETIRIZINE HYDROCHLORIDE 10 MG: 10 TABLET, FILM COATED ORAL at 09:01

## 2023-01-24 RX ADMIN — THERA TABS 1 TABLET: TAB at 09:01

## 2023-01-24 RX ADMIN — CYPROHEPTADINE HYDROCHLORIDE 4 MG: 4 TABLET ORAL at 09:01

## 2023-01-24 RX ADMIN — FOLIC ACID 1 MG: 1 TABLET ORAL at 09:01

## 2023-01-24 RX ADMIN — MEGESTROL ACETATE 40 MG: 20 TABLET ORAL at 09:01

## 2023-01-24 RX ADMIN — METOCLOPRAMIDE 10 MG: 10 TABLET ORAL at 11:01

## 2023-01-24 RX ADMIN — ENOXAPARIN SODIUM 40 MG: 100 INJECTION SUBCUTANEOUS at 04:01

## 2023-01-24 RX ADMIN — METOCLOPRAMIDE 10 MG: 10 TABLET ORAL at 09:01

## 2023-01-24 RX ADMIN — RISPERIDONE 0.5 MG: 0.25 TABLET ORAL at 09:01

## 2023-01-24 RX ADMIN — GUAIFENESIN 600 MG: 600 TABLET, EXTENDED RELEASE ORAL at 08:01

## 2023-01-24 RX ADMIN — SODIUM BICARBONATE 650 MG TABLET 650 MG: at 08:01

## 2023-01-24 RX ADMIN — METOCLOPRAMIDE 10 MG: 10 TABLET ORAL at 08:01

## 2023-01-24 RX ADMIN — CYPROHEPTADINE HYDROCHLORIDE 4 MG: 4 TABLET ORAL at 08:01

## 2023-01-24 RX ADMIN — INSULIN DETEMIR 10 UNITS: 100 INJECTION, SOLUTION SUBCUTANEOUS at 08:01

## 2023-01-24 RX ADMIN — FLUTICASONE PROPIONATE 100 MCG: 50 SPRAY, METERED NASAL at 08:01

## 2023-01-24 RX ADMIN — ESCITALOPRAM OXALATE 20 MG: 10 TABLET ORAL at 09:01

## 2023-01-24 RX ADMIN — FLUTICASONE PROPIONATE 100 MCG: 50 SPRAY, METERED NASAL at 09:01

## 2023-01-24 RX ADMIN — MAGNESIUM SULFATE HEPTAHYDRATE 2 G: 40 INJECTION, SOLUTION INTRAVENOUS at 10:01

## 2023-01-24 RX ADMIN — DARUNAVIR ETHANOLATE AND COBICISTAT 1 TABLET: 800; 150 TABLET, FILM COATED ORAL at 09:01

## 2023-01-24 RX ADMIN — SODIUM BICARBONATE 650 MG TABLET 650 MG: at 04:01

## 2023-01-24 RX ADMIN — SODIUM BICARBONATE 650 MG TABLET 650 MG: at 09:01

## 2023-01-24 RX ADMIN — DEXTROSE, SODIUM CHLORIDE, AND POTASSIUM CHLORIDE: 5; .45; .3 INJECTION INTRAVENOUS at 10:01

## 2023-01-24 RX ADMIN — THIAMINE HCL TAB 100 MG 100 MG: 100 TAB at 09:01

## 2023-01-24 RX ADMIN — GUAIFENESIN 600 MG: 600 TABLET, EXTENDED RELEASE ORAL at 09:01

## 2023-01-24 NOTE — PT/OT/SLP PROGRESS
Physical Therapy      Patient Name:  El Abreu   MRN:  [id number]    Patient not seen today secondary to Other (Comment) (Pt off floor with extender x 1 attempt and pt with visitors x 2 attempts.). Will follow-up 1/25/23.

## 2023-01-24 NOTE — PLAN OF CARE
Problem: Oral Intake Inadequate  Goal: Improved Oral Intake  Outcome: Met  Intervention: Promote and Optimize Oral Intake  Flowsheets (Taken 1/24/2023 1441)  Oral Nutrition Promotion:   calorie-dense liquids provided   calorie-dense foods provided     Problem: Skin Injury Risk Increased  Goal: Skin Health and Integrity  Intervention: Promote and Optimize Oral Intake  Flowsheets (Taken 1/24/2023 1441)  Oral Nutrition Promotion:   calorie-dense liquids provided   calorie-dense foods provided   Patient eating % EEN and EPN with meals and supplements.

## 2023-01-24 NOTE — PROGRESS NOTES
Progress Note  Infectious Disease    Reason for Consult:  leukocytosis    HPI: El Abreu is a 27 y.o. 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  he was last admitted to [facility] November 30th with vomiting and headache, increased intracranial pressure.  This LP had a positive Sirisha ink opening pressure was 23.  He was then committed to a 6 week course of AmBisome completed 1/10, which was interrupted with AC but resumed and he was discharged to [facility] on January 10th.  He did require an additional LP for fluid removal and reduction of pressure on 12/13 (Sirisha ink was still positive but the culture was negative.  The last positive culture was 11/21).  He has been continued on fluconazole 800 mg daily since 01/11 and has been receiving Pneumocystis prophylaxis and appetite stimulants and antidepressants.  MRI brain on 01/01 showed stability of the right globus pallidus cryptococcal pseudocyst.  He was additionally treated for neurosyphilis 3 doses of Bicillin after an appropriate course of IV penicillin G.  He has not been eating well, withdrawn, seen by Psychiatry late December and started on risperidone.  He was seen in the [facility] [place] emergency room 1/14 when his mandible became stuck open.  This was reduced in their emergency room he was sent back to the [facility].  He was sent to our emergency room yesterday for severe hypomagnesemia, 0.8 While in the emergency room he was noted to have a rising white blood cell count, from normal on 01/08 to 14 on 01/09 to 17 on 01/16 additionally his hemoglobin was 7.3.  An infectious workup was commenced and CRP was 2.0, lactic acid 0.9, procalcitonin 0.15.  Chest x-ray was negative, blood cultures are negative, respiratory PCR is not yet been  collected and he is not produce any sputum but this is not likely relevant..vanc and cefepime were begun.   Unfortunately our hospital is full and he has been waiting in the hallway outside the emergency room.  He is incontinent of urine, and urinalysis was negative.  Magnesium is now 1.7    He feels much improved, tells me that he ate breakfast and lunch. He does endorse some small amount of yellow sputum and he does find that his sinuses are congested. It is relevant to note that he had pansinusitis on MRI brain 1/1.     1/18: interim reviewed. He is eating well. He had a syncopal episode while working with PT. He states that this has happened before, but does not happen consistently when he gets up. He is still congested but not really expectorating. Sputum sample was not purulent(saliva) and culture has only normal burak. He is not receiving his HIV antivirals here(not on formulary) though these were available at [facility]. Repeated CXR and it is still clear.   1/20:  interim reviewed. WBC a little higher , peripheral smear noted but unsure of the relevance of the findings.  He is sleeping deeply, worked with physical therapy much more successfully today he is eating reasonably well and antivirals were resumed.  He endorses that his cough is improved.  Denies diarrhea. Blood culture from 1/16, has coag neg Staph in 1/4 bottles drawn from picc line. The picc line is 47 days old, placed 12/4.      1/21-22 (Deep): interim reviewed, discussed with Dr Ny, patient seen and examined at bedside.  States he vomited earlier this morning, he was feeling very nauseous.  He denies headaches, no fever or chills.  Slightly hypertensive, afebrile.  Adequate urinary output, 1 episode of stool recorded in the last 24 hours.  Labs reviewed, persistent leukocytosis 20.9, lymphocytic predominance 10%, no left shift, bands 10%, H&H 7.5/23.5, platelet count 248.  Stable kidney function, magnesium 1.2.  Micro reviewed, blood cultures  from 01/20 no growth to date, pending final.  Tip culture from PICC which was removed 1/20 no growth.  Respiratory viral panel not detected.    1/23:  Interim reviewed, patient seen examined at bedside, states he is feeling a little stronger today, was able to walk around with physical therapy, states he had 2-3/10 frontal headache this morning that is now improved.  Hemodynamically stable, afebrile.  Labs reviewed, leukocytosis down to 15.9, lymphocytic predominance 12%, bands 2%, H&H 7.5/32.4, platelet count 312.  Magnesium 1.4, stable potassium, and kidney function.  CD4 count and viral load in process.    1/24:  Interim reviewed, patient seen examined at bedside, states he is feeling much better today, walk with PT, encouraged to sit in the chair.  Hemodynamically stable, afebrile.  White count down to 13.2, no left shift, H&H 7.3/22.2, platelet count 351, magnesium 1.6.  CD4 122, viral load still in process.    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  :  Voids , no flank tenderness  Musc:  Joints without effusion, swelling, erythema, synovitis, with mild generalized muscle wasting.   Skin:  No rashes. Dry skin. Poor turgor  Neuro:              Arousable, speech fluent, face symmetric, moves all extremities, no focal weakness.   Psych:  Calm, cooperative  Lymphatic:        Extrem: No edema, erythema, phlebitis, cellulitis, warm and well perfused  VAD:  Peripheral IV  Isolation:  none  Wound: none      General Labs reviewed:  Recent Labs   Lab 01/22/23 0523 01/23/23  0430 01/24/23  0545   WBC 20.91* 15.8*  15.90* 13.23*   HGB 7.5* 7.7*  7.5* 7.3*   HCT 23.5* 24.2*  22.4* 22.2*    317  312 351       Recent Labs   Lab 01/22/23  0523 01/23/23  0430 01/24/23  0545   * 138 133*   K 3.5 3.5 4.5    112* 105   CO2 18* 19* 20*   BUN 10 10 12   CREATININE 0.8 0.6 0.8   CALCIUM 8.9 9.3 9.4     No results for input(s): CRP in the last 168 hours.    Estimated Creatinine Clearance: 140.3 mL/min (based on SCr of 0.8 mg/dL).    Micro:  Microbiology Results (last 7 days)       Procedure Component Value Units Date/Time    IV catheter culture [[id number]] Collected: 01/20/23 1920    Order Status: Completed Specimen: Catheter Tip, PICC Updated: 01/24/23 0703     Aerobic Culture - Cath tip No growth    Blood culture [[id number]] Collected: 01/20/23 2051    Order Status: Completed Specimen: Blood from Antecubital, Left Arm Updated: 01/23/23 2232     Blood Culture, Routine No Growth to date      No Growth to date      No Growth to date      No Growth to date    Narrative:      Drawn from PICC line    Respiratory Infection Panel (PCR), Nasopharyngeal [[id number]] Collected: 01/20/23 2031    Order Status: Completed Specimen: Nasopharyngeal Swab Updated: 01/20/23 2242     Respiratory Infection Panel Source NP swab     Adenovirus Not Detected     Coronavirus 229E, Common Cold Virus Not Detected     Coronavirus HKU1, Common Cold Virus Not Detected     Coronavirus NL63, Common Cold Virus Not Detected     Coronavirus OC43, Common Cold Virus Not Detected     Comment: The Coronavirus strains detected in this test cause the common cold.  These strains are  not the COVID-19 (novel Coronavirus)strain   associated with the respiratory disease outbreak.          SARS-CoV2 (COVID-19) Qualitative PCR Not Detected     Human Metapneumovirus Not Detected     Human Rhinovirus/Enterovirus Not Detected     Influenza A (subtypes H1, H1-2009,H3) Not Detected     Influenza B Not Detected     Parainfluenza Virus 1 Not Detected     Parainfluenza Virus 2 Not Detected     Parainfluenza Virus 3 Not Detected     Parainfluenza Virus 4 Not Detected     Respiratory Syncytial Virus Not Detected     Bordetella Parapertussis (TT0020) Not Detected     Bordetella pertussis (ptxP) Not Detected     Chlamydia pneumoniae Not Detected     Mycoplasma pneumoniae Not Detected     Comment: Respiratory Infection Panel testing performed by Multiplex PCR.       Narrative:      Respiratory Infection Panel source->NP Swab    Culture, Respiratory with Gram Stain [[id number]] Collected: 01/17/23 1456    Order Status: Completed Specimen: Sputum Updated: 01/19/23 0756     Respiratory Culture Normal respiratory burak     Gram Stain (Respiratory) <10 epithelial cells per low power field.     Gram Stain (Respiratory) Rare WBC's     Gram Stain (Respiratory) Rare Gram positive cocci    MRSA Screen by PCR [[id number]] Collected: 01/18/23 2101    Order Status: Completed Specimen: Nasopharyngeal Swab from Nasal Updated: 01/18/23 2352     MRSA SCREEN BY PCR Negative    Blood culture x two cultures. Draw prior to antibiotics. [[id number]]  (Abnormal) Collected: 01/16/23 1455    Order Status: Completed Specimen: Blood from Line, PICC Right Basilic Updated: 01/18/23 0726     Blood Culture, Routine Gram stain evans bottle: Gram positive cocci in clusters resembling Staph      Results called to and read back by: Donald Guerrero RN 1E.      01/18/2023  00:19 TGC      COAGULASE-NEGATIVE STAPHYLOCOCCUS SPECIES  Organism is a probable contaminant      Narrative:      Aerobic and anaerobic    Rapid Organism ID by PCR (from Blood  culture) [[id number]]  (Abnormal) Collected: 01/16/23 1455    Order Status: Completed Updated: 01/18/23 0041     Enterococcus faecials Not Detected     Enterococcus faecium Not Detected     Listeria Monocytogenes Not Detected     Staphylococcus spp. See species for ID     Comment: CORRECTED RESULT; previously reported as Not Detected on 01/18/2023   at 00:38.          Staphylococcus aureus Not Detected     Staphylococcus epidermidis Detected     Comment: CORRECTED RESULT; previously reported as Not Detected on 01/18/2023   at 00:38.          Staphylococcus lugdunensis Not Detected     Streptococcus species Not Detected     Streptococcus agalactiae Not Detected     Streptococcus pneumoniae Not Detected     Streptococcus pyogenes Not Detected     Acinetobacter calcoaceticus/baumannii complex Not Detected     Bacteroides fragilis Not Detected     Enterobacerales Not Detected     Enterobacter cloacae complex Not Detected     Escherichia Not Detected     Klebsiella aerogenes Not Detected     Klebsiella oxytoca Not Detected     Klebsiella pneumoniae group Not Detected     Proteus Not Detected     Salmonella sp Not Detected     Serratia marcescens Not Detected     Haemophilus influenzae Not Detected     Neisseria meningtidis Not Detected     Pseudomonas aeruginosa Not Detected     Stenotrophomonas maltophilia Not Detected     Candida albicans Not Detected     Candida auris Not Detected     Candida glabrata Not Detected     Comment: CORRECTED RESULT; previously reported as Detected on 01/18/2023 at   00:38.          Arti krusei Not Detected     Candida Parapsilosis Not Detected     Candida Tropicalis Not Detected     Cryptococcus neoformans/gattii Not Detected     CTX-M Gene Not Detected     Comment: CORRECTED RESULT; previously reported as Test not applicable on   01/18/2023 at 00:38.          IMP Gene Not Detected     Comment: CORRECTED RESULT; previously reported as Test not applicable on   01/18/2023 at 00:38.           KPC  Not Detected     Comment: CORRECTED RESULT; previously reported as Test not applicable on   01/18/2023 at 00:38.          mcr-1  Not Detected     Comment: CORRECTED RESULT; previously reported as Test not applicable on   01/18/2023 at 00:38.          mec A/C Detected     Comment: CORRECTED RESULT; previously reported as Test not applicable on   01/18/2023 at 00:38.          mec A/C and MREJ (MRSA) Not Detected     Comment: CORRECTED RESULT; previously reported as Test not applicable on   01/18/2023 at 00:38.          NDM Not Detected     Comment: CORRECTED RESULT; previously reported as Test not applicable on   01/18/2023 at 00:38.          OXA-48-like Not Detected     Comment: CORRECTED RESULT; previously reported as Test not applicable on   01/18/2023 at 00:38.          van A/B Not Detected     Comment: CORRECTED RESULT; previously reported as Test not applicable on   01/18/2023 at 00:38.          VIM Not Detected     Comment: CORRECTED RESULT; previously reported as Test not applicable on   01/18/2023 at 00:38.         Narrative:      Aerobic and anaerobic            Imaging Reviewed:   CXR   MRI brain 1/1    Cardiology:    IMPRESSION & PLAN     Severe hypomagnesemia, last Mg 1.6   Electrolyte abnormalities likely from ampho B    2. HIV/AIDS C3 - Cryptococcal meningitis, now controlled, s/p 6 weeks of ambisome and continues on high dose oral fluconazole   Cryptococcoma, stable   CD4 11/5 7 - VL 48,127 - repeat CD4 127 and VL still in process    On Prezcobix/Descovy   PPD 1/14 negative    3. Cachexia with failure to thrive   On supplements     4. Pansinusitis, cannot exclude possible cause of persistent leukocytosis; trending down today 13.2  CRP 2.0    5. CoNS - Staph epidermidis 1/2 bottles, s/p PICC removal 1/20 - likely a contaminant       Recommendations:  Continue Diflucan 800 mg per day for Crypto will need at least 12 months of therapy along with ART   Bactrim DS 1 tab daily, CD4 <200 for PJP and  Toxoplasma  Can complete 10 days of Augmentin, end date 1/26  Continue Prezcobix/Descovy daily   Follow VL  PT/OT as tolerated  [facility] vs SNF to continue PT vs home with PT     D/w Dr Ellison    Medical Decision Making during this encounter was  [_] Low Complexity  [_] Moderate Complexity  [xxx  ] High Complexity

## 2023-01-24 NOTE — PT/OT/SLP PROGRESS
Occupational Therapy   Treatment    Name: El Abreu  MRN: [id number]  Admitting Diagnosis:  AIDS       Recommendations:     Discharge Recommendations: rehabilitation facility, nursing facility, skilled  Discharge Equipment Recommendations:  bedside commode  Barriers to discharge:  None    Assessment:     El Abreu is a 27 y.o. male with a medical diagnosis of AIDS.  Pt agreeable to OT therapy session this AM. Performance deficits affecting function are weakness, impaired endurance, impaired self care skills, impaired functional mobility, gait instability, impaired balance, impaired cardiopulmonary response to activity. Pt with improvement of BP with activity, but pt with elevated HR. RN approved pt to sit up in chair. Pt's BP supine at start of session: 131/77; BP EOB: 120/78; BP and HR in standin/62; 150 HR; BP and HR after t/f to chair: 138/59; 133 HR; BP and HR after ~10 minutes seated in chair: 117/71; 132 HR. Pt reported no dizziness throughout session.    Rehab Prognosis:  Good; patient would benefit from acute skilled OT services to address these deficits and reach maximum level of function.       Plan:     Patient to be seen 5 x/week to address the above listed problems via self-care/home management, therapeutic activities, therapeutic exercises  Plan of Care Expires: 23  Plan of Care Reviewed with: patient    Subjective     Pain/Comfort:  Pain Rating 1: 0/10    Objective:     Communicated with: nursing prior to session.  Patient found HOB elevated with bed alarm, peripheral IV, telemetry upon OT entry to room.    General Precautions: Standard, fall    Orthopedic Precautions:N/A  Braces: N/A  Respiratory Status: Room air     Occupational Performance:     Bed Mobility:    Patient completed Supine to Sit with contact guard assistance     Functional Mobility/Transfers:  Patient completed Sit <> Stand Transfer with minimum assistance  with  rolling walker   Patient completed Bed <> Chair  Transfer using Step Transfer technique with minimum assistance with rolling walker    Activities of Daily Living:  Grooming: setup assistance seated in chair to brush teeth and to wash face    Upper Body Dressing: minimum assistance to doff soiled gown and don clean gown due to lines  Toileting: maximal assistance to doff soiled brief while pt was standing with BUE support on RW for balance    Treatment & Education:  Pt educated on role of OT/POC, importance of OOB/EOB activity, use of call bell, and safety during ADLs, transfers, and functional mobility.    Patient left up in chair with all lines intact, call button in reach, and RN notified    GOALS:   Multidisciplinary Problems       Occupational Therapy Goals          Problem: Occupational Therapy    Goal Priority Disciplines Outcome Interventions   Occupational Therapy Goal     OT, PT/OT Ongoing, Progressing    Description: Goals to be met by: 2/17/23     Patient will increase functional independence with ADLs by performing:    UE Dressing with Supervision.  LE Dressing with Supervision and Assistive Devices as needed.  Grooming while standing at sink with Supervision.  Toileting from bedside commode with Supervision for hygiene and clothing management.   Toilet transfer to bedside commode with Supervision.  Upper extremity exercise program x10 reps per handout, with supervision.                         Time Tracking:     OT Date of Treatment: 01/24/23  OT Start Time: 0923  OT Stop Time: 0955  OT Total Time (min): 32 min    Billable Minutes:Self Care/Home Management 32    OT/JACOB: OT          1/24/2023

## 2023-01-24 NOTE — PROGRESS NOTES
[facility] Medicine  Progress Note    Patient name: El Abreu  MRN: [id number]  Admit Date: 1/16/2023   LOS: 6 days     SUBJECTIVE:     Principal problem: AIDS    Interval History:  Patient was seen and examined at bedside, denies any new symptoms, subjectively feels better, patient has may ambulated with physical therapy  improving p.o. intake, Otherwise hemodynamically stable, no acute events overnight as per nursing staff    Scheduled Meds:   amoxicillin-clavulanate 875-125mg  1 tablet Oral Q12H    cetirizine  10 mg Oral Daily    cyproheptadine  4 mg Oral BID    darunavir-cobicistat  1 tablet Oral Daily    emtricitabine-tenofovir 200-300 mg  1 tablet Oral Daily    enoxaparin  40 mg Subcutaneous Daily    EScitalopram oxalate  20 mg Oral Daily    ferrous sulfate  1 tablet Oral Daily    fluconazole  800 mg Oral Daily    fluticasone propionate  2 spray Each Nostril BID    folic acid  1 mg Oral Daily    guaiFENesin  600 mg Oral BID    insulin detemir U-100  10 Units Subcutaneous BID    levETIRAcetam  1,500 mg Oral BID    magnesium chloride  128 mg Oral Daily    megestroL  40 mg Oral Daily    metoclopramide HCl  10 mg Oral QID (WM & HS)    multivitamin  1 tablet Oral Daily    pantoprazole  40 mg Oral Before breakfast    risperiDONE  0.5 mg Oral Daily    risperiDONE  1 mg Oral QHS    sodium bicarbonate  650 mg Oral TID    sulfamethoxazole-trimethoprim 800-160mg  1 tablet Oral Daily    thiamine  100 mg Oral Daily     Continuous Infusions:   dextrose 5 % and 0.45 % NaCl with KCl 40 mEq 100 mL/hr at 01/23/23 2112     PRN Meds:acetaminophen, albuterol-ipratropium, dextrose 10%, dextrose 10%, glucagon (human recombinant), glucose, glucose, HYDROcodone-acetaminophen, HYDROcodone-acetaminophen, insulin aspart U-100, magnesium sulfate IVPB, magnesium sulfate IVPB, melatonin, melatonin, naloxone, ondansetron, polyethylene glycol, potassium bicarbonate, potassium bicarbonate, potassium bicarbonate,  prochlorperazine, simethicone, sodium chloride 0.9%    Review of patient's allergies indicates:  No Known Allergies    Review of Systems: As per interval history    OBJECTIVE:     Vital Signs (Most Recent)  Temp: 98.5 °F (36.9 °C) (01/24/23 0730)  Pulse: 80 (01/24/23 0838)  Resp: 17 (01/24/23 0838)  BP: 117/75 (01/24/23 0730)  SpO2: 99 % (01/24/23 0838)    Vital Signs Range (Last 24H):  Temp:  [36.8 °F (2.7 °C)-99.2 °F (37.3 °C)]   Pulse:  [71-96]   Resp:  [16-18]   BP: ()/(56-80)   SpO2:  [96 %-100 %]     I & O (Last 24H):  Intake/Output Summary (Last 24 hours) at 1/24/2023 0900  Last data filed at 1/24/2023 0539  Gross per 24 hour   Intake 2600 ml   Output 1900 ml   Net 700 ml         Physical Exam:  General:  Chronically ill-appearing gentleman, cachectic  Eyes: No conjunctival injection. No scleral icterus.  ENT: Hearing grossly intact. No discharge from ears. No nasal discharge.   Neck: Supple, trachea midline. No JVD  CVS: RRR. No LE edema BL  Lungs:  No tachypnea or accessory muscle use.  Clear to auscultation bilaterally  Abdomen:  Soft, nontender and nondistended.  No organomegaly  Neuro: AOx3. Moves all extremities. Follows commands. Responds appropriately       Laboratory:  I have reviewed all pertinent lab results within the past 24 hours.    Diagnostic Results:  Reviewed all imaging    ASSESSMENT/PLAN:     27-year-old very unfortunate gentleman who recently had very prolonged hospital stay due to cryptococcal meningitis requiring serial lumbar punctures, neuro syphilis and hospital stay was complicated by AC, GI bleed, malnutrition and severe deconditioning and was transition to LTAC.  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         Plan:   Very unfortunate case of AIDS with recent prolonged hospital stay for cryptococcal meningitis and neurosyphilis came with severe lethargy, anemia and critical hypomagnesemia  Anti-retroviral, antifungal and antibiotics as per Infectious Disease  One blood culture positive for coag-negative staph, PICC line was removed and sent for tissue culture  Noted discontinuation of daptomycin and increasing dose of Bactrim by Infectious Disease  Follow-up on repeat cultures  Remains on Bactrim for PJP prophylaxis and Augmentin for sinusitis  On Diflucan for recent cryptococcal meningitis  Aggressive p.o. nutrition, improve pulmonary toilet  Out of bed to chair  PT, OT  Gentle IV hydration, avoid antihypertensives, aggressive electrolyte replacement  Frequent orthostatic vital signs  Further management as per clinical course  Patient is improving significantly in last couple days, has better p.o. intake, instructed him to spend more time in the chair    VTE Risk Mitigation (From admission, onward)           Ordered     Place CARMEN hose  Until discontinued         01/18/23 1830     enoxaparin injection 40 mg  Daily         01/16/23 2628                        Department Hospital Medicine  [facility]  Maximiliano Ellison MD  Date of service: 01/24/2023

## 2023-01-24 NOTE — CARE UPDATE
01/24/23 0838   Patient Assessment/Suction   Level of Consciousness (AVPU) alert   Respiratory Effort Unlabored;Normal   Expansion/Accessory Muscles/Retractions no use of accessory muscles;no retractions;expansion symmetric   All Lung Fields Breath Sounds clear   Rhythm/Pattern, Respiratory unlabored;pattern regular;depth regular;chest wiggle adequate;no shortness of breath reported   Cough Frequency infrequent   Cough Type good   PRE-TX-O2   Device (Oxygen Therapy) room air   SpO2 99 %   Pulse Oximetry Type Intermittent   $ Pulse Oximetry - Multiple Charge Pulse Oximetry - Multiple   Pulse 80   Resp 17   Aerosol Therapy   $ Aerosol Therapy Charges PRN treatment not required   Education   $ Education Bronchodilator;15 min   Respiratory Evaluation   $ Care Plan Tech Time 15 min

## 2023-01-24 NOTE — PLAN OF CARE
Problem: Occupational Therapy  Goal: Occupational Therapy Goal  Description: Goals to be met by: 2/17/23     Patient will increase functional independence with ADLs by performing:    UE Dressing with Supervision.  LE Dressing with Supervision and Assistive Devices as needed.  Grooming while standing at sink with Supervision.  Toileting from bedside commode with Supervision for hygiene and clothing management.   Toilet transfer to bedside commode with Supervision.  Upper extremity exercise program x10 reps per handout, with supervision.    Outcome: Ongoing, Progressing

## 2023-01-24 NOTE — PLAN OF CARE
Per liaison Blanca, patient is accepted for Home Health services with [facility] with a start of care date 1/26/23.       01/24/23 1549   Post-Acute Status   Post-Acute Authorization Home Health   Home Health Status Set-up Complete/Auth obtained   Discharge Delays None known at this time

## 2023-01-24 NOTE — PLAN OF CARE
Spoke with patient at bedside concerning Home Health services, to which patient verbalizes understanding and agreement.  Patient choice form signed and scanned into .  [facility]       01/24/23 1510   Post-Acute Status   Post-Acute Authorization Home [facility]   Home Health Status Referrals Sent   Hospital Resources/Appts/Education Provided Appointments scheduled and added to AVS   Discharge Delays None known at this time   Discharge Plan   Discharge Plan A Home Health   Discharge Plan B Home Health

## 2023-01-24 NOTE — PLAN OF CARE
01/24/23 1051   Discharge Reassessment   Assessment Type Discharge Planning Reassessment   Did the patient's condition or plan change since previous assessment? Yes   Discharge Plan discussed with: Patient   Communicated ZULMA with patient/caregiver Yes   Discharge Plan A Home with family;Home Health   Discharge Plan B Rehab;Skilled Nursing Facility   DME Needed Upon Discharge  none   Why the patient remains in the hospital Placement issues;Requires continued medical care   Post-Acute Status   Post-Acute Authorization Home Health   Patient choice form signed by patient/caregiver List with quality metrics by geographic area provided   Discharge Delays (!) Change in Medical Condition     Case Management notified by Dr. Ellison of patient's significant clinical improvement since admission. Robert Ellison anticipating discharge on tomorrow with home health if patient continues to improve. CIARAN requested home health orders from MD. CM notified Niharika , of above. SW to arrange home health for discharge. CM following for additional needs.

## 2023-01-25 LAB
ANION GAP SERPL CALC-SCNC: 9 MMOL/L (ref 8–16)
BACTERIA BLD CULT: NORMAL
BASOPHILS NFR BLD: 1 % (ref 0–1.9)
BUN SERPL-MCNC: 13 MG/DL (ref 6–20)
CALCIUM SERPL-MCNC: 9.2 MG/DL (ref 8.7–10.5)
CHLORIDE SERPL-SCNC: 106 MMOL/L (ref 95–110)
CO2 SERPL-SCNC: 19 MMOL/L (ref 23–29)
CREAT SERPL-MCNC: 0.7 MG/DL (ref 0.5–1.4)
DIFFERENTIAL METHOD: ABNORMAL
EOSINOPHIL NFR BLD: 5 % (ref 0–8)
ERYTHROCYTE [DISTWIDTH] IN BLOOD BY AUTOMATED COUNT: 14.7 % (ref 11.5–14.5)
EST. GFR  (NO RACE VARIABLE): >60 ML/MIN/1.73 M^2
GLUCOSE SERPL-MCNC: 123 MG/DL (ref 70–110)
GLUCOSE SERPL-MCNC: 175 MG/DL (ref 70–110)
GLUCOSE SERPL-MCNC: 183 MG/DL (ref 70–110)
GLUCOSE SERPL-MCNC: 199 MG/DL (ref 70–110)
HCT VFR BLD AUTO: 23.8 % (ref 40–54)
HGB BLD-MCNC: 7.6 G/DL (ref 14–18)
HIV1 RNA # SERPL NAA+PROBE: 20 COPIES/ML
HIV1 RNA SERPL NAA+PROBE-LOG#: 1.3 LOG10COPY/ML
IMM GRANULOCYTES # BLD AUTO: ABNORMAL K/UL (ref 0–0.04)
IMM GRANULOCYTES NFR BLD AUTO: ABNORMAL % (ref 0–0.5)
LYMPHOCYTES NFR BLD: 10 % (ref 18–48)
MAGNESIUM SERPL-MCNC: 1.6 MG/DL (ref 1.6–2.6)
MCH RBC QN AUTO: 26.9 PG (ref 27–31)
MCHC RBC AUTO-ENTMCNC: 31.9 G/DL (ref 32–36)
MCV RBC AUTO: 84 FL (ref 82–98)
MONOCYTES NFR BLD: 16 % (ref 4–15)
MYELOCYTES NFR BLD MANUAL: 2 %
NEUTROPHILS NFR BLD: 61 % (ref 38–73)
NEUTS BAND NFR BLD MANUAL: 4 %
NRBC BLD-RTO: 0 /100 WBC
PHOSPHATE SERPL-MCNC: 4.2 MG/DL (ref 2.7–4.5)
PLATELET # BLD AUTO: 334 K/UL (ref 150–450)
PLATELET BLD QL SMEAR: ABNORMAL
PMV BLD AUTO: 8 FL (ref 9.2–12.9)
POTASSIUM SERPL-SCNC: 4.5 MMOL/L (ref 3.5–5.1)
PROMYELOCYTES NFR BLD MANUAL: 1 %
RBC # BLD AUTO: 2.83 M/UL (ref 4.6–6.2)
SODIUM SERPL-SCNC: 134 MMOL/L (ref 136–145)
WBC # BLD AUTO: 13.03 K/UL (ref 3.9–12.7)

## 2023-01-25 PROCEDURE — 25000003 PHARM REV CODE 250: Performed by: NURSE PRACTITIONER

## 2023-01-25 PROCEDURE — 85007 BL SMEAR W/DIFF WBC COUNT: CPT | Performed by: NURSE PRACTITIONER

## 2023-01-25 PROCEDURE — 84100 ASSAY OF PHOSPHORUS: CPT | Performed by: NURSE PRACTITIONER

## 2023-01-25 PROCEDURE — 25000003 PHARM REV CODE 250: Performed by: STUDENT IN AN ORGANIZED HEALTH CARE EDUCATION/TRAINING PROGRAM

## 2023-01-25 PROCEDURE — 25000003 PHARM REV CODE 250: Performed by: INTERNAL MEDICINE

## 2023-01-25 PROCEDURE — 85027 COMPLETE CBC AUTOMATED: CPT | Performed by: NURSE PRACTITIONER

## 2023-01-25 PROCEDURE — 63600175 PHARM REV CODE 636 W HCPCS: Performed by: NURSE PRACTITIONER

## 2023-01-25 PROCEDURE — 83735 ASSAY OF MAGNESIUM: CPT | Performed by: NURSE PRACTITIONER

## 2023-01-25 PROCEDURE — 97110 THERAPEUTIC EXERCISES: CPT

## 2023-01-25 PROCEDURE — 25000003 PHARM REV CODE 250: Performed by: HOSPITALIST

## 2023-01-25 PROCEDURE — 63700000 PHARM REV CODE 250 ALT 637 W/O HCPCS: Performed by: INTERNAL MEDICINE

## 2023-01-25 PROCEDURE — 12000002 HC ACUTE/MED SURGE SEMI-PRIVATE ROOM

## 2023-01-25 PROCEDURE — 99233 PR SUBSEQUENT HOSPITAL CARE,LEVL III: ICD-10-PCS | Mod: ,,, | Performed by: STUDENT IN AN ORGANIZED HEALTH CARE EDUCATION/TRAINING PROGRAM

## 2023-01-25 PROCEDURE — 63600175 PHARM REV CODE 636 W HCPCS: Performed by: HOSPITALIST

## 2023-01-25 PROCEDURE — 63700000 PHARM REV CODE 250 ALT 637 W/O HCPCS: Performed by: NURSE PRACTITIONER

## 2023-01-25 PROCEDURE — 36415 COLL VENOUS BLD VENIPUNCTURE: CPT | Performed by: NURSE PRACTITIONER

## 2023-01-25 PROCEDURE — 97530 THERAPEUTIC ACTIVITIES: CPT

## 2023-01-25 PROCEDURE — 80048 BASIC METABOLIC PNL TOTAL CA: CPT | Performed by: NURSE PRACTITIONER

## 2023-01-25 PROCEDURE — 99233 SBSQ HOSP IP/OBS HIGH 50: CPT | Mod: ,,, | Performed by: STUDENT IN AN ORGANIZED HEALTH CARE EDUCATION/TRAINING PROGRAM

## 2023-01-25 RX ORDER — MAGNESIUM SULFATE HEPTAHYDRATE 40 MG/ML
2 INJECTION, SOLUTION INTRAVENOUS ONCE
Status: COMPLETED | OUTPATIENT
Start: 2023-01-25 | End: 2023-01-25

## 2023-01-25 RX ADMIN — RISPERIDONE 0.5 MG: 0.25 TABLET ORAL at 09:01

## 2023-01-25 RX ADMIN — GUAIFENESIN 600 MG: 600 TABLET, EXTENDED RELEASE ORAL at 09:01

## 2023-01-25 RX ADMIN — THIAMINE HCL TAB 100 MG 100 MG: 100 TAB at 09:01

## 2023-01-25 RX ADMIN — METOCLOPRAMIDE 10 MG: 10 TABLET ORAL at 09:01

## 2023-01-25 RX ADMIN — CYPROHEPTADINE HYDROCHLORIDE 4 MG: 4 TABLET ORAL at 09:01

## 2023-01-25 RX ADMIN — SODIUM BICARBONATE 650 MG TABLET 650 MG: at 03:01

## 2023-01-25 RX ADMIN — FOLIC ACID 1 MG: 1 TABLET ORAL at 09:01

## 2023-01-25 RX ADMIN — MAGNESIUM 64 MG (MAGNESIUM CHLORIDE) TABLET,DELAYED RELEASE 128 MG: at 09:01

## 2023-01-25 RX ADMIN — RISPERIDONE 1 MG: 1 TABLET ORAL at 09:01

## 2023-01-25 RX ADMIN — INSULIN DETEMIR 10 UNITS: 100 INJECTION, SOLUTION SUBCUTANEOUS at 09:01

## 2023-01-25 RX ADMIN — LEVETIRACETAM 1500 MG: 500 TABLET, FILM COATED ORAL at 09:01

## 2023-01-25 RX ADMIN — METOCLOPRAMIDE 10 MG: 10 TABLET ORAL at 11:01

## 2023-01-25 RX ADMIN — METOCLOPRAMIDE 10 MG: 10 TABLET ORAL at 08:01

## 2023-01-25 RX ADMIN — THERA TABS 1 TABLET: TAB at 09:01

## 2023-01-25 RX ADMIN — SODIUM BICARBONATE 650 MG TABLET 650 MG: at 09:01

## 2023-01-25 RX ADMIN — DEXTROSE, SODIUM CHLORIDE, AND POTASSIUM CHLORIDE: 5; .45; .3 INJECTION INTRAVENOUS at 03:01

## 2023-01-25 RX ADMIN — ESCITALOPRAM OXALATE 20 MG: 10 TABLET ORAL at 09:01

## 2023-01-25 RX ADMIN — ENOXAPARIN SODIUM 40 MG: 100 INJECTION SUBCUTANEOUS at 05:01

## 2023-01-25 RX ADMIN — EMTRICITABINE AND TENOFOVIR DISOPROXIL FUMARATE 1 TABLET: 200; 300 TABLET, FILM COATED ORAL at 09:01

## 2023-01-25 RX ADMIN — FLUTICASONE PROPIONATE 100 MCG: 50 SPRAY, METERED NASAL at 09:01

## 2023-01-25 RX ADMIN — CETIRIZINE HYDROCHLORIDE 10 MG: 10 TABLET, FILM COATED ORAL at 09:01

## 2023-01-25 RX ADMIN — INSULIN ASPART 2 UNITS: 100 INJECTION, SOLUTION INTRAVENOUS; SUBCUTANEOUS at 09:01

## 2023-01-25 RX ADMIN — SULFAMETHOXAZOLE AND TRIMETHOPRIM 1 TABLET: 800; 160 TABLET ORAL at 09:01

## 2023-01-25 RX ADMIN — MAGNESIUM SULFATE HEPTAHYDRATE 2 G: 40 INJECTION, SOLUTION INTRAVENOUS at 12:01

## 2023-01-25 RX ADMIN — MEGESTROL ACETATE 40 MG: 20 TABLET ORAL at 09:01

## 2023-01-25 RX ADMIN — DARUNAVIR ETHANOLATE AND COBICISTAT 1 TABLET: 800; 150 TABLET, FILM COATED ORAL at 09:01

## 2023-01-25 RX ADMIN — AMOXICILLIN AND CLAVULANATE POTASSIUM 1 TABLET: 875; 125 TABLET, FILM COATED ORAL at 09:01

## 2023-01-25 RX ADMIN — FLUCONAZOLE 800 MG: 100 TABLET ORAL at 09:01

## 2023-01-25 RX ADMIN — FERROUS SULFATE TAB 325 MG (65 MG ELEMENTAL FE) 1 EACH: 325 (65 FE) TAB at 09:01

## 2023-01-25 RX ADMIN — METOCLOPRAMIDE 10 MG: 10 TABLET ORAL at 05:01

## 2023-01-25 NOTE — PT/OT/SLP PROGRESS
Occupational Therapy   Treatment    Name: El Abreu  MRN: [id number]  Admitting Diagnosis:  AIDS       Recommendations:     Discharge Recommendations: rehabilitation facility, nursing facility, skilled  Discharge Equipment Recommendations:  bedside commode  Barriers to discharge:  None    Assessment:     El Abreu is a 27 y.o. male with a medical diagnosis of AIDS.  Pt agreeable to OT therapy session this AM. Performance deficits affecting function are weakness, impaired endurance, impaired self care skills, impaired functional mobility, gait instability, impaired balance, impaired cardiopulmonary response to activity. Pt orthostatic with PT earlier, unable to progress past standing. Pt lethargic throughout session, pt agreeable and able to complete UE exercises bed level.    Rehab Prognosis:  Fair; patient would benefit from acute skilled OT services to address these deficits and reach maximum level of function.       Plan:     Patient to be seen 5 x/week to address the above listed problems via self-care/home management, therapeutic activities, therapeutic exercises  Plan of Care Expires: 02/17/23  Plan of Care Reviewed with: patient    Subjective     Pain/Comfort:  Pain Rating 1: 0/10    Objective:     Communicated with: nursing prior to session.  Patient found HOB elevated with bed alarm, peripheral IV, telemetry upon OT entry to room.    General Precautions: Standard, fall    Orthopedic Precautions:N/A  Braces: N/A  Respiratory Status: Room air     Occupational Performance:     Therapeutic Exercises:  UE exercises with yellow tband 2 x 10 reps in all major planes with SBA bed level ;pt tolerated well    Treatment & Education:  Pt educated on role of OT/POC, importance of OOB/EOB activity, use of call bell, and safety during ADLs, transfers, and functional mobility.    Patient left HOB elevated with all lines intact, call button in reach, and bed alarm on    GOALS:   Multidisciplinary Problems        Occupational Therapy Goals          Problem: Occupational Therapy    Goal Priority Disciplines Outcome Interventions   Occupational Therapy Goal     OT, PT/OT Ongoing, Progressing    Description: Goals to be met by: 2/17/23     Patient will increase functional independence with ADLs by performing:    UE Dressing with Supervision.  LE Dressing with Supervision and Assistive Devices as needed.  Grooming while standing at sink with Supervision.  Toileting from bedside commode with Supervision for hygiene and clothing management.   Toilet transfer to bedside commode with Supervision.  Upper extremity exercise program x10 reps per handout, with supervision.                         Time Tracking:     OT Date of Treatment: 01/25/23  OT Start Time: 1141  OT Stop Time: 1151  OT Total Time (min): 10 min    Billable Minutes:Therapeutic Exercise 10    OT/JACOB: OT          1/25/2023

## 2023-01-25 NOTE — PT/OT/SLP PROGRESS
Physical Therapy Treatment    Patient Name:  El Abreu   MRN:  [id number]    Recommendations:     Discharge Recommendations: rehabilitation facility, nursing facility, skilled  Discharge Equipment Recommendations: bedside commode  Barriers to discharge:  increased assist with mobility    Assessment:     El Abreu is a 27 y.o. male admitted with a medical diagnosis of AIDS.  He presents with the following impairments/functional limitations: weakness, impaired functional mobility, impaired cardiopulmonary response to activity, gait instability, impaired endurance.    Pt with HOB elevated and agreeable to visit. Required min A to sit on EOB, sat static for 5 minutes prior to standing with RW min A. Pt with no complaints of dizziness however patient with significant drop in BP and elevated HR as below. Pt return to bed with HOB  elevated  ~ 35 degrees. Unable to progress to ambulation today. Educated on importance of increased activity in bed and keeping HOB elevated  BP seated on /67, . Standing BP 78/40, , sitting 104/59, .      Rehab Prognosis: Fair; patient would benefit from acute skilled PT services to address these deficits and reach maximum level of function.    Recent Surgery: * No surgery found *      Plan:     During this hospitalization, patient to be seen 6 x/week to address the identified rehab impairments via gait training, therapeutic activities, therapeutic exercises, neuromuscular re-education and progress toward the following goals:    Plan of Care Expires:  02/17/23    Subjective     Chief Complaint: no complaints  Patient/Family Comments/goals: to get stronger  Pain/Comfort:  Pain Rating 1: 0/10  Pain Rating Post-Intervention 1: 0/10      Objective:     Communicated with RN prior to session.  Patient found HOB elevated with bed alarm, peripheral IV upon PT entry to room.     General Precautions: Standard, fall (ORTHOSTATIC)  Orthopedic Precautions: N/A  Braces:  N/A  Respiratory Status: Room air     Functional Mobility:  Bed Mobility:     Supine to Sit: minimum assistance  Sit to Supine: contact guard assistance  Transfers:     Sit to Stand:  minimum assistance with rolling walker      AM-PAC 6 CLICK MOBILITY  Turning over in bed (including adjusting bedclothes, sheets and blankets)?: 3  Sitting down on and standing up from a chair with arms (e.g., wheelchair, bedside commode, etc.): 3  Moving from lying on back to sitting on the side of the bed?: 3  Moving to and from a bed to a chair (including a wheelchair)?: 1  Need to walk in hospital room?: 1  Climbing 3-5 steps with a railing?: 1  Basic Mobility Total Score: 12       Treatment & Education:  Pt educated on importance of time OOB, importance of intermittent mobility, safe techniques for transfers/ambulation, discharge recommendations/options, and use of call light for assistance and fall prevention.      Patient left HOB elevated with all lines intact, call button in reach, bed alarm on, and RN notified..    GOALS:   Multidisciplinary Problems       Physical Therapy Goals          Problem: Physical Therapy    Goal Priority Disciplines Outcome Goal Variances Interventions   Physical Therapy Goal     PT, PT/OT Ongoing, Progressing     Description: Goals to be met by: 23     Patient will increase functional independence with mobility by performin. Supine to sit with Supervision  2. Sit to stand transfer with Supervision  3. Bed to chair transfer with Supervision using Rolling Walker  4. Gait  x 150 feet with Supervision using Rolling Walker.                              Time Tracking:     PT Received On: 23  PT Start Time: 0945     PT Stop Time: 1006  PT Total Time (min): 21 min     Billable Minutes: Therapeutic Activity 21    Treatment Type: Treatment  PT/PTA: PT     PTA Visit Number: 2     2023

## 2023-01-25 NOTE — PROGRESS NOTES
[facility] Medicine  Progress Note    Patient name: El Abreu  MRN: [id number]  Admit Date: 1/16/2023   LOS: 7 days     SUBJECTIVE:     Principal problem: AIDS    Interval History:  Patient was seen and examined at bedside, denies any new symptoms, subjectively feels better, improving p.o. intake, Otherwise hemodynamically stable, no acute events overnight as per nursing staff    Scheduled Meds:   amoxicillin-clavulanate 875-125mg  1 tablet Oral Q12H    cetirizine  10 mg Oral Daily    cyproheptadine  4 mg Oral BID    darunavir-cobicistat  1 tablet Oral Daily    emtricitabine-tenofovir 200-300 mg  1 tablet Oral Daily    enoxaparin  40 mg Subcutaneous Daily    EScitalopram oxalate  20 mg Oral Daily    ferrous sulfate  1 tablet Oral Daily    fluconazole  800 mg Oral Daily    fluticasone propionate  2 spray Each Nostril BID    folic acid  1 mg Oral Daily    guaiFENesin  600 mg Oral BID    insulin detemir U-100  10 Units Subcutaneous BID    levETIRAcetam  1,500 mg Oral BID    magnesium chloride  128 mg Oral Daily    magnesium sulfate IVPB  2 g Intravenous Once    megestroL  40 mg Oral Daily    metoclopramide HCl  10 mg Oral QID (WM & HS)    multivitamin  1 tablet Oral Daily    pantoprazole  40 mg Oral Before breakfast    risperiDONE  0.5 mg Oral Daily    risperiDONE  1 mg Oral QHS    sodium bicarbonate  650 mg Oral TID    sulfamethoxazole-trimethoprim 800-160mg  1 tablet Oral Daily    thiamine  100 mg Oral Daily     Continuous Infusions:      PRN Meds:acetaminophen, albuterol-ipratropium, dextrose 10%, dextrose 10%, glucagon (human recombinant), glucose, glucose, HYDROcodone-acetaminophen, HYDROcodone-acetaminophen, insulin aspart U-100, magnesium sulfate IVPB, magnesium sulfate IVPB, melatonin, melatonin, naloxone, ondansetron, polyethylene glycol, potassium bicarbonate, potassium bicarbonate, potassium bicarbonate, prochlorperazine, simethicone, sodium chloride 0.9%    Review of patient's  allergies indicates:  No Known Allergies    Review of Systems: As per interval history    OBJECTIVE:     Vital Signs (Most Recent)  Temp: 97.6 °F (36.4 °C) (01/25/23 1100)  Pulse: 90 (01/25/23 1100)  Resp: 16 (01/25/23 1100)  BP: 123/70 (01/25/23 1100)  SpO2: 100 % (01/25/23 1100)    Vital Signs Range (Last 24H):  Temp:  [97.6 °F (36.4 °C)-98.8 °F (37.1 °C)]   Pulse:  [90-95]   Resp:  [16-18]   BP: (115-129)/(67-75)   SpO2:  [99 %-100 %]     I & O (Last 24H):  Intake/Output Summary (Last 24 hours) at 1/25/2023 1202  Last data filed at 1/25/2023 0533  Gross per 24 hour   Intake 1400 ml   Output 900 ml   Net 500 ml         Physical Exam:  General:  Chronically ill-appearing gentleman, cachectic  Eyes: No conjunctival injection. No scleral icterus.  ENT: Hearing grossly intact. No discharge from ears. No nasal discharge.   Neck: Supple, trachea midline. No JVD  CVS: RRR. No LE edema BL  Lungs:  No tachypnea or accessory muscle use.  Clear to auscultation bilaterally  Abdomen:  Soft, nontender and nondistended.  No organomegaly  Neuro: AOx3. Moves all extremities. Follows commands. Responds appropriately       Laboratory:  I have reviewed all pertinent lab results within the past 24 hours.    Diagnostic Results:  Reviewed all imaging    ASSESSMENT/PLAN:     27-year-old very unfortunate gentleman who recently had very prolonged hospital stay due to cryptococcal meningitis requiring serial lumbar punctures, neuro syphilis and hospital stay was complicated by AC, GI bleed, malnutrition and severe deconditioning and was transition to LTAC.  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         Plan:   Very unfortunate case of AIDS with recent prolonged hospital stay for cryptococcal meningitis and neurosyphilis came with severe lethargy, anemia and critical hypomagnesemia  Anti-retroviral, antifungal and antibiotics as per Infectious Disease.  Remarkable improvement in CD4 count and viral load  One blood culture positive for coag-negative staph, PICC line was removed and sent for tissue culture  Noted discontinuation of daptomycin and increasing dose of Bactrim by Infectious Disease  Follow-up on repeat cultures  Remains on Bactrim for PJP prophylaxis and Augmentin for sinusitis  On Diflucan for recent cryptococcal meningitis  Aggressive p.o. nutrition, improve pulmonary toilet  Out of bed to chair  PT, OT   discontinue IV hydration, avoid antihypertensives, aggressive electrolyte replacement  Frequent orthostatic vital signs  Further management as per clinical course  Patient is improving significantly in last couple days, has better p.o. intake, instructed him to spend more time in the chair    VTE Risk Mitigation (From admission, onward)           Ordered     Place CARMEN hose  Until discontinued         01/18/23 1830     enoxaparin injection 40 mg  Daily         01/16/23 2102                        Department Hospital Medicine  [facility]  Maximiliano Ellison MD  Date of service: 01/25/2023

## 2023-01-25 NOTE — PROGRESS NOTES
Progress Note  Infectious Disease    Reason for Consult:  leukocytosis    HPI: El Abreu is a 27 y.o. 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  he was last admitted to [facility] November 30th with vomiting and headache, increased intracranial pressure.  This LP had a positive Sirisha ink opening pressure was 23.  He was then committed to a 6 week course of AmBisome completed 1/10, which was interrupted with AC but resumed and he was discharged to [facility] on January 10th.  He did require an additional LP for fluid removal and reduction of pressure on 12/13 (Sirisha ink was still positive but the culture was negative.  The last positive culture was 11/21).  He has been continued on fluconazole 800 mg daily since 01/11 and has been receiving Pneumocystis prophylaxis and appetite stimulants and antidepressants.  MRI brain on 01/01 showed stability of the right globus pallidus cryptococcal pseudocyst.  He was additionally treated for neurosyphilis 3 doses of Bicillin after an appropriate course of IV penicillin G.  He has not been eating well, withdrawn, seen by Psychiatry late December and started on risperidone.  He was seen in the [facility] [place] emergency room 1/14 when his mandible became stuck open.  This was reduced in their emergency room he was sent back to the [facility].  He was sent to our emergency room yesterday for severe hypomagnesemia, 0.8 While in the emergency room he was noted to have a rising white blood cell count, from normal on 01/08 to 14 on 01/09 to 17 on 01/16 additionally his hemoglobin was 7.3.  An infectious workup was commenced and CRP was 2.0, lactic acid 0.9, procalcitonin 0.15.  Chest x-ray was negative, blood cultures are negative, respiratory PCR is not yet been  collected and he is not produce any sputum but this is not likely relevant..vanc and cefepime were begun.   Unfortunately our hospital is full and he has been waiting in the hallway outside the emergency room.  He is incontinent of urine, and urinalysis was negative.  Magnesium is now 1.7    He feels much improved, tells me that he ate breakfast and lunch. He does endorse some small amount of yellow sputum and he does find that his sinuses are congested. It is relevant to note that he had pansinusitis on MRI brain 1/1.     1/18: interim reviewed. He is eating well. He had a syncopal episode while working with PT. He states that this has happened before, but does not happen consistently when he gets up. He is still congested but not really expectorating. Sputum sample was not purulent(saliva) and culture has only normal burak. He is not receiving his HIV antivirals here(not on formulary) though these were available at [facility]. Repeated CXR and it is still clear.   1/20:  interim reviewed. WBC a little higher , peripheral smear noted but unsure of the relevance of the findings.  He is sleeping deeply, worked with physical therapy much more successfully today he is eating reasonably well and antivirals were resumed.  He endorses that his cough is improved.  Denies diarrhea. Blood culture from 1/16, has coag neg Staph in 1/4 bottles drawn from picc line. The picc line is 47 days old, placed 12/4.      1/21-22 (Deep): interim reviewed, discussed with Dr Ny, patient seen and examined at bedside.  States he vomited earlier this morning, he was feeling very nauseous.  He denies headaches, no fever or chills.  Slightly hypertensive, afebrile.  Adequate urinary output, 1 episode of stool recorded in the last 24 hours.  Labs reviewed, persistent leukocytosis 20.9, lymphocytic predominance 10%, no left shift, bands 10%, H&H 7.5/23.5, platelet count 248.  Stable kidney function, magnesium 1.2.  Micro reviewed, blood cultures  from 01/20 no growth to date, pending final.  Tip culture from PICC which was removed 1/20 no growth.  Respiratory viral panel not detected.    1/23:  Interim reviewed, patient seen examined at bedside, states he is feeling a little stronger today, was able to walk around with physical therapy, states he had 2-3/10 frontal headache this morning that is now improved.  Hemodynamically stable, afebrile.  Labs reviewed, leukocytosis down to 15.9, lymphocytic predominance 12%, bands 2%, H&H 7.5/32.4, platelet count 312.  Magnesium 1.4, stable potassium, and kidney function.  CD4 count and viral load in process.    1/24:  Interim reviewed, patient seen examined at bedside, states he is feeling much better today, walk with PT, encouraged to sit in the chair.  Hemodynamically stable, afebrile.  White count down to 13.2, no left shift, H&H 7.3/22.2, platelet count 351, magnesium 1.6.  CD4 122, viral load still in process.    1/25:  Interim reviewed, patient seen examined at bedside.  States he is feeling good today, trying his best to cooperate with physical therapy, encouraged to get out of bed.  Hemodynamically stable, afebrile.  Labs reviewed, white count 13, no left shift, H&H 7.6/23.8, platelet count 334.  Stable kidney function.  Viral load 20 copies, almost undetectable.    EXAM & DIAGNOSTICS REVIEWED:   Vitals:     Temp:  [97.6 °F (36.4 °C)-98.8 °F (37.1 °C)]   Temp: 97.6 °F (36.4 °C) (01/25/23 1100)  Pulse: 90 (01/25/23 1100)  Resp: 16 (01/25/23 1100)  BP: 123/70 (01/25/23 1100)  SpO2: 100 % (01/25/23 1100)    Intake/Output Summary (Last 24 hours) at 1/25/2023 1638  Last data filed at 1/25/2023 0533  Gross per 24 hour   Intake 1400 ml   Output 900 ml   Net 500 ml       General:  Very thin, in NAD, cooperative, comfortable, non toxic  Eyes:  Anicteric, EOMI -  iris heterochromia/ post surgical L eye   ENT:  No ulcers, exudates, thrush, nares patent, nasal congestion improved   Neck:  supple  Lungs: Mostly clear to  auscultation   Heart:  RRR, no gallop/murmur/rub noted  Abd:  Soft, NT, ND, normal BS, no masses or organomegaly appreciated.  :  Voids , no flank tenderness  Musc:  Joints without effusion, swelling, erythema, synovitis, with mild generalized muscle wasting.   Skin:  No rashes. Dry skin. Poor turgor  Neuro:             Arousable, speech fluent, face symmetric, moves all extremities, no focal weakness.   Psych:  Calm, cooperative  Lymphatic:        Extrem: No edema, erythema, phlebitis, cellulitis, warm and well perfused  VAD:  Peripheral IV  Isolation:  none  Wound: none      General Labs reviewed:  Recent Labs   Lab 01/23/23  0430 01/24/23  0545 01/25/23  0556   WBC 15.8*  15.90* 13.23* 13.03*   HGB 7.7*  7.5* 7.3* 7.6*   HCT 24.2*  22.4* 22.2* 23.8*     312 351 334       Recent Labs   Lab 01/23/23  0430 01/24/23  0545 01/25/23  0556    133* 134*   K 3.5 4.5 4.5   * 105 106   CO2 19* 20* 19*   BUN 10 12 13   CREATININE 0.6 0.8 0.7   CALCIUM 9.3 9.4 9.2     No results for input(s): CRP in the last 168 hours.    Estimated Creatinine Clearance: 160.4 mL/min (based on SCr of 0.7 mg/dL).    Micro:  Microbiology Results (last 7 days)       Procedure Component Value Units Date/Time    Blood culture [[id number]] Collected: 01/20/23 2051    Order Status: Completed Specimen: Blood from Antecubital, Left Arm Updated: 01/24/23 2232     Blood Culture, Routine No Growth to date      No Growth to date      No Growth to date      No Growth to date      No Growth to date    Narrative:      Drawn from PICC line    IV catheter culture [[id number]] Collected: 01/20/23 1920    Order Status: Completed Specimen: Catheter Tip, PICC Updated: 01/24/23 0703     Aerobic Culture - Cath tip No growth    Respiratory Infection Panel (PCR), Nasopharyngeal [[id number]] Collected: 01/20/23 2031    Order Status: Completed Specimen: Nasopharyngeal Swab Updated: 01/20/23 5598     Respiratory Infection Panel Source NP swab      Adenovirus Not Detected     Coronavirus 229E, Common Cold Virus Not Detected     Coronavirus HKU1, Common Cold Virus Not Detected     Coronavirus NL63, Common Cold Virus Not Detected     Coronavirus OC43, Common Cold Virus Not Detected     Comment: The Coronavirus strains detected in this test cause the common cold.  These strains are not the COVID-19 (novel Coronavirus)strain   associated with the respiratory disease outbreak.          SARS-CoV2 (COVID-19) Qualitative PCR Not Detected     Human Metapneumovirus Not Detected     Human Rhinovirus/Enterovirus Not Detected     Influenza A (subtypes H1, H1-2009,H3) Not Detected     Influenza B Not Detected     Parainfluenza Virus 1 Not Detected     Parainfluenza Virus 2 Not Detected     Parainfluenza Virus 3 Not Detected     Parainfluenza Virus 4 Not Detected     Respiratory Syncytial Virus Not Detected     Bordetella Parapertussis (QS1323) Not Detected     Bordetella pertussis (ptxP) Not Detected     Chlamydia pneumoniae Not Detected     Mycoplasma pneumoniae Not Detected     Comment: Respiratory Infection Panel testing performed by Multiplex PCR.       Narrative:      Respiratory Infection Panel source->NP Swab    Culture, Respiratory with Gram Stain [[id number]] Collected: 01/17/23 1456    Order Status: Completed Specimen: Sputum Updated: 01/19/23 0756     Respiratory Culture Normal respiratory burak     Gram Stain (Respiratory) <10 epithelial cells per low power field.     Gram Stain (Respiratory) Rare WBC's     Gram Stain (Respiratory) Rare Gram positive cocci    MRSA Screen by PCR [[id number]] Collected: 01/18/23 2101    Order Status: Completed Specimen: Nasopharyngeal Swab from Nasal Updated: 01/18/23 2352     MRSA SCREEN BY PCR Negative            Imaging Reviewed:   CXR   MRI brain 1/1    Cardiology:    IMPRESSION & PLAN     Severe hypomagnesemia, last Mg 1.6   Electrolyte abnormalities likely from ampho B    2. HIV/AIDS C3 - Cryptococcal meningitis, now  controlled, s/p 6 weeks of ambisome and continues on high dose oral fluconazole   Cryptococcoma, stable   CD4 11/5 7 - VL 48,127 - repeat CD4 127 and VL 20 copies, almost undetectable   On Prezcobix/Descovy   PPD 1/14 negative    3. Cachexia with failure to thrive   On supplements     4. Pansinusitis, cannot exclude possible cause of persistent leukocytosis; trending down today 13  CRP 2.0    5. CoNS - Staph epidermidis 1/2 bottles, s/p PICC removal 1/20 - likely a contaminant       Recommendations:  Continue Diflucan 800 mg per day for Crypto will need at least 12 months of therapy along with ART   Bactrim DS 1 tab daily, CD4 <200 for PJP and Toxoplasma  Can complete 10 days of Augmentin, end date 1/26  Continue Prezcobix/Descovy daily   Patient to follow with his ID physician in [place]   PT/OT as tolerated  SNF vs home with PT     D/w patient, Dr Ellison    Medical Decision Making during this encounter was  [_] Low Complexity  [_] Moderate Complexity  [xxx  ] High Complexity

## 2023-01-26 LAB
ANION GAP SERPL CALC-SCNC: 7 MMOL/L (ref 8–16)
ANISOCYTOSIS BLD QL SMEAR: SLIGHT
BASOPHILS NFR BLD: 0 % (ref 0–1.9)
BUN SERPL-MCNC: 15 MG/DL (ref 6–20)
CALCIUM SERPL-MCNC: 10.2 MG/DL (ref 8.7–10.5)
CHLORIDE SERPL-SCNC: 108 MMOL/L (ref 95–110)
CO2 SERPL-SCNC: 19 MMOL/L (ref 23–29)
CREAT SERPL-MCNC: 0.9 MG/DL (ref 0.5–1.4)
DIFFERENTIAL METHOD: ABNORMAL
EOSINOPHIL NFR BLD: 3 % (ref 0–8)
ERYTHROCYTE [DISTWIDTH] IN BLOOD BY AUTOMATED COUNT: 14.9 % (ref 11.5–14.5)
EST. GFR  (NO RACE VARIABLE): >60 ML/MIN/1.73 M^2
GLUCOSE SERPL-MCNC: 110 MG/DL (ref 70–110)
GLUCOSE SERPL-MCNC: 114 MG/DL (ref 70–110)
GLUCOSE SERPL-MCNC: 223 MG/DL (ref 70–110)
GLUCOSE SERPL-MCNC: 83 MG/DL (ref 70–110)
GLUCOSE SERPL-MCNC: 98 MG/DL (ref 70–110)
HCT VFR BLD AUTO: 26.2 % (ref 40–54)
HGB BLD-MCNC: 8.4 G/DL (ref 14–18)
IMM GRANULOCYTES # BLD AUTO: ABNORMAL K/UL (ref 0–0.04)
IMM GRANULOCYTES NFR BLD AUTO: ABNORMAL % (ref 0–0.5)
LYMPHOCYTES NFR BLD: 16 % (ref 18–48)
MAGNESIUM SERPL-MCNC: 1.7 MG/DL (ref 1.6–2.6)
MCH RBC QN AUTO: 26.8 PG (ref 27–31)
MCHC RBC AUTO-ENTMCNC: 32.1 G/DL (ref 32–36)
MCV RBC AUTO: 84 FL (ref 82–98)
METAMYELOCYTES NFR BLD MANUAL: 1 %
MONOCYTES NFR BLD: 18 % (ref 4–15)
NEUTROPHILS NFR BLD: 58 % (ref 38–73)
NEUTS BAND NFR BLD MANUAL: 4 %
NRBC BLD-RTO: 0 /100 WBC
PHOSPHATE SERPL-MCNC: 5.2 MG/DL (ref 2.7–4.5)
PLATELET # BLD AUTO: 375 K/UL (ref 150–450)
PMV BLD AUTO: 8.1 FL (ref 9.2–12.9)
POIKILOCYTOSIS BLD QL SMEAR: SLIGHT
POTASSIUM SERPL-SCNC: 4.3 MMOL/L (ref 3.5–5.1)
RBC # BLD AUTO: 3.13 M/UL (ref 4.6–6.2)
SODIUM SERPL-SCNC: 134 MMOL/L (ref 136–145)
WBC # BLD AUTO: 11.19 K/UL (ref 3.9–12.7)

## 2023-01-26 PROCEDURE — 63700000 PHARM REV CODE 250 ALT 637 W/O HCPCS: Performed by: INTERNAL MEDICINE

## 2023-01-26 PROCEDURE — 99233 SBSQ HOSP IP/OBS HIGH 50: CPT | Mod: ,,, | Performed by: STUDENT IN AN ORGANIZED HEALTH CARE EDUCATION/TRAINING PROGRAM

## 2023-01-26 PROCEDURE — 97530 THERAPEUTIC ACTIVITIES: CPT

## 2023-01-26 PROCEDURE — 63700000 PHARM REV CODE 250 ALT 637 W/O HCPCS: Performed by: NURSE PRACTITIONER

## 2023-01-26 PROCEDURE — 63600175 PHARM REV CODE 636 W HCPCS: Performed by: NURSE PRACTITIONER

## 2023-01-26 PROCEDURE — 84100 ASSAY OF PHOSPHORUS: CPT | Performed by: HOSPITALIST

## 2023-01-26 PROCEDURE — 36415 COLL VENOUS BLD VENIPUNCTURE: CPT | Performed by: NURSE PRACTITIONER

## 2023-01-26 PROCEDURE — 80048 BASIC METABOLIC PNL TOTAL CA: CPT | Performed by: NURSE PRACTITIONER

## 2023-01-26 PROCEDURE — 99233 PR SUBSEQUENT HOSPITAL CARE,LEVL III: ICD-10-PCS | Mod: ,,, | Performed by: STUDENT IN AN ORGANIZED HEALTH CARE EDUCATION/TRAINING PROGRAM

## 2023-01-26 PROCEDURE — 12000002 HC ACUTE/MED SURGE SEMI-PRIVATE ROOM

## 2023-01-26 PROCEDURE — 85027 COMPLETE CBC AUTOMATED: CPT | Performed by: NURSE PRACTITIONER

## 2023-01-26 PROCEDURE — 25000003 PHARM REV CODE 250: Performed by: HOSPITALIST

## 2023-01-26 PROCEDURE — 25000003 PHARM REV CODE 250: Performed by: STUDENT IN AN ORGANIZED HEALTH CARE EDUCATION/TRAINING PROGRAM

## 2023-01-26 PROCEDURE — 25000003 PHARM REV CODE 250: Performed by: INTERNAL MEDICINE

## 2023-01-26 PROCEDURE — 85007 BL SMEAR W/DIFF WBC COUNT: CPT | Performed by: NURSE PRACTITIONER

## 2023-01-26 PROCEDURE — 25000003 PHARM REV CODE 250: Performed by: NURSE PRACTITIONER

## 2023-01-26 PROCEDURE — 83735 ASSAY OF MAGNESIUM: CPT | Performed by: NURSE PRACTITIONER

## 2023-01-26 RX ORDER — LORAZEPAM 2 MG/ML
2 INJECTION INTRAMUSCULAR
Status: DISCONTINUED | OUTPATIENT
Start: 2023-01-26 | End: 2023-02-07 | Stop reason: HOSPADM

## 2023-01-26 RX ADMIN — AMOXICILLIN AND CLAVULANATE POTASSIUM 1 TABLET: 875; 125 TABLET, FILM COATED ORAL at 08:01

## 2023-01-26 RX ADMIN — THERA TABS 1 TABLET: TAB at 10:01

## 2023-01-26 RX ADMIN — SODIUM BICARBONATE 650 MG TABLET 650 MG: at 08:01

## 2023-01-26 RX ADMIN — DARUNAVIR ETHANOLATE AND COBICISTAT 1 TABLET: 800; 150 TABLET, FILM COATED ORAL at 10:01

## 2023-01-26 RX ADMIN — RISPERIDONE 1 MG: 1 TABLET ORAL at 08:01

## 2023-01-26 RX ADMIN — LEVETIRACETAM 1500 MG: 500 TABLET, FILM COATED ORAL at 10:01

## 2023-01-26 RX ADMIN — FERROUS SULFATE TAB 325 MG (65 MG ELEMENTAL FE) 1 EACH: 325 (65 FE) TAB at 10:01

## 2023-01-26 RX ADMIN — FOLIC ACID 1 MG: 1 TABLET ORAL at 10:01

## 2023-01-26 RX ADMIN — INSULIN DETEMIR 10 UNITS: 100 INJECTION, SOLUTION SUBCUTANEOUS at 10:01

## 2023-01-26 RX ADMIN — CETIRIZINE HYDROCHLORIDE 10 MG: 10 TABLET, FILM COATED ORAL at 10:01

## 2023-01-26 RX ADMIN — GUAIFENESIN 600 MG: 600 TABLET, EXTENDED RELEASE ORAL at 10:01

## 2023-01-26 RX ADMIN — LEVETIRACETAM 1500 MG: 500 TABLET, FILM COATED ORAL at 08:01

## 2023-01-26 RX ADMIN — FLUTICASONE PROPIONATE 100 MCG: 50 SPRAY, METERED NASAL at 08:01

## 2023-01-26 RX ADMIN — AMOXICILLIN AND CLAVULANATE POTASSIUM 1 TABLET: 875; 125 TABLET, FILM COATED ORAL at 10:01

## 2023-01-26 RX ADMIN — CYPROHEPTADINE HYDROCHLORIDE 4 MG: 4 TABLET ORAL at 08:01

## 2023-01-26 RX ADMIN — METOCLOPRAMIDE 10 MG: 10 TABLET ORAL at 10:01

## 2023-01-26 RX ADMIN — ENOXAPARIN SODIUM 40 MG: 100 INJECTION SUBCUTANEOUS at 06:01

## 2023-01-26 RX ADMIN — CYPROHEPTADINE HYDROCHLORIDE 4 MG: 4 TABLET ORAL at 10:01

## 2023-01-26 RX ADMIN — FLUTICASONE PROPIONATE 100 MCG: 50 SPRAY, METERED NASAL at 10:01

## 2023-01-26 RX ADMIN — MEGESTROL ACETATE 40 MG: 20 TABLET ORAL at 10:01

## 2023-01-26 RX ADMIN — EMTRICITABINE AND TENOFOVIR DISOPROXIL FUMARATE 1 TABLET: 200; 300 TABLET, FILM COATED ORAL at 10:01

## 2023-01-26 RX ADMIN — METOCLOPRAMIDE 10 MG: 10 TABLET ORAL at 08:01

## 2023-01-26 RX ADMIN — SULFAMETHOXAZOLE AND TRIMETHOPRIM 1 TABLET: 800; 160 TABLET ORAL at 10:01

## 2023-01-26 RX ADMIN — SODIUM BICARBONATE 650 MG TABLET 650 MG: at 10:01

## 2023-01-26 RX ADMIN — MAGNESIUM 64 MG (MAGNESIUM CHLORIDE) TABLET,DELAYED RELEASE 128 MG: at 10:01

## 2023-01-26 RX ADMIN — SODIUM BICARBONATE 650 MG TABLET 650 MG: at 03:01

## 2023-01-26 RX ADMIN — METOCLOPRAMIDE 10 MG: 10 TABLET ORAL at 06:01

## 2023-01-26 RX ADMIN — ONDANSETRON 4 MG: 2 INJECTION INTRAMUSCULAR; INTRAVENOUS at 02:01

## 2023-01-26 RX ADMIN — FLUCONAZOLE 800 MG: 100 TABLET ORAL at 10:01

## 2023-01-26 RX ADMIN — RISPERIDONE 0.5 MG: 0.25 TABLET ORAL at 10:01

## 2023-01-26 RX ADMIN — THIAMINE HCL TAB 100 MG 100 MG: 100 TAB at 10:01

## 2023-01-26 RX ADMIN — METOCLOPRAMIDE 10 MG: 10 TABLET ORAL at 02:01

## 2023-01-26 RX ADMIN — INSULIN DETEMIR 10 UNITS: 100 INJECTION, SOLUTION SUBCUTANEOUS at 08:01

## 2023-01-26 RX ADMIN — ESCITALOPRAM OXALATE 20 MG: 10 TABLET ORAL at 10:01

## 2023-01-26 NOTE — PROGRESS NOTES
[facility] Medicine  Progress Note    Patient name: El Abreu  MRN: [id number]  Admit Date: 1/16/2023   LOS: 8 days     SUBJECTIVE:     Principal problem: AIDS    Interval History:  Patient was seen and examined at bedside, denies any new symptoms, subjectively feels better, improving p.o. intake, Otherwise hemodynamically stable, no acute events overnight as per nursing staff.  Unfortunately patient becomes profoundly orthostatic upon standing up, heart rate goes up to 160s and he feels very symptomatic.  Today later in the afternoon nursing staff reported suspected seizure-like activity.     Scheduled Meds:   amoxicillin-clavulanate 875-125mg  1 tablet Oral Q12H    cetirizine  10 mg Oral Daily    cyproheptadine  4 mg Oral BID    darunavir-cobicistat  1 tablet Oral Daily    emtricitabine-tenofovir 200-300 mg  1 tablet Oral Daily    enoxaparin  40 mg Subcutaneous Daily    EScitalopram oxalate  20 mg Oral Daily    ferrous sulfate  1 tablet Oral Daily    fluconazole  800 mg Oral Daily    fluticasone propionate  2 spray Each Nostril BID    folic acid  1 mg Oral Daily    guaiFENesin  600 mg Oral BID    insulin detemir U-100  10 Units Subcutaneous BID    levETIRAcetam  1,500 mg Oral BID    magnesium chloride  128 mg Oral Daily    megestroL  40 mg Oral Daily    metoclopramide HCl  10 mg Oral QID (WM & HS)    multivitamin  1 tablet Oral Daily    pantoprazole  40 mg Oral Before breakfast    risperiDONE  0.5 mg Oral Daily    risperiDONE  1 mg Oral QHS    sodium bicarbonate  650 mg Oral TID    sulfamethoxazole-trimethoprim 800-160mg  1 tablet Oral Daily    thiamine  100 mg Oral Daily     Continuous Infusions:      PRN Meds:acetaminophen, albuterol-ipratropium, dextrose 10%, dextrose 10%, glucagon (human recombinant), glucose, glucose, HYDROcodone-acetaminophen, HYDROcodone-acetaminophen, insulin aspart U-100, magnesium sulfate IVPB, magnesium sulfate IVPB, melatonin, melatonin, naloxone,  ondansetron, polyethylene glycol, potassium bicarbonate, potassium bicarbonate, potassium bicarbonate, prochlorperazine, simethicone, sodium chloride 0.9%    Review of patient's allergies indicates:  No Known Allergies    Review of Systems: As per interval history    OBJECTIVE:     Vital Signs (Most Recent)  Temp: 97.9 °F (36.6 °C) (01/26/23 0730)  Pulse: 92 (01/26/23 0730)  Resp: 16 (01/26/23 0730)  BP: 116/79 (01/26/23 0730)  SpO2: 100 % (01/26/23 0730)    Vital Signs Range (Last 24H):  Temp:  [97.9 °F (36.6 °C)-99.1 °F (37.3 °C)]   Pulse:  []   Resp:  [16-19]   BP: (115-130)/(71-82)   SpO2:  [97 %-100 %]     I & O (Last 24H):  Intake/Output Summary (Last 24 hours) at 1/26/2023 1137  Last data filed at 1/26/2023 0528  Gross per 24 hour   Intake 200 ml   Output --   Net 200 ml         Physical Exam:  General:  Chronically ill-appearing gentleman, cachectic  Eyes: No conjunctival injection. No scleral icterus.  ENT: Hearing grossly intact. No discharge from ears. No nasal discharge.   Neck: Supple, trachea midline. No JVD  CVS: RRR. No LE edema BL  Lungs:  No tachypnea or accessory muscle use.  Clear to auscultation bilaterally  Abdomen:  Soft, nontender and nondistended.  No organomegaly  Neuro: AOx3. Moves all extremities. Follows commands. Responds appropriately       Laboratory:  I have reviewed all pertinent lab results within the past 24 hours.    Diagnostic Results:  Reviewed all imaging    ASSESSMENT/PLAN:     27-year-old very unfortunate gentleman who recently had very prolonged hospital stay due to cryptococcal meningitis requiring serial lumbar punctures, neuro syphilis and hospital stay was complicated by AC, GI bleed, malnutrition and severe deconditioning and was transition to LTAC.  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         Plan:   Very unfortunate case of AIDS with recent prolonged hospital stay for cryptococcal meningitis and neurosyphilis came with severe lethargy, anemia and critical hypomagnesemia  Anti-retroviral, antifungal and antibiotics as per Infectious Disease.  Remarkable improvement in CD4 count and viral load  One blood culture positive for coag-negative staph, PICC line was removed and sent for tissue culture  Noted discontinuation of daptomycin and increasing dose of Bactrim by Infectious Disease  Follow-up on repeat cultures  Remains on Bactrim for PJP prophylaxis and Augmentin for sinusitis  On Diflucan for recent cryptococcal meningitis  Patient has severe dysautonomia.  Instructed nursing staff to apply compression stockings  Will consult Psychiatry to reconsider his psych regimen as he is always drowsy  Consulted neurology for suspected seizure-like activity.  EEG ordered  Aggressive p.o. nutrition, improve pulmonary toilet  Out of bed to chair  PT, OT  Avoid antihypertensives, aggressive electrolyte replacement  Frequent orthostatic vital signs  Further management as per clinical course  Patient will need inpatient rehab versus SNF      VTE Risk Mitigation (From admission, onward)           Ordered     Place CARMEN hose  Until discontinued         01/18/23 1830     enoxaparin injection 40 mg  Daily         01/16/23 8895                        Department Hospital Medicine  [facility]  Maximiliano Ellison MD  Date of service: 01/26/2023

## 2023-01-26 NOTE — PT/OT/SLP PROGRESS
Occupational Therapy   Treatment    Name: El Abreu  MRN: [id number]  Admitting Diagnosis:  AIDS       Recommendations:     Discharge Recommendations: rehabilitation facility, nursing facility, skilled  Discharge Equipment Recommendations:  bedside commode  Barriers to discharge:  None    Assessment:     El Abreu is a 27 y.o. male with a medical diagnosis of AIDS.  Pt agreeable to OT therapy session this AM. Performance deficits affecting function are weakness, impaired endurance, impaired self care skills, impaired functional mobility, gait instability, impaired balance, impaired cardiopulmonary response to activity. Pt's remains with positive orthostatics and elevated HR.   Supine: /82 ; EOB: /62 ; Standing: BP 54/32 ; 2nd attempt at standing after resting EOB ~5 mins: BP 49/35 .     Rehab Prognosis:  Good; patient would benefit from acute skilled OT services to address these deficits and reach maximum level of function.       Plan:     Patient to be seen 5 x/week to address the above listed problems via self-care/home management, therapeutic activities, therapeutic exercises  Plan of Care Expires: 02/17/23  Plan of Care Reviewed with: patient    Subjective     Pain/Comfort:  Pain Rating 1: 0/10    Objective:     Communicated with: nursing prior to session.  Patient found HOB elevated with bed alarm, peripheral IV, telemetry upon OT entry to room.    General Precautions: Standard, fall    Orthopedic Precautions:N/A  Braces: N/A  Respiratory Status: Room air     Occupational Performance:     Bed Mobility:    Patient completed Supine to Sit with stand by assistance  Patient completed Sit to Supine with stand by assistance     Functional Mobility/Transfers:  Patient completed Sit <> Stand Transfer with minimum assistance  with  rolling walker   Functional Mobility: unable to due to positive orthostatics     Treatment & Education:  Pt educated on role of OT/POC,  importance of OOB/EOB activity, use of call bell, and safety during ADLs, transfers, and functional mobility.    Patient left HOB elevated with all lines intact, call button in reach, bed alarm on, RN and MD notified, and RN present    GOALS:   Multidisciplinary Problems       Occupational Therapy Goals          Problem: Occupational Therapy    Goal Priority Disciplines Outcome Interventions   Occupational Therapy Goal     OT, PT/OT Ongoing, Progressing    Description: Goals to be met by: 2/17/23     Patient will increase functional independence with ADLs by performing:    UE Dressing with Supervision.  LE Dressing with Supervision and Assistive Devices as needed.  Grooming while standing at sink with Supervision.  Toileting from bedside commode with Supervision for hygiene and clothing management.   Toilet transfer to bedside commode with Supervision.  Upper extremity exercise program x10 reps per handout, with supervision.                         Time Tracking:     OT Date of Treatment: 01/26/23  OT Start Time: 1000  OT Stop Time: 1031  OT Total Time (min): 31 min    Billable Minutes:Therapeutic Activity 31    OT/JACOB: OT          1/26/2023

## 2023-01-26 NOTE — PT/OT/SLP PROGRESS
"Physical Therapy Treatment    Patient Name:  El Abreu   MRN:  [id number]    Recommendations:     Discharge Recommendations: rehabilitation facility  Discharge Equipment Recommendations: bedside commode  Barriers to discharge:  increased assist with mobility    Assessment:     El Abreu is a 27 y.o. male admitted with a medical diagnosis of AIDS.  He presents with the following impairments/functional limitations: weakness, impaired functional mobility, impaired cardiopulmonary response to activity, gait instability, impaired endurance.    Pt with completely supine.Agreeable to visit. Has not eaten lunch. Dr. Viera OK's donning CARMEN hose to support BP . Once CARMEN hose donned, HOB elevated to 45 degrees. /80, . Pt sat EOB with min A and immediately had blank stare and not responsive to verbal . Returned to supine and nursing called to room. Pt quickly responsive./91, . No complaints of dizziness or nausea. Pt requesting to attempt EOB again. Once again, blank stare . Returned to supine and very quickly responsive. /75, . Then patient began to projectile vomit.Turned on his side and assisted. He was not dysphoretic . Left in care of nursing and extender.      Rehab Prognosis: Fair; patient would benefit from acute skilled PT services to address these deficits and reach maximum level of function.    Recent Surgery: * No surgery found *      Plan:     During this hospitalization, patient to be seen 6 x/week to address the identified rehab impairments via gait training, therapeutic activities, therapeutic exercises, neuromuscular re-education and progress toward the following goals:    Plan of Care Expires:  02/17/23    Subjective     Chief Complaint: "I just feel tired today"  Patient/Family Comments/goals: to get stronger  Pain/Comfort:  Pain Rating 1: 0/10  Pain Rating Post-Intervention 1: 0/10      Objective:     Communicated with MD prior to session.  Patient found " HOB elevated with telemetry, peripheral IV, bed alarm upon PT entry to room.     General Precautions: Standard, fall  Orthopedic Precautions: N/A  Braces: N/A  Respiratory Status: Room air     Functional Mobility:  Bed Mobility:     Supine to Sit: minimum assistance  Sit to Supine: moderate assistance      AM-PAC 6 CLICK MOBILITY          Treatment & Education:  Pt educated on importance of time OOB, importance of intermittent mobility, safe techniques for transfers/ambulation, discharge recommendations/options, and use of call light for assistance and fall prevention.      Patient left HOB elevated with all lines intact, call button in reach, bed alarm on, and RN notified..    GOALS:   Multidisciplinary Problems       Physical Therapy Goals          Problem: Physical Therapy    Goal Priority Disciplines Outcome Goal Variances Interventions   Physical Therapy Goal     PT, PT/OT Ongoing, Progressing     Description: Goals to be met by: 23     Patient will increase functional independence with mobility by performin. Supine to sit with Supervision  2. Sit to stand transfer with Supervision  3. Bed to chair transfer with Supervision using Rolling Walker  4. Gait  x 150 feet with Supervision using Rolling Walker.                              Time Tracking:     PT Received On: 23  PT Start Time: 1415     PT Stop Time: 1445  PT Total Time (min): 30 min     Billable Minutes: Therapeutic Activity 30    Treatment Type: Treatment  PT/PTA: PT     PTA Visit Number: 2     2023

## 2023-01-26 NOTE — PLAN OF CARE
01/26/23 1343   Post-Acute Status   Post-Acute Authorization Placement     Case Management received secure chat from Freya Gibson stating patient's payor source approved LTAC for 14 days and requested additional clinicals. No call back number, reference number, or fax number left.     CIARAN called [phone] ([facility]) to inquire about LTAC auth. CM on hold for 30 minutes and representative stated she does not see an approved LTAC auth in her system. CM remains on hold at this time awaiting authorization department.     1409 - Call failed, even tho [facility] had call back number for this , no call back received when call failed. CIARAN notified Mishel of above, as [facility] was the only ltac referral sent via [facility]. Mishel currently trying to call [facility] to inquire about LTAC auth at this time. CIARAN called Ly with [employer], see previous note on 01/23 for number and extension, to inquire about authorization. No answer, voicemail left. CM closely following

## 2023-01-26 NOTE — PLAN OF CARE
Problem: Occupational Therapy  Goal: Occupational Therapy Goal  Description: Goals to be met by: 2/17/23     Patient will increase functional independence with ADLs by performing:    UE Dressing with Supervision.  LE Dressing with Supervision and Assistive Devices as needed.  Grooming while standing at sink with Supervision.  Toileting from bedside commode with Supervision for hygiene and clothing management.   Toilet transfer to bedside commode with Supervision.  Upper extremity exercise program x10 reps per handout, with supervision.    Outcome: Ongoing, Progressing   Limited due to positive orthostatics

## 2023-01-26 NOTE — PROGRESS NOTES
Progress Note  Infectious Disease    Reason for Consult:  leukocytosis    HPI: El Abreu is a 27 y.o. 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  he was last admitted to [facility] November 30th with vomiting and headache, increased intracranial pressure.  This LP had a positive Sirisha ink opening pressure was 23.  He was then committed to a 6 week course of AmBisome completed 1/10, which was interrupted with AC but resumed and he was discharged to [facility] on January 10th.  He did require an additional LP for fluid removal and reduction of pressure on 12/13 (Sirisha ink was still positive but the culture was negative.  The last positive culture was 11/21).  He has been continued on fluconazole 800 mg daily since 01/11 and has been receiving Pneumocystis prophylaxis and appetite stimulants and antidepressants.  MRI brain on 01/01 showed stability of the right globus pallidus cryptococcal pseudocyst.  He was additionally treated for neurosyphilis 3 doses of Bicillin after an appropriate course of IV penicillin G.  He has not been eating well, withdrawn, seen by Psychiatry late December and started on risperidone.  He was seen in the [facility] emergency room 1/14 when his mandible became stuck open.  This was reduced in their emergency room he was sent back to the [facility].  He was sent to our emergency room yesterday for severe hypomagnesemia, 0.8 While in the emergency room he was noted to have a rising white blood cell count, from normal on 01/08 to 14 on 01/09 to 17 on 01/16 additionally his hemoglobin was 7.3.  An infectious workup was commenced and CRP was 2.0, lactic acid 0.9, procalcitonin 0.15.  Chest x-ray was negative, blood cultures are negative, respiratory PCR is not yet been  collected and he is not produce any sputum but this is not likely relevant..vanc and cefepime were begun.   Unfortunately our hospital is full and he has been waiting in the hallway outside the emergency room.  He is incontinent of urine, and urinalysis was negative.  Magnesium is now 1.7    He feels much improved, tells me that he ate breakfast and lunch. He does endorse some small amount of yellow sputum and he does find that his sinuses are congested. It is relevant to note that he had pansinusitis on MRI brain 1/1.     1/18: interim reviewed. He is eating well. He had a syncopal episode while working with PT. He states that this has happened before, but does not happen consistently when he gets up. He is still congested but not really expectorating. Sputum sample was not purulent(saliva) and culture has only normal burak. He is not receiving his HIV antivirals here(not on formulary) though these were available at [facility]. Repeated CXR and it is still clear.   1/20:  interim reviewed. WBC a little higher , peripheral smear noted but unsure of the relevance of the findings.  He is sleeping deeply, worked with physical therapy much more successfully today he is eating reasonably well and antivirals were resumed.  He endorses that his cough is improved.  Denies diarrhea. Blood culture from 1/16, has coag neg Staph in 1/4 bottles drawn from picc line. The picc line is 47 days old, placed 12/4.      1/21-22 (Deep): interim reviewed, discussed with Dr Ny, patient seen and examined at bedside.  States he vomited earlier this morning, he was feeling very nauseous.  He denies headaches, no fever or chills.  Slightly hypertensive, afebrile.  Adequate urinary output, 1 episode of stool recorded in the last 24 hours.  Labs reviewed, persistent leukocytosis 20.9, lymphocytic predominance 10%, no left shift, bands 10%, H&H 7.5/23.5, platelet count 248.  Stable kidney function, magnesium 1.2.  Micro reviewed, blood cultures  from 01/20 no growth to date, pending final.  Tip culture from PICC which was removed 1/20 no growth.  Respiratory viral panel not detected.    1/23:  Interim reviewed, patient seen examined at bedside, states he is feeling a little stronger today, was able to walk around with physical therapy, states he had 2-3/10 frontal headache this morning that is now improved.  Hemodynamically stable, afebrile.  Labs reviewed, leukocytosis down to 15.9, lymphocytic predominance 12%, bands 2%, H&H 7.5/32.4, platelet count 312.  Magnesium 1.4, stable potassium, and kidney function.  CD4 count and viral load in process.    1/24:  Interim reviewed, patient seen examined at bedside, states he is feeling much better today, walk with PT, encouraged to sit in the chair.  Hemodynamically stable, afebrile.  White count down to 13.2, no left shift, H&H 7.3/22.2, platelet count 351, magnesium 1.6.  CD4 122, viral load still in process.    1/25:  Interim reviewed, patient seen examined at bedside.  States he is feeling good today, trying his best to cooperate with physical therapy, encouraged to get out of bed.  Hemodynamically stable, afebrile.  Labs reviewed, white count 13, no left shift, H&H 7.6/23.8, platelet count 334.  Stable kidney function.  Viral load 20 copies, almost undetectable.    1/26:  Interim reviewed, patient seen examined at bedside, states he threw up at noon, asking for food.  States he is feeling better, encouraged to get out of bed.  Physical therapy still recommending skilled nursing facilities, case management trying to find a place for him to go.  Labs reviewed, white count improved, stable kidney function, magnesium 1.7, improved.    EXAM & DIAGNOSTICS REVIEWED:   Vitals:     Temp:  [97.9 °F (36.6 °C)-99.1 °F (37.3 °C)]   Temp: 98.6 °F (37 °C) (01/26/23 1530)  Pulse: (!) 113 (01/26/23 1530)  Resp: 18 (01/26/23 1530)  BP: 115/80 (01/26/23 1530)  SpO2: 100 % (01/26/23 1530)    Intake/Output Summary (Last 24 hours)  at 1/26/2023 1709  Last data filed at 1/26/2023 0528  Gross per 24 hour   Intake 200 ml   Output --   Net 200 ml       General:  Very thin, in NAD, cooperative, comfortable, non toxic  Eyes:  Anicteric, EOMI -  iris heterochromia/ post surgical L eye   ENT:  No ulcers, exudates, thrush, nares patent, nasal congestion improved   Neck:  supple  Lungs: Mostly clear to auscultation   Heart:  RRR, no gallop/murmur/rub noted  Abd:  Soft, NT, ND, normal BS, no masses or organomegaly appreciated.  :  Voids , no flank tenderness  Musc:  Joints without effusion, swelling, erythema, synovitis, with mild generalized muscle wasting.   Skin:  No rashes. Dry skin. Poor turgor  Neuro:             Arousable, speech fluent, face symmetric, moves all extremities, no focal weakness.   Psych:  Calm, cooperative  Lymphatic:        Extrem: No edema, erythema, phlebitis, cellulitis, warm and well perfused  VAD:  Peripheral IV  Isolation:  none  Wound: none      General Labs reviewed:  Recent Labs   Lab 01/24/23  0545 01/25/23  0556 01/26/23  0437   WBC 13.23* 13.03* 11.19   HGB 7.3* 7.6* 8.4*   HCT 22.2* 23.8* 26.2*    334 375       Recent Labs   Lab 01/24/23  0545 01/25/23  0556 01/26/23  0437   * 134* 134*   K 4.5 4.5 4.3    106 108   CO2 20* 19* 19*   BUN 12 13 15   CREATININE 0.8 0.7 0.9   CALCIUM 9.4 9.2 10.2     No results for input(s): CRP in the last 168 hours.    Estimated Creatinine Clearance: 124.7 mL/min (based on SCr of 0.9 mg/dL).    Micro:  Microbiology Results (last 7 days)       Procedure Component Value Units Date/Time    Blood culture [[id number]] Collected: 01/20/23 2051    Order Status: Completed Specimen: Blood from Antecubital, Left Arm Updated: 01/25/23 2232     Blood Culture, Routine No growth after 5 days.    Narrative:      Drawn from PICC line    IV catheter culture [[id number]] Collected: 01/20/23 1920    Order Status: Completed Specimen: Catheter Tip, PICC Updated: 01/24/23 0703     Aerobic  Culture - Cath tip No growth    Respiratory Infection Panel (PCR), Nasopharyngeal [[id number]] Collected: 01/20/23 2031    Order Status: Completed Specimen: Nasopharyngeal Swab Updated: 01/20/23 2242     Respiratory Infection Panel Source NP swab     Adenovirus Not Detected     Coronavirus 229E, Common Cold Virus Not Detected     Coronavirus HKU1, Common Cold Virus Not Detected     Coronavirus NL63, Common Cold Virus Not Detected     Coronavirus OC43, Common Cold Virus Not Detected     Comment: The Coronavirus strains detected in this test cause the common cold.  These strains are not the COVID-19 (novel Coronavirus)strain   associated with the respiratory disease outbreak.          SARS-CoV2 (COVID-19) Qualitative PCR Not Detected     Human Metapneumovirus Not Detected     Human Rhinovirus/Enterovirus Not Detected     Influenza A (subtypes H1, H1-2009,H3) Not Detected     Influenza B Not Detected     Parainfluenza Virus 1 Not Detected     Parainfluenza Virus 2 Not Detected     Parainfluenza Virus 3 Not Detected     Parainfluenza Virus 4 Not Detected     Respiratory Syncytial Virus Not Detected     Bordetella Parapertussis (SD2440) Not Detected     Bordetella pertussis (ptxP) Not Detected     Chlamydia pneumoniae Not Detected     Mycoplasma pneumoniae Not Detected     Comment: Respiratory Infection Panel testing performed by Multiplex PCR.       Narrative:      Respiratory Infection Panel source->NP Swab            Imaging Reviewed:   CXR   MRI brain 1/1    Cardiology:    IMPRESSION & PLAN     Severe hypomagnesemia, improved - last Mg 1.7   Electrolyte abnormalities likely from ampho B    2. HIV/AIDS C3 - Cryptococcal meningitis, now controlled, s/p 6 weeks of ambisome and continues on high dose oral fluconazole   Cryptococcoma, stable   CD4 11/5 7 - VL 48,127 - repeat CD4 127 and VL 20 copies, almost undetectable   On Prezcobix/Descovy   PPD 1/14 negative    3. Cachexia with failure to thrive   On supplements     4.  Pansinusitis, cannot exclude possible cause of persistent leukocytosis; trending down today 13  CRP 2.0    5. CoNS - Staph epidermidis 1/2 bottles, s/p PICC removal 1/20 - likely a contaminant       Recommendations:  Continue Diflucan 800 mg per day for Crypto will need at least 12 months of therapy along with ART   Bactrim DS 1 tab daily, CD4 <200 for PJP and Toxoplasma  Completes Augmentin today   Continue Prezcobix/Descovy daily   Patient to follow with his ID physician in [place]   PT/OT as tolerated  SNF vs home with PT    D/w patient, Dr Ellison    Medical Decision Making during this encounter was  [_] Low Complexity  [_] Moderate Complexity  [xxx  ] High Complexity

## 2023-01-26 NOTE — PLAN OF CARE
01/26/23 0840   Post-Acute Status   Post-Acute Authorization E   E Status Referrals Sent     Case Management noted PT/OT recommendations for bedside commode on discharge. Bedside commode is a self pay item. CM or SW to meet with patient to discuss DME on discharge.

## 2023-01-27 LAB
ALBUMIN SERPL BCP-MCNC: 3.6 G/DL (ref 3.5–5.2)
ALP SERPL-CCNC: 79 U/L (ref 55–135)
ALT SERPL W/O P-5'-P-CCNC: 61 U/L (ref 10–44)
ANION GAP SERPL CALC-SCNC: 6 MMOL/L (ref 8–16)
ANISOCYTOSIS BLD QL SMEAR: SLIGHT
AST SERPL-CCNC: 28 U/L (ref 10–40)
BASOPHILS NFR BLD: 0 % (ref 0–1.9)
BILIRUB SERPL-MCNC: 0.6 MG/DL (ref 0.1–1)
BNP SERPL-MCNC: 4 PG/ML (ref 0–99)
BUN SERPL-MCNC: 21 MG/DL (ref 6–20)
CALCIUM SERPL-MCNC: 10.8 MG/DL (ref 8.7–10.5)
CHLORIDE SERPL-SCNC: 104 MMOL/L (ref 95–110)
CO2 SERPL-SCNC: 21 MMOL/L (ref 23–29)
CREAT SERPL-MCNC: 1.2 MG/DL (ref 0.5–1.4)
DIFFERENTIAL METHOD: ABNORMAL
EOSINOPHIL NFR BLD: 3 % (ref 0–8)
ERYTHROCYTE [DISTWIDTH] IN BLOOD BY AUTOMATED COUNT: 14.7 % (ref 11.5–14.5)
EST. GFR  (NO RACE VARIABLE): >60 ML/MIN/1.73 M^2
GLUCOSE SERPL-MCNC: 174 MG/DL (ref 70–110)
GLUCOSE SERPL-MCNC: 212 MG/DL (ref 70–110)
GLUCOSE SERPL-MCNC: 80 MG/DL (ref 70–110)
GLUCOSE SERPL-MCNC: 91 MG/DL (ref 70–110)
GLUCOSE SERPL-MCNC: 97 MG/DL (ref 70–110)
GLUCOSE SERPL-MCNC: 98 MG/DL (ref 70–110)
HCT VFR BLD AUTO: 27.5 % (ref 40–54)
HGB BLD-MCNC: 9 G/DL (ref 14–18)
IMM GRANULOCYTES # BLD AUTO: ABNORMAL K/UL (ref 0–0.04)
IMM GRANULOCYTES NFR BLD AUTO: ABNORMAL % (ref 0–0.5)
LYMPHOCYTES NFR BLD: 15 % (ref 18–48)
MAGNESIUM SERPL-MCNC: 1.5 MG/DL (ref 1.6–2.6)
MCH RBC QN AUTO: 27.4 PG (ref 27–31)
MCHC RBC AUTO-ENTMCNC: 32.7 G/DL (ref 32–36)
MCV RBC AUTO: 84 FL (ref 82–98)
MONOCYTES NFR BLD: 19 % (ref 4–15)
NEUTROPHILS NFR BLD: 61 % (ref 38–73)
NEUTS BAND NFR BLD MANUAL: 2 %
NRBC BLD-RTO: 0 /100 WBC
PHOSPHATE SERPL-MCNC: 5.4 MG/DL (ref 2.7–4.5)
PLATELET # BLD AUTO: 408 K/UL (ref 150–450)
PLATELET BLD QL SMEAR: ABNORMAL
PMV BLD AUTO: 8.2 FL (ref 9.2–12.9)
POTASSIUM SERPL-SCNC: 4.3 MMOL/L (ref 3.5–5.1)
PROT SERPL-MCNC: 8 G/DL (ref 6–8.4)
RBC # BLD AUTO: 3.28 M/UL (ref 4.6–6.2)
SODIUM SERPL-SCNC: 131 MMOL/L (ref 136–145)
WBC # BLD AUTO: 10.31 K/UL (ref 3.9–12.7)

## 2023-01-27 PROCEDURE — 83880 ASSAY OF NATRIURETIC PEPTIDE: CPT | Performed by: HOSPITALIST

## 2023-01-27 PROCEDURE — 97110 THERAPEUTIC EXERCISES: CPT

## 2023-01-27 PROCEDURE — 63700000 PHARM REV CODE 250 ALT 637 W/O HCPCS: Performed by: INTERNAL MEDICINE

## 2023-01-27 PROCEDURE — 25000003 PHARM REV CODE 250: Performed by: INTERNAL MEDICINE

## 2023-01-27 PROCEDURE — 12000002 HC ACUTE/MED SURGE SEMI-PRIVATE ROOM

## 2023-01-27 PROCEDURE — 99900035 HC TECH TIME PER 15 MIN (STAT)

## 2023-01-27 PROCEDURE — 84100 ASSAY OF PHOSPHORUS: CPT | Performed by: HOSPITALIST

## 2023-01-27 PROCEDURE — 63700000 PHARM REV CODE 250 ALT 637 W/O HCPCS: Performed by: NURSE PRACTITIONER

## 2023-01-27 PROCEDURE — 25000003 PHARM REV CODE 250: Performed by: NURSE PRACTITIONER

## 2023-01-27 PROCEDURE — 80053 COMPREHEN METABOLIC PANEL: CPT | Performed by: HOSPITALIST

## 2023-01-27 PROCEDURE — 85027 COMPLETE CBC AUTOMATED: CPT | Performed by: NURSE PRACTITIONER

## 2023-01-27 PROCEDURE — 63600175 PHARM REV CODE 636 W HCPCS: Performed by: NURSE PRACTITIONER

## 2023-01-27 PROCEDURE — 85007 BL SMEAR W/DIFF WBC COUNT: CPT | Performed by: NURSE PRACTITIONER

## 2023-01-27 PROCEDURE — 25000003 PHARM REV CODE 250: Performed by: STUDENT IN AN ORGANIZED HEALTH CARE EDUCATION/TRAINING PROGRAM

## 2023-01-27 PROCEDURE — 25000003 PHARM REV CODE 250: Performed by: HOSPITALIST

## 2023-01-27 PROCEDURE — 94761 N-INVAS EAR/PLS OXIMETRY MLT: CPT

## 2023-01-27 PROCEDURE — 97530 THERAPEUTIC ACTIVITIES: CPT

## 2023-01-27 PROCEDURE — 99233 SBSQ HOSP IP/OBS HIGH 50: CPT | Mod: ,,, | Performed by: STUDENT IN AN ORGANIZED HEALTH CARE EDUCATION/TRAINING PROGRAM

## 2023-01-27 PROCEDURE — 95819 EEG AWAKE AND ASLEEP: CPT

## 2023-01-27 PROCEDURE — 99233 PR SUBSEQUENT HOSPITAL CARE,LEVL III: ICD-10-PCS | Mod: ,,, | Performed by: STUDENT IN AN ORGANIZED HEALTH CARE EDUCATION/TRAINING PROGRAM

## 2023-01-27 PROCEDURE — 80177 DRUG SCRN QUAN LEVETIRACETAM: CPT | Performed by: HOSPITALIST

## 2023-01-27 PROCEDURE — 83735 ASSAY OF MAGNESIUM: CPT | Performed by: NURSE PRACTITIONER

## 2023-01-27 RX ORDER — MIRTAZAPINE 15 MG/1
15 TABLET, FILM COATED ORAL NIGHTLY
Status: DISCONTINUED | OUTPATIENT
Start: 2023-01-27 | End: 2023-02-07 | Stop reason: HOSPADM

## 2023-01-27 RX ADMIN — THERA TABS 1 TABLET: TAB at 09:01

## 2023-01-27 RX ADMIN — SODIUM BICARBONATE 650 MG TABLET 650 MG: at 02:01

## 2023-01-27 RX ADMIN — FERROUS SULFATE TAB 325 MG (65 MG ELEMENTAL FE) 1 EACH: 325 (65 FE) TAB at 09:01

## 2023-01-27 RX ADMIN — RISPERIDONE 0.5 MG: 0.25 TABLET ORAL at 09:01

## 2023-01-27 RX ADMIN — LEVETIRACETAM 1500 MG: 500 TABLET, FILM COATED ORAL at 09:01

## 2023-01-27 RX ADMIN — ENOXAPARIN SODIUM 40 MG: 100 INJECTION SUBCUTANEOUS at 05:01

## 2023-01-27 RX ADMIN — MEGESTROL ACETATE 40 MG: 20 TABLET ORAL at 09:01

## 2023-01-27 RX ADMIN — METOCLOPRAMIDE 10 MG: 10 TABLET ORAL at 09:01

## 2023-01-27 RX ADMIN — FLUTICASONE PROPIONATE 100 MCG: 50 SPRAY, METERED NASAL at 09:01

## 2023-01-27 RX ADMIN — MIRTAZAPINE 15 MG: 15 TABLET, FILM COATED ORAL at 08:01

## 2023-01-27 RX ADMIN — SODIUM BICARBONATE 650 MG TABLET 650 MG: at 08:01

## 2023-01-27 RX ADMIN — METOCLOPRAMIDE 10 MG: 10 TABLET ORAL at 12:01

## 2023-01-27 RX ADMIN — CYPROHEPTADINE HYDROCHLORIDE 4 MG: 4 TABLET ORAL at 09:01

## 2023-01-27 RX ADMIN — ESCITALOPRAM OXALATE 20 MG: 10 TABLET ORAL at 09:01

## 2023-01-27 RX ADMIN — FOLIC ACID 1 MG: 1 TABLET ORAL at 09:01

## 2023-01-27 RX ADMIN — THIAMINE HCL TAB 100 MG 100 MG: 100 TAB at 09:01

## 2023-01-27 RX ADMIN — FLUCONAZOLE 800 MG: 100 TABLET ORAL at 09:01

## 2023-01-27 RX ADMIN — METOCLOPRAMIDE 10 MG: 10 TABLET ORAL at 08:01

## 2023-01-27 RX ADMIN — EMTRICITABINE AND TENOFOVIR DISOPROXIL FUMARATE 1 TABLET: 200; 300 TABLET, FILM COATED ORAL at 09:01

## 2023-01-27 RX ADMIN — INSULIN DETEMIR 10 UNITS: 100 INJECTION, SOLUTION SUBCUTANEOUS at 09:01

## 2023-01-27 RX ADMIN — METOCLOPRAMIDE 10 MG: 10 TABLET ORAL at 05:01

## 2023-01-27 RX ADMIN — MAGNESIUM 64 MG (MAGNESIUM CHLORIDE) TABLET,DELAYED RELEASE 128 MG: at 09:01

## 2023-01-27 RX ADMIN — LEVETIRACETAM 1500 MG: 500 TABLET, FILM COATED ORAL at 08:01

## 2023-01-27 RX ADMIN — SULFAMETHOXAZOLE AND TRIMETHOPRIM 1 TABLET: 800; 160 TABLET ORAL at 09:01

## 2023-01-27 RX ADMIN — PANTOPRAZOLE SODIUM 40 MG: 40 TABLET, DELAYED RELEASE ORAL at 05:01

## 2023-01-27 RX ADMIN — INSULIN DETEMIR 10 UNITS: 100 INJECTION, SOLUTION SUBCUTANEOUS at 08:01

## 2023-01-27 RX ADMIN — DARUNAVIR ETHANOLATE AND COBICISTAT 1 TABLET: 800; 150 TABLET, FILM COATED ORAL at 09:01

## 2023-01-27 RX ADMIN — FLUTICASONE PROPIONATE 100 MCG: 50 SPRAY, METERED NASAL at 08:01

## 2023-01-27 RX ADMIN — SODIUM BICARBONATE 650 MG TABLET 650 MG: at 09:01

## 2023-01-27 NOTE — CONSULTS
[facility]  Department of Neurology  Neurology Consultation Note        PATIENT NAME: El Abreu  MRN: [id number]  CSN: [id number]      TODAY'S DATE: 01/27/2023  ADMIT DATE: 1/16/2023                            CONSULTING PROVIDER: Charlie Adair MD  CONSULT REQUESTED BY: Maximiliano Ellison MD      Reason for consult: Seizure       History obtained from chart review and the patient.    HPI per EMR: El Abreu is a 27 y.o. 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  he was last admitted to [facility] November 30th with vomiting and headache, increased intracranial pressure.  This LP had a positive Sirisha ink opening pressure was 23.  He was then committed to a 6 week course of AmBisome completed 1/10, which was interrupted with AC but resumed and he was discharged to [facility] on January 10th.  He did require an additional LP for fluid removal and reduction of pressure on 12/13 (Sirisha ink was still positive but the culture was negative.  The last positive culture was 11/21).  He has been continued on fluconazole 800 mg daily since 01/11 and has been receiving Pneumocystis prophylaxis and appetite stimulants and antidepressants.  MRI brain on 01/01 showed stability of the right globus pallidus cryptococcal pseudocyst.  He was additionally treated for neurosyphilis 3 doses of Bicillin after an appropriate course of IV penicillin G.  He has not been eating well, withdrawn, seen by Psychiatry late December and started on risperidone.  He was seen in the [facility] emergency room 1/14 when his mandible became stuck open.  This was reduced in their emergency room he was sent back to the [facility].  He again presented back to the emergency room for severe hypomagnesemia, 0.8  While in the emergency room he was noted to have a rising white blood cell count, from normal on 01/08 to 14 on 01/09 to 17 on 01/16 additionally his hemoglobin was 7.3.  An infectious workup was commenced and CRP was 2.0, lactic acid 0.9, procalcitonin 0.15.  Chest x-ray was negative, blood cultures are negative, respiratory PCR is not yet been collected and he is not produce any sputum but this is not likely relevant..vanc and cefepime were begun.   Unfortunately our hospital is full and he has been waiting in the hallway outside the emergency room.  He is incontinent of urine, and urinalysis was negative.     During the hospital stay, patient had syncopal episode on 01/18 while working with PT. patient currently being treated for hypomagnesemia, AIDS, with recent cryptococcal meningitis and neurosyphilis.    Neurology consult:  Patient was seen and examined by me this morning.  He is alert and oriented x3.  Neurology is consulted for episodes of syncope.  Patient states that he has had multiple episodes of syncopal spells mostly happens when he stands up and has not had any syncopal spells when he is lying down or sitting in the chair.  Denies any symptoms prior to the syncopal spell and denies having any seizure-like activity associated with the syncope.  He is no history of seizures.  He also states that he is not confused after the episode and is almost back to himself after he wakes up.    He denies any focal/unilateral weakness or sensory changes.  Denies any headache, vision changes or speech trouble.    PREVIOUS MEDICAL HISTORY:  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    Diabetes mellitus     type 2 x 2 months ago dx//    Neurosyphilis 11/06/2022    Paroxysmal atrial fibrillation     Seizures     Strabismus age 4-5//    os since accident     Vision abnormalities     left eye     PREVIOUS SURGICAL HISTORY:  Past Surgical History:    Procedure Laterality Date    CATARACT EXTRACTION W/  INTRAOCULAR LENS IMPLANT Left age 8    CIRCUMCISION      EYE SURGERY  4-5 yrs of age    toy hit eye os//     FAMILY MEDICAL HISTORY:  Family History   Problem Relation Age of Onset    Diabetes Mother     Diabetes Father     Hypertension Father     Diabetes Paternal Grandmother     Hypertension Paternal Grandmother     Diabetes Paternal Grandfather     Cancer Paternal Grandfather     Stroke Paternal Grandfather     Blindness Paternal Uncle     Amblyopia Neg Hx     Cataracts Neg Hx     Glaucoma Neg Hx     Macular degeneration Neg Hx     Retinal detachment Neg Hx     Strabismus Neg Hx     Thyroid disease Neg Hx      SOCIAL HISTORY:  Social History     Tobacco Use    Smoking status: Never   Substance Use Topics    Alcohol use: No    Drug use: No     ALLERGIES:  Review of patient's allergies indicates:  No Known Allergies  HOME MEDICATIONS:  Prior to Admission medications    Medication Sig Start Date End Date Taking? Authorizing Provider   acetaminophen (TYLENOL) 650 MG TbSR Take 650 mg by mouth every 4 (four) hours as needed.   Yes Historical Provider   aluminum-magnesium hydroxide-simethicone (MAALOX ADVANCED) 200-200-20 mg/5 mL Susp Take 30 mLs by mouth every 4 (four) hours as needed.   Yes Historical Provider   cetirizine (ZYRTEC) 10 MG tablet Take 10 mg by mouth once daily.   Yes Historical Provider   cyproheptadine (PERIACTIN) 4 mg tablet Take 1 tablet (4 mg total) by mouth 2 (two) times daily. 1/10/23  Yes Won Rhodes MD   darunavir-cobicistat (PREZCOBIX) 800-150 mg-mg Tab tablet Take 1 tablet by mouth once daily. 1/6/23 2/5/23 Yes Won Rhodes MD   emtricitabine-tenofovir alafen (DESCOVY) 200-25 mg Tab Take 1 tablet by mouth once daily. 1/6/23 2/5/23 Yes Won Rhodes MD   enoxaparin (LOVENOX) 40 mg/0.4 mL Syrg Inject 40 mg into the skin once daily.   Yes Historical Provider   EScitalopram oxalate (LEXAPRO) 20 MG tablet Take 1 tablet (20 mg total)  by mouth once daily. 1/6/23 1/6/24 Yes Won Rhodes MD   ferrous gluconate (FERGON) 324 MG tablet Take 650 mg by mouth every other day.   Yes Historical Provider   fluticasone propionate (FLONASE) 50 mcg/actuation nasal spray 2 sprays by Each Nostril route 2 (two) times a day.   Yes Historical Provider   folic acid (FOLVITE) 1 MG tablet Take 1 tablet (1 mg total) by mouth once daily. 1/6/23 1/6/24 Yes Won Rhodes MD   glucagon 1 mg/mL SolR Inject 1 mg into the muscle as needed.   Yes Historical Provider   glucose 4 GM chewable tablet Take 16-24 g by mouth as needed for Low blood sugar.   Yes Historical Provider   HYDROcodone-acetaminophen (NORCO) 5-325 mg per tablet Take 1 tablet by mouth every 4 (four) hours as needed for Pain.   Yes Historical Provider   insulin aspart U-100 (NOVOLOG) 100 unit/mL (3 mL) InPn pen Inject 15 Units into the skin 3 (three) times daily.  Patient taking differently: Inject 1-10 Units into the skin 4 (four) times daily before meals and nightly. 11/21/22 1/16/23 Yes Vishal Palomino MD   insulin detemir U-100 (LEVEMIR FLEXTOUCH) 100 unit/mL (3 mL) SubQ InPn pen Inject 10 Units into the skin 2 (two) times daily. 11/21/22 11/21/23 Yes Vishal Palomino MD   Lactobacillus rhamnosus GG (CULTURELLE) 10 billion cell capsule Take 1 capsule by mouth once daily. 1/6/23 2/5/23 Yes Won Rhodes MD   levETIRAcetam (KEPPRA) 750 MG Tab Take 2 tablets (1,500 mg total) by mouth 2 (two) times daily. 11/21/22 11/21/23 Yes Vishal Palomino MD   megestroL (MEGACE) 40 MG Tab Take 1 tablet (40 mg total) by mouth once daily. 1/6/23 1/6/24 Yes Won Rhodes MD   melatonin (MELATIN) 3 mg tablet Take 6 mg by mouth nightly as needed for Insomnia.   Yes Historical Provider   metoclopramide HCl (REGLAN) 10 MG tablet Take 1 tablet (10 mg total) by mouth 4 (four) times daily with meals and nightly. 1/10/23  Yes Won Rhodes MD   multivitamin (ONE DAILY MULTIVITAMIN) per tablet Take 1  tablet by mouth once daily.   Yes Historical Provider   pantoprazole (PROTONIX) 40 MG tablet Take 1 tablet (40 mg total) by mouth once daily. 1/6/23 1/6/24 Yes Won Rhodes MD   potassium bicarbonate (K-LYTE) disintegrating tablet Take 50 mEq by mouth once daily. Dissolve tab in 4oz cold water prior to administration   Yes Historical Provider   risperiDONE (RISPERDAL) 0.5 MG Tab Take 1 tablet (0.5 mg total) by mouth once daily. 1/6/23 1/6/24 Yes Won Rhodes MD   risperiDONE 0.25 mg TbDL Take 4 tablets (1 mg total) by mouth every evening. 1/5/23 1/5/24 Yes Won Rhodes MD   sodium bicarbonate 650 MG tablet Take 2 tablets (1,300 mg total) by mouth 3 (three) times daily. 1/5/23 1/5/24 Yes Won Rhodes MD   sulfamethoxazole-trimethoprim 800-160mg (BACTRIM DS) 800-160 mg Tab Take 1 tablet by mouth every Mon, Wed, Fri. 1/11/23 2/10/23 Yes Won Rhodes MD   thiamine 100 MG tablet Take 100 mg by mouth once daily.   Yes Historical Provider   blood sugar diagnostic Strp To check BG 4 times daily, and PRN hypoglycemia to use with insurance preferred meter 12/5/22   Jaya Mathias MD   blood-glucose meter (ONETOUCH VERIO METER) Misc To check Blood sugars 4 times daily  , DX E 11.65 12/5/22   Jaya Mathias MD   lancets Misc To check BG 4 times daily, and PRN low blood sugars to use with insurance preferred meter 12/5/22   Jaya Mathias MD     CURRENT SCHEDULED MEDICATIONS:   cyproheptadine  4 mg Oral BID    darunavir-cobicistat  1 tablet Oral Daily    emtricitabine-tenofovir 200-300 mg  1 tablet Oral Daily    enoxaparin  40 mg Subcutaneous Daily    EScitalopram oxalate  20 mg Oral Daily    ferrous sulfate  1 tablet Oral Daily    fluconazole  800 mg Oral Daily    fluticasone propionate  2 spray Each Nostril BID    folic acid  1 mg Oral Daily    insulin detemir U-100  10 Units Subcutaneous BID    levETIRAcetam  1,500 mg Oral BID    magnesium chloride  128 mg Oral Daily    megestroL  40 mg Oral Daily     metoclopramide HCl  10 mg Oral QID (WM & HS)    multivitamin  1 tablet Oral Daily    pantoprazole  40 mg Oral Before breakfast    risperiDONE  0.5 mg Oral Daily    risperiDONE  1 mg Oral QHS    sodium bicarbonate  650 mg Oral TID    sulfamethoxazole-trimethoprim 800-160mg  1 tablet Oral Daily    thiamine  100 mg Oral Daily     CURRENT INFUSIONS:    CURRENT PRN MEDICATIONS:  acetaminophen, albuterol-ipratropium, dextrose 10%, dextrose 10%, glucagon (human recombinant), glucose, glucose, HYDROcodone-acetaminophen, HYDROcodone-acetaminophen, insulin aspart U-100, lorazepam, magnesium sulfate IVPB, magnesium sulfate IVPB, melatonin, melatonin, naloxone, ondansetron, polyethylene glycol, potassium bicarbonate, potassium bicarbonate, potassium bicarbonate, prochlorperazine, simethicone, sodium chloride 0.9%    REVIEW OF SYSTEMS:  Please refer to the HPI for all pertinent positive and negative findings. A comprehensive review of all other systems was negative.       PHYSICAL EXAM:  Patient Vitals for the past 24 hrs:   BP Temp Temp src Pulse Resp SpO2   01/27/23 0834 120/81 97.9 °F (36.6 °C) Axillary 107 18 100 %   01/27/23 0418 112/81 97.1 °F (36.2 °C) Axillary 109 17 100 %   01/26/23 2334 112/76 99 °F (37.2 °C) Axillary 103 16 100 %   01/26/23 1931 108/69 98.6 °F (37 °C) Oral 109 17 100 %   01/26/23 1530 115/80 98.6 °F (37 °C) Oral (!) 113 18 100 %   01/26/23 1230 115/76 98.4 °F (36.9 °C) Oral 93 14 98 %       GENERAL APPEARANCE: Alert, well-developed, well-nourished male in no acute distress.  HEENT: Normocephalic and atraumatic. PERRL. Oropharynx unremarkable.  PULM: Normal respiratory effort. No accessory muscle use.  CV: RRR.  ABDOMEN: Soft, nontender.  EXTREMITIES: No obvious signs of vascular compromise. Pulses present. No cyanosis, clubbing or edema.  SKIN: Clear; no rashes, lesions or skin breaks in exposed areas.    NEURO:  MENTAL STATUS:   , Patient awake and oriented to time, place, and person, recent/remote  memory normal, attention span/concentration normal, speech fluent without paraphasic errors, good comprehension with appropriate thought content, and fund of knowledge appropriate for patient's level of education.  Affect euthymic.    CRANIAL NERVES:  CN I: Not tested.  CN II: Fundoscopic exam deferred.  CN III, IV, VI: Pupils equal, round and reactive to light.  Extraocular movements full and intact.  CN V: Facial sensation normal.  CN VII: Facial asymmetry absent.  CN VIII: Hearing grossly normal and equal bilaterally.  No skew deviation or pathologic nystagmus.  CN IX, X: Palate elevates symmetrically. Speech/articulation:  Dysarthria/hypophonia  CN XI: Shoulder shrug and chin rotation equal with good strength.  CN XII: Tongue protrusion midline.    MOTOR:  Bulk normal. Tone normal and symmetric throughout.  Abnormal movements absent.  Tremor: none present.  Strength 5/5 throughout.    REFLEXES:  DTRs 2+ throughout.  Plantar response downgoing bilaterally.  SENSATION: grossly intact throughout.  COORDINATION: normal finger-to-nose.  STATION: not tested.  GAIT: not tested.    Labs:  Recent Labs   Lab 01/25/23  0556 01/25/23  0559 01/26/23  0437 01/27/23  0504   *  --  134* 131*   K 4.5  --  4.3 4.3     --  108 104   CO2 19*  --  19* 21*   BUN 13  --  15 21*   CREATININE 0.7  --  0.9 1.2   *  --  98 91   CALCIUM 9.2  --  10.2 10.8*   PHOS  --  4.2 5.2* 5.4*   MG 1.6  --  1.7 1.5*     Recent Labs   Lab 01/25/23  0556 01/26/23  0437 01/27/23  0504   WBC 13.03* 11.19 10.31   HGB 7.6* 8.4* 9.0*   HCT 23.8* 26.2* 27.5*    375 408     Recent Labs   Lab 01/27/23  0504   ALBUMIN 3.6   PROT 8.0   BILITOT 0.6   ALKPHOS 79   ALT 61*   AST 28     Lab Results   Component Value Date    INR 1.1 01/11/2023     Lab Results   Component Value Date    TRIG 105 01/03/2023    HDL 24 (L) 01/03/2023    CHOLHDL 14.5 (L) 01/03/2023     Lab Results   Component Value Date    HGBA1C 6.0 01/17/2023     Lab Results    Component Value Date    PROTEINCSF 50 12/13/2022    GLUCCSF 77 (H) 12/13/2022       Imaging:  I have reviewed and interpreted the pertinent imaging and lab results.      X-Ray Chest AP Portable  HISTORY: follow up chest congestion    FINDINGS: Portable chest radiograph at 1848 hours compared to 01/16/2023 shows right PICC unchanged in position, with the cardiomediastinal silhouette and pulmonary vasculature stable and within normal limits.    The lungs are normally expanded, with no consolidation, large pleural effusion, or evidence of pulmonary edema. No confluent infiltrates or pneumothorax. There are no significant osseous abnormalities.    IMPRESSION: No evidence of active cardiopulmonary disease.    Electronically signed by:  Wilfrid Ta MD  1/18/2023 7:10 PM CST Workstation: [id number]         ASSESSMENT & PLAN:    Syncopal spells   HIV/AIDS      Plan:   Patient has had multiple syncopal spells associated with blank staring and mostly happens when he is being stood up for therapy.  Etiology likely orthostatic hypotension and less likely to be seizures.  EEG ordered and pending.  Check orthostatic vitals  Management of electrolyte imbalance per primary team  ID following  Repeat MRI brain   Will follow        Thank you kindly for including us in the care of this patient. Please do not hesitate to contact us with any questions.      44 minutes of care time has been spent evaluating with the patient. Time includes chart review not limited to diagnostic imaging, labs, and vitals, patient assessment, discussion with family and nursing, current order evaluations, and new order entries.       Charlie Adair MD  Neurology/vascular Neurology  Date of Service: 01/27/2023  9:17  AM    --------------------------------------------------------------------------------------------------------------------------------------------------------------------------------------------------------------------------------------------------------------  Please note: This note was transcribed using voice recognition software. Because of this technology there are often uinintended grammatical, spelling, and other transcription errors. Please disregard these errors.

## 2023-01-27 NOTE — PROCEDURES
EEG REPORT    NAME: El Abreu  : 1995  MRN: [id number]    DATE of EE2023    CLINICAL INDICATION: This is a 27 y.o. male with history of HIV, cryptococcal meningitis,being evaluated for syncopal spells.    MEDICATIONS:    darunavir-cobicistat  1 tablet Oral Daily    emtricitabine-tenofovir 200-300 mg  1 tablet Oral Daily    enoxaparin  40 mg Subcutaneous Daily    EScitalopram oxalate  20 mg Oral Daily    ferrous sulfate  1 tablet Oral Daily    fluconazole  800 mg Oral Daily    fluticasone propionate  2 spray Each Nostril BID    folic acid  1 mg Oral Daily    insulin detemir U-100  10 Units Subcutaneous BID    levETIRAcetam  1,500 mg Oral BID    magnesium chloride  128 mg Oral Daily    megestroL  40 mg Oral Daily    metoclopramide HCl  10 mg Oral QID (WM & HS)    mirtazapine  15 mg Oral QHS    multivitamin  1 tablet Oral Daily    pantoprazole  40 mg Oral Before breakfast    sodium bicarbonate  650 mg Oral TID    sulfamethoxazole-trimethoprim 800-160mg  1 tablet Oral Daily    thiamine  100 mg Oral Daily         EEG DESCRIPTION:  A 16-channel EEG was performed with electrode placement in 10/[employer] System. Longitudinal bipolar and referential montages were utilized in analysis.    This is a technically adequate study with minor muscle and motion artifacts.    The dominant posterior background rhythm was a well modulated, symmetric, synchronous, reactive, 8 Hz rhythm.    The record was reactive to eye opening and closure. Anterior derivations showed the expected admixture of low-voltage beta and theta frequencies as well as intermittent eye blink artifact.     sleep was not captured    Photic stimulation, performed elicited no driving response. Hyperventilation  was not performed.    No epileptiform discharges were recorded. No electrographic or electroclinical seizures were recorded.    INTERPRETATION:  This is a normal EEG recorded in awake state.  No epileptiform activity or seizures were  recorded      Charlie Adair MD  Neurology

## 2023-01-27 NOTE — PLAN OF CARE
01/27/23 0821   Post-Acute Status   Post-Acute Authorization Placement   Post-Acute Placement Status Set-up Complete/Auth obtained     Case Management received voicemail from Radha at [facility] [phone] concerning Rehab authorization. Per Radha, patient approved for 14 days of IP Rehab at [facility]. CM awaiting return call from Radha at this time. CM to follow up with accepting facility.     Case number [id number]    0921 - received call from Radha. Per Radha with [facility], patient approved for 14 days of IP Rehab at [facility]. CM notified Brittney at [facility].    1036 - CM met with Dr. Ellison to discuss discharge plan. Dr. Ellison placed new orders for blood pressure support (ex. Compression stockings). Patient to continue current POC and CM to re-assess patient's needs on Monday morning. CM closely following. CM to meet with patient at bedside on today to discuss above.     1337 - CM attempted to meet patient at bedside. Patient getting EEG at at the time of my attempt. CM touched base with Brittney at [facility] IP Rehab. CM anticipating discharge to rehab facility on Monday. Brittney v/u and thanked me for the update.

## 2023-01-27 NOTE — HPI
1/27/23  Identifying information  27-year-old  male is admitted to [facility] on formal voluntary admission he was admitted on January 16th 2023 and was referred from skilled nursing facility    History of presenting problems  At the skilled nursing facility patient was reported to be feeling fatigued tired with poor participation. Patient reports his appetite to be poor and he is not sure if he has lost or gained any weight.  Patient states that he sleeps okay and denies having any nightmares.  He does express some helpless hopeless ideations. Patient denies having any hallucinations denies having any major mood swings and denies having any suicidal thoughts.  Patient believes his memory is intact and says that he wishes to go home and live with his dad.   According to patient he was diagnosed with HIV somewhere in the month of September last year.  He denies having any feelings of anger, or disappointments at himself for contacting this serious illness.     Past psychiatric history  Patient denies any suicide attempt in the past he denies any inpatient and or outpatient treatment.  However when he was admitted at [facility] he was seen by Dr. Stoll who started him on Lexapro and felt the response was not adequate and added Risperdal to get better response from Lexapro.     Family history  Negative    Social history  Patient's parents are both alive in their mid 50s, they are  and patient states he lives with his father.  He has 2 brothers no sister and he finished high school at the age of 20 and had 1 year of college in [place].  Patient does admit to smoking marijuana.  Patient denies any intravenous use of drugs nor sharing any needles. He denies having any major relationships has never been  and has no children.     Medical surgical history  Seizure disorder, atrial fib, cryptococcus meningitis, diabetes, STDs which includes HIV as well as  "neurosyphilis.    Allergies  None reported    Current psychotropics  Lexapro 20 mg, Risperdal 1.5 mg p.o. q.d.,  (patient is on cyproheptadine which acts against the antidepressant medications)    Mental status examination  27-year-old lean and thin cachectic looking with poor oral care and increased lacrimation.  Patient made and maintained eye contact.  Patient has markedly low volume speech and he seems to have speech impediment and have difficulty pronouncing the words and is limited to answering yes and no that to in whispers.   Patient is alert and cooperative but seems preoccupied.   Orientation-"Friday, 27th January 2023, 26, 1995, John Aragon,"  His serial sevens are poor 100- 7= 93 -7= 92, 96.  There is no evidence of any auditory or visual hallucinations.  No illusions or delusions patient denies having any homicidal suicidal intent.  And expressed his desires to go home to be with his dad.  His recent memory is 4/4 after 1 minute, 0/4 after 5 minutes and 1/4 with help.  Patient's remote memory seems okay.  His insight and judgment seems impaired.     Impression  1. Major depression-recurrent superimposed on depression secondary to general medical condition  2. Cognitive impairment the etiology of it may be related to his general medical condition which includes neurosyphilis and HIV.  There is high probability of patient developing dementia.  3. Extrapyramidal syndrome-secondary to Risperdal  4. Patient's Dysarthric speech seems to be getting worse-    Recommendations  1. Discontinue cyproheptadine  2. Discontinue Risperdal  3. Remeron 15 mg  p.o. q.h.s. to improve his appetite  4. With the discontinuation of Risperdal improvement in extrapyramidal syndrome is expected in the next 2-3 days' time  5. Recommend consultation from the neurologist regarding patient's Dysarthria getting worse.  Thank you  "

## 2023-01-27 NOTE — PT/OT/SLP PROGRESS
Physical Therapy Treatment    Patient Name:  El Abreu   MRN:  [id number]    Recommendations:     Discharge Recommendations: nursing facility, skilled, rehabilitation facility, LTACH (long-term acute care hospital)  Discharge Equipment Recommendations: bedside commode  Barriers to discharge: None    Assessment:     El Abreu is a 27 y.o. male admitted with a medical diagnosis of AIDS.  He presents with the following impairments/functional limitations: weakness, impaired endurance, impaired self care skills, impaired functional mobility, impaired balance, decreased upper extremity function, decreased lower extremity function, decreased safety awareness, impaired cardiopulmonary response to activity. Patient is agreeable to participation with PT treatment. PT treatment limited to therex in bed secondary to syncopal episode this morning. He performed 10 reps of BLE therex with assistance provided as needed with good tolerance.     Rehab Prognosis: Good; patient would benefit from acute skilled PT services to address these deficits and reach maximum level of function.    Recent Surgery: * No surgery found *      Plan:     During this hospitalization, patient to be seen 6 x/week to address the identified rehab impairments via gait training, therapeutic activities, therapeutic exercises, neuromuscular re-education and progress toward the following goals:    Plan of Care Expires:  02/17/23    Subjective     Chief Complaint: wants to go home   Patient/Family Comments/goals: home  Pain/Comfort:  Pain Rating 1: 0/10      Objective:     Communicated with PABLO Mata prior to session.  Patient found HOB elevated with bed alarm, peripheral IV, telemetry upon PT entry to room.     General Precautions: Standard, fall  Orthopedic Precautions: N/A  Braces: N/A  Respiratory Status: Room air     Functional Mobility:  Bed Mobility:     Scooting: stand by assistance      AM-PAC 6 CLICK MOBILITY          Treatment &  Education:  Patient was educated on the importance of OOB activity and functional mobility to negate negative effects of prolonged bed rest during hospitalization, safe transfers, and D/C planning     Patient performed 10 reps of bilateral LE therapeutic exercise including ankle pumps, quad sets, straight leg raises, short arc quads hip adduction with pillow, hip abduction with manual resistance, and heel slides     Patient left HOB elevated with all lines intact, call button in reach, and bed alarm on..    GOALS:   Multidisciplinary Problems       Physical Therapy Goals          Problem: Physical Therapy    Goal Priority Disciplines Outcome Goal Variances Interventions   Physical Therapy Goal     PT, PT/OT Ongoing, Progressing     Description: Goals to be met by: 23     Patient will increase functional independence with mobility by performin. Supine to sit with Supervision  2. Sit to stand transfer with Supervision  3. Bed to chair transfer with Supervision using Rolling Walker  4. Gait  x 150 feet with Supervision using Rolling Walker.                              Time Tracking:     PT Received On: 23  PT Start Time: 1459     PT Stop Time: 1516  PT Total Time (min): 17 min     Billable Minutes: Therapeutic Exercise 17    Treatment Type: Treatment  PT/PTA: PT     PTA Visit Number: 0     2023

## 2023-01-27 NOTE — PROGRESS NOTES
Progress Note  Infectious Disease    Reason for Consult:  leukocytosis    HPI: El Abreu is a 27 y.o. 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  he was last admitted to [facility] November 30th with vomiting and headache, increased intracranial pressure.  This LP had a positive Sirisha ink opening pressure was 23.  He was then committed to a 6 week course of AmBisome completed 1/10, which was interrupted with AC but resumed and he was discharged to [facility] on January 10th.  He did require an additional LP for fluid removal and reduction of pressure on 12/13 (Sirisha ink was still positive but the culture was negative.  The last positive culture was 11/21).  He has been continued on fluconazole 800 mg daily since 01/11 and has been receiving Pneumocystis prophylaxis and appetite stimulants and antidepressants.  MRI brain on 01/01 showed stability of the right globus pallidus cryptococcal pseudocyst.  He was additionally treated for neurosyphilis 3 doses of Bicillin after an appropriate course of IV penicillin G.  He has not been eating well, withdrawn, seen by Psychiatry late December and started on risperidone.  He was seen in the [facility] emergency room 1/14 when his mandible became stuck open.  This was reduced in their emergency room he was sent back to the [facility].  He was sent to our emergency room yesterday for severe hypomagnesemia, 0.8 While in the emergency room he was noted to have a rising white blood cell count, from normal on 01/08 to 14 on 01/09 to 17 on 01/16 additionally his hemoglobin was 7.3.  An infectious workup was commenced and CRP was 2.0, lactic acid 0.9, procalcitonin 0.15.  Chest x-ray was negative, blood cultures are negative, respiratory PCR is not yet been  collected and he is not produce any sputum but this is not likely relevant..vanc and cefepime were begun.   Unfortunately our hospital is full and he has been waiting in the hallway outside the emergency room.  He is incontinent of urine, and urinalysis was negative.  Magnesium is now 1.7    He feels much improved, tells me that he ate breakfast and lunch. He does endorse some small amount of yellow sputum and he does find that his sinuses are congested. It is relevant to note that he had pansinusitis on MRI brain 1/1.     1/18: interim reviewed. He is eating well. He had a syncopal episode while working with PT. He states that this has happened before, but does not happen consistently when he gets up. He is still congested but not really expectorating. Sputum sample was not purulent(saliva) and culture has only normal burak. He is not receiving his HIV antivirals here(not on formulary) though these were available at [facility]. Repeated CXR and it is still clear.   1/20:  interim reviewed. WBC a little higher , peripheral smear noted but unsure of the relevance of the findings.  He is sleeping deeply, worked with physical therapy much more successfully today he is eating reasonably well and antivirals were resumed.  He endorses that his cough is improved.  Denies diarrhea. Blood culture from 1/16, has coag neg Staph in 1/4 bottles drawn from picc line. The picc line is 47 days old, placed 12/4.      1/21-22 (Deep): interim reviewed, discussed with Dr Ny, patient seen and examined at bedside.  States he vomited earlier this morning, he was feeling very nauseous.  He denies headaches, no fever or chills.  Slightly hypertensive, afebrile.  Adequate urinary output, 1 episode of stool recorded in the last 24 hours.  Labs reviewed, persistent leukocytosis 20.9, lymphocytic predominance 10%, no left shift, bands 10%, H&H 7.5/23.5, platelet count 248.  Stable kidney function, magnesium 1.2.  Micro reviewed, blood cultures  from 01/20 no growth to date, pending final.  Tip culture from PICC which was removed 1/20 no growth.  Respiratory viral panel not detected.    1/23:  Interim reviewed, patient seen examined at bedside, states he is feeling a little stronger today, was able to walk around with physical therapy, states he had 2-3/10 frontal headache this morning that is now improved.  Hemodynamically stable, afebrile.  Labs reviewed, leukocytosis down to 15.9, lymphocytic predominance 12%, bands 2%, H&H 7.5/32.4, platelet count 312.  Magnesium 1.4, stable potassium, and kidney function.  CD4 count and viral load in process.    1/24:  Interim reviewed, patient seen examined at bedside, states he is feeling much better today, walk with PT, encouraged to sit in the chair.  Hemodynamically stable, afebrile.  White count down to 13.2, no left shift, H&H 7.3/22.2, platelet count 351, magnesium 1.6.  CD4 122, viral load still in process.    1/25:  Interim reviewed, patient seen examined at bedside.  States he is feeling good today, trying his best to cooperate with physical therapy, encouraged to get out of bed.  Hemodynamically stable, afebrile.  Labs reviewed, white count 13, no left shift, H&H 7.6/23.8, platelet count 334.  Stable kidney function.  Viral load 20 copies, almost undetectable.    1/26:  Interim reviewed, patient seen examined at bedside, states he threw up at noon, asking for food.  States he is feeling better, encouraged to get out of bed.  Physical therapy still recommending skilled nursing facilities, case management trying to find a place for him to go.  Labs reviewed, white count improved, stable kidney function, magnesium 1.7, improved.    1/27: interim reviewed, patient seen and examined at bedside. Patient had hypotension this morning, now improved. Seen by Psych, recommendations noted. EEG reviewed, no abnormalities.     EXAM & DIAGNOSTICS REVIEWED:   Vitals:     Temp:  [97.1 °F (36.2 °C)-99 °F (37.2 °C)]   Temp:  97.9 °F (36.6 °C) (01/27/23 0834)  Pulse: 107 (01/27/23 0834)  Resp: 18 (01/27/23 0834)  BP: 120/81 (01/27/23 0834)  SpO2: 100 % (01/27/23 1221)  No intake or output data in the 24 hours ending 01/27/23 1723      General:  Very thin, in NAD, cooperative, comfortable, non toxic  Eyes:  Anicteric, EOMI -  iris heterochromia/ post surgical L eye   ENT:  No ulcers, exudates, thrush, nares patent, nasal congestion improved   Neck:  supple  Lungs: Mostly clear to auscultation   Heart:  RRR, no gallop/murmur/rub noted  Abd:  Soft, NT, ND, normal BS, no masses or organomegaly appreciated.  :  Voids , no flank tenderness  Musc:  Joints without effusion, swelling, erythema, synovitis, with mild generalized muscle wasting.   Skin:  No rashes. Dry skin.  Neuro:             Arousable, speech fluent, face symmetric, moves all extremities, no focal weakness.   Psych:  Calm, cooperative  Lymphatic:        Extrem: No edema, erythema, phlebitis, cellulitis, warm and well perfused  VAD:  Peripheral IV  Isolation:  none  Wound: none      General Labs reviewed:  Recent Labs   Lab 01/25/23  0556 01/26/23  0437 01/27/23  0504   WBC 13.03* 11.19 10.31   HGB 7.6* 8.4* 9.0*   HCT 23.8* 26.2* 27.5*    375 408       Recent Labs   Lab 01/25/23  0556 01/26/23  0437 01/27/23  0504   * 134* 131*   K 4.5 4.3 4.3    108 104   CO2 19* 19* 21*   BUN 13 15 21*   CREATININE 0.7 0.9 1.2   CALCIUM 9.2 10.2 10.8*   PROT  --   --  8.0   BILITOT  --   --  0.6   ALKPHOS  --   --  79   ALT  --   --  61*   AST  --   --  28     No results for input(s): CRP in the last 168 hours.    Estimated Creatinine Clearance: 93.5 mL/min (based on SCr of 1.2 mg/dL).    Micro:  Microbiology Results (last 7 days)       Procedure Component Value Units Date/Time    Blood culture [[id number]] Collected: 01/20/23 2051    Order Status: Completed Specimen: Blood from Antecubital, Left Arm Updated: 01/25/23 2232     Blood Culture, Routine No growth after 5 days.     Narrative:      Drawn from PICC line    IV catheter culture [[id number]] Collected: 01/20/23 1920    Order Status: Completed Specimen: Catheter Tip, PICC Updated: 01/24/23 0703     Aerobic Culture - Cath tip No growth    Respiratory Infection Panel (PCR), Nasopharyngeal [[id number]] Collected: 01/20/23 2031    Order Status: Completed Specimen: Nasopharyngeal Swab Updated: 01/20/23 2242     Respiratory Infection Panel Source NP swab     Adenovirus Not Detected     Coronavirus 229E, Common Cold Virus Not Detected     Coronavirus HKU1, Common Cold Virus Not Detected     Coronavirus NL63, Common Cold Virus Not Detected     Coronavirus OC43, Common Cold Virus Not Detected     Comment: The Coronavirus strains detected in this test cause the common cold.  These strains are not the COVID-19 (novel Coronavirus)strain   associated with the respiratory disease outbreak.          SARS-CoV2 (COVID-19) Qualitative PCR Not Detected     Human Metapneumovirus Not Detected     Human Rhinovirus/Enterovirus Not Detected     Influenza A (subtypes H1, H1-2009,H3) Not Detected     Influenza B Not Detected     Parainfluenza Virus 1 Not Detected     Parainfluenza Virus 2 Not Detected     Parainfluenza Virus 3 Not Detected     Parainfluenza Virus 4 Not Detected     Respiratory Syncytial Virus Not Detected     Bordetella Parapertussis (DK3201) Not Detected     Bordetella pertussis (ptxP) Not Detected     Chlamydia pneumoniae Not Detected     Mycoplasma pneumoniae Not Detected     Comment: Respiratory Infection Panel testing performed by Multiplex PCR.       Narrative:      Respiratory Infection Panel source->NP Swab            Imaging Reviewed:   CXR   MRI brain 1/1    Cardiology:    IMPRESSION & PLAN     Severe hypomagnesemia, improved - last Mg 1.5   Electrolyte abnormalities likely from ampho B    2. HIV/AIDS C3 - Cryptococcal meningitis, now controlled, s/p 6 weeks of ambisome and continues on high dose oral fluconazole   Cryptococcoma,  stable   CD4 11/5 7 - VL 48,127 - repeat CD4 127 and VL 20 copies, almost undetectable   On Prezcobix/Descovy   PPD 1/14 negative    3. Cachexia with failure to thrive   On supplements     4. Pansinusitis, cannot exclude possible cause of persistent leukocytosis; trending down today 13  CRP 2.0    5. CoNS - Staph epidermidis 1/2 bottles, s/p PICC removal 1/20 - likely a contaminant       Recommendations:  Continue Diflucan 800 mg per day for Crypto will need at least 12 months of therapy along with ART   Bactrim DS 1 tab daily, CD4 <200 for PJP and Toxoplasma  Continue Prezcobix/Descovy daily   Optimizing nutrition   Patient to follow with his ID physician in [place]   PT/OT as tolerated  CM working at LTAC    We will be available as needed, thank you    D/w patient, Dr Ellison    Medical Decision Making during this encounter was  [_] Low Complexity  [_] Moderate Complexity  [xxx  ] High Complexity

## 2023-01-27 NOTE — CARE UPDATE
01/27/23 1221   Patient Assessment/Suction   Level of Consciousness (AVPU) alert   Respiratory Effort Unlabored   Expansion/Accessory Muscles/Retractions no use of accessory muscles   PRE-TX-O2   Device (Oxygen Therapy) room air   SpO2 100 %   Pulse Oximetry Type Intermittent   $ Pulse Oximetry - Multiple Charge Pulse Oximetry - Multiple   Aerosol Therapy   $ Aerosol Therapy Charges PRN treatment not required

## 2023-01-27 NOTE — PT/OT/SLP PROGRESS
Occupational Therapy   Treatment    Name: El Abreu  MRN: [id number]  Admitting Diagnosis:  AIDS       Recommendations:     Discharge Recommendations: rehabilitation facility, nursing facility, skilled, LTACH (long-term acute care hospital)  Discharge Equipment Recommendations:  bedside commode  Barriers to discharge:  None    Assessment:     El Abreu is a 27 y.o. male with a medical diagnosis of AIDS.  Pt agreeable to OT therapy session this AM. Performance deficits affecting function are weakness, impaired endurance, impaired self care skills, impaired functional mobility, gait instability, impaired balance, impaired cardiopulmonary response to activity.     While in standing to obtain BP, pt had a brief syncopal episode and was unable to continue with therapy session. Pt did not report feeling dizzy or lightheaded prior to this episode.   Pt's BP supine 108/66 ; EOB: 106/56 ; back on EOB (after attempting standing and after brief episode of syncope) : 53/38 . Pt has been wearing his CARMEN hose since yesterday, per his report. Nursing and MD notified and nursing in room to assess patient.    Rehab Prognosis:  Fair; patient would benefit from acute skilled OT services to address these deficits and reach maximum level of function.       Plan:     Patient to be seen 5 x/week to address the above listed problems via self-care/home management, therapeutic activities, therapeutic exercises  Plan of Care Expires: 02/17/23  Plan of Care Reviewed with: patient    Subjective     Pain/Comfort:  Pain Rating 1: 0/10    Objective:     Communicated with: nursing prior to session.  Patient found HOB elevated with telemetry, peripheral IV, bed alarm upon OT entry to room.    General Precautions: Standard, fall    Orthopedic Precautions:N/A  Braces: N/A  Respiratory Status: Room air     Occupational Performance:     Bed Mobility:    Patient completed Supine to Sit with stand by assistance  Patient  completed Sit to Supine with maximal assistance     Functional Mobility/Transfers:  Patient completed Sit <> Stand Transfer with minimum assistance  with  rolling walker   Functional Mobility: unable due to positive orthostatics and brief syncopal episode.    Activities of Daily Living:  unable due to positive orthostatics and brief syncopal episode.    Treatment & Education:  Pt educated on role of OT/POC, importance of keeping HOB raised and wearing CARMEN hose, use of call bell, and safety during ADLs, transfers, and functional mobility.    Patient left HOB elevated with all lines intact, call button in reach, bed alarm on, and RN and MD notified    GOALS:   Multidisciplinary Problems       Occupational Therapy Goals          Problem: Occupational Therapy    Goal Priority Disciplines Outcome Interventions   Occupational Therapy Goal     OT, PT/OT Ongoing, Progressing    Description: Goals to be met by: 2/17/23     Patient will increase functional independence with ADLs by performing:    UE Dressing with Supervision.  LE Dressing with Supervision and Assistive Devices as needed.  Grooming while standing at sink with Supervision.  Toileting from bedside commode with Supervision for hygiene and clothing management.   Toilet transfer to bedside commode with Supervision.  Upper extremity exercise program x10 reps per handout, with supervision.                         Time Tracking:     OT Date of Treatment: 01/27/23  OT Start Time: 1123  OT Stop Time: 1140  OT Total Time (min): 17 min    Billable Minutes:Therapeutic Activity 17    OT/JACOB: OT          1/27/2023

## 2023-01-27 NOTE — PROGRESS NOTES
[facility] Medicine  Progress Note    Patient name: El Abreu  MRN: [id number]  Admit Date: 1/16/2023   LOS: 9 days     SUBJECTIVE:     Principal problem: AIDS    Interval History:  Patient was seen and examined at bedside, denies any new symptoms, subjectively feels better, improving p.o. intake, Otherwise hemodynamically stable, no acute events overnight as per nursing staff. Unfortunately patient becomes profoundly orthostatic upon standing up, heart rate goes up to 160s and he feels very symptomatic.       Scheduled Meds:   darunavir-cobicistat  1 tablet Oral Daily    emtricitabine-tenofovir 200-300 mg  1 tablet Oral Daily    enoxaparin  40 mg Subcutaneous Daily    EScitalopram oxalate  20 mg Oral Daily    ferrous sulfate  1 tablet Oral Daily    fluconazole  800 mg Oral Daily    fluticasone propionate  2 spray Each Nostril BID    folic acid  1 mg Oral Daily    insulin detemir U-100  10 Units Subcutaneous BID    levETIRAcetam  1,500 mg Oral BID    magnesium chloride  128 mg Oral Daily    megestroL  40 mg Oral Daily    metoclopramide HCl  10 mg Oral QID (WM & HS)    mirtazapine  15 mg Oral QHS    multivitamin  1 tablet Oral Daily    pantoprazole  40 mg Oral Before breakfast    sodium bicarbonate  650 mg Oral TID    sulfamethoxazole-trimethoprim 800-160mg  1 tablet Oral Daily    thiamine  100 mg Oral Daily     Continuous Infusions:      PRN Meds:acetaminophen, albuterol-ipratropium, dextrose 10%, dextrose 10%, glucagon (human recombinant), glucose, glucose, HYDROcodone-acetaminophen, HYDROcodone-acetaminophen, insulin aspart U-100, lorazepam, magnesium sulfate IVPB, magnesium sulfate IVPB, melatonin, melatonin, naloxone, ondansetron, polyethylene glycol, potassium bicarbonate, potassium bicarbonate, potassium bicarbonate, prochlorperazine, simethicone, sodium chloride 0.9%    Review of patient's allergies indicates:  No Known Allergies    Review of Systems: As per interval  history    OBJECTIVE:     Vital Signs (Most Recent)  Temp: 97.9 °F (36.6 °C) (01/27/23 0834)  Pulse: 107 (01/27/23 0834)  Resp: 18 (01/27/23 0834)  BP: 120/81 (01/27/23 0834)  SpO2: 100 % (01/27/23 1221)    Vital Signs Range (Last 24H):  Temp:  [97.1 °F (36.2 °C)-99 °F (37.2 °C)]   Pulse:  [103-109]   Resp:  [16-18]   BP: (108-120)/(69-81)   SpO2:  [100 %]     I & O (Last 24H):No intake or output data in the 24 hours ending 01/27/23 1659      Physical Exam:  General:  Chronically ill-appearing gentleman, cachectic  Eyes: No conjunctival injection. No scleral icterus.  ENT: Hearing grossly intact. No discharge from ears. No nasal discharge.   Neck: Supple, trachea midline. No JVD  CVS: RRR. No LE edema BL  Lungs:  No tachypnea or accessory muscle use.  Clear to auscultation bilaterally  Abdomen:  Soft, nontender and nondistended.  No organomegaly  Neuro: AOx3. Moves all extremities. Follows commands. Responds appropriately       Laboratory:  I have reviewed all pertinent lab results within the past 24 hours.    Diagnostic Results:  Reviewed all imaging    ASSESSMENT/PLAN:     27-year-old very unfortunate gentleman who recently had very prolonged hospital stay due to cryptococcal meningitis requiring serial lumbar punctures, neuro syphilis and hospital stay was complicated by AC, GI bleed, malnutrition and severe deconditioning and was transition to LTAC.  He was sent to our emergency room by LTAC due to critical hypomagnesemia and anemia causing severe lethargy and unable to stand up    Active Hospital Problems    Diagnosis  POA    *AIDS [B20]  Yes    Weakness [R53.1]  Yes    Physical deconditioning [R53.81]  Yes    Paroxysmal atrial fibrillation [I48.0]  Yes    Anemia [D64.9]  Yes    Hypokalemia [E87.6]  Yes    Hypomagnesemia [E83.42]  Yes    Seizures complicating infection [B99.9, R56.9]  Yes    Cryptococcal meningitis [B45.1]  Yes    Type 2 diabetes mellitus without retinopathy [E11.9]  Yes      Resolved Hospital  Problems   No resolved problems to display.         Plan:   Very unfortunate case of AIDS with recent prolonged hospital stay for cryptococcal meningitis and neurosyphilis came with severe lethargy, anemia and critical hypomagnesemia  Anti-retroviral, antifungal and antibiotics as per Infectious Disease.  Remarkable improvement in CD4 count and viral load  One blood culture positive for coag-negative staph, PICC line was removed and sent for tissue culture  Noted discontinuation of daptomycin and increasing dose of Bactrim by Infectious Disease  Follow-up on repeat cultures  Remains on Bactrim for PJP prophylaxis and Augmentin for sinusitis  On Diflucan for recent cryptococcal meningitis  Will consult Psychiatry to reconsider his psych regimen as he is always drowsy-thank you  Consulted neurology for suspected seizure-like activity. EEG ordered  Aggressive p.o. nutrition, improve pulmonary toilet  Out of bed to chair  PT, OT  Avoid antihypertensives, aggressive electrolyte replacement  Frequent orthostatic vital signs  Further management as per clinical course  Unfortunately patient is not able to work with physical therapy due to profound orthostatic hypotension and dysautonomia. Patient will need inpatient rehab versus SNF      VTE Risk Mitigation (From admission, onward)           Ordered     Place CARMEN hose  Until discontinued         01/18/23 1830     enoxaparin injection 40 mg  Daily         01/16/23 210                        Department Hospital Medicine  Count includes the [facility]  Maximiliano Ellison MD  Date of service: 01/27/2023

## 2023-01-27 NOTE — CONSULTS
[facility]  Psychiatry  Consult Note    Patient Name: El Abreu  MRN: [id number]   Code Status: Full Code  Admission Date: 1/16/2023  Hospital Length of Stay: 9 days  Attending Physician: Maximiliano Ellison MD  Primary Care Provider: [facility]    Current Legal Status: Formal Voluntary Admission (FVA)    Patient information was obtained from patient and ER records.   Consults  Subjective:     Principal Problem:AIDS    Chief Complaint:  Depression      HPI: 1/27/23  Identifying information  27-year-old  male is admitted to [facility] on formal voluntary admission he was admitted on January 16th 2023 and was referred from [facility] nursing facility    History of presenting problems  At the skilled nursing facility patient was reported to be feeling fatigued tired with poor participation. Patient reports his appetite to be poor and he is not sure if he has lost or gained any weight.  Patient states that he sleeps okay and denies having any nightmares.  He does express some helpless hopeless ideations. Patient denies having any hallucinations denies having any major mood swings and denies having any suicidal thoughts.  Patient believes his memory is intact and says that he wishes to go home and live with his dad.   According to patient he was diagnosed with HIV somewhere in the month of September last year.  He denies having any feelings of anger, or disappointments at himself for contacting this serious illness.     Past psychiatric history  Patient denies any suicide attempt in the past he denies any inpatient and or outpatient treatment.  However when he was admitted at [facility] he was seen by Dr. Stoll who started him on Lexapro and felt the response was not adequate and added Risperdal to get better response from Lexapro.     Family history  Negative    Social history  Patient's parents are both alive in their mid 50s, they are  " and patient states he lives with his father.  He has 2 brothers no sister and he finished high school at the age of 20 and had 1 year of college in [place].  Patient does admit to smoking marijuana.  Patient denies any intravenous use of drugs nor sharing any needles. He denies having any major relationships has never been  and has no children.     Medical surgical history  Seizure disorder, atrial fib, cryptococcus meningitis, diabetes, STDs which includes HIV as well as neurosyphilis.    Allergies  None reported    Current psychotropics  Lexapro 20 mg, Risperdal 1.5 mg p.o. q.d.,  (patient is on cyproheptadine which acts against the antidepressant medications)    Mental status examination  27-year-old lean and thin cachectic looking with poor oral care and increased lacrimation.  Patient made and maintained eye contact.  Patient has markedly low volume speech and he seems to have speech impediment and have difficulty pronouncing the words and is limited to answering yes and no that to in whispers.   Patient is alert and cooperative but seems preoccupied.   Orientation-"Friday, 27th January 2023, 26, 1995, John Aragon,"  His serial sevens are poor 100- 7= 93 -7= 92, 96.  There is no evidence of any auditory or visual hallucinations.  No illusions or delusions patient denies having any homicidal suicidal intent.  And expressed his desires to go home to be with his dad.  His recent memory is 4/4 after 1 minute, 0/4 after 5 minutes and 1/4 with help.  Patient's remote memory seems okay.  His insight and judgment seems impaired.     Impression  1. Major depression-recurrent superimposed on depression secondary to general medical condition  2. Cognitive impairment the etiology of it may be related to his general medical condition which includes neurosyphilis and HIV.  There is high probability of patient developing dementia.  3. Extrapyramidal syndrome-secondary to Risperdal  4. Patient's " Dysarthric speech seems to be getting worse-    Recommendations  1. Discontinue cyproheptadine  2. Discontinue Risperdal  3. Remeron 15 mg  p.o. q.h.s. to improve his appetite  4. With the discontinuation of Risperdal improvement in extrapyramidal syndrome is expected in the next 2-3 days' time  5. Recommend consultation from the neurologist regarding patient's Dysarthria getting worse.  Thank you      Hospital Course: No notes on file    No new subjective & objective note has been filed under this hospital service since the last note was generated.    Assessment/Plan:     No notes have been filed under this hospital service.  Service: Psychiatry       Total Time:  60 minutes      Lucia Keating MD   Psychiatry  [facility]

## 2023-01-28 LAB
ACANTHOCYTES BLD QL SMEAR: PRESENT
ALBUMIN SERPL BCP-MCNC: 3.8 G/DL (ref 3.5–5.2)
ALP SERPL-CCNC: 89 U/L (ref 55–135)
ALT SERPL W/O P-5'-P-CCNC: 57 U/L (ref 10–44)
ANION GAP SERPL CALC-SCNC: 8 MMOL/L (ref 8–16)
ANISOCYTOSIS BLD QL SMEAR: SLIGHT
AST SERPL-CCNC: 27 U/L (ref 10–40)
BASOPHILS NFR BLD: 1 % (ref 0–1.9)
BILIRUB SERPL-MCNC: 0.5 MG/DL (ref 0.1–1)
BUN SERPL-MCNC: 27 MG/DL (ref 6–20)
CALCIUM SERPL-MCNC: 10.6 MG/DL (ref 8.7–10.5)
CHLORIDE SERPL-SCNC: 103 MMOL/L (ref 95–110)
CO2 SERPL-SCNC: 20 MMOL/L (ref 23–29)
CREAT SERPL-MCNC: 1.3 MG/DL (ref 0.5–1.4)
DIFFERENTIAL METHOD: ABNORMAL
EOSINOPHIL NFR BLD: 8 % (ref 0–8)
ERYTHROCYTE [DISTWIDTH] IN BLOOD BY AUTOMATED COUNT: 14.6 % (ref 11.5–14.5)
EST. GFR  (NO RACE VARIABLE): >60 ML/MIN/1.73 M^2
GLUCOSE SERPL-MCNC: 104 MG/DL (ref 70–110)
GLUCOSE SERPL-MCNC: 137 MG/DL (ref 70–110)
GLUCOSE SERPL-MCNC: 164 MG/DL (ref 70–110)
GLUCOSE SERPL-MCNC: 190 MG/DL (ref 70–110)
HCT VFR BLD AUTO: 27.2 % (ref 40–54)
HGB BLD-MCNC: 8.9 G/DL (ref 14–18)
IMM GRANULOCYTES # BLD AUTO: ABNORMAL K/UL (ref 0–0.04)
IMM GRANULOCYTES NFR BLD AUTO: ABNORMAL % (ref 0–0.5)
LYMPHOCYTES NFR BLD: 16 % (ref 18–48)
MAGNESIUM SERPL-MCNC: 1.5 MG/DL (ref 1.6–2.6)
MCH RBC QN AUTO: 27.3 PG (ref 27–31)
MCHC RBC AUTO-ENTMCNC: 32.7 G/DL (ref 32–36)
MCV RBC AUTO: 83 FL (ref 82–98)
MONOCYTES NFR BLD: 14 % (ref 4–15)
NEUTROPHILS NFR BLD: 61 % (ref 38–73)
NRBC BLD-RTO: 0 /100 WBC
PHOSPHATE SERPL-MCNC: 5.1 MG/DL (ref 2.7–4.5)
PLATELET # BLD AUTO: 416 K/UL (ref 150–450)
PLATELET BLD QL SMEAR: ABNORMAL
PMV BLD AUTO: 8.3 FL (ref 9.2–12.9)
POTASSIUM SERPL-SCNC: 4.4 MMOL/L (ref 3.5–5.1)
PROT SERPL-MCNC: 7.8 G/DL (ref 6–8.4)
RBC # BLD AUTO: 3.26 M/UL (ref 4.6–6.2)
SODIUM SERPL-SCNC: 131 MMOL/L (ref 136–145)
WBC # BLD AUTO: 11.94 K/UL (ref 3.9–12.7)

## 2023-01-28 PROCEDURE — 85007 BL SMEAR W/DIFF WBC COUNT: CPT | Performed by: NURSE PRACTITIONER

## 2023-01-28 PROCEDURE — 63700000 PHARM REV CODE 250 ALT 637 W/O HCPCS: Performed by: NURSE PRACTITIONER

## 2023-01-28 PROCEDURE — 25000003 PHARM REV CODE 250: Performed by: HOSPITALIST

## 2023-01-28 PROCEDURE — 25000003 PHARM REV CODE 250: Performed by: NURSE PRACTITIONER

## 2023-01-28 PROCEDURE — 85027 COMPLETE CBC AUTOMATED: CPT | Performed by: NURSE PRACTITIONER

## 2023-01-28 PROCEDURE — 36415 COLL VENOUS BLD VENIPUNCTURE: CPT | Performed by: NURSE PRACTITIONER

## 2023-01-28 PROCEDURE — 25000003 PHARM REV CODE 250: Performed by: STUDENT IN AN ORGANIZED HEALTH CARE EDUCATION/TRAINING PROGRAM

## 2023-01-28 PROCEDURE — 99900035 HC TECH TIME PER 15 MIN (STAT)

## 2023-01-28 PROCEDURE — 63700000 PHARM REV CODE 250 ALT 637 W/O HCPCS: Performed by: INTERNAL MEDICINE

## 2023-01-28 PROCEDURE — 12000002 HC ACUTE/MED SURGE SEMI-PRIVATE ROOM

## 2023-01-28 PROCEDURE — 83735 ASSAY OF MAGNESIUM: CPT | Performed by: NURSE PRACTITIONER

## 2023-01-28 PROCEDURE — 25000003 PHARM REV CODE 250: Performed by: INTERNAL MEDICINE

## 2023-01-28 PROCEDURE — 63600175 PHARM REV CODE 636 W HCPCS: Performed by: HOSPITALIST

## 2023-01-28 PROCEDURE — 63600175 PHARM REV CODE 636 W HCPCS: Performed by: NURSE PRACTITIONER

## 2023-01-28 PROCEDURE — 84100 ASSAY OF PHOSPHORUS: CPT | Performed by: HOSPITALIST

## 2023-01-28 PROCEDURE — 80053 COMPREHEN METABOLIC PANEL: CPT | Performed by: HOSPITALIST

## 2023-01-28 PROCEDURE — 99900031 HC PATIENT EDUCATION (STAT)

## 2023-01-28 PROCEDURE — 94761 N-INVAS EAR/PLS OXIMETRY MLT: CPT

## 2023-01-28 RX ORDER — MAGNESIUM SULFATE HEPTAHYDRATE 40 MG/ML
4 INJECTION, SOLUTION INTRAVENOUS ONCE
Status: COMPLETED | OUTPATIENT
Start: 2023-01-28 | End: 2023-01-28

## 2023-01-28 RX ORDER — SODIUM CHLORIDE, SODIUM LACTATE, POTASSIUM CHLORIDE, CALCIUM CHLORIDE 600; 310; 30; 20 MG/100ML; MG/100ML; MG/100ML; MG/100ML
INJECTION, SOLUTION INTRAVENOUS CONTINUOUS
Status: DISCONTINUED | OUTPATIENT
Start: 2023-01-28 | End: 2023-02-05

## 2023-01-28 RX ORDER — MIDODRINE HYDROCHLORIDE 2.5 MG/1
5 TABLET ORAL
Status: DISCONTINUED | OUTPATIENT
Start: 2023-01-28 | End: 2023-01-30

## 2023-01-28 RX ADMIN — FLUCONAZOLE 800 MG: 100 TABLET ORAL at 08:01

## 2023-01-28 RX ADMIN — ENOXAPARIN SODIUM 40 MG: 100 INJECTION SUBCUTANEOUS at 04:01

## 2023-01-28 RX ADMIN — THERA TABS 1 TABLET: TAB at 08:01

## 2023-01-28 RX ADMIN — MAGNESIUM SULFATE HEPTAHYDRATE 4 G: 40 INJECTION, SOLUTION INTRAVENOUS at 08:01

## 2023-01-28 RX ADMIN — DARUNAVIR ETHANOLATE AND COBICISTAT 1 TABLET: 800; 150 TABLET, FILM COATED ORAL at 08:01

## 2023-01-28 RX ADMIN — SODIUM BICARBONATE 650 MG TABLET 650 MG: at 09:01

## 2023-01-28 RX ADMIN — INSULIN DETEMIR 10 UNITS: 100 INJECTION, SOLUTION SUBCUTANEOUS at 08:01

## 2023-01-28 RX ADMIN — INSULIN DETEMIR 10 UNITS: 100 INJECTION, SOLUTION SUBCUTANEOUS at 09:01

## 2023-01-28 RX ADMIN — SODIUM BICARBONATE 650 MG TABLET 650 MG: at 08:01

## 2023-01-28 RX ADMIN — FLUTICASONE PROPIONATE 100 MCG: 50 SPRAY, METERED NASAL at 09:01

## 2023-01-28 RX ADMIN — SODIUM BICARBONATE 650 MG TABLET 650 MG: at 03:01

## 2023-01-28 RX ADMIN — Medication 6 MG: at 08:01

## 2023-01-28 RX ADMIN — FERROUS SULFATE TAB 325 MG (65 MG ELEMENTAL FE) 1 EACH: 325 (65 FE) TAB at 09:01

## 2023-01-28 RX ADMIN — LEVETIRACETAM 1500 MG: 500 TABLET, FILM COATED ORAL at 08:01

## 2023-01-28 RX ADMIN — FOLIC ACID 1 MG: 1 TABLET ORAL at 08:01

## 2023-01-28 RX ADMIN — MAGNESIUM 64 MG (MAGNESIUM CHLORIDE) TABLET,DELAYED RELEASE 128 MG: at 08:01

## 2023-01-28 RX ADMIN — EMTRICITABINE AND TENOFOVIR DISOPROXIL FUMARATE 1 TABLET: 200; 300 TABLET, FILM COATED ORAL at 08:01

## 2023-01-28 RX ADMIN — SULFAMETHOXAZOLE AND TRIMETHOPRIM 1 TABLET: 800; 160 TABLET ORAL at 08:01

## 2023-01-28 RX ADMIN — SODIUM CHLORIDE, SODIUM LACTATE, POTASSIUM CHLORIDE, AND CALCIUM CHLORIDE 1000 ML: .6; .31; .03; .02 INJECTION, SOLUTION INTRAVENOUS at 03:01

## 2023-01-28 RX ADMIN — SODIUM CHLORIDE, SODIUM LACTATE, POTASSIUM CHLORIDE, AND CALCIUM CHLORIDE: .6; .31; .03; .02 INJECTION, SOLUTION INTRAVENOUS at 03:01

## 2023-01-28 RX ADMIN — MIDODRINE HYDROCHLORIDE 5 MG: 2.5 TABLET ORAL at 04:01

## 2023-01-28 RX ADMIN — THIAMINE HCL TAB 100 MG 100 MG: 100 TAB at 08:01

## 2023-01-28 RX ADMIN — MIRTAZAPINE 15 MG: 15 TABLET, FILM COATED ORAL at 08:01

## 2023-01-28 NOTE — PROGRESS NOTES
[facility]  Department of Neurology  Progress Note  Date: 2023 9:54 AM          Patient Name: El Abreu   MRN: [id number]   : 1995    AGE: 27 y.o.    LOS: 10 days Hospital Day: 13  Admit date: 2023  2:04 PM          HPI per EMR: El Abreu is a 27 y.o. 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  he was last admitted to [facility]  with vomiting and headache, increased intracranial pressure.  This LP had a positive Sirisha ink opening pressure was 23.  He was then committed to a 6 week course of AmBisome completed 1/10, which was interrupted with AC but resumed and he was discharged to [facility] on .  He did require an additional LP for fluid removal and reduction of pressure on  (Sirisha ink was still positive but the culture was negative.  The last positive culture was ).  He has been continued on fluconazole 800 mg daily since  and has been receiving Pneumocystis prophylaxis and appetite stimulants and antidepressants.  MRI brain on  showed stability of the right globus pallidus cryptococcal pseudocyst.  He was additionally treated for neurosyphilis 3 doses of Bicillin after an appropriate course of IV penicillin G.  He has not been eating well, withdrawn, seen by Psychiatry late December and started on risperidone.  He was seen in the [facility] emergency room  when his mandible became stuck open.  This was reduced in their emergency room he was sent back to the [facility].  He again presented back to the emergency room for severe hypomagnesemia, 0.8 While in the emergency room he was noted to have a rising white blood cell count, from normal on  to 14 on  to 17 on  additionally his  hemoglobin was 7.3.  An infectious workup was commenced and CRP was 2.0, lactic acid 0.9, procalcitonin 0.15.  Chest x-ray was negative, blood cultures are negative, respiratory PCR is not yet been collected and he is not produce any sputum but this is not likely relevant..vanc and cefepime were begun.   Unfortunately our hospital is full and he has been waiting in the hallway outside the emergency room.  He is incontinent of urine, and urinalysis was negative.      During the hospital stay, patient had syncopal episode on 01/18 while working with PT. patient currently being treated for hypomagnesemia, AIDS, with recent cryptococcal meningitis and neurosyphilis.     Neurology consult:  Patient was seen and examined by me this morning.  He is alert and oriented x3.  Neurology is consulted for episodes of syncope.  Patient states that he has had multiple episodes of syncopal spells mostly happens when he stands up and has not had any syncopal spells when he is lying down or sitting in the chair.  Denies any symptoms prior to the syncopal spell and denies having any seizure-like activity associated with the syncope.  He is no history of seizures.  He also states that he is not confused after the episode and is almost back to himself after he wakes up.     He denies any focal/unilateral weakness or sensory changes.  Denies any headache, vision changes or speech trouble.    01/28/2023: No acute events overnight. Patient was seen and examined by me this morning. Neuro exam is unchanged from yesterday.  No further episodes of syncopal spell.       Vitals:  Patient Vitals for the past 24 hrs:   BP Temp Temp src Pulse Resp SpO2   01/28/23 0834 -- -- -- 100 18 99 %   01/28/23 0419 110/82 98.2 °F (36.8 °C) Oral 104 18 100 %   01/27/23 2312 111/76 98.6 °F (37 °C) Oral 96 18 100 %   01/27/23 1927 110/73 98.6 °F (37 °C) Oral (!) 114 20 100 %   01/27/23 1600 117/76 98.4 °F (36.9 °C) -- (!) 111 16 100 %   01/27/23 1221 -- -- -- --  -- 100 %     PHYSICAL EXAM:        GENERAL APPEARANCE: Alert, well-developed, well-nourished male in no acute distress.  HEENT: Normocephalic and atraumatic. PERRL. Oropharynx unremarkable.  PULM: Normal respiratory effort. No accessory muscle use.  CV: RRR.  ABDOMEN: Soft, nontender.  EXTREMITIES: No obvious signs of vascular compromise. Pulses present. No cyanosis, clubbing or edema.  SKIN: Clear; no rashes, lesions or skin breaks in exposed areas.     NEURO:  MENTAL STATUS:   , Patient awake and oriented to time, place, and person, recent/remote memory normal, attention span/concentration normal, speech fluent without paraphasic errors, good comprehension with appropriate thought content, and fund of knowledge appropriate for patient's level of education.  Affect euthymic.     CRANIAL NERVES:  CN I: Not tested.  CN II: Fundoscopic exam deferred.  CN III, IV, VI: Pupils equal, round and reactive to light.  Extraocular movements full and intact.  CN V: Facial sensation normal.  CN VII: Facial asymmetry absent.  CN VIII: Hearing grossly normal and equal bilaterally.  No skew deviation or pathologic nystagmus.  CN IX, X: Palate elevates symmetrically. Speech/articulation:  Dysarthria/hypophonia  CN XI: Shoulder shrug and chin rotation equal with good strength.  CN XII: Tongue protrusion midline.     MOTOR:  Bulk normal. Tone normal and symmetric throughout.  Abnormal movements absent.  Tremor: none present.  Strength 5/5 throughout.     REFLEXES:  DTRs 2+ throughout.  Plantar response downgoing bilaterally.  SENSATION: grossly intact throughout.  COORDINATION: normal finger-to-nose.  STATION: not tested.  GAIT: not tested.       CURRENT SCHEDULED MEDICATIONS:   darunavir-cobicistat  1 tablet Oral Daily    emtricitabine-tenofovir 200-300 mg  1 tablet Oral Daily    enoxaparin  40 mg Subcutaneous Daily    EScitalopram oxalate  20 mg Oral Daily    ferrous sulfate  1 tablet Oral Daily    fluconazole  800 mg Oral Daily     fluticasone propionate  2 spray Each Nostril BID    folic acid  1 mg Oral Daily    insulin detemir U-100  10 Units Subcutaneous BID    levETIRAcetam  1,500 mg Oral BID    magnesium chloride  128 mg Oral Daily    magnesium sulfate IVPB  4 g Intravenous Once    megestroL  40 mg Oral Daily    mirtazapine  15 mg Oral QHS    multivitamin  1 tablet Oral Daily    pantoprazole  40 mg Oral Before breakfast    sodium bicarbonate  650 mg Oral TID    sulfamethoxazole-trimethoprim 800-160mg  1 tablet Oral Daily    thiamine  100 mg Oral Daily     CURRENT INFUSIONS:    DATA:  Recent Labs   Lab 01/22/23  0523 01/23/23  0430 01/24/23  0545 01/25/23  0556 01/25/23  0559 01/26/23  0437 01/27/23  0504 01/28/23  0445   * 138 133* 134*  --  134* 131* 131*   K 3.5 3.5 4.5 4.5  --  4.3 4.3 4.4    112* 105 106  --  108 104 103   CO2 18* 19* 20* 19*  --  19* 21* 20*   BUN 10 10 12 13  --  15 21* 27*   CREATININE 0.8 0.6 0.8 0.7  --  0.9 1.2 1.3   * 78 107 175*  --  98 91 104   CALCIUM 8.9 9.3 9.4 9.2  --  10.2 10.8* 10.6*   PHOS  --   --   --   --  4.2 5.2* 5.4* 5.1*   MG 1.2* 1.4* 1.6 1.6  --  1.7 1.5* 1.5*   AST  --   --   --   --   --   --  28 27   ALT  --   --   --   --   --   --  61* 57*     Recent Labs   Lab 01/22/23  0523 01/23/23  0430 01/24/23  0545 01/25/23  0556 01/26/23  0437 01/27/23  0504 01/28/23  0445   WBC 20.91* 15.8*  15.90* 13.23* 13.03* 11.19 10.31 11.94   HGB 7.5* 7.7*  7.5* 7.3* 7.6* 8.4* 9.0* 8.9*   HCT 23.5* 24.2*  22.4* 22.2* 23.8* 26.2* 27.5* 27.2*    317  312 351 334 375 408 416     Lab Results   Component Value Date    PROTEINCSF 50 12/13/2022    GLUCCSF 77 (H) 12/13/2022     Hemoglobin A1C   Date Value Ref Range Status   01/17/2023 6.0 4.5 - 6.2 % Final     Comment:     According to ADA guidelines, hemoglobin A1C <7.0% represents  optimal control in non-pregnant diabetic patients.  Different  metrics may apply to specific populations.   Standards of Medical Care in Diabetes -  2016.    For the purpose of screening for the presence of diabetes:  <5.7%     Consistent with the absence of diabetes  5.7-6.4%  Consistent with increasing risk for diabetes   (prediabetes)  >or=6.5%  Consistent with diabetes    Currently no consensus exists for use of hemoglobin A1C  for diagnosis of diabetes for children.     11/05/2022 6.4 (H) 0.0 - 5.6 % Final     Comment:     Reference Interval:  5.0 - 5.6 Normal   5.7 - 6.4 High Risk   > 6.5 Diabetic      Hgb A1c results are standardized based on the (NGSP) National   Glycohemoglobin Standardization Program.      Hemoglobin A1C levels are related to mean serum/plasma glucose   during the preceding 2-3 months.        07/08/2014 12.8 (H) 4.5 - 6.2 % Final            I have personally reviewed and interpreted the pertinent imaging and lab results.  Imaging Results              X-Ray Chest AP Portable (Final result)  Result time 01/16/23 15:47:10      Final result by Wilfrid Ta MD (01/16/23 15:47:10)                   Narrative:    HISTORY: Sepsis    FINDINGS: Portable chest radiograph at 1518 hours compared to 12/26/2022 shows right PICC with distal portion having flipped into the left brachiocephalic vein, the distal tip looped over the left brachiocephalic vein to the left of midline. The cardiomediastinal silhouette and pulmonary vasculature are within normal limits.    The lungs are normally expanded, with no consolidation, large pleural effusion, or evidence of pulmonary edema. No confluent infiltrates or pneumothorax. There are no significant osseous abnormalities.    IMPRESSION:  1. Right PICC as described.  2. No evidence of active cardiopulmonary disease.    Electronically signed by:  Wilfrid Ta MD  1/16/2023 3:47 PM [facility] Workstation: [id number]                                            ASSESSMENT AND PLAN:     Syncopal spells   HIV/AIDS       Plan:   Patient has had multiple syncopal spells associated with blank staring and mostly happens when  he is being stood up for therapy.  Etiology likely orthostatic hypotension and less likely to be seizures.  EEG was normal and no epileptiform activity or seizures.  Check orthostatic vitals  Management of electrolyte imbalance per primary team  ID following  Repeat MRI brain was negative.  Slurred speech likely residual effect from prior intracranial pathology including cryptococcal meningitis.   Psychiatry has seen the patient-appreciate recommendations  No further neuro workup is needed at this time.  Will follow patient as needed please call with any questions           Charlie Adair MD  Neurology/vascular Neurology  Date of Service: 01/28/2023  9:54 AM    Please note: This note was transcribed using voice recognition software. Because of this technology there are often uinintended grammatical, spelling, and other transcription errors. Please disregard these errors.

## 2023-01-28 NOTE — PROGRESS NOTES
[facility] Medicine  Progress Note    Patient name: El Abreu  MRN: [id number]  Admit Date: 1/16/2023   LOS: 10 days     SUBJECTIVE:     Principal problem: AIDS    Interval History:  Patient was seen and examined at bedside, he is very worn out from MRI.  Improving p.o. intake, Otherwise hemodynamically stable, no acute events overnight as per nursing staff. Unfortunately patient becomes profoundly orthostatic upon standing up, heart rate goes up to 160s and he feels very symptomatic.       Scheduled Meds:   darunavir-cobicistat  1 tablet Oral Daily    emtricitabine-tenofovir 200-300 mg  1 tablet Oral Daily    enoxaparin  40 mg Subcutaneous Daily    EScitalopram oxalate  20 mg Oral Daily    ferrous sulfate  1 tablet Oral Daily    fluconazole  800 mg Oral Daily    fluticasone propionate  2 spray Each Nostril BID    folic acid  1 mg Oral Daily    insulin detemir U-100  10 Units Subcutaneous BID    levETIRAcetam  1,500 mg Oral BID    magnesium chloride  128 mg Oral Daily    magnesium sulfate IVPB  4 g Intravenous Once    megestroL  40 mg Oral Daily    mirtazapine  15 mg Oral QHS    multivitamin  1 tablet Oral Daily    pantoprazole  40 mg Oral Before breakfast    sodium bicarbonate  650 mg Oral TID    sulfamethoxazole-trimethoprim 800-160mg  1 tablet Oral Daily    thiamine  100 mg Oral Daily     Continuous Infusions:      PRN Meds:acetaminophen, albuterol-ipratropium, dextrose 10%, dextrose 10%, glucagon (human recombinant), glucose, glucose, HYDROcodone-acetaminophen, HYDROcodone-acetaminophen, insulin aspart U-100, lorazepam, magnesium sulfate IVPB, magnesium sulfate IVPB, melatonin, melatonin, naloxone, ondansetron, polyethylene glycol, potassium bicarbonate, potassium bicarbonate, potassium bicarbonate, prochlorperazine, simethicone, sodium chloride 0.9%    Review of patient's allergies indicates:  No Known Allergies    Review of Systems: As per interval history    OBJECTIVE:     Vital  Signs (Most Recent)  Temp: 98.2 °F (36.8 °C) (01/28/23 0419)  Pulse: 104 (01/28/23 0419)  Resp: 18 (01/28/23 0419)  BP: 110/82 (01/28/23 0419)  SpO2: 100 % (01/28/23 0419)    Vital Signs Range (Last 24H):  Temp:  [97.9 °F (36.6 °C)-98.6 °F (37 °C)]   Pulse:  []   Resp:  [16-20]   BP: (110-120)/(73-82)   SpO2:  [100 %]     I & O (Last 24H):  Intake/Output Summary (Last 24 hours) at 1/28/2023 0816  Last data filed at 1/27/2023 1700  Gross per 24 hour   Intake --   Output 300 ml   Net -300 ml         Physical Exam:  General:  Chronically ill-appearing gentleman, cachectic  Eyes: No conjunctival injection. No scleral icterus.  ENT: Hearing grossly intact. No discharge from ears. No nasal discharge.   Neck: Supple, trachea midline. No JVD  CVS: RRR. No LE edema BL  Lungs:  No tachypnea or accessory muscle use.  Clear to auscultation bilaterally  Abdomen:  Soft, nontender and nondistended.  No organomegaly  Neuro: AOx3. Moves all extremities. Follows commands. Responds appropriately       Laboratory:  I have reviewed all pertinent lab results within the past 24 hours.    Diagnostic Results:  Reviewed all imaging    ASSESSMENT/PLAN:     27-year-old very unfortunate gentleman who recently had very prolonged hospital stay due to cryptococcal meningitis requiring serial lumbar punctures, neuro syphilis and hospital stay was complicated by AC, GI bleed, malnutrition and severe deconditioning and was transition to LTAC.  He was sent to our emergency room by LTAC due to critical hypomagnesemia and anemia causing severe lethargy and unable to stand up.  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         Plan:   Very unfortunate case of AIDS with recent prolonged hospital stay for cryptococcal meningitis and neurosyphilis came with severe lethargy, anemia and critical hypomagnesemia  Anti-retroviral, antifungal and antibiotics as per Infectious Disease.  Remarkable improvement in CD4 count and viral load  One blood culture positive for coag-negative staph, PICC line was removed and sent for tissue culture  Noted discontinuation of daptomycin and increasing dose of Bactrim by Infectious Disease  Follow-up on repeat cultures  Remains on Bactrim for PJP prophylaxis and Augmentin for sinusitis  On Diflucan for recent cryptococcal meningitis  Consulted psychiatry to reconsider his psych regimen considering excessive drowsiness-thank you  Consulted neurology for suspected seizure-like activity. EEG and MRI findings noted  Aggressive p.o. nutrition, improve pulmonary toilet  Out of bed to chair  PT, OT  Avoid antihypertensives, aggressive electrolyte replacement  Frequent orthostatic vital signs  Further management as per clinical course  Patient remains profoundly orthostatic and appears to have severe dysautonomia.  Will try midodrine and compression stockings  Unfortunately patient is not able to work with physical therapy due to profound orthostatic hypotension and dysautonomia. Patient will need inpatient rehab versus SNF      VTE Risk Mitigation (From admission, onward)           Ordered     Place CARMEN hose  Until discontinued         01/18/23 1830     enoxaparin injection 40 mg  Daily         01/16/23 2108                        Department Hospital Medicine  [facility]  Maximiliano Ellison MD  Date of service: 01/28/2023

## 2023-01-28 NOTE — CARE UPDATE
01/28/23 0834   Patient Assessment/Suction   Level of Consciousness (AVPU) responds to voice   Respiratory Effort Unlabored;Normal   Expansion/Accessory Muscles/Retractions no use of accessory muscles;no retractions;expansion symmetric   All Lung Fields Breath Sounds clear   Rhythm/Pattern, Respiratory unlabored;pattern regular;depth regular;chest wiggle adequate;no shortness of breath reported   Cough Frequency no cough   PRE-TX-O2   Device (Oxygen Therapy) room air   SpO2 99 %   Pulse Oximetry Type Intermittent   $ Pulse Oximetry - Multiple Charge Pulse Oximetry - Multiple   Pulse 100   Resp 18   Aerosol Therapy   $ Aerosol Therapy Charges PRN treatment not required   Education   $ Education Bronchodilator;15 min   Respiratory Evaluation   $ Care Plan Tech Time 15 min

## 2023-01-29 LAB
ALBUMIN SERPL BCP-MCNC: 3.5 G/DL (ref 3.5–5.2)
ALP SERPL-CCNC: 79 U/L (ref 55–135)
ALT SERPL W/O P-5'-P-CCNC: 41 U/L (ref 10–44)
ANION GAP SERPL CALC-SCNC: 6 MMOL/L (ref 8–16)
AST SERPL-CCNC: 22 U/L (ref 10–40)
BASOPHILS # BLD AUTO: 0.03 K/UL (ref 0–0.2)
BASOPHILS NFR BLD: 0.2 % (ref 0–1.9)
BILIRUB SERPL-MCNC: 0.5 MG/DL (ref 0.1–1)
BUN SERPL-MCNC: 24 MG/DL (ref 6–20)
CALCIUM SERPL-MCNC: 11 MG/DL (ref 8.7–10.5)
CHLORIDE SERPL-SCNC: 103 MMOL/L (ref 95–110)
CO2 SERPL-SCNC: 21 MMOL/L (ref 23–29)
CREAT SERPL-MCNC: 1.3 MG/DL (ref 0.5–1.4)
DIFFERENTIAL METHOD: ABNORMAL
EOSINOPHIL # BLD AUTO: 0.2 K/UL (ref 0–0.5)
EOSINOPHIL NFR BLD: 1.5 % (ref 0–8)
ERYTHROCYTE [DISTWIDTH] IN BLOOD BY AUTOMATED COUNT: 14.5 % (ref 11.5–14.5)
EST. GFR  (NO RACE VARIABLE): >60 ML/MIN/1.73 M^2
GLUCOSE SERPL-MCNC: 102 MG/DL (ref 70–110)
GLUCOSE SERPL-MCNC: 106 MG/DL (ref 70–110)
GLUCOSE SERPL-MCNC: 118 MG/DL (ref 70–110)
GLUCOSE SERPL-MCNC: 130 MG/DL (ref 70–110)
GLUCOSE SERPL-MCNC: 98 MG/DL (ref 70–110)
HCT VFR BLD AUTO: 24.1 % (ref 40–54)
HGB BLD-MCNC: 8 G/DL (ref 14–18)
IMM GRANULOCYTES # BLD AUTO: 0.24 K/UL (ref 0–0.04)
IMM GRANULOCYTES NFR BLD AUTO: 1.9 % (ref 0–0.5)
LYMPHOCYTES # BLD AUTO: 1.4 K/UL (ref 1–4.8)
LYMPHOCYTES NFR BLD: 11.3 % (ref 18–48)
MAGNESIUM SERPL-MCNC: 1.8 MG/DL (ref 1.6–2.6)
MCH RBC QN AUTO: 27.7 PG (ref 27–31)
MCHC RBC AUTO-ENTMCNC: 33.2 G/DL (ref 32–36)
MCV RBC AUTO: 83 FL (ref 82–98)
MONOCYTES # BLD AUTO: 1.6 K/UL (ref 0.3–1)
MONOCYTES NFR BLD: 13.2 % (ref 4–15)
NEUTROPHILS # BLD AUTO: 8.9 K/UL (ref 1.8–7.7)
NEUTROPHILS NFR BLD: 71.9 % (ref 38–73)
NRBC BLD-RTO: 0 /100 WBC
PHOSPHATE SERPL-MCNC: 4.7 MG/DL (ref 2.7–4.5)
PLATELET # BLD AUTO: 355 K/UL (ref 150–450)
PMV BLD AUTO: 7.7 FL (ref 9.2–12.9)
POTASSIUM SERPL-SCNC: 4.2 MMOL/L (ref 3.5–5.1)
PROT SERPL-MCNC: 7.4 G/DL (ref 6–8.4)
RBC # BLD AUTO: 2.89 M/UL (ref 4.6–6.2)
SODIUM SERPL-SCNC: 130 MMOL/L (ref 136–145)
WBC # BLD AUTO: 12.4 K/UL (ref 3.9–12.7)

## 2023-01-29 PROCEDURE — 25000003 PHARM REV CODE 250: Performed by: STUDENT IN AN ORGANIZED HEALTH CARE EDUCATION/TRAINING PROGRAM

## 2023-01-29 PROCEDURE — 97530 THERAPEUTIC ACTIVITIES: CPT

## 2023-01-29 PROCEDURE — 85025 COMPLETE CBC W/AUTO DIFF WBC: CPT | Performed by: NURSE PRACTITIONER

## 2023-01-29 PROCEDURE — 99900031 HC PATIENT EDUCATION (STAT)

## 2023-01-29 PROCEDURE — 63700000 PHARM REV CODE 250 ALT 637 W/O HCPCS: Performed by: NURSE PRACTITIONER

## 2023-01-29 PROCEDURE — 25000003 PHARM REV CODE 250: Performed by: INTERNAL MEDICINE

## 2023-01-29 PROCEDURE — 80053 COMPREHEN METABOLIC PANEL: CPT | Performed by: HOSPITALIST

## 2023-01-29 PROCEDURE — 94761 N-INVAS EAR/PLS OXIMETRY MLT: CPT

## 2023-01-29 PROCEDURE — 63700000 PHARM REV CODE 250 ALT 637 W/O HCPCS: Performed by: INTERNAL MEDICINE

## 2023-01-29 PROCEDURE — 25000003 PHARM REV CODE 250: Performed by: HOSPITALIST

## 2023-01-29 PROCEDURE — 83735 ASSAY OF MAGNESIUM: CPT | Performed by: NURSE PRACTITIONER

## 2023-01-29 PROCEDURE — 63600175 PHARM REV CODE 636 W HCPCS: Performed by: NURSE PRACTITIONER

## 2023-01-29 PROCEDURE — 25000003 PHARM REV CODE 250: Performed by: NURSE PRACTITIONER

## 2023-01-29 PROCEDURE — 84100 ASSAY OF PHOSPHORUS: CPT | Performed by: HOSPITALIST

## 2023-01-29 PROCEDURE — 36415 COLL VENOUS BLD VENIPUNCTURE: CPT | Performed by: NURSE PRACTITIONER

## 2023-01-29 PROCEDURE — 99900035 HC TECH TIME PER 15 MIN (STAT)

## 2023-01-29 PROCEDURE — 12000002 HC ACUTE/MED SURGE SEMI-PRIVATE ROOM

## 2023-01-29 RX ADMIN — MAGNESIUM 64 MG (MAGNESIUM CHLORIDE) TABLET,DELAYED RELEASE 128 MG: at 09:01

## 2023-01-29 RX ADMIN — INSULIN DETEMIR 10 UNITS: 100 INJECTION, SOLUTION SUBCUTANEOUS at 08:01

## 2023-01-29 RX ADMIN — FLUTICASONE PROPIONATE 100 MCG: 50 SPRAY, METERED NASAL at 09:01

## 2023-01-29 RX ADMIN — FOLIC ACID 1 MG: 1 TABLET ORAL at 09:01

## 2023-01-29 RX ADMIN — FLUCONAZOLE 800 MG: 100 TABLET ORAL at 08:01

## 2023-01-29 RX ADMIN — ESCITALOPRAM OXALATE 20 MG: 10 TABLET ORAL at 09:01

## 2023-01-29 RX ADMIN — THERA TABS 1 TABLET: TAB at 09:01

## 2023-01-29 RX ADMIN — MIDODRINE HYDROCHLORIDE 5 MG: 2.5 TABLET ORAL at 12:01

## 2023-01-29 RX ADMIN — SODIUM BICARBONATE 650 MG TABLET 650 MG: at 09:01

## 2023-01-29 RX ADMIN — PANTOPRAZOLE SODIUM 40 MG: 40 TABLET, DELAYED RELEASE ORAL at 05:01

## 2023-01-29 RX ADMIN — MIDODRINE HYDROCHLORIDE 5 MG: 2.5 TABLET ORAL at 08:01

## 2023-01-29 RX ADMIN — SODIUM BICARBONATE 650 MG TABLET 650 MG: at 04:01

## 2023-01-29 RX ADMIN — DARUNAVIR ETHANOLATE AND COBICISTAT 1 TABLET: 800; 150 TABLET, FILM COATED ORAL at 09:01

## 2023-01-29 RX ADMIN — LEVETIRACETAM 1500 MG: 500 TABLET, FILM COATED ORAL at 09:01

## 2023-01-29 RX ADMIN — FERROUS SULFATE TAB 325 MG (65 MG ELEMENTAL FE) 1 EACH: 325 (65 FE) TAB at 09:01

## 2023-01-29 RX ADMIN — THIAMINE HCL TAB 100 MG 100 MG: 100 TAB at 09:01

## 2023-01-29 RX ADMIN — EMTRICITABINE AND TENOFOVIR DISOPROXIL FUMARATE 1 TABLET: 200; 300 TABLET, FILM COATED ORAL at 09:01

## 2023-01-29 RX ADMIN — MIDODRINE HYDROCHLORIDE 5 MG: 2.5 TABLET ORAL at 04:01

## 2023-01-29 RX ADMIN — ENOXAPARIN SODIUM 40 MG: 100 INJECTION SUBCUTANEOUS at 04:01

## 2023-01-29 RX ADMIN — SULFAMETHOXAZOLE AND TRIMETHOPRIM 1 TABLET: 800; 160 TABLET ORAL at 09:01

## 2023-01-29 RX ADMIN — MEGESTROL ACETATE 40 MG: 20 TABLET ORAL at 09:01

## 2023-01-29 RX ADMIN — MIRTAZAPINE 15 MG: 15 TABLET, FILM COATED ORAL at 09:01

## 2023-01-29 NOTE — CARE UPDATE
01/29/23 1314   PRE-TX-O2   Device (Oxygen Therapy) room air   SpO2 100 %   Pulse Oximetry Type Intermittent   $ Pulse Oximetry - Multiple Charge Pulse Oximetry - Multiple   Aerosol Therapy   $ Aerosol Therapy Charges PRN treatment not required   Education   $ Education Bronchodilator;15 min

## 2023-01-29 NOTE — PT/OT/SLP PROGRESS
Physical Therapy Treatment    Patient Name:  El Abreu   MRN:  [id number]    Recommendations:     Discharge Recommendations: nursing facility, skilled, rehabilitation facility, LTACH (long-term acute care hospital)  Discharge Equipment Recommendations: bedside commode  Barriers to discharge:  orthostatic hypotension    Assessment:     El Abreu is a 27 y.o. male admitted with a medical diagnosis of AIDS.  He presents with the following impairments/functional limitations: weakness, impaired endurance, impaired functional mobility, impaired balance, decreased upper extremity function, decreased lower extremity function, impaired cardiopulmonary response to activity. Patient is agreeable to participation with PT treatment. (+) orthostatics. Supine: 137/83 , sit: 136/71, standin/51 . Patient did not have syncopal episode, but did become less verbally responsive. He returned to bed with RN present.     Rehab Prognosis: Fair; patient would benefit from acute skilled PT services to address these deficits and reach maximum level of function.    Recent Surgery: * No surgery found *      Plan:     During this hospitalization, patient to be seen 6 x/week to address the identified rehab impairments via gait training, therapeutic activities, therapeutic exercises, neuromuscular re-education and progress toward the following goals:    Plan of Care Expires:  23    Subjective     Chief Complaint: wants to go home   Patient/Family Comments/goals: home  Pain/Comfort:  Pain Rating 1: 0/10      Objective:     Communicated with PABLO Madrid prior to session.  Patient found HOB elevated with bed alarm, telemetry upon PT entry to room.     General Precautions: Standard, fall  Orthopedic Precautions: N/A  Braces: N/A  Respiratory Status: Room air     Functional Mobility:  Bed Mobility:     Supine to Sit: stand by assistance  Transfers:     Sit to Stand:  moderate assistance with rolling walker      AM-PAC 6  CLICK MOBILITY  Turning over in bed (including adjusting bedclothes, sheets and blankets)?: 4  Sitting down on and standing up from a chair with arms (e.g., wheelchair, bedside commode, etc.): 3  Moving from lying on back to sitting on the side of the bed?: 4  Moving to and from a bed to a chair (including a wheelchair)?: 1  Need to walk in hospital room?: 1  Climbing 3-5 steps with a railing?: 1  Basic Mobility Total Score: 14       Treatment & Education:  Patient was educated on the importance of OOB activity and functional mobility to negate negative effects of prolonged bed rest during hospitalization, safe transfers, and D/C planning     Patient left HOB elevated with all lines intact, call button in reach, bed alarm on, and RN present..    GOALS:   Multidisciplinary Problems       Physical Therapy Goals          Problem: Physical Therapy    Goal Priority Disciplines Outcome Goal Variances Interventions   Physical Therapy Goal     PT, PT/OT Ongoing, Progressing     Description: Goals to be met by: 23     Patient will increase functional independence with mobility by performin. Supine to sit with Supervision  2. Sit to stand transfer with Supervision  3. Bed to chair transfer with Supervision using Rolling Walker  4. Gait  x 150 feet with Supervision using Rolling Walker.                              Time Tracking:     PT Received On: 23  PT Start Time: 1303     PT Stop Time: 1317  PT Total Time (min): 14 min     Billable Minutes: Therapeutic Activity 14    Treatment Type: Treatment  PT/PTA: PT     PTA Visit Number: 0     2023

## 2023-01-29 NOTE — PROGRESS NOTES
[facility] Medicine  Progress Note    Patient name: El Abreu  MRN: [id number]  Admit Date: 1/16/2023   LOS: 11 days     SUBJECTIVE:     Principal problem: AIDS    Interval History:  Patient was seen and examined at bedside, overall appears same. Unfortunately patient becomes profoundly orthostatic upon standing up, heart rate goes up to 160s and he feels very symptomatic.       Scheduled Meds:   darunavir-cobicistat  1 tablet Oral Daily    emtricitabine-tenofovir 200-300 mg  1 tablet Oral Daily    enoxaparin  40 mg Subcutaneous Daily    EScitalopram oxalate  20 mg Oral Daily    ferrous sulfate  1 tablet Oral Daily    fluconazole  800 mg Oral Daily    fluticasone propionate  2 spray Each Nostril BID    folic acid  1 mg Oral Daily    insulin detemir U-100  10 Units Subcutaneous BID    levETIRAcetam  1,500 mg Oral BID    magnesium chloride  128 mg Oral Daily    megestroL  40 mg Oral Daily    midodrine  5 mg Oral TID WM    mirtazapine  15 mg Oral QHS    multivitamin  1 tablet Oral Daily    pantoprazole  40 mg Oral Before breakfast    sodium bicarbonate  650 mg Oral TID    sulfamethoxazole-trimethoprim 800-160mg  1 tablet Oral Daily    thiamine  100 mg Oral Daily     Continuous Infusions:   lactated ringers 100 mL/hr at 01/28/23 1503       PRN Meds:acetaminophen, albuterol-ipratropium, dextrose 10%, dextrose 10%, glucagon (human recombinant), glucose, glucose, HYDROcodone-acetaminophen, HYDROcodone-acetaminophen, insulin aspart U-100, lorazepam, magnesium sulfate IVPB, magnesium sulfate IVPB, melatonin, melatonin, naloxone, ondansetron, polyethylene glycol, potassium bicarbonate, potassium bicarbonate, potassium bicarbonate, prochlorperazine, simethicone, sodium chloride 0.9%    Review of patient's allergies indicates:  No Known Allergies    Review of Systems: As per interval history    OBJECTIVE:     Vital Signs (Most Recent)  Temp: 97.8 °F (36.6 °C) (01/29/23 0740)  Pulse: 106 (01/29/23  0740)  Resp: 16 (01/29/23 0740)  BP: 120/80 (01/29/23 0740)  SpO2: 99 % (01/29/23 0740)    Vital Signs Range (Last 24H):  Temp:  [97.8 °F (36.6 °C)-99.6 °F (37.6 °C)]   Pulse:  [105-110]   Resp:  [16-18]   BP: (115-123)/(72-91)   SpO2:  [97 %-99 %]     I & O (Last 24H):  Intake/Output Summary (Last 24 hours) at 1/29/2023 1201  Last data filed at 1/29/2023 0741  Gross per 24 hour   Intake --   Output 500 ml   Net -500 ml           Physical Exam:  General:  Chronically ill-appearing gentleman, cachectic  Eyes: No conjunctival injection. No scleral icterus.  ENT: Hearing grossly intact. No discharge from ears. No nasal discharge.   Neck: Supple, trachea midline. No JVD  CVS: RRR. No LE edema BL  Lungs:  No tachypnea or accessory muscle use.  Clear to auscultation bilaterally  Abdomen:  Soft, nontender and nondistended.  No organomegaly  Neuro: AOx3. Moves all extremities. Follows commands. Responds appropriately       Laboratory:  I have reviewed all pertinent lab results within the past 24 hours.    Diagnostic Results:  Reviewed all imaging    ASSESSMENT/PLAN:     27-year-old very unfortunate gentleman who recently had very prolonged hospital stay due to cryptococcal meningitis requiring serial lumbar punctures, neuro syphilis and hospital stay was complicated by AC, GI bleed, malnutrition and severe deconditioning and was transition to LTAC.  He was sent to our emergency room by LTAC due to critical hypomagnesemia and anemia causing severe lethargy and unable to stand up.  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         Plan:   Very unfortunate case of AIDS with recent prolonged hospital stay for cryptococcal meningitis and neurosyphilis came with severe lethargy, anemia and critical hypomagnesemia  Anti-retroviral, antifungal and antibiotics as per Infectious Disease.  Remarkable improvement in CD4 count and viral load  One blood culture positive for coag-negative staph, PICC line was removed and sent for tissue culture. Noted discontinuation of daptomycin and increasing dose of Bactrim by Infectious Disease  Follow-up on repeat cultures  Remains on Bactrim for PJP prophylaxis and Augmentin for sinusitis  On Diflucan for recent cryptococcal meningitis  Consulted psychiatry to reconsider his psych regimen considering excessive drowsiness-thank you  Consulted neurology for suspected seizure-like activity. EEG and MRI findings noted  Aggressive p.o. nutrition, improve pulmonary toilet  Out of bed to chair  PT, OT  Avoid antihypertensives, aggressive electrolyte replacement  Frequent orthostatic vital signs  Further management as per clinical course  Patient remains profoundly orthostatic and appears to have severe dysautonomia.  Will try midodrine and compression stockings  Unfortunately patient is not able to work with physical therapy due to profound orthostatic hypotension and dysautonomia. Patient will need inpatient rehab versus SNF      VTE Risk Mitigation (From admission, onward)           Ordered     Place CARMEN hose  Until discontinued         01/18/23 1830     enoxaparin injection 40 mg  Daily         01/16/23 0621                        Department Hospital Medicine  [facility]  Maximiliano Ellison MD  Date of service: 01/29/2023

## 2023-01-30 LAB
ABO + RH BLD: NORMAL
ALBUMIN SERPL BCP-MCNC: 3.4 G/DL (ref 3.5–5.2)
ALP SERPL-CCNC: 76 U/L (ref 55–135)
ALT SERPL W/O P-5'-P-CCNC: 32 U/L (ref 10–44)
ANION GAP SERPL CALC-SCNC: 8 MMOL/L (ref 8–16)
AST SERPL-CCNC: 23 U/L (ref 10–40)
BASOPHILS # BLD AUTO: 0.03 K/UL (ref 0–0.2)
BASOPHILS NFR BLD: 0.4 % (ref 0–1.9)
BILIRUB SERPL-MCNC: 0.6 MG/DL (ref 0.1–1)
BLD GP AB SCN CELLS X3 SERPL QL: NORMAL
BLD PROD TYP BPU: NORMAL
BLOOD UNIT EXPIRATION DATE: NORMAL
BLOOD UNIT TYPE CODE: 5100
BLOOD UNIT TYPE: NORMAL
BUN SERPL-MCNC: 24 MG/DL (ref 6–20)
CALCIUM SERPL-MCNC: 10.8 MG/DL (ref 8.7–10.5)
CHLORIDE SERPL-SCNC: 104 MMOL/L (ref 95–110)
CO2 SERPL-SCNC: 20 MMOL/L (ref 23–29)
CODING SYSTEM: NORMAL
CREAT SERPL-MCNC: 1.2 MG/DL (ref 0.5–1.4)
DIFFERENTIAL METHOD: ABNORMAL
DISPENSE STATUS: NORMAL
EOSINOPHIL # BLD AUTO: 0.2 K/UL (ref 0–0.5)
EOSINOPHIL NFR BLD: 2.6 % (ref 0–8)
ERYTHROCYTE [DISTWIDTH] IN BLOOD BY AUTOMATED COUNT: 14.6 % (ref 11.5–14.5)
EST. GFR  (NO RACE VARIABLE): >60 ML/MIN/1.73 M^2
FERRITIN SERPL-MCNC: 2244 NG/ML (ref 20–300)
GLUCOSE SERPL-MCNC: 103 MG/DL (ref 70–110)
GLUCOSE SERPL-MCNC: 132 MG/DL (ref 70–110)
GLUCOSE SERPL-MCNC: 134 MG/DL (ref 70–110)
GLUCOSE SERPL-MCNC: 84 MG/DL (ref 70–110)
GLUCOSE SERPL-MCNC: 90 MG/DL (ref 70–110)
HCT VFR BLD AUTO: 23.2 % (ref 40–54)
HGB BLD-MCNC: 7.5 G/DL (ref 14–18)
IMM GRANULOCYTES # BLD AUTO: 0.18 K/UL (ref 0–0.04)
IMM GRANULOCYTES NFR BLD AUTO: 2.1 % (ref 0–0.5)
IRON SERPL-MCNC: 68 UG/DL (ref 45–160)
LEVETIRACETAM SERPL-MCNC: 60 UG/ML (ref 10–40)
LYMPHOCYTES # BLD AUTO: 1.3 K/UL (ref 1–4.8)
LYMPHOCYTES NFR BLD: 14.7 % (ref 18–48)
MAGNESIUM SERPL-MCNC: 1.5 MG/DL (ref 1.6–2.6)
MCH RBC QN AUTO: 27.4 PG (ref 27–31)
MCHC RBC AUTO-ENTMCNC: 32.3 G/DL (ref 32–36)
MCV RBC AUTO: 85 FL (ref 82–98)
MONOCYTES # BLD AUTO: 1.2 K/UL (ref 0.3–1)
MONOCYTES NFR BLD: 14.2 % (ref 4–15)
NEUTROPHILS # BLD AUTO: 5.7 K/UL (ref 1.8–7.7)
NEUTROPHILS NFR BLD: 66 % (ref 38–73)
NRBC BLD-RTO: 0 /100 WBC
NUM UNITS TRANS PACKED RBC: NORMAL
PHOSPHATE SERPL-MCNC: 4.2 MG/DL (ref 2.7–4.5)
PLATELET # BLD AUTO: 352 K/UL (ref 150–450)
PMV BLD AUTO: 8.5 FL (ref 9.2–12.9)
POTASSIUM SERPL-SCNC: 3.8 MMOL/L (ref 3.5–5.1)
PROT SERPL-MCNC: 6.9 G/DL (ref 6–8.4)
RBC # BLD AUTO: 2.74 M/UL (ref 4.6–6.2)
RETICS/RBC NFR AUTO: 1.1 % (ref 0.4–2)
SATURATED IRON: 35 % (ref 20–50)
SODIUM SERPL-SCNC: 132 MMOL/L (ref 136–145)
TOTAL IRON BINDING CAPACITY: 197 UG/DL (ref 250–450)
TRANSFERRIN SERPL-MCNC: 141 MG/DL (ref 200–375)
WBC # BLD AUTO: 8.57 K/UL (ref 3.9–12.7)

## 2023-01-30 PROCEDURE — 86922 COMPATIBILITY TEST ANTIGLOB: CPT | Performed by: HOSPITALIST

## 2023-01-30 PROCEDURE — 84100 ASSAY OF PHOSPHORUS: CPT | Performed by: HOSPITALIST

## 2023-01-30 PROCEDURE — 63700000 PHARM REV CODE 250 ALT 637 W/O HCPCS: Performed by: NURSE PRACTITIONER

## 2023-01-30 PROCEDURE — 84466 ASSAY OF TRANSFERRIN: CPT | Performed by: HOSPITALIST

## 2023-01-30 PROCEDURE — 97110 THERAPEUTIC EXERCISES: CPT

## 2023-01-30 PROCEDURE — 63600175 PHARM REV CODE 636 W HCPCS: Performed by: HOSPITALIST

## 2023-01-30 PROCEDURE — 82728 ASSAY OF FERRITIN: CPT | Performed by: HOSPITALIST

## 2023-01-30 PROCEDURE — 36415 COLL VENOUS BLD VENIPUNCTURE: CPT | Performed by: NURSE PRACTITIONER

## 2023-01-30 PROCEDURE — 63700000 PHARM REV CODE 250 ALT 637 W/O HCPCS: Performed by: INTERNAL MEDICINE

## 2023-01-30 PROCEDURE — 36430 TRANSFUSION BLD/BLD COMPNT: CPT

## 2023-01-30 PROCEDURE — 85045 AUTOMATED RETICULOCYTE COUNT: CPT | Performed by: HOSPITALIST

## 2023-01-30 PROCEDURE — 86902 BLOOD TYPE ANTIGEN DONOR EA: CPT | Performed by: HOSPITALIST

## 2023-01-30 PROCEDURE — 86922 COMPATIBILITY TEST ANTIGLOB: CPT | Performed by: INTERNAL MEDICINE

## 2023-01-30 PROCEDURE — 63600175 PHARM REV CODE 636 W HCPCS: Performed by: NURSE PRACTITIONER

## 2023-01-30 PROCEDURE — 85025 COMPLETE CBC W/AUTO DIFF WBC: CPT | Performed by: NURSE PRACTITIONER

## 2023-01-30 PROCEDURE — 80053 COMPREHEN METABOLIC PANEL: CPT | Performed by: HOSPITALIST

## 2023-01-30 PROCEDURE — 25000003 PHARM REV CODE 250: Performed by: HOSPITALIST

## 2023-01-30 PROCEDURE — 25000003 PHARM REV CODE 250: Performed by: INTERNAL MEDICINE

## 2023-01-30 PROCEDURE — 12000002 HC ACUTE/MED SURGE SEMI-PRIVATE ROOM

## 2023-01-30 PROCEDURE — 86900 BLOOD TYPING SEROLOGIC ABO: CPT | Performed by: HOSPITALIST

## 2023-01-30 PROCEDURE — 83735 ASSAY OF MAGNESIUM: CPT | Performed by: NURSE PRACTITIONER

## 2023-01-30 PROCEDURE — 97530 THERAPEUTIC ACTIVITIES: CPT

## 2023-01-30 PROCEDURE — 97116 GAIT TRAINING THERAPY: CPT

## 2023-01-30 PROCEDURE — 25000003 PHARM REV CODE 250: Performed by: NURSE PRACTITIONER

## 2023-01-30 PROCEDURE — P9016 RBC LEUKOCYTES REDUCED: HCPCS | Performed by: HOSPITALIST

## 2023-01-30 PROCEDURE — 25000003 PHARM REV CODE 250: Performed by: STUDENT IN AN ORGANIZED HEALTH CARE EDUCATION/TRAINING PROGRAM

## 2023-01-30 RX ORDER — MIDODRINE HYDROCHLORIDE 2.5 MG/1
10 TABLET ORAL
Status: DISCONTINUED | OUTPATIENT
Start: 2023-01-30 | End: 2023-01-30

## 2023-01-30 RX ORDER — MAGNESIUM SULFATE HEPTAHYDRATE 40 MG/ML
4 INJECTION, SOLUTION INTRAVENOUS ONCE
Status: COMPLETED | OUTPATIENT
Start: 2023-01-30 | End: 2023-01-30

## 2023-01-30 RX ORDER — MIDODRINE HYDROCHLORIDE 2.5 MG/1
10 TABLET ORAL
Status: DISCONTINUED | OUTPATIENT
Start: 2023-01-30 | End: 2023-02-07 | Stop reason: HOSPADM

## 2023-01-30 RX ORDER — HYDROCODONE BITARTRATE AND ACETAMINOPHEN 500; 5 MG/1; MG/1
TABLET ORAL
Status: DISCONTINUED | OUTPATIENT
Start: 2023-01-30 | End: 2023-02-06 | Stop reason: SDUPTHER

## 2023-01-30 RX ORDER — MIDODRINE HYDROCHLORIDE 2.5 MG/1
10 TABLET ORAL ONCE
Status: COMPLETED | OUTPATIENT
Start: 2023-01-30 | End: 2023-01-30

## 2023-01-30 RX ADMIN — ESCITALOPRAM OXALATE 20 MG: 10 TABLET ORAL at 09:01

## 2023-01-30 RX ADMIN — MAGNESIUM SULFATE HEPTAHYDRATE 4 G: 40 INJECTION, SOLUTION INTRAVENOUS at 11:01

## 2023-01-30 RX ADMIN — FLUCONAZOLE 800 MG: 100 TABLET ORAL at 09:01

## 2023-01-30 RX ADMIN — INSULIN DETEMIR 10 UNITS: 100 INJECTION, SOLUTION SUBCUTANEOUS at 08:01

## 2023-01-30 RX ADMIN — FLUTICASONE PROPIONATE 100 MCG: 50 SPRAY, METERED NASAL at 09:01

## 2023-01-30 RX ADMIN — MIDODRINE HYDROCHLORIDE 10 MG: 2.5 TABLET ORAL at 11:01

## 2023-01-30 RX ADMIN — MIRTAZAPINE 15 MG: 15 TABLET, FILM COATED ORAL at 08:01

## 2023-01-30 RX ADMIN — FERROUS SULFATE TAB 325 MG (65 MG ELEMENTAL FE) 1 EACH: 325 (65 FE) TAB at 09:01

## 2023-01-30 RX ADMIN — DARUNAVIR ETHANOLATE AND COBICISTAT 1 TABLET: 800; 150 TABLET, FILM COATED ORAL at 09:01

## 2023-01-30 RX ADMIN — ENOXAPARIN SODIUM 40 MG: 100 INJECTION SUBCUTANEOUS at 04:01

## 2023-01-30 RX ADMIN — SODIUM CHLORIDE, SODIUM LACTATE, POTASSIUM CHLORIDE, AND CALCIUM CHLORIDE: .6; .31; .03; .02 INJECTION, SOLUTION INTRAVENOUS at 05:01

## 2023-01-30 RX ADMIN — MIDODRINE HYDROCHLORIDE 10 MG: 2.5 TABLET ORAL at 09:01

## 2023-01-30 RX ADMIN — MEGESTROL ACETATE 40 MG: 20 TABLET ORAL at 09:01

## 2023-01-30 RX ADMIN — SODIUM BICARBONATE 650 MG TABLET 650 MG: at 08:01

## 2023-01-30 RX ADMIN — HYDROCODONE BITARTRATE AND ACETAMINOPHEN 1 TABLET: 5; 325 TABLET ORAL at 10:01

## 2023-01-30 RX ADMIN — SULFAMETHOXAZOLE AND TRIMETHOPRIM 1 TABLET: 800; 160 TABLET ORAL at 09:01

## 2023-01-30 RX ADMIN — PANTOPRAZOLE SODIUM 40 MG: 40 TABLET, DELAYED RELEASE ORAL at 05:01

## 2023-01-30 RX ADMIN — THIAMINE HCL TAB 100 MG 100 MG: 100 TAB at 09:01

## 2023-01-30 RX ADMIN — MIDODRINE HYDROCHLORIDE 10 MG: 2.5 TABLET ORAL at 04:01

## 2023-01-30 RX ADMIN — FOLIC ACID 1 MG: 1 TABLET ORAL at 09:01

## 2023-01-30 RX ADMIN — THERA TABS 1 TABLET: TAB at 09:01

## 2023-01-30 RX ADMIN — SODIUM BICARBONATE 650 MG TABLET 650 MG: at 09:01

## 2023-01-30 RX ADMIN — LEVETIRACETAM 1500 MG: 500 TABLET, FILM COATED ORAL at 09:01

## 2023-01-30 RX ADMIN — SODIUM BICARBONATE 650 MG TABLET 650 MG: at 04:01

## 2023-01-30 RX ADMIN — LEVETIRACETAM 1500 MG: 500 TABLET, FILM COATED ORAL at 08:01

## 2023-01-30 RX ADMIN — EMTRICITABINE AND TENOFOVIR DISOPROXIL FUMARATE 1 TABLET: 200; 300 TABLET, FILM COATED ORAL at 09:01

## 2023-01-30 RX ADMIN — MAGNESIUM 64 MG (MAGNESIUM CHLORIDE) TABLET,DELAYED RELEASE 128 MG: at 09:01

## 2023-01-30 RX ADMIN — INSULIN DETEMIR 10 UNITS: 100 INJECTION, SOLUTION SUBCUTANEOUS at 09:01

## 2023-01-30 RX ADMIN — ONDANSETRON 4 MG: 2 INJECTION INTRAMUSCULAR; INTRAVENOUS at 08:01

## 2023-01-30 NOTE — PROGRESS NOTES
[facility] Medicine  Progress Note    Patient name: El Abreu  MRN: [id number]  Admit Date: 1/16/2023   LOS: 12 days     SUBJECTIVE:     Principal problem: AIDS    Interval History:  Patient was seen and examined at bedside, marginally better. Unfortunately patient becomes profoundly orthostatic upon standing up, heart rate goes up to 160s and he feels very symptomatic.       Scheduled Meds:   darunavir-cobicistat  1 tablet Oral Daily    emtricitabine-tenofovir 200-300 mg  1 tablet Oral Daily    enoxaparin  40 mg Subcutaneous Daily    EScitalopram oxalate  20 mg Oral Daily    ferrous sulfate  1 tablet Oral Daily    fluconazole  800 mg Oral Daily    fluticasone propionate  2 spray Each Nostril BID    folic acid  1 mg Oral Daily    insulin detemir U-100  10 Units Subcutaneous BID    levETIRAcetam  1,500 mg Oral BID    magnesium chloride  128 mg Oral Daily    megestroL  40 mg Oral Daily    midodrine  10 mg Oral TID WM    mirtazapine  15 mg Oral QHS    multivitamin  1 tablet Oral Daily    pantoprazole  40 mg Oral Before breakfast    sodium bicarbonate  650 mg Oral TID    sulfamethoxazole-trimethoprim 800-160mg  1 tablet Oral Daily    thiamine  100 mg Oral Daily     Continuous Infusions:   lactated ringers 100 mL/hr at 01/30/23 0538       PRN Meds:acetaminophen, albuterol-ipratropium, dextrose 10%, dextrose 10%, glucagon (human recombinant), glucose, glucose, HYDROcodone-acetaminophen, HYDROcodone-acetaminophen, insulin aspart U-100, lorazepam, magnesium sulfate IVPB, magnesium sulfate IVPB, melatonin, melatonin, naloxone, ondansetron, polyethylene glycol, potassium bicarbonate, potassium bicarbonate, potassium bicarbonate, prochlorperazine, simethicone, sodium chloride 0.9%    Review of patient's allergies indicates:  No Known Allergies    Review of Systems: As per interval history    OBJECTIVE:     Vital Signs (Most Recent)  Temp: 98.2 °F (36.8 °C) (01/30/23 0723)  Pulse: 96 (01/30/23  0723)  Resp: 18 (01/30/23 0723)  BP: 116/73 (01/30/23 0723)  SpO2: 100 % (01/30/23 0723)    Vital Signs Range (Last 24H):  Temp:  [97.6 °F (36.4 °C)-99.2 °F (37.3 °C)]   Pulse:  []   Resp:  [16-18]   BP: ()/(71-84)   SpO2:  [95 %-100 %]     I & O (Last 24H):No intake or output data in the 24 hours ending 01/30/23 1115        Physical Exam:  General:  Chronically ill-appearing gentleman, cachectic  Eyes: No conjunctival injection. No scleral icterus.  ENT: Hearing grossly intact. No discharge from ears. No nasal discharge.   Neck: Supple, trachea midline. No JVD  CVS: RRR. No LE edema BL  Lungs:  No tachypnea or accessory muscle use.  Clear to auscultation bilaterally  Abdomen:  Soft, nontender and nondistended.  No organomegaly  Neuro: AOx3. Moves all extremities. Follows commands. Responds appropriately       Laboratory:  I have reviewed all pertinent lab results within the past 24 hours.    Diagnostic Results:  Reviewed all imaging    ASSESSMENT/PLAN:     27-year-old very unfortunate gentleman who recently had very prolonged hospital stay due to cryptococcal meningitis requiring serial lumbar punctures, neuro syphilis and hospital stay was complicated by AC, GI bleed, malnutrition and severe deconditioning and was transition to LTAC.  He was sent to our emergency room by LTAC due to critical hypomagnesemia and anemia causing severe lethargy and unable to stand up.  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         Plan:   Very  unfortunate case of AIDS with recent prolonged hospital stay for cryptococcal meningitis and neurosyphilis came with severe lethargy, anemia and critical hypomagnesemia  Anti-retroviral, antifungal and antibiotics as per Infectious Disease.  Remarkable improvement in CD4 count and viral load  One blood culture positive for coag-negative staph, PICC line was removed and sent for tissue culture. Noted discontinuation of daptomycin and increasing dose of Bactrim by Infectious Disease  Follow-up on repeat cultures  Remains on Bactrim for PJP prophylaxis and Augmentin for sinusitis  On Diflucan for recent cryptococcal meningitis  Consulted psychiatry to reconsider his psych regimen considering excessive drowsiness-thank you  Consulted neurology for suspected seizure-like activity. EEG and MRI findings noted  Aggressive p.o. nutrition, improve pulmonary toilet  Out of bed to chair  PT, OT  Avoid antihypertensives, aggressive electrolyte replacement  Frequent orthostatic vital signs  Further management as per clinical course  Patient remains profoundly orthostatic and appears to have severe dysautonomia.  Will try midodrine and compression stockings.  Up titrated midodrine today.  Patient is also anemic, will transfuse 1 unit PRBC in hope of better exercise tolerance  Unfortunately patient is not able to work with physical therapy due to profound orthostatic hypotension and dysautonomia. Patient will need inpatient rehab versus SNF      VTE Risk Mitigation (From admission, onward)           Ordered     Place CARMEN hose  Until discontinued         01/18/23 1830     enoxaparin injection 40 mg  Daily         01/16/23 2105                        Department Hospital Medicine  [facility]  Maximiliano Ellison MD  Date of service: 01/30/2023

## 2023-01-30 NOTE — PLAN OF CARE
01/30/23 0956   Discharge Reassessment   Assessment Type Discharge Planning Reassessment   Did the patient's condition or plan change since previous assessment? Yes   Discharge Plan discussed with: Patient   Communicated ZULMA with patient/caregiver Yes   Discharge Plan A Rehab   Discharge Plan B Rehab   DME Needed Upon Discharge  none   Discharge Barriers Identified None   Why the patient remains in the hospital Requires continued medical care;Placement issues   Post-Acute Status   Post-Acute Authorization Placement   Post-Acute Placement Status Pending medical clearance/testing   Discharge Delays (!) Change in Medical Condition     Patient continues with symptomatic orthostatic hypotension. Per Dr. Ellison, plans to increase PO midodrine today and hopeful for discharge to  rehab on tomorrow. Patient accepted to [facility], payor authorization received on Friday. CM following

## 2023-01-30 NOTE — PT/OT/SLP PROGRESS
"Physical Therapy Treatment    Patient Name:  El Abreu   MRN:  [id number]    Recommendations:     Discharge Recommendations: rehabilitation facility  Discharge Equipment Recommendations: bedside commode  Barriers to discharge:  increased assist with mobility    Assessment:     El Abreu is a 27 y.o. male admitted with a medical diagnosis of AIDS.  He presents with the following impairments/functional limitations: weakness, impaired endurance, impaired functional mobility, impaired balance, decreased upper extremity function, decreased lower extremity function, impaired cardiopulmonary response to activity.    Pt found completely supine.Agreeable to visit. Father and step mother present.Just ate some [employer] chicken.CARMEN hose already  donned, HOB elevated to 50 degrees. /87, HR 94. Pt sat EOB with min A. /88,  .  Standing /69, . Pt then ambulated 20 ft with RW CGA/min A. BP in standing again 90/51, . After sitting for a period of time and BP check. Pt stood and ambulated 20 additional feet. BP in standing after 4 minutes /65, . Tolerance to activity improving.      Rehab Prognosis: Fair; patient would benefit from acute skilled PT services to address these deficits and reach maximum level of function.    Recent Surgery: * No surgery found *      Plan:     During this hospitalization, patient to be seen 6 x/week to address the identified rehab impairments via gait training, therapeutic activities, therapeutic exercises, neuromuscular re-education and progress toward the following goals:    Plan of Care Expires:  02/17/23    Subjective     Chief Complaint: "Can I try to stand up by myself?"  Patient/Family Comments/goals: to get stronger  Pain/Comfort:  Pain Rating 1: 0/10  Pain Rating Post-Intervention 1: 0/10      Objective:     Communicated with Anthony Madrid prior to session.  Patient found supine with peripheral IV, bed alarm, telemetry upon PT entry to room. "     General Precautions: Standard, fall (monitor BP/HR)  Orthopedic Precautions: N/A  Braces: N/A  Respiratory Status: Room air     Functional Mobility:  Bed Mobility:     Supine to Sit: minimum assistance  Sit to Supine: stand by assistance  Transfers:     Sit to Stand:  contact guard assistance and minimum assistance with rolling walker  Gait: 2 x 20 ft with RW min A, 1 seated rest      AM-PAC 6 CLICK MOBILITY  Turning over in bed (including adjusting bedclothes, sheets and blankets)?: 4  Sitting down on and standing up from a chair with arms (e.g., wheelchair, bedside commode, etc.): 3  Moving from lying on back to sitting on the side of the bed?: 4  Moving to and from a bed to a chair (including a wheelchair)?: 3  Need to walk in hospital room?: 3  Climbing 3-5 steps with a railing?: 1  Basic Mobility Total Score: 18       Treatment & Education:  Pt educated on importance of time OOB, importance of intermittent mobility, safe techniques for transfers/ambulation, discharge recommendations/options, and use of call light for assistance and fall prevention.      Patient left HOB elevated with all lines intact, call button in reach, bed alarm on, Rn notified, and family present..    GOALS:   Multidisciplinary Problems       Physical Therapy Goals          Problem: Physical Therapy    Goal Priority Disciplines Outcome Goal Variances Interventions   Physical Therapy Goal     PT, PT/OT Ongoing, Progressing     Description: Goals to be met by: 23     Patient will increase functional independence with mobility by performin. Supine to sit with Supervision  2. Sit to stand transfer with Supervision  3. Bed to chair transfer with Supervision using Rolling Walker  4. Gait  x 150 feet with Supervision using Rolling Walker.                              Time Tracking:     PT Received On: 23  PT Start Time: 1420     PT Stop Time: 1448  PT Total Time (min): 28 min     Billable Minutes: Gait Training 8 and  Therapeutic Activity 20    Treatment Type: Treatment  PT/PTA: PT     PTA Visit Number: 0     01/30/2023

## 2023-01-31 LAB
ALBUMIN SERPL BCP-MCNC: 3.5 G/DL (ref 3.5–5.2)
ALP SERPL-CCNC: 83 U/L (ref 55–135)
ALT SERPL W/O P-5'-P-CCNC: 34 U/L (ref 10–44)
ANION GAP SERPL CALC-SCNC: 7 MMOL/L (ref 8–16)
AST SERPL-CCNC: 30 U/L (ref 10–40)
BASOPHILS # BLD AUTO: 0.03 K/UL (ref 0–0.2)
BASOPHILS NFR BLD: 0.2 % (ref 0–1.9)
BILIRUB SERPL-MCNC: 0.5 MG/DL (ref 0.1–1)
BILIRUB UR QL STRIP: NEGATIVE
BUN SERPL-MCNC: 19 MG/DL (ref 6–20)
CALCIUM SERPL-MCNC: 10.6 MG/DL (ref 8.7–10.5)
CHLORIDE SERPL-SCNC: 103 MMOL/L (ref 95–110)
CLARITY UR: ABNORMAL
CO2 SERPL-SCNC: 21 MMOL/L (ref 23–29)
COLOR UR: YELLOW
CREAT SERPL-MCNC: 1 MG/DL (ref 0.5–1.4)
DIFFERENTIAL METHOD: ABNORMAL
EOSINOPHIL # BLD AUTO: 0.1 K/UL (ref 0–0.5)
EOSINOPHIL NFR BLD: 0.4 % (ref 0–8)
ERYTHROCYTE [DISTWIDTH] IN BLOOD BY AUTOMATED COUNT: 14.6 % (ref 11.5–14.5)
EST. GFR  (NO RACE VARIABLE): >60 ML/MIN/1.73 M^2
GLUCOSE SERPL-MCNC: 107 MG/DL (ref 70–110)
GLUCOSE SERPL-MCNC: 108 MG/DL (ref 70–110)
GLUCOSE SERPL-MCNC: 109 MG/DL (ref 70–110)
GLUCOSE SERPL-MCNC: 119 MG/DL (ref 70–110)
GLUCOSE SERPL-MCNC: 134 MG/DL (ref 70–110)
GLUCOSE SERPL-MCNC: 82 MG/DL (ref 70–110)
GLUCOSE UR QL STRIP: NEGATIVE
HCT VFR BLD AUTO: 24.9 % (ref 40–54)
HGB BLD-MCNC: 7.8 G/DL (ref 14–18)
HGB UR QL STRIP: NEGATIVE
IMM GRANULOCYTES # BLD AUTO: 0.27 K/UL (ref 0–0.04)
IMM GRANULOCYTES NFR BLD AUTO: 2 % (ref 0–0.5)
KETONES UR QL STRIP: NEGATIVE
LACTATE SERPL-SCNC: 0.7 MMOL/L (ref 0.5–1.9)
LEUKOCYTE ESTERASE UR QL STRIP: NEGATIVE
LYMPHOCYTES # BLD AUTO: 0.8 K/UL (ref 1–4.8)
LYMPHOCYTES NFR BLD: 5.8 % (ref 18–48)
MAGNESIUM SERPL-MCNC: 1.7 MG/DL (ref 1.6–2.6)
MCH RBC QN AUTO: 27 PG (ref 27–31)
MCHC RBC AUTO-ENTMCNC: 31.3 G/DL (ref 32–36)
MCV RBC AUTO: 86 FL (ref 82–98)
MONOCYTES # BLD AUTO: 1.4 K/UL (ref 0.3–1)
MONOCYTES NFR BLD: 10.6 % (ref 4–15)
NEUTROPHILS # BLD AUTO: 10.9 K/UL (ref 1.8–7.7)
NEUTROPHILS NFR BLD: 81 % (ref 38–73)
NITRITE UR QL STRIP: NEGATIVE
NRBC BLD-RTO: 0 /100 WBC
PH UR STRIP: 7 [PH] (ref 5–8)
PHOSPHATE SERPL-MCNC: 3.6 MG/DL (ref 2.7–4.5)
PLATELET # BLD AUTO: 305 K/UL (ref 150–450)
PMV BLD AUTO: 8 FL (ref 9.2–12.9)
POTASSIUM SERPL-SCNC: 3.5 MMOL/L (ref 3.5–5.1)
PROCALCITONIN SERPL IA-MCNC: 0.06 NG/ML (ref 0–0.5)
PROT SERPL-MCNC: 7 G/DL (ref 6–8.4)
PROT UR QL STRIP: NEGATIVE
RBC # BLD AUTO: 2.89 M/UL (ref 4.6–6.2)
SODIUM SERPL-SCNC: 131 MMOL/L (ref 136–145)
SP GR UR STRIP: 1.01 (ref 1–1.03)
URN SPEC COLLECT METH UR: ABNORMAL
UROBILINOGEN UR STRIP-ACNC: NEGATIVE EU/DL
WBC # BLD AUTO: 13.46 K/UL (ref 3.9–12.7)

## 2023-01-31 PROCEDURE — 36415 COLL VENOUS BLD VENIPUNCTURE: CPT | Performed by: INTERNAL MEDICINE

## 2023-01-31 PROCEDURE — 25000003 PHARM REV CODE 250: Performed by: HOSPITALIST

## 2023-01-31 PROCEDURE — 25000003 PHARM REV CODE 250: Performed by: NURSE PRACTITIONER

## 2023-01-31 PROCEDURE — 84100 ASSAY OF PHOSPHORUS: CPT | Performed by: HOSPITALIST

## 2023-01-31 PROCEDURE — 99900031 HC PATIENT EDUCATION (STAT)

## 2023-01-31 PROCEDURE — 25000003 PHARM REV CODE 250: Performed by: STUDENT IN AN ORGANIZED HEALTH CARE EDUCATION/TRAINING PROGRAM

## 2023-01-31 PROCEDURE — 12000002 HC ACUTE/MED SURGE SEMI-PRIVATE ROOM

## 2023-01-31 PROCEDURE — 63600175 PHARM REV CODE 636 W HCPCS: Performed by: NURSE PRACTITIONER

## 2023-01-31 PROCEDURE — 99232 SBSQ HOSP IP/OBS MODERATE 35: CPT | Mod: ,,, | Performed by: STUDENT IN AN ORGANIZED HEALTH CARE EDUCATION/TRAINING PROGRAM

## 2023-01-31 PROCEDURE — 36415 COLL VENOUS BLD VENIPUNCTURE: CPT | Performed by: NURSE PRACTITIONER

## 2023-01-31 PROCEDURE — 25000003 PHARM REV CODE 250: Performed by: INTERNAL MEDICINE

## 2023-01-31 PROCEDURE — 83735 ASSAY OF MAGNESIUM: CPT | Performed by: NURSE PRACTITIONER

## 2023-01-31 PROCEDURE — 63600175 PHARM REV CODE 636 W HCPCS: Performed by: HOSPITALIST

## 2023-01-31 PROCEDURE — 63600175 PHARM REV CODE 636 W HCPCS: Performed by: INTERNAL MEDICINE

## 2023-01-31 PROCEDURE — 81003 URINALYSIS AUTO W/O SCOPE: CPT | Performed by: INTERNAL MEDICINE

## 2023-01-31 PROCEDURE — 63700000 PHARM REV CODE 250 ALT 637 W/O HCPCS: Performed by: NURSE PRACTITIONER

## 2023-01-31 PROCEDURE — 85025 COMPLETE CBC W/AUTO DIFF WBC: CPT | Performed by: NURSE PRACTITIONER

## 2023-01-31 PROCEDURE — 99232 PR SUBSEQUENT HOSPITAL CARE,LEVL II: ICD-10-PCS | Mod: ,,, | Performed by: STUDENT IN AN ORGANIZED HEALTH CARE EDUCATION/TRAINING PROGRAM

## 2023-01-31 PROCEDURE — 97530 THERAPEUTIC ACTIVITIES: CPT

## 2023-01-31 PROCEDURE — 94761 N-INVAS EAR/PLS OXIMETRY MLT: CPT

## 2023-01-31 PROCEDURE — 97535 SELF CARE MNGMENT TRAINING: CPT

## 2023-01-31 PROCEDURE — 84145 PROCALCITONIN (PCT): CPT | Performed by: INTERNAL MEDICINE

## 2023-01-31 PROCEDURE — 63700000 PHARM REV CODE 250 ALT 637 W/O HCPCS: Performed by: INTERNAL MEDICINE

## 2023-01-31 PROCEDURE — 99900035 HC TECH TIME PER 15 MIN (STAT)

## 2023-01-31 PROCEDURE — 87040 BLOOD CULTURE FOR BACTERIA: CPT | Performed by: INTERNAL MEDICINE

## 2023-01-31 PROCEDURE — 83605 ASSAY OF LACTIC ACID: CPT | Performed by: INTERNAL MEDICINE

## 2023-01-31 PROCEDURE — 80053 COMPREHEN METABOLIC PANEL: CPT | Performed by: HOSPITALIST

## 2023-01-31 RX ORDER — MAGNESIUM SULFATE HEPTAHYDRATE 40 MG/ML
2 INJECTION, SOLUTION INTRAVENOUS ONCE
Status: COMPLETED | OUTPATIENT
Start: 2023-01-31 | End: 2023-01-31

## 2023-01-31 RX ADMIN — SULFAMETHOXAZOLE AND TRIMETHOPRIM 1 TABLET: 800; 160 TABLET ORAL at 09:01

## 2023-01-31 RX ADMIN — INSULIN DETEMIR 10 UNITS: 100 INJECTION, SOLUTION SUBCUTANEOUS at 08:01

## 2023-01-31 RX ADMIN — THIAMINE HCL TAB 100 MG 100 MG: 100 TAB at 09:01

## 2023-01-31 RX ADMIN — SODIUM CHLORIDE TAB 1 GM 1 G: 1 TAB at 08:01

## 2023-01-31 RX ADMIN — MIDODRINE HYDROCHLORIDE 10 MG: 2.5 TABLET ORAL at 12:01

## 2023-01-31 RX ADMIN — ESCITALOPRAM OXALATE 20 MG: 10 TABLET ORAL at 09:01

## 2023-01-31 RX ADMIN — SODIUM BICARBONATE 650 MG TABLET 650 MG: at 09:01

## 2023-01-31 RX ADMIN — MIRTAZAPINE 15 MG: 15 TABLET, FILM COATED ORAL at 08:01

## 2023-01-31 RX ADMIN — PANTOPRAZOLE SODIUM 40 MG: 40 TABLET, DELAYED RELEASE ORAL at 05:01

## 2023-01-31 RX ADMIN — FLUTICASONE PROPIONATE 100 MCG: 50 SPRAY, METERED NASAL at 09:01

## 2023-01-31 RX ADMIN — ONDANSETRON 4 MG: 2 INJECTION INTRAMUSCULAR; INTRAVENOUS at 08:01

## 2023-01-31 RX ADMIN — LEVETIRACETAM 1500 MG: 500 TABLET, FILM COATED ORAL at 08:01

## 2023-01-31 RX ADMIN — INSULIN DETEMIR 10 UNITS: 100 INJECTION, SOLUTION SUBCUTANEOUS at 09:01

## 2023-01-31 RX ADMIN — SODIUM BICARBONATE 650 MG TABLET 650 MG: at 02:01

## 2023-01-31 RX ADMIN — ENOXAPARIN SODIUM 40 MG: 100 INJECTION SUBCUTANEOUS at 04:01

## 2023-01-31 RX ADMIN — SODIUM CHLORIDE, SODIUM LACTATE, POTASSIUM CHLORIDE, AND CALCIUM CHLORIDE: .6; .31; .03; .02 INJECTION, SOLUTION INTRAVENOUS at 09:01

## 2023-01-31 RX ADMIN — ONDANSETRON 4 MG: 2 INJECTION INTRAMUSCULAR; INTRAVENOUS at 09:01

## 2023-01-31 RX ADMIN — MIDODRINE HYDROCHLORIDE 10 MG: 2.5 TABLET ORAL at 09:01

## 2023-01-31 RX ADMIN — FLUCONAZOLE 800 MG: 100 TABLET ORAL at 09:01

## 2023-01-31 RX ADMIN — FERROUS SULFATE TAB 325 MG (65 MG ELEMENTAL FE) 1 EACH: 325 (65 FE) TAB at 09:01

## 2023-01-31 RX ADMIN — THERA TABS 1 TABLET: TAB at 09:01

## 2023-01-31 RX ADMIN — DARUNAVIR ETHANOLATE AND COBICISTAT 1 TABLET: 800; 150 TABLET, FILM COATED ORAL at 09:01

## 2023-01-31 RX ADMIN — MAGNESIUM 64 MG (MAGNESIUM CHLORIDE) TABLET,DELAYED RELEASE 128 MG: at 09:01

## 2023-01-31 RX ADMIN — MIDODRINE HYDROCHLORIDE 10 MG: 2.5 TABLET ORAL at 04:01

## 2023-01-31 RX ADMIN — LEVETIRACETAM 1500 MG: 500 TABLET, FILM COATED ORAL at 09:01

## 2023-01-31 RX ADMIN — MEGESTROL ACETATE 40 MG: 20 TABLET ORAL at 09:01

## 2023-01-31 RX ADMIN — FOLIC ACID 1 MG: 1 TABLET ORAL at 09:01

## 2023-01-31 RX ADMIN — MAGNESIUM SULFATE HEPTAHYDRATE 2 G: 40 INJECTION, SOLUTION INTRAVENOUS at 09:01

## 2023-01-31 RX ADMIN — EMTRICITABINE AND TENOFOVIR DISOPROXIL FUMARATE 1 TABLET: 200; 300 TABLET, FILM COATED ORAL at 09:01

## 2023-01-31 NOTE — PT/OT/SLP PROGRESS
Physical Therapy Treatment    Patient Name:  El Abreu   MRN:  [id number]    Recommendations:     Discharge Recommendations: rehabilitation facility  Discharge Equipment Recommendations: bedside commode  Barriers to discharge:  increase with mobility    Assessment:     El Abreu is a 27 y.o. male admitted with a medical diagnosis of AIDS.  He presents with the following impairments/functional limitations: weakness, impaired endurance, impaired self care skills, impaired functional mobility, gait instability, impaired balance, decreased lower extremity function, decreased safety awareness, impaired cardiopulmonary response to activity.    Pt found supine in bed. Educated on importance of HOB elevated. Pt reports less dizziness and improved BP. BP monitored throughout session and noted improve activity tolerance. BP supine 135/77, BP seated 124/71, BPs post ambulation 131/60 and 137/71. Initial ambulation 40 ft with RW and min A greatest for balance and RW management then tolerated increase ambulation as BP stable to 50 ft continues with min A greatest for balance and RW management     Rehab Prognosis: Fair; patient would benefit from acute skilled PT services to address these deficits and reach maximum level of function.    Recent Surgery: * No surgery found *      Plan:     During this hospitalization, patient to be seen 6 x/week to address the identified rehab impairments via gait training, therapeutic activities, therapeutic exercises, neuromuscular re-education and progress toward the following goals:    Plan of Care Expires:  03/02/23    Subjective     Chief Complaint: none, reports improved BP  Patient/Family Comments/goals: walk  Pain/Comfort:  Pain Rating 1: 0/10      Objective:     Communicated with RN prior to session.  Patient found supine with peripheral IV, telemetry, bed alarm upon PT entry to room.     General Precautions: Standard, fall  Orthopedic Precautions: N/A  Braces: N/A  Respiratory  Status: Room air     Functional Mobility:  Bed Mobility:     Supine to Sit: supervision  Sit to Supine: supervision  Transfers:     Sit to Stand:  contact guard assistance with rolling walker  Gait: 40 and 50 ft with RW and min A for balance and RW management greatest on the right      AM-PAC 6 CLICK MOBILITY          Treatment & Education:  Pt educated on POC, discharge recommendation, importance of time OOB, RW management, obstacle navigation, importance of HOB elevated, effect of BP on mobility, need for assist with mobility, use of call bell to seek assistance as needed and fall prevention      Patient left supine with all lines intact, call button in reach, and bed alarm on..    GOALS:   Multidisciplinary Problems       Physical Therapy Goals          Problem: Physical Therapy    Goal Priority Disciplines Outcome Goal Variances Interventions   Physical Therapy Goal     PT, PT/OT Ongoing, Progressing     Description: Goals to be met by: 23     Patient will increase functional independence with mobility by performin. Supine to sit with Supervision  2. Sit to stand transfer with Supervision  3. Bed to chair transfer with Supervision using Rolling Walker  4. Gait  x 150 feet with Supervision using Rolling Walker.                              Time Tracking:     PT Received On: 23  PT Start Time: 1149     PT Stop Time: 1205  PT Total Time (min): 16 min     Billable Minutes: Therapeutic Activity 16    Treatment Type: Treatment  PT/PTA: PT     PTA Visit Number: 0     2023

## 2023-01-31 NOTE — PROGRESS NOTES
Progress Note  Infectious Disease    Reason for Consult:  leukocytosis    HPI: El Abreu is a 27 y.o. 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  he was last admitted to [facility] November 30th with vomiting and headache, increased intracranial pressure.  This LP had a positive Sirisha ink opening pressure was 23.  He was then committed to a 6 week course of AmBisome completed 1/10, which was interrupted with AC but resumed and he was discharged to [facility] on January 10th.  He did require an additional LP for fluid removal and reduction of pressure on 12/13 (Sirisha ink was still positive but the culture was negative.  The last positive culture was 11/21).  He has been continued on fluconazole 800 mg daily since 01/11 and has been receiving Pneumocystis prophylaxis and appetite stimulants and antidepressants.  MRI brain on 01/01 showed stability of the right globus pallidus cryptococcal pseudocyst.  He was additionally treated for neurosyphilis 3 doses of Bicillin after an appropriate course of IV penicillin G.  He has not been eating well, withdrawn, seen by Psychiatry late December and started on risperidone.  He was seen in the [facility] emergency room 1/14 when his mandible became stuck open.  This was reduced in their emergency room he was sent back to the [facility].  He was sent to our emergency room yesterday for severe hypomagnesemia, 0.8 While in the emergency room he was noted to have a rising white blood cell count, from normal on 01/08 to 14 on 01/09 to 17 on 01/16 additionally his hemoglobin was 7.3.  An infectious workup was commenced and CRP was 2.0, lactic acid 0.9, procalcitonin 0.15.  Chest x-ray was negative, blood cultures are negative, respiratory PCR is not yet been  collected and he is not produce any sputum but this is not likely relevant..vanc and cefepime were begun.   Unfortunately our hospital is full and he has been waiting in the hallway outside the emergency room.  He is incontinent of urine, and urinalysis was negative.  Magnesium is now 1.7    He feels much improved, tells me that he ate breakfast and lunch. He does endorse some small amount of yellow sputum and he does find that his sinuses are congested. It is relevant to note that he had pansinusitis on MRI brain 1/1.     1/18: interim reviewed. He is eating well. He had a syncopal episode while working with PT. He states that this has happened before, but does not happen consistently when he gets up. He is still congested but not really expectorating. Sputum sample was not purulent(saliva) and culture has only normal burak. He is not receiving his HIV antivirals here(not on formulary) though these were available at [facility]. Repeated CXR and it is still clear.   1/20:  interim reviewed. WBC a little higher , peripheral smear noted but unsure of the relevance of the findings.  He is sleeping deeply, worked with physical therapy much more successfully today he is eating reasonably well and antivirals were resumed.  He endorses that his cough is improved.  Denies diarrhea. Blood culture from 1/16, has coag neg Staph in 1/4 bottles drawn from picc line. The picc line is 47 days old, placed 12/4.      1/21-22 (Deep): interim reviewed, discussed with Dr Ny, patient seen and examined at bedside.  States he vomited earlier this morning, he was feeling very nauseous.  He denies headaches, no fever or chills.  Slightly hypertensive, afebrile.  Adequate urinary output, 1 episode of stool recorded in the last 24 hours.  Labs reviewed, persistent leukocytosis 20.9, lymphocytic predominance 10%, no left shift, bands 10%, H&H 7.5/23.5, platelet count 248.  Stable kidney function, magnesium 1.2.  Micro reviewed, blood cultures  from 01/20 no growth to date, pending final.  Tip culture from PICC which was removed 1/20 no growth.  Respiratory viral panel not detected.    1/23:  Interim reviewed, patient seen examined at bedside, states he is feeling a little stronger today, was able to walk around with physical therapy, states he had 2-3/10 frontal headache this morning that is now improved.  Hemodynamically stable, afebrile.  Labs reviewed, leukocytosis down to 15.9, lymphocytic predominance 12%, bands 2%, H&H 7.5/32.4, platelet count 312.  Magnesium 1.4, stable potassium, and kidney function.  CD4 count and viral load in process.    1/24:  Interim reviewed, patient seen examined at bedside, states he is feeling much better today, walk with PT, encouraged to sit in the chair.  Hemodynamically stable, afebrile.  White count down to 13.2, no left shift, H&H 7.3/22.2, platelet count 351, magnesium 1.6.  CD4 122, viral load still in process.    1/25:  Interim reviewed, patient seen examined at bedside.  States he is feeling good today, trying his best to cooperate with physical therapy, encouraged to get out of bed.  Hemodynamically stable, afebrile.  Labs reviewed, white count 13, no left shift, H&H 7.6/23.8, platelet count 334.  Stable kidney function.  Viral load 20 copies, almost undetectable.    1/26:  Interim reviewed, patient seen examined at bedside, states he threw up at noon, asking for food.  States he is feeling better, encouraged to get out of bed.  Physical therapy still recommending skilled nursing facilities, case management trying to find a place for him to go.  Labs reviewed, white count improved, stable kidney function, magnesium 1.7, improved.    1/27: interim reviewed, patient seen and examined at bedside. Patient had hypotension this morning, now improved. Seen by Psych, recommendations noted. EEG reviewed, no abnormalities.     ID CALLED BACK FOR LEUKOCYTOSIS:  1/31: Interim reviewed, he received 1 UI PRBC yesterday,  blood cultures x 1 sent this morning.   Patient seen and examined at bedside, remains orthostatic, was able to work with PT, on midodrine. Hemodynamically stable, afebrile. Labs reviewed, leukocytosis 13, left shift PMN: 81%, H/H 7.8/24.9, plt 305. Stable kidney function, hyponatremia 131.     EXAM & DIAGNOSTICS REVIEWED:   Vitals:     Temp:  [97.7 °F (36.5 °C)-99 °F (37.2 °C)]   Temp: 98.2 °F (36.8 °C) (01/31/23 0740)  Pulse: 96 (01/31/23 0740)  Resp: 16 (01/31/23 0740)  BP: 129/85 (01/31/23 0740)  SpO2: 100 % (01/31/23 0740)    Intake/Output Summary (Last 24 hours) at 1/31/2023 1008  Last data filed at 1/31/2023 0504  Gross per 24 hour   Intake 651.67 ml   Output 400 ml   Net 251.67 ml         General:  Very thin, in NAD, cooperative, comfortable, non toxic  Eyes:  Anicteric, EOMI -  iris heterochromia/ post surgical L eye   ENT:  No ulcers, exudates, thrush, nares patent, nasal congestion improved   Neck:  supple  Lungs: Clear to auscultation   Heart:  RRR, no gallop/murmur/rub noted  Abd:  Soft, NT, ND, normal BS, no masses or organomegaly appreciated.  :  Voids , no flank tenderness  Musc:  Joints without effusion, swelling, erythema, synovitis, with mild generalized muscle wasting.   Skin:  No rashes. Dry skin.  Neuro:             Arousable, speech fluent, face symmetric, moves all extremities, no focal weakness.   Psych:  Calm, cooperative  Lymphatic:        Extrem: No edema, erythema, phlebitis, cellulitis, warm and well perfused  VAD:  Peripheral IV  Isolation:  none  Wound: none      General Labs reviewed:  Recent Labs   Lab 01/29/23  0559 01/30/23 0434 01/31/23 0441   WBC 12.40 8.57 13.46*   HGB 8.0* 7.5* 7.8*   HCT 24.1* 23.2* 24.9*    352 305       Recent Labs   Lab 01/29/23  0559 01/30/23 0434 01/31/23  0441   * 132* 131*   K 4.2 3.8 3.5    104 103   CO2 21* 20* 21*   BUN 24* 24* 19   CREATININE 1.3 1.2 1.0   CALCIUM 11.0* 10.8* 10.6*   PROT 7.4 6.9 7.0   BILITOT 0.5 0.6 0.5    ALKPHOS 79 76 83   ALT 41 32 34   AST 22 23 30     No results for input(s): CRP in the last 168 hours.    Estimated Creatinine Clearance: 112.3 mL/min (based on SCr of 1 mg/dL).    Micro:  Microbiology Results (last 7 days)       Procedure Component Value Units Date/Time    Blood culture [[id number]] Collected: 01/31/23 0919    Order Status: Sent Specimen: Blood Updated: 01/31/23 0926    Blood culture [[id number]] Collected: 01/20/23 2051    Order Status: Completed Specimen: Blood from Antecubital, Left Arm Updated: 01/25/23 2232     Blood Culture, Routine No growth after 5 days.    Narrative:      Drawn from PICC line            Imaging Reviewed:   CXR   MRI brain 1/1    Cardiology:    IMPRESSION & PLAN     Severe hypomagnesemia, improved - last Mg 1.7   Electrolyte abnormalities likely from ampho B    2. HIV/AIDS C3 - Cryptococcal meningitis, now controlled, s/p 6 weeks of ambisome and continues on high dose oral fluconazole   Cryptococcoma, stable   CD4 11/5 7 - VL 48,127 - repeat CD4 127 and VL 20 copies, almost undetectable   On Prezcobix/Descovy   PPD 1/14 negative    3. Leukocytosis 13.4 today, s/p 1 UI PRBC yesterday, will monitor    4. Cachexia with failure to thrive   On supplements     5. Pansinusitis, s/p Augmentin, improved  CRP 2.0    Recommendations:  Continue Diflucan 800 mg per day for Crypto will need at least 12 months of therapy along with ART   Bactrim DS 1 tab daily, CD4 <200 for PJP   Continue Prezcobix/Descovy daily   Optimizing nutrition   Patient to follow with his ID physician in [place]   PT/OT as tolerated  CM working at LTAC    D/w patient, Dr Medina     Medical Decision Making during this encounter was  [_] Low Complexity  [_] Moderate Complexity  [xxx  ] High Complexity

## 2023-01-31 NOTE — PT/OT/SLP PROGRESS
Occupational Therapy   Treatment    Name: El Abreu  MRN: [id number]  Admitting Diagnosis:  AIDS       Recommendations:     Discharge Recommendations: rehabilitation facility  Discharge Equipment Recommendations:  bedside commode  Barriers to discharge:  None    Assessment:     El Abreu is a 27 y.o. male with a medical diagnosis of AIDS.  Pt agreeable to OT therapy session this AM. Performance deficits affecting function are weakness, impaired endurance, impaired self care skills, impaired functional mobility, gait instability, impaired balance, decreased safety awareness, impaired cardiopulmonary response to activity.   Pt with improved tolerance for activity this date. Supine: 141/89 HR 83; EOB: 114/73 ; standin/74 ; After ambulation (~15 ft): /79 ; Supine at end of session: 145/84 HR 83.   Pt with no complaints of dizziness or lightheadedness throughout session.    Rehab Prognosis:  Good; patient would benefit from acute skilled OT services to address these deficits and reach maximum level of function.       Plan:     Patient to be seen 3 x/week to address the above listed problems via self-care/home management, therapeutic activities, therapeutic exercises  Plan of Care Expires: 23  Plan of Care Reviewed with: patient    Subjective     Pain/Comfort:  Pain Rating 1: 0/10    Objective:     Communicated with: nursing prior to session.  Patient found supine with peripheral IV, bed alarm, telemetry upon OT entry to room.    General Precautions: Standard, fall    Orthopedic Precautions:N/A  Braces: N/A  Respiratory Status: Room air     Occupational Performance:     Bed Mobility:    Patient completed Supine to Sit with stand by assistance  Patient completed Sit to Supine with stand by assistance     Functional Mobility/Transfers:  Patient completed Sit <> Stand Transfer with minimum assistance  with  rolling walker   Functional Mobility: pt amb in room ~15 ft with RW with  minimum assistance with no LOB or SOB; cues to slow down and take his time    Activities of Daily Living:  Feeding:  independence    Grooming: stand by assistance seated EOB to wash face  Toileting: maximal assistance to doff soiled brief and don clean brief in standing with BUE support on RW; Now that pt's BP is tolerating activity, pt encouraged to call nursing for assistance to use BSC when needs to use bathroom. Pt v/u.    Treatment & Education:  Pt educated on role of OT/POC, importance of OOB/EOB activity, use of call bell, and safety during ADLs, transfers, and functional mobility.  Pt declined to sit up in chair due to pain in ribs. RN aware.    Patient left HOB elevated with all lines intact, call button in reach, and bed alarm on    GOALS:   Multidisciplinary Problems       Occupational Therapy Goals          Problem: Occupational Therapy    Goal Priority Disciplines Outcome Interventions   Occupational Therapy Goal     OT, PT/OT Ongoing, Progressing    Description: Goals to be met by: 2/17/23     Patient will increase functional independence with ADLs by performing:    UE Dressing with Supervision.  LE Dressing with Supervision and Assistive Devices as needed.  Grooming while standing at sink with Supervision.  Toileting from bedside commode with Supervision for hygiene and clothing management.   Toilet transfer to bedside commode with Supervision.  Upper extremity exercise program x10 reps per handout, with supervision.                         Time Tracking:     OT Date of Treatment: 01/31/23  OT Start Time: 1003  OT Stop Time: 1031  OT Total Time (min): 28 min    Billable Minutes:Self Care/Home Management 28    OT/JACOB: OT          1/31/2023

## 2023-01-31 NOTE — PROGRESS NOTES
[facility]  Adult Nutrition   Progress Note (Follow-Up)    SUMMARY      Recommendations:   1. Continue Diabetic diet. 2200 kcal / day per previous facility order.   2. Continue Glucerna with meals  3. Change Khurram to unflavored in sprite BID.  4. Recommend adding Severe Malnutrition to problem list.      Goals:   Goals: 1. Patient to consume >/= EEN / EPN with po meals and supplements. 2. No further weight loss. 3. Labs trend to target range.    Dietitian Rounds Brief  Patient eating most food from outside, he has a good appetite but is tired of meals. Plan is for dc soon. Dislikes Khurram will try in sprite. Last BM 1//30/23. Continues to drink Glucerna with meals. Pt walking in [place]. RD to follow.    Diet order: Diabetic    Oral Supplement: Khurram BID; Glucerna with meals    % Intake of Estimated Energy Needs: 75 - 100 %  % Meal Intake: 75 - 100 %    Estimated/Assessed Needs  Weight Used For Calorie Calculations: 52.2 kg (115 lb 1.3 oz)  Energy Calorie Requirements (kcal): 8407-4409 (35-40)     Protein Requirements:  (1.5-2.0)  Weight Used For Protein Calculations: 52.2 kg (115 lb 1.3 oz)     Estimated Fluid Requirement Method: RDA Method  RDA Method (mL): 1827     Weight History:  Wt Readings from Last 5 Encounters:   01/21/23 70.9 kg (156 lb 4.9 oz)   01/15/23 52.3 kg (115 lb 3.2 oz)   01/13/23 61 kg (134 lb 7.7 oz)   01/02/23 60.1 kg (132 lb 7.9 oz)   11/25/22 68.9 kg (152 lb)      Reason for Assessment  Reason For Assessment: consult (diet educaion trigger-- pneumonia and diabetes)  Diagnosis: infection/sepsis, other (see comments), pulmonary disease (Pneumonia)  Relevant Medical History: diabetes, AIDS, PAF, fungal meningitis    Medications:Pertinent Medications Reviewed  Scheduled Meds:   darunavir-cobicistat  1 tablet Oral Daily    emtricitabine-tenofovir 200-300 mg  1 tablet Oral Daily    enoxaparin  40 mg Subcutaneous Daily    EScitalopram oxalate  20 mg Oral Daily    ferrous sulfate  1  tablet Oral Daily    fluconazole  800 mg Oral Daily    fluticasone propionate  2 spray Each Nostril BID    folic acid  1 mg Oral Daily    insulin detemir U-100  10 Units Subcutaneous BID    levETIRAcetam  1,500 mg Oral BID    magnesium chloride  128 mg Oral Daily    megestroL  40 mg Oral Daily    midodrine  10 mg Oral TID WM    mirtazapine  15 mg Oral QHS    multivitamin  1 tablet Oral Daily    pantoprazole  40 mg Oral Before breakfast    sodium bicarbonate  650 mg Oral TID    sulfamethoxazole-trimethoprim 800-160mg  1 tablet Oral Daily    thiamine  100 mg Oral Daily     Continuous Infusions:   lactated ringers 100 mL/hr at 01/31/23 0910     PRN Meds:.sodium chloride, acetaminophen, albuterol-ipratropium, dextrose 10%, dextrose 10%, glucagon (human recombinant), glucose, glucose, HYDROcodone-acetaminophen, HYDROcodone-acetaminophen, insulin aspart U-100, lorazepam, magnesium sulfate IVPB, magnesium sulfate IVPB, melatonin, melatonin, naloxone, ondansetron, polyethylene glycol, potassium bicarbonate, potassium bicarbonate, potassium bicarbonate, prochlorperazine, simethicone, sodium chloride 0.9%    Labs: Pertinent Labs Reviewed  Clinical Chemistry:  Recent Labs   Lab 01/29/23  0559 01/30/23  0434 01/31/23  0441   * 132* 131*   K 4.2 3.8 3.5    104 103   CO2 21* 20* 21*    103 134*   BUN 24* 24* 19   CREATININE 1.3 1.2 1.0   CALCIUM 11.0* 10.8* 10.6*   PROT 7.4 6.9 7.0   ALBUMIN 3.5 3.4* 3.5   BILITOT 0.5 0.6 0.5   ALKPHOS 79 76 83   AST 22 23 30   ALT 41 32 34   ANIONGAP 6* 8 7*   MG 1.8 1.5* 1.7   PHOS 4.7* 4.2 3.6     CBC:   Recent Labs   Lab 01/31/23  0441   WBC 13.46*   RBC 2.89*   HGB 7.8*   HCT 24.9*      MCV 86   MCH 27.0   MCHC 31.3*     Cardiac Profile:  Recent Labs   Lab 01/27/23  0504   BNP 4     Monitor and Evaluation  Food and Nutrient Intake: energy intake, food and beverage intake  Food and Nutrient Adminstration: diet order  Knowledge/Beliefs/Attitudes: food and nutrition  knowledge/skill  Physical Activity and Function: nutrition-related ADLs and IADLs  Anthropometric Measurements: weight, weight change, body mass index  Biochemical Data, Medical Tests and Procedures: electrolyte and renal panel, gastrointestinal profile, glucose/endocrine profile, inflammatory profile, lipid profile  Nutrition-Focused Physical Findings: overall appearance     Nutrition Risk  Level of Risk/Frequency of Follow-up: moderate   Nutrition Follow-Up  RD Follow-up?: Yes    Teetee Cabrales RD 01/31/2023 2:10 PM

## 2023-01-31 NOTE — PLAN OF CARE
Problem: Adult Inpatient Plan of Care  Goal: Plan of Care Review  Outcome: Ongoing, Progressing  Goal: Patient-Specific Goal (Individualized)  Outcome: Ongoing, Progressing  Goal: Absence of Hospital-Acquired Illness or Injury  Outcome: Ongoing, Progressing  Goal: Optimal Comfort and Wellbeing  Outcome: Ongoing, Progressing  Goal: Readiness for Transition of Care  Outcome: Ongoing, Progressing     Problem: Fall Injury Risk  Goal: Absence of Fall and Fall-Related Injury  Outcome: Ongoing, Progressing     Problem: Skin Injury Risk Increased  Goal: Skin Health and Integrity  Outcome: Ongoing, Progressing     Problem: Diabetes Comorbidity  Goal: Blood Glucose Level Within Targeted Range  Outcome: Ongoing, Progressing     Problem: Fluid and Electrolyte Imbalance (Acute Kidney Injury/Impairment)  Goal: Fluid and Electrolyte Balance  Outcome: Ongoing, Progressing     Problem: Oral Intake Inadequate (Acute Kidney Injury/Impairment)  Goal: Optimal Nutrition Intake  Outcome: Ongoing, Progressing     Problem: Renal Function Impairment (Acute Kidney Injury/Impairment)  Goal: Effective Renal Function  Outcome: Ongoing, Progressing     Problem: Fluid Imbalance (Pneumonia)  Goal: Fluid Balance  Outcome: Ongoing, Progressing     Problem: Infection (Pneumonia)  Goal: Resolution of Infection Signs and Symptoms  Outcome: Ongoing, Progressing     Problem: Respiratory Compromise (Pneumonia)  Goal: Effective Oxygenation and Ventilation  Outcome: Ongoing, Progressing     Problem: Infection  Goal: Absence of Infection Signs and Symptoms  Outcome: Ongoing, Progressing     Problem: Pain Acute  Goal: Acceptable Pain Control and Functional Ability  Outcome: Ongoing, Progressing

## 2023-01-31 NOTE — CARE UPDATE
CM spoke with Pt about rehab denial; pt agreeable to try SNF. Referral sent to [facility] in his home area. Attempts made to send referral to [facility]; voice message left; awaiting for response.       [facility]Port Alert: NO response from [facility] re: Referral [id number] for patient in [facility] [id number]: No, unable to accept patient

## 2023-02-01 LAB
ALBUMIN SERPL BCP-MCNC: 3.2 G/DL (ref 3.5–5.2)
ALP SERPL-CCNC: 70 U/L (ref 55–135)
ALT SERPL W/O P-5'-P-CCNC: 26 U/L (ref 10–44)
ANION GAP SERPL CALC-SCNC: 7 MMOL/L (ref 8–16)
AST SERPL-CCNC: 24 U/L (ref 10–40)
BASOPHILS # BLD AUTO: 0.02 K/UL (ref 0–0.2)
BASOPHILS NFR BLD: 0.2 % (ref 0–1.9)
BILIRUB SERPL-MCNC: 0.6 MG/DL (ref 0.1–1)
BUN SERPL-MCNC: 15 MG/DL (ref 6–20)
CALCIUM SERPL-MCNC: 10.9 MG/DL (ref 8.7–10.5)
CHLORIDE SERPL-SCNC: 104 MMOL/L (ref 95–110)
CO2 SERPL-SCNC: 22 MMOL/L (ref 23–29)
CREAT SERPL-MCNC: 0.9 MG/DL (ref 0.5–1.4)
DIFFERENTIAL METHOD: ABNORMAL
EOSINOPHIL # BLD AUTO: 0.1 K/UL (ref 0–0.5)
EOSINOPHIL NFR BLD: 0.9 % (ref 0–8)
ERYTHROCYTE [DISTWIDTH] IN BLOOD BY AUTOMATED COUNT: 14.5 % (ref 11.5–14.5)
EST. GFR  (NO RACE VARIABLE): >60 ML/MIN/1.73 M^2
GLUCOSE SERPL-MCNC: 113 MG/DL (ref 70–110)
GLUCOSE SERPL-MCNC: 64 MG/DL (ref 70–110)
GLUCOSE SERPL-MCNC: 76 MG/DL (ref 70–110)
GLUCOSE SERPL-MCNC: 81 MG/DL (ref 70–110)
GLUCOSE SERPL-MCNC: 91 MG/DL (ref 70–110)
HCT VFR BLD AUTO: 24.5 % (ref 40–54)
HGB BLD-MCNC: 8.2 G/DL (ref 14–18)
IMM GRANULOCYTES # BLD AUTO: 0.09 K/UL (ref 0–0.04)
IMM GRANULOCYTES NFR BLD AUTO: 0.9 % (ref 0–0.5)
LYMPHOCYTES # BLD AUTO: 1.3 K/UL (ref 1–4.8)
LYMPHOCYTES NFR BLD: 13.2 % (ref 18–48)
MAGNESIUM SERPL-MCNC: 1.6 MG/DL (ref 1.6–2.6)
MCH RBC QN AUTO: 28.1 PG (ref 27–31)
MCHC RBC AUTO-ENTMCNC: 33.5 G/DL (ref 32–36)
MCV RBC AUTO: 84 FL (ref 82–98)
MONOCYTES # BLD AUTO: 1.3 K/UL (ref 0.3–1)
MONOCYTES NFR BLD: 13.1 % (ref 4–15)
NEUTROPHILS # BLD AUTO: 7.2 K/UL (ref 1.8–7.7)
NEUTROPHILS NFR BLD: 71.7 % (ref 38–73)
NRBC BLD-RTO: 0 /100 WBC
PHOSPHATE SERPL-MCNC: 4.4 MG/DL (ref 2.7–4.5)
PLATELET # BLD AUTO: 347 K/UL (ref 150–450)
PMV BLD AUTO: 8.3 FL (ref 9.2–12.9)
POTASSIUM SERPL-SCNC: 3.6 MMOL/L (ref 3.5–5.1)
PROT SERPL-MCNC: 7.1 G/DL (ref 6–8.4)
RBC # BLD AUTO: 2.92 M/UL (ref 4.6–6.2)
SODIUM SERPL-SCNC: 133 MMOL/L (ref 136–145)
WBC # BLD AUTO: 10.07 K/UL (ref 3.9–12.7)

## 2023-02-01 PROCEDURE — 25000003 PHARM REV CODE 250: Performed by: INTERNAL MEDICINE

## 2023-02-01 PROCEDURE — 63600175 PHARM REV CODE 636 W HCPCS: Performed by: NURSE PRACTITIONER

## 2023-02-01 PROCEDURE — 85025 COMPLETE CBC W/AUTO DIFF WBC: CPT | Performed by: NURSE PRACTITIONER

## 2023-02-01 PROCEDURE — 97535 SELF CARE MNGMENT TRAINING: CPT

## 2023-02-01 PROCEDURE — 97530 THERAPEUTIC ACTIVITIES: CPT | Mod: CQ

## 2023-02-01 PROCEDURE — 84100 ASSAY OF PHOSPHORUS: CPT | Performed by: HOSPITALIST

## 2023-02-01 PROCEDURE — 25000003 PHARM REV CODE 250: Performed by: STUDENT IN AN ORGANIZED HEALTH CARE EDUCATION/TRAINING PROGRAM

## 2023-02-01 PROCEDURE — 83735 ASSAY OF MAGNESIUM: CPT | Performed by: NURSE PRACTITIONER

## 2023-02-01 PROCEDURE — 80053 COMPREHEN METABOLIC PANEL: CPT | Performed by: HOSPITALIST

## 2023-02-01 PROCEDURE — 25000003 PHARM REV CODE 250: Performed by: HOSPITALIST

## 2023-02-01 PROCEDURE — 99232 SBSQ HOSP IP/OBS MODERATE 35: CPT | Mod: ,,, | Performed by: STUDENT IN AN ORGANIZED HEALTH CARE EDUCATION/TRAINING PROGRAM

## 2023-02-01 PROCEDURE — 63700000 PHARM REV CODE 250 ALT 637 W/O HCPCS: Performed by: NURSE PRACTITIONER

## 2023-02-01 PROCEDURE — 12000002 HC ACUTE/MED SURGE SEMI-PRIVATE ROOM

## 2023-02-01 PROCEDURE — 99232 PR SUBSEQUENT HOSPITAL CARE,LEVL II: ICD-10-PCS | Mod: ,,, | Performed by: STUDENT IN AN ORGANIZED HEALTH CARE EDUCATION/TRAINING PROGRAM

## 2023-02-01 PROCEDURE — 63600175 PHARM REV CODE 636 W HCPCS: Performed by: HOSPITALIST

## 2023-02-01 PROCEDURE — 36415 COLL VENOUS BLD VENIPUNCTURE: CPT | Performed by: NURSE PRACTITIONER

## 2023-02-01 PROCEDURE — 63700000 PHARM REV CODE 250 ALT 637 W/O HCPCS: Performed by: INTERNAL MEDICINE

## 2023-02-01 PROCEDURE — 25000003 PHARM REV CODE 250: Performed by: NURSE PRACTITIONER

## 2023-02-01 PROCEDURE — 97116 GAIT TRAINING THERAPY: CPT | Mod: CQ

## 2023-02-01 RX ADMIN — SODIUM CHLORIDE TAB 1 GM 1 G: 1 TAB at 08:02

## 2023-02-01 RX ADMIN — INSULIN DETEMIR 10 UNITS: 100 INJECTION, SOLUTION SUBCUTANEOUS at 08:02

## 2023-02-01 RX ADMIN — LEVETIRACETAM 1500 MG: 500 TABLET, FILM COATED ORAL at 09:02

## 2023-02-01 RX ADMIN — MIDODRINE HYDROCHLORIDE 10 MG: 2.5 TABLET ORAL at 11:02

## 2023-02-01 RX ADMIN — FOLIC ACID 1 MG: 1 TABLET ORAL at 08:02

## 2023-02-01 RX ADMIN — ESCITALOPRAM OXALATE 20 MG: 10 TABLET ORAL at 08:02

## 2023-02-01 RX ADMIN — SODIUM BICARBONATE 650 MG TABLET 650 MG: at 04:02

## 2023-02-01 RX ADMIN — SODIUM CHLORIDE, SODIUM LACTATE, POTASSIUM CHLORIDE, AND CALCIUM CHLORIDE: .6; .31; .03; .02 INJECTION, SOLUTION INTRAVENOUS at 08:02

## 2023-02-01 RX ADMIN — SULFAMETHOXAZOLE AND TRIMETHOPRIM 1 TABLET: 800; 160 TABLET ORAL at 08:02

## 2023-02-01 RX ADMIN — MIDODRINE HYDROCHLORIDE 10 MG: 2.5 TABLET ORAL at 04:02

## 2023-02-01 RX ADMIN — SODIUM CHLORIDE TAB 1 GM 1 G: 1 TAB at 09:02

## 2023-02-01 RX ADMIN — SODIUM CHLORIDE, SODIUM LACTATE, POTASSIUM CHLORIDE, AND CALCIUM CHLORIDE: .6; .31; .03; .02 INJECTION, SOLUTION INTRAVENOUS at 04:02

## 2023-02-01 RX ADMIN — MEGESTROL ACETATE 40 MG: 20 TABLET ORAL at 08:02

## 2023-02-01 RX ADMIN — MAGNESIUM 64 MG (MAGNESIUM CHLORIDE) TABLET,DELAYED RELEASE 128 MG: at 08:02

## 2023-02-01 RX ADMIN — LEVETIRACETAM 1500 MG: 500 TABLET, FILM COATED ORAL at 08:02

## 2023-02-01 RX ADMIN — ENOXAPARIN SODIUM 40 MG: 100 INJECTION SUBCUTANEOUS at 04:02

## 2023-02-01 RX ADMIN — THERA TABS 1 TABLET: TAB at 08:02

## 2023-02-01 RX ADMIN — EMTRICITABINE AND TENOFOVIR DISOPROXIL FUMARATE 1 TABLET: 200; 300 TABLET, FILM COATED ORAL at 08:02

## 2023-02-01 RX ADMIN — DARUNAVIR ETHANOLATE AND COBICISTAT 1 TABLET: 800; 150 TABLET, FILM COATED ORAL at 08:02

## 2023-02-01 RX ADMIN — MIRTAZAPINE 15 MG: 15 TABLET, FILM COATED ORAL at 09:02

## 2023-02-01 RX ADMIN — MIDODRINE HYDROCHLORIDE 10 MG: 2.5 TABLET ORAL at 08:02

## 2023-02-01 RX ADMIN — FERROUS SULFATE TAB 325 MG (65 MG ELEMENTAL FE) 1 EACH: 325 (65 FE) TAB at 08:02

## 2023-02-01 RX ADMIN — FLUCONAZOLE 800 MG: 100 TABLET ORAL at 08:02

## 2023-02-01 RX ADMIN — THIAMINE HCL TAB 100 MG 100 MG: 100 TAB at 08:02

## 2023-02-01 RX ADMIN — SODIUM BICARBONATE 650 MG TABLET 650 MG: at 08:02

## 2023-02-01 RX ADMIN — INSULIN DETEMIR 10 UNITS: 100 INJECTION, SOLUTION SUBCUTANEOUS at 09:02

## 2023-02-01 RX ADMIN — FLUTICASONE PROPIONATE 100 MCG: 50 SPRAY, METERED NASAL at 09:02

## 2023-02-01 RX ADMIN — SODIUM BICARBONATE 650 MG TABLET 650 MG: at 09:02

## 2023-02-01 RX ADMIN — FLUTICASONE PROPIONATE 100 MCG: 50 SPRAY, METERED NASAL at 08:02

## 2023-02-01 NOTE — PT/OT/SLP PROGRESS
Physical Therapy Treatment    Patient Name:  El Abreu   MRN:  [id number]    Recommendations:     Discharge Recommendations: rehabilitation facility  Discharge Equipment Recommendations: bedside commode  Barriers to discharge:  increased assist with mobility    Assessment:     El Abreu is a 27 y.o. male admitted with a medical diagnosis of AIDS.  He presents with the following impairments/functional limitations: weakness, impaired endurance, impaired self care skills, impaired functional mobility, gait instability, impaired balance, decreased lower extremity function, decreased safety awareness, impaired cardiopulmonary response to activity.    Pt agreeable to visit. Pt with no reports of dizziness during session. BP monitored throughout session: HOB elevated 127/83, seated 111/73, standing 100/68, and post ambulation 99/65 and 116/76. Pt ambulated 30' and 50' with RW and min assist for balance and RW management. Pt's /82 and HR 95 after five minutes with the HOB elevated.    Rehab Prognosis: Fair; patient would benefit from acute skilled PT services to address these deficits and reach maximum level of function.    Recent Surgery: * No surgery found *      Plan:     During this hospitalization, patient to be seen 6 x/week to address the identified rehab impairments via gait training, therapeutic activities, therapeutic exercises, neuromuscular re-education and progress toward the following goals:    Plan of Care Expires:  03/02/23    Subjective     Chief Complaint: pt reports that he gets tired easily  Patient/Family Comments/goals: to get stronger  Pain/Comfort:  Pain Rating 1: 0/10      Objective:     Communicated with RN prior to session.  Patient found HOB elevated with peripheral IV, telemetry, bed alarm upon PT entry to room.     General Precautions: Standard, fall  Orthopedic Precautions: N/A  Braces: N/A  Respiratory Status: Room air     Functional Mobility:  Bed Mobility:     Supine to  Sit: supervision  Sit to Supine: supervision  Transfers:     Sit to Stand:  contact guard assistance and minimum assistance with rolling walker  Gait: x 30' and 50' with RW and Simeon      AM-PAC 6 CLICK MOBILITY          Treatment & Education:  Pt educated on importance of time OOB, importance of intermittent mobility, safe techniques for transfers/ambulation, discharge recommendations/options, and use of call light for assistance and fall prevention.      Patient left HOB elevated with all lines intact, call button in reach, bed alarm on, and RN notified..    GOALS:   Multidisciplinary Problems       Physical Therapy Goals          Problem: Physical Therapy    Goal Priority Disciplines Outcome Goal Variances Interventions   Physical Therapy Goal     PT, PT/OT Ongoing, Progressing     Description: Goals to be met by: 23     Patient will increase functional independence with mobility by performin. Supine to sit with Supervision  2. Sit to stand transfer with Supervision  3. Bed to chair transfer with Supervision using Rolling Walker  4. Gait  x 150 feet with Supervision using Rolling Walker.                              Time Tracking:     PT Received On: 23  PT Start Time: 1103     PT Stop Time: 1126  PT Total Time (min): 23 min     Billable Minutes: Gait Training 10 and Therapeutic Activity 13    Treatment Type: Treatment  PT/PTA: PTA     PTA Visit Number: 1     2023

## 2023-02-01 NOTE — PROGRESS NOTES
[facility] Medicine  Progress Note    Patient name: El Abreu  MRN: [id number]  Admit Date: 1/16/2023   LOS: 13 days     SUBJECTIVE:     Principal problem: AIDS    Interval History:  Still orthostatic but improved BP after blood.  Leukocytosis today after getting blood.  Infectious Disease feels this is related to the blood in will continue to monitor.  Case management working on SNF placement.       Scheduled Meds:   darunavir-cobicistat  1 tablet Oral Daily    emtricitabine-tenofovir 200-300 mg  1 tablet Oral Daily    enoxaparin  40 mg Subcutaneous Daily    EScitalopram oxalate  20 mg Oral Daily    ferrous sulfate  1 tablet Oral Daily    fluconazole  800 mg Oral Daily    fluticasone propionate  2 spray Each Nostril BID    folic acid  1 mg Oral Daily    insulin detemir U-100  10 Units Subcutaneous BID    levETIRAcetam  1,500 mg Oral BID    magnesium chloride  128 mg Oral Daily    megestroL  40 mg Oral Daily    midodrine  10 mg Oral TID WM    mirtazapine  15 mg Oral QHS    multivitamin  1 tablet Oral Daily    pantoprazole  40 mg Oral Before breakfast    sodium bicarbonate  650 mg Oral TID    sulfamethoxazole-trimethoprim 800-160mg  1 tablet Oral Daily    thiamine  100 mg Oral Daily     Continuous Infusions:   lactated ringers 100 mL/hr at 01/31/23 0910       PRN Meds:sodium chloride, acetaminophen, albuterol-ipratropium, dextrose 10%, dextrose 10%, glucagon (human recombinant), glucose, glucose, HYDROcodone-acetaminophen, HYDROcodone-acetaminophen, insulin aspart U-100, lorazepam, magnesium sulfate IVPB, magnesium sulfate IVPB, melatonin, melatonin, naloxone, ondansetron, polyethylene glycol, potassium bicarbonate, potassium bicarbonate, potassium bicarbonate, prochlorperazine, simethicone, sodium chloride 0.9%    Review of patient's allergies indicates:  No Known Allergies    Review of Systems: As per interval history    OBJECTIVE:     Vital Signs (Most Recent)  Temp: 98.4 °F (36.9 °C)  (01/31/23 1656)  Pulse: 83 (01/31/23 1656)  Resp: 16 (01/31/23 1656)  BP: 126/85 (01/31/23 1656)  SpO2: 100 % (01/31/23 1656)    Vital Signs Range (Last 24H):  Temp:  [98.2 °F (36.8 °C)-98.8 °F (37.1 °C)]   Pulse:  []   Resp:  [16-18]   BP: (101-142)/(68-85)   SpO2:  [99 %-100 %]     I & O (Last 24H):  Intake/Output Summary (Last 24 hours) at 1/31/2023 1802  Last data filed at 1/31/2023 0925  Gross per 24 hour   Intake 301.67 ml   Output 700 ml   Net -398.33 ml           Physical Exam:  General:  Chronically ill-appearing gentleman, cachectic  Eyes: No conjunctival injection. No scleral icterus.  ENT: Hearing grossly intact. No discharge from ears. No nasal discharge.   Neck: Supple, trachea midline. No JVD  CVS: RRR. No LE edema BL  Lungs:  No tachypnea or accessory muscle use.  Clear to auscultation bilaterally  Abdomen:  Soft, nontender and nondistended.  No organomegaly  Neuro: AOx3. Moves all extremities. Follows commands. Responds appropriately       Laboratory:  I have reviewed all pertinent lab results within the past 24 hours.    Diagnostic Results:  Reviewed all imaging    ASSESSMENT/PLAN:     27-year-old very unfortunate gentleman who recently had very prolonged hospital stay due to cryptococcal meningitis requiring serial lumbar punctures, neuro syphilis and hospital stay was complicated by AC, GI bleed, malnutrition and severe deconditioning and was transition to LTAC.  He was sent to our emergency room by LTAC due to critical hypomagnesemia and anemia causing severe lethargy and unable to stand up.  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         Plan:   Very unfortunate case of AIDS with recent prolonged hospital stay for cryptococcal meningitis and neurosyphilis came with severe lethargy, anemia and critical hypomagnesemia  Anti-retroviral, antifungal and antibiotics as per Infectious Disease.  Remarkable improvement in CD4 count and viral load  One blood culture positive for coag-negative staph, PICC line was removed and sent for tissue culture. Noted discontinuation of daptomycin and increasing dose of Bactrim by Infectious Disease  Follow-up on repeat cultures  Remains on Bactrim for PJP prophylaxis and Augmentin for sinusitis  On Diflucan for recent cryptococcal meningitis  Consulted psychiatry to reconsider his psych regimen considering excessive drowsiness-thank you  Consulted neurology for suspected seizure-like activity. EEG and MRI findings noted  Aggressive p.o. nutrition, improve pulmonary toilet  Out of bed to chair  PT, OT  Avoid antihypertensives, aggressive electrolyte replacement  Frequent orthostatic vital signs  Further management as per clinical course  Patient remains profoundly orthostatic and appears to have severe dysautonomia.  Will try midodrine and compression stockings.  Up titrated midodrine today.  Patient is also anemic, s/p transfuse 1 unit PRBC in hope of better exercise tolerance  Unfortunately patient is not able to work with physical therapy due to profound orthostatic hypotension and dysautonomia.   Dispo: medically optimized for dc, CIARAN working on SNF      VTE Risk Mitigation (From admission, onward)           Ordered     Place CARMEN hose  Until discontinued         01/18/23 1830     enoxaparin injection 40 mg  Daily         01/16/23 4954                        Department Hospital Medicine  [facility]  Benjamín Medina MD  Date of service: 01/31/2023

## 2023-02-01 NOTE — CARE UPDATE
CIAARN spoke with Mishel of [facility] and they are willing to accept pt. They will submit for auth.

## 2023-02-01 NOTE — RESPIRATORY THERAPY
01/31/23 1920   Patient Assessment/Suction   Level of Consciousness (AVPU) alert   Respiratory Effort Unlabored   Expansion/Accessory Muscles/Retractions no use of accessory muscles   All Lung Fields Breath Sounds clear;diminished   Cough Frequency no cough   PRE-TX-O2   Device (Oxygen Therapy) room air   Pulse Oximetry Type Intermittent   $ Pulse Oximetry - Multiple Charge Pulse Oximetry - Multiple   Aerosol Therapy   $ Aerosol Therapy Charges PRN treatment not required   Education   $ Education Bronchodilator;DME Nebulizer;DME Oxygen;15 min   Respiratory Evaluation   $ Care Plan Tech Time 15 min

## 2023-02-01 NOTE — PT/OT/SLP PROGRESS
Occupational Therapy   Treatment    Name: El Abreu  MRN: [id number]  Admitting Diagnosis:  AIDS       Recommendations:     Discharge Recommendations: rehabilitation facility  Discharge Equipment Recommendations:  bedside commode  Barriers to discharge:  None    Assessment:     El Abreu is a 27 y.o. male with a medical diagnosis of AIDS.  Patient pleasant and agreeable to therapy. Noted min to moderate unsteadiness with standing and ambulating in room,  and while standing at sink performing performing tasks. Patient's vitals: 123/73 before activity; 122/73 at EOB; 105/69 while standing; 135/84 post activity and in bed. Patient's SpO2 remained in the upper 90s at rest and with mobility. However, patient's HR increased from upper 90s to the 160s just before returning to bed. Patient's HR rested at 100 prior to OT leaving room. Patient reported no SOB or dizziness with mobility.     Performance deficits affecting function are weakness, impaired endurance, impaired self care skills, impaired functional mobility, gait instability, impaired balance, decreased lower extremity function, impaired cardiopulmonary response to activity, decreased safety awareness, decreased coordination.     Rehab Prognosis:  Good; patient would benefit from acute skilled OT services to address these deficits and reach maximum level of function.       Plan:     Patient to be seen 3 x/week to address the above listed problems via self-care/home management, therapeutic activities, therapeutic exercises  Plan of Care Expires: 02/17/23  Plan of Care Reviewed with: patient    Subjective     Pain/Comfort:  Pain Rating 1: 0/10  Pain Rating Post-Intervention 1: 0/10    Objective:     Communicated with:  prior to session.  Patient found HOB elevated with bed alarm, peripheral IV, telemetry upon OT entry to room.    General Precautions: Standard, fall    Orthopedic Precautions:N/A  Braces: N/A  Respiratory Status: Room air     Occupational  Performance:     Bed Mobility:    Patient completed Scooting/Bridging with stand by assistance  Patient completed Supine to Sit with stand by assistance  Patient completed Sit to Supine with stand by assistance     Functional Mobility/Transfers:  Patient completed Sit <> Stand Transfer with minimum assistance  with  rolling walker   Patient completed Toilet Transfer Stand Pivot technique with contact guard assistance with  rolling walker  Functional Mobility: Patient ambulated short distance in room from bed>sink>bathroom>bed requiring Min A at times with balance due to intermittent posterior LOB.     Activities of Daily Living:  Grooming: minimum assistance mostly with standing balance at sink. Noted some unsteadiness with standing balance with mild posterior leaning, requiring Min A to regain balance.      [facility] 6 Click ADL:      Treatment & Education:  OT ed patient on safety with walker use for functional mobility with cues for hand placement & sequencing.       Patient left HOB elevated with all lines intact, call button in reach, and nurse notified    GOALS:   Multidisciplinary Problems       Occupational Therapy Goals          Problem: Occupational Therapy    Goal Priority Disciplines Outcome Interventions   Occupational Therapy Goal     OT, PT/OT Ongoing, Progressing    Description: Goals to be met by: 2/17/23     Patient will increase functional independence with ADLs by performing:    UE Dressing with Supervision.  LE Dressing with Supervision and Assistive Devices as needed.  Grooming while standing at sink with Supervision.  Toileting from bedside commode with Supervision for hygiene and clothing management.   Toilet transfer to bedside commode with Supervision.  Upper extremity exercise program x10 reps per handout, with supervision.                         Time Tracking:     OT Date of Treatment: 02/01/23  OT Start Time: 0910  OT Stop Time: 0938  OT Total Time (min): 28 min    Billable Minutes:Self  Care/Home Management 28    OT/JACOB: OT          2/1/2023

## 2023-02-01 NOTE — PROGRESS NOTES
Progress Note  Infectious Disease    Reason for Consult:  leukocytosis    HPI: El Abreu is a 27 y.o. 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  he was last admitted to [facility] November 30th with vomiting and headache, increased intracranial pressure.  This LP had a positive Sirisha ink opening pressure was 23.  He was then committed to a 6 week course of AmBisome completed 1/10, which was interrupted with AC but resumed and he was discharged to [facility] on January 10th.  He did require an additional LP for fluid removal and reduction of pressure on 12/13 (Sirisha ink was still positive but the culture was negative.  The last positive culture was 11/21).  He has been continued on fluconazole 800 mg daily since 01/11 and has been receiving Pneumocystis prophylaxis and appetite stimulants and antidepressants.  MRI brain on 01/01 showed stability of the right globus pallidus cryptococcal pseudocyst.  He was additionally treated for neurosyphilis 3 doses of Bicillin after an appropriate course of IV penicillin G.  He has not been eating well, withdrawn, seen by Psychiatry late December and started on risperidone.  He was seen in the [facility] emergency room 1/14 when his mandible became stuck open.  This was reduced in their emergency room he was sent back to the [facility].  He was sent to our emergency room yesterday for severe hypomagnesemia, 0.8 While in the emergency room he was noted to have a rising white blood cell count, from normal on 01/08 to 14 on 01/09 to 17 on 01/16 additionally his hemoglobin was 7.3.  An infectious workup was commenced and CRP was 2.0, lactic acid 0.9, procalcitonin 0.15.  Chest x-ray was negative, blood cultures are negative, respiratory PCR is not yet been  collected and he is not produce any sputum but this is not likely relevant..vanc and cefepime were begun.   Unfortunately our hospital is full and he has been waiting in the hallway outside the emergency room.  He is incontinent of urine, and urinalysis was negative.  Magnesium is now 1.7    He feels much improved, tells me that he ate breakfast and lunch. He does endorse some small amount of yellow sputum and he does find that his sinuses are congested. It is relevant to note that he had pansinusitis on MRI brain 1/1.     1/18: interim reviewed. He is eating well. He had a syncopal episode while working with PT. He states that this has happened before, but does not happen consistently when he gets up. He is still congested but not really expectorating. Sputum sample was not purulent(saliva) and culture has only normal burak. He is not receiving his HIV antivirals here(not on formulary) though these were available at [facility]. Repeated CXR and it is still clear.   1/20:  interim reviewed. WBC a little higher , peripheral smear noted but unsure of the relevance of the findings.  He is sleeping deeply, worked with physical therapy much more successfully today he is eating reasonably well and antivirals were resumed.  He endorses that his cough is improved.  Denies diarrhea. Blood culture from 1/16, has coag neg Staph in 1/4 bottles drawn from picc line. The picc line is 47 days old, placed 12/4.      1/21-22 (Deep): interim reviewed, discussed with Dr Ny, patient seen and examined at bedside.  States he vomited earlier this morning, he was feeling very nauseous.  He denies headaches, no fever or chills.  Slightly hypertensive, afebrile.  Adequate urinary output, 1 episode of stool recorded in the last 24 hours.  Labs reviewed, persistent leukocytosis 20.9, lymphocytic predominance 10%, no left shift, bands 10%, H&H 7.5/23.5, platelet count 248.  Stable kidney function, magnesium 1.2.  Micro reviewed, blood cultures  from 01/20 no growth to date, pending final.  Tip culture from PICC which was removed 1/20 no growth.  Respiratory viral panel not detected.    1/23:  Interim reviewed, patient seen examined at bedside, states he is feeling a little stronger today, was able to walk around with physical therapy, states he had 2-3/10 frontal headache this morning that is now improved.  Hemodynamically stable, afebrile.  Labs reviewed, leukocytosis down to 15.9, lymphocytic predominance 12%, bands 2%, H&H 7.5/32.4, platelet count 312.  Magnesium 1.4, stable potassium, and kidney function.  CD4 count and viral load in process.    1/24:  Interim reviewed, patient seen examined at bedside, states he is feeling much better today, walk with PT, encouraged to sit in the chair.  Hemodynamically stable, afebrile.  White count down to 13.2, no left shift, H&H 7.3/22.2, platelet count 351, magnesium 1.6.  CD4 122, viral load still in process.    1/25:  Interim reviewed, patient seen examined at bedside.  States he is feeling good today, trying his best to cooperate with physical therapy, encouraged to get out of bed.  Hemodynamically stable, afebrile.  Labs reviewed, white count 13, no left shift, H&H 7.6/23.8, platelet count 334.  Stable kidney function.  Viral load 20 copies, almost undetectable.    1/26:  Interim reviewed, patient seen examined at bedside, states he threw up at noon, asking for food.  States he is feeling better, encouraged to get out of bed.  Physical therapy still recommending skilled nursing facilities, case management trying to find a place for him to go.  Labs reviewed, white count improved, stable kidney function, magnesium 1.7, improved.    1/27: interim reviewed, patient seen and examined at bedside. Patient had hypotension this morning, now improved. Seen by Psych, recommendations noted. EEG reviewed, no abnormalities.     ID CALLED BACK FOR LEUKOCYTOSIS:  1/31: Interim reviewed, he received 1 UI PRBC yesterday,  blood cultures x 1 sent this morning.   Patient seen and examined at bedside, remains orthostatic, was able to work with PT, on midodrine. Hemodynamically stable, afebrile. Labs reviewed, leukocytosis 13, left shift PMN: 81%, H/H 7.8/24.9, plt 305. Stable kidney function, hyponatremia 131.     2/1:  Interim reviewed, patient seen examined at bedside.  No acute complaints, awaiting LTAC authorization, as per case management notes, will [facility].  Labs reviewed, stable, likely reactive thrombocytosis yesterday from blood transfusion.    EXAM & DIAGNOSTICS REVIEWED:   Vitals:     Temp:  [98 °F (36.7 °C)-98.5 °F (36.9 °C)]   Temp: 98.5 °F (36.9 °C) (02/01/23 1124)  Pulse: 102 (02/01/23 1124)  Resp: 16 (02/01/23 1124)  BP: 129/82 (02/01/23 1124)  SpO2: 100 % (02/01/23 1124)    Intake/Output Summary (Last 24 hours) at 2/1/2023 1617  Last data filed at 2/1/2023 0859  Gross per 24 hour   Intake --   Output 1250 ml   Net -1250 ml         General:  Very thin, in NAD, cooperative, comfortable, non toxic  Eyes:  Anicteric, EOMI -  iris heterochromia/ post surgical L eye   ENT:  No ulcers, exudates, thrush, nares patent  Neck:  supple  Lungs: Clear to auscultation   Heart:  RRR, no gallop/murmur/rub noted  Abd:  Soft, NT, ND, normal BS, no masses or organomegaly appreciated.  :  Voids , no flank tenderness  Musc:  Joints without effusion, swelling, erythema, synovitis, with mild generalized muscle wasting.   Skin:  No rashes. Dry skin.  Neuro:             Arousable, speech fluent, face symmetric, moves all extremities, no focal weakness.   Psych:  Calm, cooperative  Lymphatic:        Extrem: No edema, erythema, phlebitis, cellulitis, warm and well perfused  VAD:  Peripheral IV  Isolation:  none  Wound: none      General Labs reviewed:  Recent Labs   Lab 01/30/23  0434 01/31/23  0441 02/01/23 0434   WBC 8.57 13.46* 10.07   HGB 7.5* 7.8* 8.2*   HCT 23.2* 24.9* 24.5*    305 347       Recent Labs   Lab 01/30/23  043  01/31/23  0441 02/01/23  0434   * 131* 133*   K 3.8 3.5 3.6    103 104   CO2 20* 21* 22*   BUN 24* 19 15   CREATININE 1.2 1.0 0.9   CALCIUM 10.8* 10.6* 10.9*   PROT 6.9 7.0 7.1   BILITOT 0.6 0.5 0.6   ALKPHOS 76 83 70   ALT 32 34 26   AST 23 30 24     No results for input(s): CRP in the last 168 hours.    Estimated Creatinine Clearance: 124.7 mL/min (based on SCr of 0.9 mg/dL).    Micro:  Microbiology Results (last 7 days)       Procedure Component Value Units Date/Time    Blood culture [[id number]] Collected: 01/31/23 0919    Order Status: Completed Specimen: Blood Updated: 02/01/23 1032     Blood Culture, Routine No Growth to date      No Growth to date    Blood culture [[id number]] Collected: 01/20/23 2051    Order Status: Completed Specimen: Blood from Antecubital, Left Arm Updated: 01/25/23 2232     Blood Culture, Routine No growth after 5 days.    Narrative:      Drawn from PICC line            Imaging Reviewed:   CXR   MRI brain 1/1    Cardiology:    IMPRESSION & PLAN     Severe hypomagnesemia, improved - last Mg 1.6   Electrolyte abnormalities likely from ampho B    2. HIV/AIDS C3 - Cryptococcal meningitis, now controlled, s/p 6 weeks of ambisome and continues on high dose oral fluconazole   Cryptococcoma, stable   CD4 11/5 7 - VL 48,127 - repeat CD4 127 and VL 20 copies, almost undetectable   On Prezcobix/Descovy   PPD 1/14 negative    3. Leukocytosis 13.4 today, s/p 1 UI PRBC yesterday, resolved    4. Cachexia with failure to thrive   On supplements     5. Pansinusitis, s/p Augmentin, improved  CRP 2.0    Recommendations:  Continue Diflucan 800 mg per day for Crypto will need at least 12 months of therapy along with ART   Bactrim DS 1 tab daily, CD4 <200 for PJP   Continue Prezcobix/Descovy daily   Optimizing nutrition   Patient to follow with his ID physician/Dr Rush  in [place]   PT/OT as tolerated  CM working at [facility]    Will be available if needed, thank you     D/w patient, Dr Medina      Medical Decision Making during this encounter was  [_] Low Complexity  [_] Moderate Complexity  [xxx  ] High Complexity

## 2023-02-02 ENCOUNTER — CLINICAL SUPPORT (OUTPATIENT)
Dept: CARDIOLOGY | Facility: HOSPITAL | Age: 28
DRG: 640 | End: 2023-02-02
Attending: INTERNAL MEDICINE
Payer: COMMERCIAL

## 2023-02-02 VITALS — BODY MASS INDEX: 19.4 KG/M2 | HEIGHT: 75 IN | WEIGHT: 156 LBS

## 2023-02-02 PROBLEM — R50.9 FEVER: Status: ACTIVE | Noted: 2023-02-02

## 2023-02-02 LAB
ALBUMIN SERPL BCP-MCNC: 2.7 G/DL (ref 3.5–5.2)
ALBUMIN SERPL BCP-MCNC: 3 G/DL (ref 3.5–5.2)
ALP SERPL-CCNC: 69 U/L (ref 55–135)
ALP SERPL-CCNC: 71 U/L (ref 55–135)
ALT SERPL W/O P-5'-P-CCNC: 24 U/L (ref 10–44)
ALT SERPL W/O P-5'-P-CCNC: 27 U/L (ref 10–44)
ANION GAP SERPL CALC-SCNC: 10 MMOL/L (ref 8–16)
ANION GAP SERPL CALC-SCNC: 8 MMOL/L (ref 8–16)
AORTIC ROOT ANNULUS: 3.7 CM
AORTIC VALVE CUSP SEPERATION: 2.8 CM
ASCENDING AORTA: 2.6 CM
AST SERPL-CCNC: 25 U/L (ref 10–40)
AST SERPL-CCNC: 27 U/L (ref 10–40)
AV INDEX (PROSTH): 0.87
AV MEAN GRADIENT: 5 MMHG
AV PEAK GRADIENT: 9 MMHG
AV VALVE AREA: 3.63 CM2
AV VELOCITY RATIO: 0.86
BACTERIA #/AREA URNS HPF: NEGATIVE /HPF
BASOPHILS # BLD AUTO: 0.02 K/UL (ref 0–0.2)
BASOPHILS # BLD AUTO: 0.03 K/UL (ref 0–0.2)
BASOPHILS NFR BLD: 0.1 % (ref 0–1.9)
BASOPHILS NFR BLD: 0.3 % (ref 0–1.9)
BILIRUB SERPL-MCNC: 0.3 MG/DL (ref 0.1–1)
BILIRUB SERPL-MCNC: 0.3 MG/DL (ref 0.1–1)
BLD PROD TYP BPU: NORMAL
BLOOD UNIT EXPIRATION DATE: NORMAL
BLOOD UNIT TYPE CODE: 5100
BLOOD UNIT TYPE: NORMAL
BSA FOR ECHO PROCEDURE: 1.93 M2
BUN SERPL-MCNC: 14 MG/DL (ref 6–20)
BUN SERPL-MCNC: 16 MG/DL (ref 6–20)
CALCIUM SERPL-MCNC: 10.1 MG/DL (ref 8.7–10.5)
CALCIUM SERPL-MCNC: 9.4 MG/DL (ref 8.7–10.5)
CHLORIDE SERPL-SCNC: 101 MMOL/L (ref 95–110)
CHLORIDE SERPL-SCNC: 104 MMOL/L (ref 95–110)
CO2 SERPL-SCNC: 20 MMOL/L (ref 23–29)
CO2 SERPL-SCNC: 21 MMOL/L (ref 23–29)
CODING SYSTEM: NORMAL
CREAT SERPL-MCNC: 1.1 MG/DL (ref 0.5–1.4)
CREAT SERPL-MCNC: 1.2 MG/DL (ref 0.5–1.4)
CV ECHO LV RWT: 0.48 CM
DIFFERENTIAL METHOD: ABNORMAL
DIFFERENTIAL METHOD: ABNORMAL
DISPENSE STATUS: NORMAL
DOP CALC AO PEAK VEL: 1.53 M/S
DOP CALC AO VTI: 18.2 CM
DOP CALC LVOT AREA: 4.2 CM2
DOP CALC LVOT DIAMETER: 2.3 CM
DOP CALC LVOT PEAK VEL: 1.32 M/S
DOP CALC LVOT STROKE VOLUME: 66.03 CM3
DOP CALC MV VTI: 18 CM
DOP CALCLVOT PEAK VEL VTI: 15.9 CM
E WAVE DECELERATION TIME: 217 MSEC
E/A RATIO: 1.32
E/E' RATIO: 9.16 M/S
ECHO LV POSTERIOR WALL: 1.07 CM (ref 0.6–1.1)
EJECTION FRACTION: 55 %
EOSINOPHIL # BLD AUTO: 0 K/UL (ref 0–0.5)
EOSINOPHIL # BLD AUTO: 0 K/UL (ref 0–0.5)
EOSINOPHIL NFR BLD: 0 % (ref 0–8)
EOSINOPHIL NFR BLD: 0.2 % (ref 0–8)
ERYTHROCYTE [DISTWIDTH] IN BLOOD BY AUTOMATED COUNT: 14.3 % (ref 11.5–14.5)
ERYTHROCYTE [DISTWIDTH] IN BLOOD BY AUTOMATED COUNT: 14.3 % (ref 11.5–14.5)
EST. GFR  (NO RACE VARIABLE): >60 ML/MIN/1.73 M^2
EST. GFR  (NO RACE VARIABLE): >60 ML/MIN/1.73 M^2
FRACTIONAL SHORTENING: 5 % (ref 28–44)
GLUCOSE SERPL-MCNC: 104 MG/DL (ref 70–110)
GLUCOSE SERPL-MCNC: 120 MG/DL (ref 70–110)
GLUCOSE SERPL-MCNC: 172 MG/DL (ref 70–110)
GLUCOSE SERPL-MCNC: 82 MG/DL (ref 70–110)
GLUCOSE SERPL-MCNC: 98 MG/DL (ref 70–110)
HCT VFR BLD AUTO: 21.5 % (ref 40–54)
HCT VFR BLD AUTO: 23.7 % (ref 40–54)
HGB BLD-MCNC: 7.1 G/DL (ref 14–18)
HGB BLD-MCNC: 7.9 G/DL (ref 14–18)
HYALINE CASTS #/AREA URNS LPF: 10 /LPF
IMM GRANULOCYTES # BLD AUTO: 0.08 K/UL (ref 0–0.04)
IMM GRANULOCYTES # BLD AUTO: 0.17 K/UL (ref 0–0.04)
IMM GRANULOCYTES NFR BLD AUTO: 0.7 % (ref 0–0.5)
IMM GRANULOCYTES NFR BLD AUTO: 1.1 % (ref 0–0.5)
INFLUENZA A, MOLECULAR: NEGATIVE
INFLUENZA B, MOLECULAR: NEGATIVE
INTERVENTRICULAR SEPTUM: 1.16 CM (ref 0.6–1.1)
IVRT: 106 MSEC
LACTATE SERPL-SCNC: 1.1 MMOL/L (ref 0.5–1.9)
LEFT INTERNAL DIMENSION IN SYSTOLE: 4.22 CM (ref 2.1–4)
LEFT VENTRICLE DIASTOLIC VOLUME INDEX: 45.23 ML/M2
LEFT VENTRICLE DIASTOLIC VOLUME: 89.1 ML
LEFT VENTRICLE MASS INDEX: 88 G/M2
LEFT VENTRICLE SYSTOLIC VOLUME INDEX: 21.4 ML/M2
LEFT VENTRICLE SYSTOLIC VOLUME: 42.2 ML
LEFT VENTRICULAR INTERNAL DIMENSION IN DIASTOLE: 4.43 CM (ref 3.5–6)
LEFT VENTRICULAR MASS: 174.05 G
LV LATERAL E/E' RATIO: 7.91 M/S
LV SEPTAL E/E' RATIO: 10.88 M/S
LVOT MG: 3 MMHG
LVOT MV: 0.83 CM/S
LYMPHOCYTES # BLD AUTO: 0.8 K/UL (ref 1–4.8)
LYMPHOCYTES # BLD AUTO: 0.9 K/UL (ref 1–4.8)
LYMPHOCYTES NFR BLD: 5 % (ref 18–48)
LYMPHOCYTES NFR BLD: 7.6 % (ref 18–48)
MAGNESIUM SERPL-MCNC: 1.2 MG/DL (ref 1.6–2.6)
MAGNESIUM SERPL-MCNC: 1.8 MG/DL (ref 1.6–2.6)
MCH RBC QN AUTO: 27.6 PG (ref 27–31)
MCH RBC QN AUTO: 27.9 PG (ref 27–31)
MCHC RBC AUTO-ENTMCNC: 33 G/DL (ref 32–36)
MCHC RBC AUTO-ENTMCNC: 33.3 G/DL (ref 32–36)
MCV RBC AUTO: 84 FL (ref 82–98)
MCV RBC AUTO: 84 FL (ref 82–98)
MICROSCOPIC COMMENT: ABNORMAL
MONOCYTES # BLD AUTO: 0.9 K/UL (ref 0.3–1)
MONOCYTES # BLD AUTO: 1.4 K/UL (ref 0.3–1)
MONOCYTES NFR BLD: 7.5 % (ref 4–15)
MONOCYTES NFR BLD: 8.9 % (ref 4–15)
MV MEAN GRADIENT: 2 MMHG
MV PEAK A VEL: 0.66 M/S
MV PEAK E VEL: 0.87 M/S
MV PEAK GRADIENT: 4 MMHG
MV VALVE AREA BY CONTINUITY EQUATION: 3.67 CM2
NEUTROPHILS # BLD AUTO: 13.3 K/UL (ref 1.8–7.7)
NEUTROPHILS # BLD AUTO: 9.6 K/UL (ref 1.8–7.7)
NEUTROPHILS NFR BLD: 83.7 % (ref 38–73)
NEUTROPHILS NFR BLD: 84.9 % (ref 38–73)
NRBC BLD-RTO: 0 /100 WBC
NRBC BLD-RTO: 0 /100 WBC
NUM UNITS TRANS PACKED RBC: NORMAL
PHOSPHATE SERPL-MCNC: 2.7 MG/DL (ref 2.7–4.5)
PISA TR MAX VEL: 2.67 M/S
PLATELET # BLD AUTO: 248 K/UL (ref 150–450)
PLATELET # BLD AUTO: 290 K/UL (ref 150–450)
PMV BLD AUTO: 8.2 FL (ref 9.2–12.9)
PMV BLD AUTO: 8.5 FL (ref 9.2–12.9)
POTASSIUM SERPL-SCNC: 3.1 MMOL/L (ref 3.5–5.1)
POTASSIUM SERPL-SCNC: 3.6 MMOL/L (ref 3.5–5.1)
PROT SERPL-MCNC: 6.2 G/DL (ref 6–8.4)
PROT SERPL-MCNC: 6.6 G/DL (ref 6–8.4)
PV MV: 0.83 M/S
PV PEAK VELOCITY: 1.12 CM/S
RA MAJOR: 4.67 CM
RA PRESSURE: 3 MMHG
RA WIDTH: 3.8 CM
RBC # BLD AUTO: 2.57 M/UL (ref 4.6–6.2)
RBC # BLD AUTO: 2.83 M/UL (ref 4.6–6.2)
RBC #/AREA URNS HPF: 1 /HPF (ref 0–4)
RIGHT VENTRICULAR END-DIASTOLIC DIMENSION: 2.66 CM
RIGHT VENTRICULAR LENGTH IN DIASTOLE (APICAL 4-CHAMBER VIEW): 72 CM
RV TISSUE DOPPLER FREE WALL SYSTOLIC VELOCITY 1 (APICAL 4 CHAMBER VIEW): 0.02 CM/S
SARS-COV-2 RDRP RESP QL NAA+PROBE: NEGATIVE
SODIUM SERPL-SCNC: 132 MMOL/L (ref 136–145)
SODIUM SERPL-SCNC: 132 MMOL/L (ref 136–145)
SPECIMEN SOURCE: NORMAL
SQUAMOUS #/AREA URNS HPF: 1 /HPF
STJ: 2.61 CM
TDI LATERAL: 0.11 M/S
TDI SEPTAL: 0.08 M/S
TDI: 0.1 M/S
TR MAX PG: 29 MMHG
TRICUSPID ANNULAR PLANE SYSTOLIC EXCURSION: 3.17 CM
TV REST PULMONARY ARTERY PRESSURE: 32 MMHG
WBC # BLD AUTO: 11.42 K/UL (ref 3.9–12.7)
WBC # BLD AUTO: 15.62 K/UL (ref 3.9–12.7)
WBC #/AREA URNS HPF: 2 /HPF (ref 0–5)

## 2023-02-02 PROCEDURE — 85025 COMPLETE CBC W/AUTO DIFF WBC: CPT | Performed by: NURSE PRACTITIONER

## 2023-02-02 PROCEDURE — 63600175 PHARM REV CODE 636 W HCPCS: Performed by: NURSE PRACTITIONER

## 2023-02-02 PROCEDURE — 84100 ASSAY OF PHOSPHORUS: CPT | Performed by: HOSPITALIST

## 2023-02-02 PROCEDURE — 87077 CULTURE AEROBIC IDENTIFY: CPT | Performed by: INTERNAL MEDICINE

## 2023-02-02 PROCEDURE — 63700000 PHARM REV CODE 250 ALT 637 W/O HCPCS: Performed by: NURSE PRACTITIONER

## 2023-02-02 PROCEDURE — 85025 COMPLETE CBC W/AUTO DIFF WBC: CPT | Mod: 91 | Performed by: INTERNAL MEDICINE

## 2023-02-02 PROCEDURE — 12000002 HC ACUTE/MED SURGE SEMI-PRIVATE ROOM

## 2023-02-02 PROCEDURE — 80053 COMPREHEN METABOLIC PANEL: CPT | Mod: 91 | Performed by: INTERNAL MEDICINE

## 2023-02-02 PROCEDURE — P9016 RBC LEUKOCYTES REDUCED: HCPCS | Performed by: INTERNAL MEDICINE

## 2023-02-02 PROCEDURE — 63700000 PHARM REV CODE 250 ALT 637 W/O HCPCS: Performed by: INTERNAL MEDICINE

## 2023-02-02 PROCEDURE — 25000003 PHARM REV CODE 250: Performed by: STUDENT IN AN ORGANIZED HEALTH CARE EDUCATION/TRAINING PROGRAM

## 2023-02-02 PROCEDURE — 25000003 PHARM REV CODE 250: Performed by: NURSE PRACTITIONER

## 2023-02-02 PROCEDURE — 87186 SC STD MICRODIL/AGAR DIL: CPT | Performed by: INTERNAL MEDICINE

## 2023-02-02 PROCEDURE — 80053 COMPREHEN METABOLIC PANEL: CPT | Performed by: HOSPITALIST

## 2023-02-02 PROCEDURE — 63600175 PHARM REV CODE 636 W HCPCS: Mod: TB,JG | Performed by: INTERNAL MEDICINE

## 2023-02-02 PROCEDURE — 99233 PR SUBSEQUENT HOSPITAL CARE,LEVL III: ICD-10-PCS | Mod: ,,, | Performed by: STUDENT IN AN ORGANIZED HEALTH CARE EDUCATION/TRAINING PROGRAM

## 2023-02-02 PROCEDURE — 25000003 PHARM REV CODE 250: Performed by: INTERNAL MEDICINE

## 2023-02-02 PROCEDURE — 83605 ASSAY OF LACTIC ACID: CPT | Performed by: INTERNAL MEDICINE

## 2023-02-02 PROCEDURE — 87086 URINE CULTURE/COLONY COUNT: CPT | Performed by: INTERNAL MEDICINE

## 2023-02-02 PROCEDURE — 99233 SBSQ HOSP IP/OBS HIGH 50: CPT | Mod: ,,, | Performed by: STUDENT IN AN ORGANIZED HEALTH CARE EDUCATION/TRAINING PROGRAM

## 2023-02-02 PROCEDURE — 36415 COLL VENOUS BLD VENIPUNCTURE: CPT | Performed by: NURSE PRACTITIONER

## 2023-02-02 PROCEDURE — 93306 TTE W/DOPPLER COMPLETE: CPT

## 2023-02-02 PROCEDURE — 93306 ECHO (CUPID ONLY): ICD-10-PCS | Mod: 26,,, | Performed by: INTERNAL MEDICINE

## 2023-02-02 PROCEDURE — 81001 URINALYSIS AUTO W/SCOPE: CPT | Performed by: INTERNAL MEDICINE

## 2023-02-02 PROCEDURE — 97530 THERAPEUTIC ACTIVITIES: CPT | Mod: CQ

## 2023-02-02 PROCEDURE — 25000003 PHARM REV CODE 250: Performed by: HOSPITALIST

## 2023-02-02 PROCEDURE — 87040 BLOOD CULTURE FOR BACTERIA: CPT | Performed by: INTERNAL MEDICINE

## 2023-02-02 PROCEDURE — 36415 COLL VENOUS BLD VENIPUNCTURE: CPT | Performed by: INTERNAL MEDICINE

## 2023-02-02 PROCEDURE — 83735 ASSAY OF MAGNESIUM: CPT | Performed by: NURSE PRACTITIONER

## 2023-02-02 PROCEDURE — 87154 CUL TYP ID BLD PTHGN 6+ TRGT: CPT | Performed by: INTERNAL MEDICINE

## 2023-02-02 PROCEDURE — 87502 INFLUENZA DNA AMP PROBE: CPT | Performed by: INTERNAL MEDICINE

## 2023-02-02 PROCEDURE — 36430 TRANSFUSION BLD/BLD COMPNT: CPT

## 2023-02-02 PROCEDURE — 93306 TTE W/DOPPLER COMPLETE: CPT | Mod: 26,,, | Performed by: INTERNAL MEDICINE

## 2023-02-02 PROCEDURE — U0002 COVID-19 LAB TEST NON-CDC: HCPCS | Performed by: INTERNAL MEDICINE

## 2023-02-02 PROCEDURE — 83735 ASSAY OF MAGNESIUM: CPT | Mod: 91 | Performed by: INTERNAL MEDICINE

## 2023-02-02 RX ORDER — HYDROCODONE BITARTRATE AND ACETAMINOPHEN 500; 5 MG/1; MG/1
TABLET ORAL
Status: DISCONTINUED | OUTPATIENT
Start: 2023-02-02 | End: 2023-02-07 | Stop reason: HOSPADM

## 2023-02-02 RX ORDER — CEFEPIME HYDROCHLORIDE 1 G/50ML
2 INJECTION, SOLUTION INTRAVENOUS
Status: DISCONTINUED | OUTPATIENT
Start: 2023-02-02 | End: 2023-02-02

## 2023-02-02 RX ORDER — CEFEPIME HYDROCHLORIDE 1 G/50ML
2 INJECTION, SOLUTION INTRAVENOUS
Status: DISCONTINUED | OUTPATIENT
Start: 2023-02-02 | End: 2023-02-07 | Stop reason: HOSPADM

## 2023-02-02 RX ORDER — POTASSIUM CHLORIDE 20 MEQ/1
40 TABLET, EXTENDED RELEASE ORAL EVERY 4 HOURS
Status: COMPLETED | OUTPATIENT
Start: 2023-02-02 | End: 2023-02-02

## 2023-02-02 RX ADMIN — FLUCONAZOLE 800 MG: 100 TABLET ORAL at 09:02

## 2023-02-02 RX ADMIN — MEGESTROL ACETATE 40 MG: 20 TABLET ORAL at 09:02

## 2023-02-02 RX ADMIN — FOLIC ACID 1 MG: 1 TABLET ORAL at 09:02

## 2023-02-02 RX ADMIN — ESCITALOPRAM OXALATE 20 MG: 10 TABLET ORAL at 09:02

## 2023-02-02 RX ADMIN — THIAMINE HCL TAB 100 MG 100 MG: 100 TAB at 09:02

## 2023-02-02 RX ADMIN — SODIUM CHLORIDE 2127 ML: 0.9 INJECTION, SOLUTION INTRAVENOUS at 12:02

## 2023-02-02 RX ADMIN — MAGNESIUM SULFATE HEPTAHYDRATE 4 G: 40 INJECTION, SOLUTION INTRAVENOUS at 07:02

## 2023-02-02 RX ADMIN — THERA TABS 1 TABLET: TAB at 09:02

## 2023-02-02 RX ADMIN — POTASSIUM CHLORIDE 40 MEQ: 1500 TABLET, EXTENDED RELEASE ORAL at 08:02

## 2023-02-02 RX ADMIN — LEVETIRACETAM 1500 MG: 500 TABLET, FILM COATED ORAL at 08:02

## 2023-02-02 RX ADMIN — POTASSIUM BICARBONATE 50 MEQ: 977.5 TABLET, EFFERVESCENT ORAL at 09:02

## 2023-02-02 RX ADMIN — EMTRICITABINE AND TENOFOVIR DISOPROXIL FUMARATE 1 TABLET: 200; 300 TABLET, FILM COATED ORAL at 09:02

## 2023-02-02 RX ADMIN — MIRTAZAPINE 15 MG: 15 TABLET, FILM COATED ORAL at 08:02

## 2023-02-02 RX ADMIN — PANTOPRAZOLE SODIUM 40 MG: 40 TABLET, DELAYED RELEASE ORAL at 05:02

## 2023-02-02 RX ADMIN — SODIUM BICARBONATE 650 MG TABLET 650 MG: at 08:02

## 2023-02-02 RX ADMIN — SODIUM CHLORIDE TAB 1 GM 1 G: 1 TAB at 08:02

## 2023-02-02 RX ADMIN — ENOXAPARIN SODIUM 40 MG: 100 INJECTION SUBCUTANEOUS at 04:02

## 2023-02-02 RX ADMIN — SODIUM BICARBONATE 650 MG TABLET 650 MG: at 09:02

## 2023-02-02 RX ADMIN — MIDODRINE HYDROCHLORIDE 10 MG: 2.5 TABLET ORAL at 12:02

## 2023-02-02 RX ADMIN — POTASSIUM CHLORIDE 40 MEQ: 1500 TABLET, EXTENDED RELEASE ORAL at 11:02

## 2023-02-02 RX ADMIN — MIDODRINE HYDROCHLORIDE 10 MG: 2.5 TABLET ORAL at 04:02

## 2023-02-02 RX ADMIN — CEFEPIME HYDROCHLORIDE 2 G: 2 INJECTION, SOLUTION INTRAVENOUS at 08:02

## 2023-02-02 RX ADMIN — SULFAMETHOXAZOLE AND TRIMETHOPRIM 1 TABLET: 800; 160 TABLET ORAL at 09:02

## 2023-02-02 RX ADMIN — FERROUS SULFATE TAB 325 MG (65 MG ELEMENTAL FE) 1 EACH: 325 (65 FE) TAB at 09:02

## 2023-02-02 RX ADMIN — MIDODRINE HYDROCHLORIDE 10 MG: 2.5 TABLET ORAL at 07:02

## 2023-02-02 RX ADMIN — LEVETIRACETAM 1500 MG: 500 TABLET, FILM COATED ORAL at 09:02

## 2023-02-02 RX ADMIN — VANCOMYCIN HYDROCHLORIDE 1500 MG: 1 INJECTION, POWDER, LYOPHILIZED, FOR SOLUTION INTRAVENOUS at 02:02

## 2023-02-02 RX ADMIN — DARUNAVIR ETHANOLATE AND COBICISTAT 1 TABLET: 800; 150 TABLET, FILM COATED ORAL at 09:02

## 2023-02-02 RX ADMIN — MAGNESIUM 64 MG (MAGNESIUM CHLORIDE) TABLET,DELAYED RELEASE 128 MG: at 09:02

## 2023-02-02 RX ADMIN — SODIUM CHLORIDE TAB 1 GM 1 G: 1 TAB at 09:02

## 2023-02-02 RX ADMIN — ACETAMINOPHEN 650 MG: 325 TABLET ORAL at 10:02

## 2023-02-02 RX ADMIN — FLUTICASONE PROPIONATE 100 MCG: 50 SPRAY, METERED NASAL at 09:02

## 2023-02-02 RX ADMIN — CEFEPIME HYDROCHLORIDE 2 G: 2 INJECTION, SOLUTION INTRAVENOUS at 12:02

## 2023-02-02 RX ADMIN — SODIUM BICARBONATE 650 MG TABLET 650 MG: at 02:02

## 2023-02-02 NOTE — PT/OT/SLP PROGRESS
Physical Therapy Treatment    Patient Name:  El Abreu   MRN:  [id number]    Recommendations:     Discharge Recommendations: rehabilitation facility  Discharge Equipment Recommendations: bedside commode  Barriers to discharge:  increased assist with mobility    Assessment:     El Abreu is a 27 y.o. male admitted with a medical diagnosis of AIDS.  He presents with the following impairments/functional limitations: weakness, impaired endurance, impaired self care skills, impaired functional mobility, gait instability, impaired balance, decreased lower extremity function, impaired cardiopulmonary response to activity.    Pt's BP monitored throughout session.  HOB elevated: 130/71,   Sitting EOB: 104/60,   Standin/27,   HOB elevated: 126/68,     Pt agreeable to visit. Pt with no reports of dizziness upon sitting up. Pt required min assist for sit to stand transfer with slight LOB posteriorly requiring additional CGA from tech. Pt with no c/o dizziness upon standing and tolerated standing long enough to take BP, however BP was extremely low 66/27 and pt began c/o dizziness with slightly vacant gaze but responded to therapist. Tech sent to get RN and MD entered at this time. Pt assisted back to bed with min assist and left in care of RN and MD.    Rehab Prognosis: Fair; patient would benefit from acute skilled PT services to address these deficits and reach maximum level of function.    Recent Surgery: * No surgery found *      Plan:     During this hospitalization, patient to be seen 6 x/week to address the identified rehab impairments via gait training, therapeutic activities, therapeutic exercises, neuromuscular re-education and progress toward the following goals:    Plan of Care Expires:  23    Subjective     Chief Complaint: pt with dizziness today   Patient/Family Comments/goals: to get stronger  Pain/Comfort:  Pain Rating 1: 0/10      Objective:     Communicated with  RN prior to session.  Patient found HOB elevated with peripheral IV, telemetry, bed alarm upon PT entry to room.     General Precautions: Standard, fall  Orthopedic Precautions: N/A  Braces: N/A  Respiratory Status: Room air     Functional Mobility:  Bed Mobility:     Supine to Sit: stand by assistance  Sit to Supine: minimum assistance  Transfers:     Sit to Stand:  minimum assistance with rolling walker      AM-PAC 6 CLICK MOBILITY          Treatment & Education:  Pt educated on importance of time OOB, importance of intermittent mobility, safe techniques for transfers/ambulation, discharge recommendations/options, and use of call light for assistance and fall prevention.      Patient left HOB elevated with all lines intact, call button in reach, and RN and MD present..    GOALS:   Multidisciplinary Problems       Physical Therapy Goals          Problem: Physical Therapy    Goal Priority Disciplines Outcome Goal Variances Interventions   Physical Therapy Goal     PT, PT/OT Ongoing, Progressing     Description: Goals to be met by: 23     Patient will increase functional independence with mobility by performin. Supine to sit with Supervision  2. Sit to stand transfer with Supervision  3. Bed to chair transfer with Supervision using Rolling Walker  4. Gait  x 150 feet with Supervision using Rolling Walker.                              Time Tracking:     PT Received On: 23  PT Start Time: 1021     PT Stop Time: 1034  PT Total Time (min): 13 min     Billable Minutes: Therapeutic Activity 13    Treatment Type: Treatment  PT/PTA: PTA     PTA Visit Number: 2     2023   COPD (chronic obstructive pulmonary disease)    CVA (cerebral vascular accident)    Diverticulitis    Neuropathy

## 2023-02-02 NOTE — CARE UPDATE
CM spoke with Vangie [phone] of [facility] requesting updated clinical notes. Phone number for [facility]  also given. Notes sent via [employer] CM to follow.

## 2023-02-02 NOTE — PROGRESS NOTES
[facility] Medicine  Progress Note    Patient name: El Abreu  MRN: [id number]  Admit Date: 1/16/2023   LOS: 14 days     SUBJECTIVE:     Principal problem: AIDS    Interval History:  Hypoglycemic in the morning, fingersticks are without hypoglycemia.  Leukocytosis improved. CM spoke with Mishel of [facility] and they are willing to accept pt. They will submit for auth.      Scheduled Meds:   darunavir-cobicistat  1 tablet Oral Daily    emtricitabine-tenofovir 200-300 mg  1 tablet Oral Daily    enoxaparin  40 mg Subcutaneous Daily    EScitalopram oxalate  20 mg Oral Daily    ferrous sulfate  1 tablet Oral Daily    fluconazole  800 mg Oral Daily    fluticasone propionate  2 spray Each Nostril BID    folic acid  1 mg Oral Daily    insulin detemir U-100  10 Units Subcutaneous BID    levETIRAcetam  1,500 mg Oral BID    magnesium chloride  128 mg Oral Daily    megestroL  40 mg Oral Daily    midodrine  10 mg Oral TID WM    mirtazapine  15 mg Oral QHS    multivitamin  1 tablet Oral Daily    pantoprazole  40 mg Oral Before breakfast    sodium bicarbonate  650 mg Oral TID    sodium chloride  1 g Oral BID    sulfamethoxazole-trimethoprim 800-160mg  1 tablet Oral Daily    thiamine  100 mg Oral Daily     Continuous Infusions:   lactated ringers 100 mL/hr at 02/01/23 1618       PRN Meds:sodium chloride, acetaminophen, albuterol-ipratropium, dextrose 10%, dextrose 10%, glucagon (human recombinant), glucose, glucose, HYDROcodone-acetaminophen, HYDROcodone-acetaminophen, insulin aspart U-100, lorazepam, magnesium sulfate IVPB, magnesium sulfate IVPB, melatonin, melatonin, naloxone, ondansetron, polyethylene glycol, potassium bicarbonate, potassium bicarbonate, potassium bicarbonate, prochlorperazine, simethicone, sodium chloride 0.9%    Review of patient's allergies indicates:  No Known Allergies    Review of Systems: As per interval history    OBJECTIVE:     Vital Signs (Most  Recent)  Temp: 99.2 °F (37.3 °C) (02/01/23 1649)  Pulse: 88 (02/01/23 1649)  Resp: 16 (02/01/23 1649)  BP: 127/85 (02/01/23 1649)  SpO2: 100 % (02/01/23 1649)    Vital Signs Range (Last 24H):  Temp:  [98 °F (36.7 °C)-99.2 °F (37.3 °C)]   Pulse:  []   Resp:  [16-20]   BP: (106-132)/(70-85)   SpO2:  [98 %-100 %]     I & O (Last 24H):  Intake/Output Summary (Last 24 hours) at 2/1/2023 1804  Last data filed at 2/1/2023 1400  Gross per 24 hour   Intake 480 ml   Output 1550 ml   Net -1070 ml           Physical Exam:  General:  Chronically ill-appearing gentleman, cachectic  Eyes: No conjunctival injection. No scleral icterus.  ENT: Hearing grossly intact. No discharge from ears. No nasal discharge.   Neck: Supple, trachea midline. No JVD  CVS: RRR. No LE edema BL  Lungs:  No tachypnea or accessory muscle use.  Clear to auscultation bilaterally  Abdomen:  Soft, nontender and nondistended.  No organomegaly  Neuro: AOx3. Moves all extremities. Follows commands. Responds appropriately       Laboratory:  I have reviewed all pertinent lab results within the past 24 hours.    Diagnostic Results:  Reviewed all imaging    ASSESSMENT/PLAN:     27-year-old very unfortunate gentleman who recently had very prolonged hospital stay due to cryptococcal meningitis requiring serial lumbar punctures, neuro syphilis and hospital stay was complicated by AC, GI bleed, malnutrition and severe deconditioning and was transition to LTAC.  He was sent to our emergency room by LTAC due to critical hypomagnesemia and anemia causing severe lethargy and unable to stand up.  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         Plan:   Very unfortunate case of AIDS with recent prolonged hospital stay for cryptococcal meningitis and neurosyphilis came with severe lethargy, anemia and critical hypomagnesemia  Anti-retroviral, antifungal and antibiotics as per Infectious Disease.  Remarkable improvement in CD4 count and viral load  One blood culture positive for coag-negative staph, PICC line was removed and sent for tissue culture. Noted discontinuation of daptomycin and increasing dose of Bactrim by Infectious Disease  Follow-up on repeat cultures  Remains on Bactrim for PJP prophylaxis and Augmentin for sinusitis  On Diflucan for recent cryptococcal meningitis  Consulted psychiatry to reconsider his psych regimen considering excessive drowsiness-thank you  Consulted neurology for suspected seizure-like activity. EEG and MRI findings noted  Aggressive p.o. nutrition, improve pulmonary toilet  Out of bed to chair  PT, OT  Avoid antihypertensives, aggressive electrolyte replacement  Frequent orthostatic vital signs  Further management as per clinical course  Patient remains profoundly orthostatic and appears to have severe dysautonomia.  Will try midodrine and compression stockings.  Up titrated midodrine today.  Patient is also anemic, s/p transfuse 1 unit PRBC in hope of better exercise tolerance  Unfortunately patient is not able to work with physical therapy due to profound orthostatic hypotension and dysautonomia.   Dispo: medically optimized for dc, CM working on LTAC      VTE Risk Mitigation (From admission, onward)           Ordered     Place CARMEN hose  Until discontinued         01/18/23 1830     enoxaparin injection 40 mg  Daily         01/16/23 0158                        Department Hospital Medicine  On license of [facility]  Benjamín Medina MD  Date of service: 02/01/2023

## 2023-02-02 NOTE — PROGRESS NOTES
Pharmacokinetic Initial Assessment: IV Vancomycin    Assessment/Plan:    Initiate intravenous vancomycin with loading dose of 1500 mg once followed by a maintenance dose of vancomycin 1250mg IV every 12 hours  Desired empiric serum trough concentration is 15 to 20 mcg/mL  Draw vancomycin trough level 60 min prior to fourth dose on 02/04/82023 at approximately 0000  Pharmacy will continue to follow and monitor vancomycin.      Please contact pharmacy at extension [phone] with any questions regarding this assessment.     Thank you for the consult,   Avinash Villalpando       Patient brief summary:  El Abreu is a 27 y.o. male initiated on antimicrobial therapy with IV Vancomycin for treatment of suspected bacteremia    Drug Allergies:   Review of patient's allergies indicates:  No Known Allergies    Actual Body Weight:   70.9 kg    Renal Function:   Estimated Creatinine Clearance: 102.1 mL/min (based on SCr of 1.1 mg/dL).,     Dialysis Method (if applicable):  N/A    CBC (last 72 hours):  Recent Labs   Lab Result Units 01/31/23  0441 02/01/23 0434 02/02/23 0447   WBC K/uL 13.46* 10.07 11.42   Hemoglobin g/dL 7.8* 8.2* 7.9*   Hematocrit % 24.9* 24.5* 23.7*   Platelets K/uL 305 347 290   Gran % % 81.0* 71.7 83.7*   Lymph % % 5.8* 13.2* 7.6*   Mono % % 10.6 13.1 7.5   Eosinophil % % 0.4 0.9 0.2   Basophil % % 0.2 0.2 0.3   Differential Method  Automated Automated Automated       Metabolic Panel (last 72 hours):  Recent Labs   Lab Result Units 01/31/23  0441 01/31/23  1412 02/01/23  0434 02/02/23 0447   Sodium mmol/L 131*  --  133* 132*   Potassium mmol/L 3.5  --  3.6 3.6   Chloride mmol/L 103  --  104 101   CO2 mmol/L 21*  --  22* 21*   Glucose mg/dL 134*  --  64* 82   Glucose, UA   --  Negative  --   --    BUN mg/dL 19  --  15 14   Creatinine mg/dL 1.0  --  0.9 1.1   Albumin g/dL 3.5  --  3.2* 3.0*   Total Bilirubin mg/dL 0.5  --  0.6 0.3   Alkaline Phosphatase U/L 83  --  70 69   AST U/L 30  --  24 25   ALT U/L 34  --   26 24   Magnesium mg/dL 1.7  --  1.6 1.2*   Phosphorus mg/dL 3.6  --  4.4 2.7       Drug levels (last 3 results):  No results for input(s): VANCOMYCINRA, VANCORANDOM, VANCOMYCINPE, VANCOPEAK, VANCOMYCINTR, VANCOTROUGH in the last 72 hours.    Microbiologic Results:  Microbiology Results (last 7 days)       Procedure Component Value Units Date/Time    Culture, Respiratory with Gram Stain [[id number]]     Order Status: No result Specimen: Respiratory     Blood culture [[id number]] Collected: 01/31/23 0919    Order Status: Completed Specimen: Blood Updated: 02/02/23 1032     Blood Culture, Routine No Growth to date      No Growth to date      No Growth to date    Blood culture [[id number]] Collected: 02/02/23 1001    Order Status: Sent Specimen: Blood Updated: 02/02/23 1011    Urine Culture High Risk [[id number]] Collected: 02/02/23 0931    Order Status: Sent Specimen: Urine, Clean Catch Updated: 02/02/23 0942

## 2023-02-02 NOTE — PROGRESS NOTES
Progress Note  Infectious Disease    Reason for Consult:  leukocytosis    HPI: El Abreu is a 27 y.o. 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  he was last admitted to [facility] November 30th with vomiting and headache, increased intracranial pressure.  This LP had a positive Sirisha ink opening pressure was 23.  He was then committed to a 6 week course of AmBisome completed 1/10, which was interrupted with AC but resumed and he was discharged to [facility] on January 10th.  He did require an additional LP for fluid removal and reduction of pressure on 12/13 (Sirisha ink was still positive but the culture was negative.  The last positive culture was 11/21).  He has been continued on fluconazole 800 mg daily since 01/11 and has been receiving Pneumocystis prophylaxis and appetite stimulants and antidepressants.  MRI brain on 01/01 showed stability of the right globus pallidus cryptococcal pseudocyst.  He was additionally treated for neurosyphilis 3 doses of Bicillin after an appropriate course of IV penicillin G.  He has not been eating well, withdrawn, seen by Psychiatry late December and started on risperidone.  He was seen in the [facility] emergency room 1/14 when his mandible became stuck open.  This was reduced in their emergency room he was sent back to the [facility].  He was sent to our emergency room yesterday for severe hypomagnesemia, 0.8 While in the emergency room he was noted to have a rising white blood cell count, from normal on 01/08 to 14 on 01/09 to 17 on 01/16 additionally his hemoglobin was 7.3.  An infectious workup was commenced and CRP was 2.0, lactic acid 0.9, procalcitonin 0.15.  Chest x-ray was negative, blood cultures are negative, respiratory PCR is not yet been  collected and he is not produce any sputum but this is not likely relevant..vanc and cefepime were begun.   Unfortunately our hospital is full and he has been waiting in the hallway outside the emergency room.  He is incontinent of urine, and urinalysis was negative.  Magnesium is now 1.7    He feels much improved, tells me that he ate breakfast and lunch. He does endorse some small amount of yellow sputum and he does find that his sinuses are congested. It is relevant to note that he had pansinusitis on MRI brain 1/1.     1/18: interim reviewed. He is eating well. He had a syncopal episode while working with PT. He states that this has happened before, but does not happen consistently when he gets up. He is still congested but not really expectorating. Sputum sample was not purulent(saliva) and culture has only normal burak. He is not receiving his HIV antivirals here(not on formulary) though these were available at . Repeated CXR and it is still clear.   1/20:  interim reviewed. WBC a little higher , peripheral smear noted but unsure of the relevance of the findings.  He is sleeping deeply, worked with physical therapy much more successfully today he is eating reasonably well and antivirals were resumed.  He endorses that his cough is improved.  Denies diarrhea. Blood culture from 1/16, has coag neg Staph in 1/4 bottles drawn from picc line. The picc line is 47 days old, placed 12/4.      1/21-22 (Deep): interim reviewed, discussed with Dr Ny, patient seen and examined at bedside.  States he vomited earlier this morning, he was feeling very nauseous.  He denies headaches, no fever or chills.  Slightly hypertensive, afebrile.  Adequate urinary output, 1 episode of stool recorded in the last 24 hours.  Labs reviewed, persistent leukocytosis 20.9, lymphocytic predominance 10%, no left shift, bands 10%, H&H 7.5/23.5, platelet count 248.  Stable kidney function, magnesium 1.2.  Micro reviewed, blood cultures  from 01/20 no growth to date, pending final.  Tip culture from PICC which was removed 1/20 no growth.  Respiratory viral panel not detected.    1/23:  Interim reviewed, patient seen examined at bedside, states he is feeling a little stronger today, was able to walk around with physical therapy, states he had 2-3/10 frontal headache this morning that is now improved.  Hemodynamically stable, afebrile.  Labs reviewed, leukocytosis down to 15.9, lymphocytic predominance 12%, bands 2%, H&H 7.5/32.4, platelet count 312.  Magnesium 1.4, stable potassium, and kidney function.  CD4 count and viral load in process.    1/24:  Interim reviewed, patient seen examined at bedside, states he is feeling much better today, walk with PT, encouraged to sit in the chair.  Hemodynamically stable, afebrile.  White count down to 13.2, no left shift, H&H 7.3/22.2, platelet count 351, magnesium 1.6.  CD4 122, viral load still in process.    1/25:  Interim reviewed, patient seen examined at bedside.  States he is feeling good today, trying his best to cooperate with physical therapy, encouraged to get out of bed.  Hemodynamically stable, afebrile.  Labs reviewed, white count 13, no left shift, H&H 7.6/23.8, platelet count 334.  Stable kidney function.  Viral load 20 copies, almost undetectable.    1/26:  Interim reviewed, patient seen examined at bedside, states he threw up at noon, asking for food.  States he is feeling better, encouraged to get out of bed.  Physical therapy still recommending skilled nursing facilities, case management trying to find a place for him to go.  Labs reviewed, white count improved, stable kidney function, magnesium 1.7, improved.    1/27: interim reviewed, patient seen and examined at bedside. Patient had hypotension this morning, now improved. Seen by Psych, recommendations noted. EEG reviewed, no abnormalities.     ID CALLED BACK FOR LEUKOCYTOSIS:  1/31: Interim reviewed, he received 1 UI PRBC yesterday,  blood cultures x 1 sent this morning.   Patient seen and examined at bedside, remains orthostatic, was able to work with PT, on midodrine. Hemodynamically stable, afebrile. Labs reviewed, leukocytosis 13, left shift PMN: 81%, H/H 7.8/24.9, plt 305. Stable kidney function, hyponatremia 131.     2/1:  Interim reviewed, patient seen examined at bedside.  No acute complaints, awaiting LTAC authorization, as per case management notes, will go to [facility].  Labs reviewed, stable, likely reactive thrombocytosis yesterday from blood transfusion.    2/2:  Interim reviewed, patient seen examined at bedside, febrile 102.5, tachycardic, diaphoretic, complaining of chills.  He is also complaining of productive cough, will send respiratory culture.  Chest x-ray clear.  CT chest with right upper paratracheal adenopathy 2.8 x 1.5 cm.  No additional mediastinal, hilar or axillary adenopathy.  Right upper paratracheal adenopathy most likely secondary to infectious or inflammatory process.  IV access which was 12-day-old, removed today.  Blood cultures I urine culture in process.  Empirically started on vancomycin/cefepime IV.    EXAM & DIAGNOSTICS REVIEWED:   Vitals:     Temp:  [97.8 °F (36.6 °C)-102.5 °F (39.2 °C)]   Temp: (!) 102.5 °F (39.2 °C) (02/02/23 1043)  Pulse: 105 (02/02/23 1043)  Resp: 18 (02/02/23 0719)  BP: 125/71 (02/02/23 1043)  SpO2: 98 % (02/02/23 1043)    Intake/Output Summary (Last 24 hours) at 2/2/2023 1114  Last data filed at 2/2/2023 1047  Gross per 24 hour   Intake 240 ml   Output 2250 ml   Net -2010 ml         General:  Very thin, in NAD, cooperative, comfortable on room air, diaphoretic, febrile  Eyes:  Anicteric, EOMI -  iris heterochromia/ post surgical L eye   ENT:  Dry oral mucosa, no thrush  Neck:  supple  Lungs: Clear to auscultation   Heart:  Tachycardic, RRR, no gallop/murmur/rub noted  Abd:  Soft, NT, ND, normal BS, no masses or organomegaly appreciated.  :  Voids , no flank  tenderness  Musc:  Joints without effusion, swelling, erythema, synovitis, with mild generalized muscle wasting.   Skin:  No rashes. Dry skin.  Neuro:             Arousable, speech fluent, face symmetric, moves all extremities, no focal weakness.   Psych:  Calm, cooperative  Lymphatic:        Extrem: No edema, erythema, phlebitis, cellulitis, warm and well perfused  VAD:  Peripheral IV removed today (12 days old)  Isolation:  none  Wound: none      General Labs reviewed:  Recent Labs   Lab 01/31/23 0441 02/01/23 0434 02/02/23 0447   WBC 13.46* 10.07 11.42   HGB 7.8* 8.2* 7.9*   HCT 24.9* 24.5* 23.7*    347 290       Recent Labs   Lab 01/31/23 0441 02/01/23 0434 02/02/23 0447   * 133* 132*   K 3.5 3.6 3.6    104 101   CO2 21* 22* 21*   BUN 19 15 14   CREATININE 1.0 0.9 1.1   CALCIUM 10.6* 10.9* 10.1   PROT 7.0 7.1 6.6   BILITOT 0.5 0.6 0.3   ALKPHOS 83 70 69   ALT 34 26 24   AST 30 24 25     No results for input(s): CRP in the last 168 hours.    Estimated Creatinine Clearance: 102.1 mL/min (based on SCr of 1.1 mg/dL).    Micro:  Microbiology Results (last 7 days)       Procedure Component Value Units Date/Time    Culture, Respiratory with Gram Stain [[id number]]     Order Status: No result Specimen: Respiratory     Blood culture [[id number]] Collected: 01/31/23 0919    Order Status: Completed Specimen: Blood Updated: 02/02/23 1032     Blood Culture, Routine No Growth to date      No Growth to date      No Growth to date    Blood culture [[id number]] Collected: 02/02/23 1001    Order Status: Sent Specimen: Blood Updated: 02/02/23 1011    Urine Culture High Risk [[id number]] Collected: 02/02/23 0931    Order Status: Sent Specimen: Urine, Clean Catch Updated: 02/02/23 0935            Imaging Reviewed:   CXRs   CT chest with right upper paratracheal adenopathy most likely secondary to infectious or inflammatory process.    MRI brain 1/1    Cardiology:    IMPRESSION & PLAN     Fever, rule out acute  infectious process  Blood cultures x 1 in process  Blood culture x 2 1/31 no growth    2. Severe hypomagnesemia, improved - last Mg 1.2   Electrolyte abnormalities likely from ampho B    3. HIV/AIDS C3 - Cryptococcal meningitis, now controlled, s/p 6 weeks of ambisome and continues on high dose oral fluconazole   Cryptococcoma, stable   CD4 11/5 7 - VL 48,127 - repeat CD4 127 and VL 20 copies, almost undetectable   On Prezcobix/Descovy   PPD 1/14 negative    4. Cachexia with failure to thrive   On supplements     5. Pansinusitis, s/p Augmentin, improved  CRP 2.0    Recommendations:  Follow new cultures  LE US to rule out DVT  Respiratory culture ordered  Procalcitonin   On vancomycin and cefepime IV  Follow cultures to guide abx therapy  Monitor and replace magnesium   Continue Diflucan 800 mg per day for Crypto will need at least 12 months of therapy along with ART   Bactrim DS 1 tab daily, CD4 <200 for PJP   Continue Prezcobix/Descovy daily   Optimizing nutrition   PT/OT as tolerated  CM working at LTAC    D/w patient, Dr Medina     Medical Decision Making during this encounter was  [_] Low Complexity  [_] Moderate Complexity  [xxx  ] High Complexity

## 2023-02-02 NOTE — PLAN OF CARE
Problem: Adult Inpatient Plan of Care  Goal: Plan of Care Review  Outcome: Ongoing, Progressing  Goal: Patient-Specific Goal (Individualized)  Outcome: Ongoing, Progressing  Goal: Absence of Hospital-Acquired Illness or Injury  Outcome: Ongoing, Progressing  Goal: Optimal Comfort and Wellbeing  Outcome: Ongoing, Progressing  Goal: Readiness for Transition of Care  Outcome: Ongoing, Progressing     Problem: Skin Injury Risk Increased  Goal: Skin Health and Integrity  Outcome: Ongoing, Progressing     Problem: Pain Acute  Goal: Acceptable Pain Control and Functional Ability  Outcome: Ongoing, Progressing

## 2023-02-03 PROBLEM — R78.81 BACTEREMIA: Status: ACTIVE | Noted: 2023-02-03

## 2023-02-03 LAB
ACINETOBACTER CALCOACETICUS/BAUMANNII COMPLEX: NOT DETECTED
ALBUMIN SERPL BCP-MCNC: 2.8 G/DL (ref 3.5–5.2)
ALP SERPL-CCNC: 66 U/L (ref 55–135)
ALT SERPL W/O P-5'-P-CCNC: 29 U/L (ref 10–44)
ANION GAP SERPL CALC-SCNC: 9 MMOL/L (ref 8–16)
AST SERPL-CCNC: 26 U/L (ref 10–40)
BACTEROIDES FRAGILIS: NOT DETECTED
BASOPHILS # BLD AUTO: 0.03 K/UL (ref 0–0.2)
BASOPHILS NFR BLD: 0.2 % (ref 0–1.9)
BILIRUB SERPL-MCNC: 0.5 MG/DL (ref 0.1–1)
BUN SERPL-MCNC: 17 MG/DL (ref 6–20)
CALCIUM SERPL-MCNC: 9.4 MG/DL (ref 8.7–10.5)
CANDIDA ALBICANS: NOT DETECTED
CANDIDA AURIS: NOT DETECTED
CANDIDA GLABRATA: NOT DETECTED
CANDIDA KRUSEI: NOT DETECTED
CANDIDA PARAPSILOSIS: NOT DETECTED
CANDIDA TROPICALIS: NOT DETECTED
CHLORIDE SERPL-SCNC: 106 MMOL/L (ref 95–110)
CO2 SERPL-SCNC: 18 MMOL/L (ref 23–29)
CORTIS SERPL-MCNC: 4.1 UG/DL
CREAT SERPL-MCNC: 1.1 MG/DL (ref 0.5–1.4)
CRYPTOCOCCUS NEOFORMANS/GATTII: NOT DETECTED
CTX-M GENE: NOT DETECTED
DIFFERENTIAL METHOD: ABNORMAL
ENTEROBACTER CLOACAE COMPLEX: NOT DETECTED
ENTEROBACTERALES: NOT DETECTED
ENTEROCOCCUS FAECALIS: NOT DETECTED
ENTEROCOCCUS FAECIUM: NOT DETECTED
EOSINOPHIL # BLD AUTO: 0 K/UL (ref 0–0.5)
EOSINOPHIL NFR BLD: 0.3 % (ref 0–8)
ERYTHROCYTE [DISTWIDTH] IN BLOOD BY AUTOMATED COUNT: 14.6 % (ref 11.5–14.5)
ESCHERICHIA COLI: NOT DETECTED
EST. GFR  (NO RACE VARIABLE): >60 ML/MIN/1.73 M^2
GLUCOSE SERPL-MCNC: 115 MG/DL (ref 70–110)
GLUCOSE SERPL-MCNC: 72 MG/DL (ref 70–110)
GLUCOSE SERPL-MCNC: 92 MG/DL (ref 70–110)
GLUCOSE SERPL-MCNC: 98 MG/DL (ref 70–110)
HAEMOPHILUS INFLUENZAE: NOT DETECTED
HCT VFR BLD AUTO: 25.5 % (ref 40–54)
HGB BLD-MCNC: 8.5 G/DL (ref 14–18)
IMM GRANULOCYTES # BLD AUTO: 0.11 K/UL (ref 0–0.04)
IMM GRANULOCYTES NFR BLD AUTO: 0.9 % (ref 0–0.5)
IMP GENE: NOT DETECTED
KLEBSIELLA AEROGENES: NOT DETECTED
KLEBSIELLA OXYTOCA: NOT DETECTED
KLEBSIELLA PNEUMONIAE GROUP: NOT DETECTED
KPC: NOT DETECTED
LISTERIA MONOCYTOGENES: NOT DETECTED
LYMPHOCYTES # BLD AUTO: 1 K/UL (ref 1–4.8)
LYMPHOCYTES NFR BLD: 8.4 % (ref 18–48)
MAGNESIUM SERPL-MCNC: 1.5 MG/DL (ref 1.6–2.6)
MCH RBC QN AUTO: 27.9 PG (ref 27–31)
MCHC RBC AUTO-ENTMCNC: 33.3 G/DL (ref 32–36)
MCR-1: ABNORMAL
MCV RBC AUTO: 84 FL (ref 82–98)
MEC A/C AND MREJ (MRSA): ABNORMAL
MEC A/C: ABNORMAL
MONOCYTES # BLD AUTO: 1.2 K/UL (ref 0.3–1)
MONOCYTES NFR BLD: 10 % (ref 4–15)
NDM: NOT DETECTED
NEISSERIA MENINGITIDIS: NOT DETECTED
NEUTROPHILS # BLD AUTO: 9.9 K/UL (ref 1.8–7.7)
NEUTROPHILS NFR BLD: 80.2 % (ref 38–73)
NRBC BLD-RTO: 0 /100 WBC
OXA-48-LIKE: ABNORMAL
PHOSPHATE SERPL-MCNC: 3.9 MG/DL (ref 2.7–4.5)
PLATELET # BLD AUTO: 230 K/UL (ref 150–450)
PMV BLD AUTO: 8 FL (ref 9.2–12.9)
POTASSIUM SERPL-SCNC: 3.7 MMOL/L (ref 3.5–5.1)
PROT SERPL-MCNC: 6.4 G/DL (ref 6–8.4)
PROTEUS SPECIES: NOT DETECTED
PSEUDOMONAS AERUGINOSA: DETECTED
RBC # BLD AUTO: 3.05 M/UL (ref 4.6–6.2)
SALMONELLA SP: NOT DETECTED
SERRATIA MARCESCENS: NOT DETECTED
SODIUM SERPL-SCNC: 133 MMOL/L (ref 136–145)
STAPHYLOCOCCUS AUREUS: NOT DETECTED
STAPHYLOCOCCUS EPIDERMIDIS: NOT DETECTED
STAPHYLOCOCCUS LUGDUNESIS: NOT DETECTED
STAPHYLOCOCCUS SPECIES: NOT DETECTED
STENOTROPHOMONAS MALTOPHILIA: NOT DETECTED
STREPTOCOCCUS AGALACTIAE: NOT DETECTED
STREPTOCOCCUS PNEUMONIAE: NOT DETECTED
STREPTOCOCCUS PYOGENES: NOT DETECTED
STREPTOCOCCUS SPECIES: NOT DETECTED
VAN A/B: ABNORMAL
VIM: NOT DETECTED
WBC # BLD AUTO: 12.32 K/UL (ref 3.9–12.7)

## 2023-02-03 PROCEDURE — 63600175 PHARM REV CODE 636 W HCPCS: Performed by: NURSE PRACTITIONER

## 2023-02-03 PROCEDURE — 63700000 PHARM REV CODE 250 ALT 637 W/O HCPCS: Performed by: NURSE PRACTITIONER

## 2023-02-03 PROCEDURE — 85025 COMPLETE CBC W/AUTO DIFF WBC: CPT | Performed by: NURSE PRACTITIONER

## 2023-02-03 PROCEDURE — 63600175 PHARM REV CODE 636 W HCPCS: Performed by: INTERNAL MEDICINE

## 2023-02-03 PROCEDURE — 25000003 PHARM REV CODE 250: Performed by: INTERNAL MEDICINE

## 2023-02-03 PROCEDURE — 83735 ASSAY OF MAGNESIUM: CPT | Performed by: NURSE PRACTITIONER

## 2023-02-03 PROCEDURE — 80053 COMPREHEN METABOLIC PANEL: CPT | Performed by: HOSPITALIST

## 2023-02-03 PROCEDURE — 84100 ASSAY OF PHOSPHORUS: CPT | Performed by: HOSPITALIST

## 2023-02-03 PROCEDURE — 97530 THERAPEUTIC ACTIVITIES: CPT | Mod: CQ

## 2023-02-03 PROCEDURE — 25000003 PHARM REV CODE 250: Performed by: HOSPITALIST

## 2023-02-03 PROCEDURE — 82962 GLUCOSE BLOOD TEST: CPT

## 2023-02-03 PROCEDURE — 97116 GAIT TRAINING THERAPY: CPT | Mod: CQ

## 2023-02-03 PROCEDURE — 25000003 PHARM REV CODE 250: Performed by: STUDENT IN AN ORGANIZED HEALTH CARE EDUCATION/TRAINING PROGRAM

## 2023-02-03 PROCEDURE — 36415 COLL VENOUS BLD VENIPUNCTURE: CPT | Performed by: STUDENT IN AN ORGANIZED HEALTH CARE EDUCATION/TRAINING PROGRAM

## 2023-02-03 PROCEDURE — 99233 SBSQ HOSP IP/OBS HIGH 50: CPT | Mod: ,,, | Performed by: STUDENT IN AN ORGANIZED HEALTH CARE EDUCATION/TRAINING PROGRAM

## 2023-02-03 PROCEDURE — 82533 TOTAL CORTISOL: CPT | Performed by: INTERNAL MEDICINE

## 2023-02-03 PROCEDURE — 25000003 PHARM REV CODE 250: Performed by: NURSE PRACTITIONER

## 2023-02-03 PROCEDURE — 63700000 PHARM REV CODE 250 ALT 637 W/O HCPCS: Performed by: INTERNAL MEDICINE

## 2023-02-03 PROCEDURE — 36415 COLL VENOUS BLD VENIPUNCTURE: CPT | Performed by: INTERNAL MEDICINE

## 2023-02-03 PROCEDURE — 87040 BLOOD CULTURE FOR BACTERIA: CPT | Performed by: STUDENT IN AN ORGANIZED HEALTH CARE EDUCATION/TRAINING PROGRAM

## 2023-02-03 PROCEDURE — 87040 BLOOD CULTURE FOR BACTERIA: CPT | Mod: 59 | Performed by: INTERNAL MEDICINE

## 2023-02-03 PROCEDURE — 99233 PR SUBSEQUENT HOSPITAL CARE,LEVL III: ICD-10-PCS | Mod: ,,, | Performed by: STUDENT IN AN ORGANIZED HEALTH CARE EDUCATION/TRAINING PROGRAM

## 2023-02-03 PROCEDURE — 12000002 HC ACUTE/MED SURGE SEMI-PRIVATE ROOM

## 2023-02-03 RX ORDER — CEFEPIME HYDROCHLORIDE 1 G/50ML
2 INJECTION, SOLUTION INTRAVENOUS
Status: CANCELLED | OUTPATIENT
Start: 2023-02-03

## 2023-02-03 RX ORDER — MAGNESIUM SULFATE HEPTAHYDRATE 40 MG/ML
4 INJECTION, SOLUTION INTRAVENOUS ONCE
Status: COMPLETED | OUTPATIENT
Start: 2023-02-03 | End: 2023-02-03

## 2023-02-03 RX ADMIN — MIDODRINE HYDROCHLORIDE 10 MG: 2.5 TABLET ORAL at 02:02

## 2023-02-03 RX ADMIN — SODIUM BICARBONATE 650 MG TABLET 650 MG: at 09:02

## 2023-02-03 RX ADMIN — MIRTAZAPINE 15 MG: 15 TABLET, FILM COATED ORAL at 08:02

## 2023-02-03 RX ADMIN — SODIUM BICARBONATE 650 MG TABLET 650 MG: at 03:02

## 2023-02-03 RX ADMIN — MAGNESIUM 64 MG (MAGNESIUM CHLORIDE) TABLET,DELAYED RELEASE 128 MG: at 09:02

## 2023-02-03 RX ADMIN — ESCITALOPRAM OXALATE 20 MG: 10 TABLET ORAL at 09:02

## 2023-02-03 RX ADMIN — FLUTICASONE PROPIONATE 100 MCG: 50 SPRAY, METERED NASAL at 09:02

## 2023-02-03 RX ADMIN — ENOXAPARIN SODIUM 40 MG: 100 INJECTION SUBCUTANEOUS at 04:02

## 2023-02-03 RX ADMIN — SODIUM CHLORIDE TAB 1 GM 1 G: 1 TAB at 09:02

## 2023-02-03 RX ADMIN — LEVETIRACETAM 1500 MG: 500 TABLET, FILM COATED ORAL at 08:02

## 2023-02-03 RX ADMIN — CEFEPIME HYDROCHLORIDE 2 G: 2 INJECTION, SOLUTION INTRAVENOUS at 08:02

## 2023-02-03 RX ADMIN — MIDODRINE HYDROCHLORIDE 10 MG: 2.5 TABLET ORAL at 09:02

## 2023-02-03 RX ADMIN — EMTRICITABINE AND TENOFOVIR DISOPROXIL FUMARATE 1 TABLET: 200; 300 TABLET, FILM COATED ORAL at 09:02

## 2023-02-03 RX ADMIN — CEFEPIME HYDROCHLORIDE 2 G: 2 INJECTION, SOLUTION INTRAVENOUS at 04:02

## 2023-02-03 RX ADMIN — MIDODRINE HYDROCHLORIDE 10 MG: 2.5 TABLET ORAL at 04:02

## 2023-02-03 RX ADMIN — VANCOMYCIN HYDROCHLORIDE 1250 MG: 1.25 INJECTION, POWDER, LYOPHILIZED, FOR SOLUTION INTRAVENOUS at 01:02

## 2023-02-03 RX ADMIN — SODIUM BICARBONATE 650 MG TABLET 650 MG: at 08:02

## 2023-02-03 RX ADMIN — PANTOPRAZOLE SODIUM 40 MG: 40 TABLET, DELAYED RELEASE ORAL at 06:02

## 2023-02-03 RX ADMIN — FOLIC ACID 1 MG: 1 TABLET ORAL at 09:02

## 2023-02-03 RX ADMIN — THERA TABS 1 TABLET: TAB at 09:02

## 2023-02-03 RX ADMIN — MEGESTROL ACETATE 40 MG: 20 TABLET ORAL at 09:02

## 2023-02-03 RX ADMIN — THIAMINE HCL TAB 100 MG 100 MG: 100 TAB at 09:02

## 2023-02-03 RX ADMIN — LEVETIRACETAM 1500 MG: 500 TABLET, FILM COATED ORAL at 09:02

## 2023-02-03 RX ADMIN — FERROUS SULFATE TAB 325 MG (65 MG ELEMENTAL FE) 1 EACH: 325 (65 FE) TAB at 09:02

## 2023-02-03 RX ADMIN — DARUNAVIR ETHANOLATE AND COBICISTAT 1 TABLET: 800; 150 TABLET, FILM COATED ORAL at 09:02

## 2023-02-03 RX ADMIN — MAGNESIUM SULFATE 4 G: 2 INJECTION INTRAVENOUS at 09:02

## 2023-02-03 RX ADMIN — SODIUM CHLORIDE TAB 1 GM 1 G: 1 TAB at 08:02

## 2023-02-03 RX ADMIN — INSULIN DETEMIR 10 UNITS: 100 INJECTION, SOLUTION SUBCUTANEOUS at 09:02

## 2023-02-03 RX ADMIN — FLUCONAZOLE 800 MG: 100 TABLET ORAL at 09:02

## 2023-02-03 RX ADMIN — SULFAMETHOXAZOLE AND TRIMETHOPRIM 1 TABLET: 800; 160 TABLET ORAL at 09:02

## 2023-02-03 NOTE — PROGRESS NOTES
Progress Note  Infectious Disease    Reason for Consult:  leukocytosis    HPI: El Abreu is a 27 y.o. 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  he was last admitted to [facility] November 30th with vomiting and headache, increased intracranial pressure.  This LP had a positive Sirisha ink opening pressure was 23.  He was then committed to a 6 week course of AmBisome completed 1/10, which was interrupted with AC but resumed and he was discharged to [facility] on January 10th.  He did require an additional LP for fluid removal and reduction of pressure on 12/13 (Sirisha ink was still positive but the culture was negative.  The last positive culture was 11/21).  He has been continued on fluconazole 800 mg daily since 01/11 and has been receiving Pneumocystis prophylaxis and appetite stimulants and antidepressants.  MRI brain on 01/01 showed stability of the right globus pallidus cryptococcal pseudocyst.  He was additionally treated for neurosyphilis 3 doses of Bicillin after an appropriate course of IV penicillin G.  He has not been eating well, withdrawn, seen by Psychiatry late December and started on risperidone.  He was seen in the [facility] emergency room 1/14 when his mandible became stuck open.  This was reduced in their emergency room he was sent back to the [facility].  He was sent to our emergency room yesterday for severe hypomagnesemia, 0.8 While in the emergency room he was noted to have a rising white blood cell count, from normal on 01/08 to 14 on 01/09 to 17 on 01/16 additionally his hemoglobin was 7.3.  An infectious workup was commenced and CRP was 2.0, lactic acid 0.9, procalcitonin 0.15.  Chest x-ray was negative, blood cultures are negative, respiratory PCR is not yet been  collected and he is not produce any sputum but this is not likely relevant..vanc and cefepime were begun.   Unfortunately our hospital is full and he has been waiting in the hallway outside the emergency room.  He is incontinent of urine, and urinalysis was negative.  Magnesium is now 1.7    He feels much improved, tells me that he ate breakfast and lunch. He does endorse some small amount of yellow sputum and he does find that his sinuses are congested. It is relevant to note that he had pansinusitis on MRI brain 1/1.     1/18: interim reviewed. He is eating well. He had a syncopal episode while working with PT. He states that this has happened before, but does not happen consistently when he gets up. He is still congested but not really expectorating. Sputum sample was not purulent(saliva) and culture has only normal burak. He is not receiving his HIV antivirals here(not on formulary) though these were available [facility]. Repeated CXR and it is still clear.   1/20:  interim reviewed. WBC a little higher , peripheral smear noted but unsure of the relevance of the findings.  He is sleeping deeply, worked with physical therapy much more successfully today he is eating reasonably well and antivirals were resumed.  He endorses that his cough is improved.  Denies diarrhea. Blood culture from 1/16, has coag neg Staph in 1/4 bottles drawn from picc line. The picc line is 47 days old, placed 12/4.      1/21-22 (Deep): interim reviewed, discussed with Dr Ny, patient seen and examined at bedside.  States he vomited earlier this morning, he was feeling very nauseous.  He denies headaches, no fever or chills.  Slightly hypertensive, afebrile.  Adequate urinary output, 1 episode of stool recorded in the last 24 hours.  Labs reviewed, persistent leukocytosis 20.9, lymphocytic predominance 10%, no left shift, bands 10%, H&H 7.5/23.5, platelet count 248.  Stable kidney function, magnesium 1.2.  Micro reviewed, blood cultures  from 01/20 no growth to date, pending final.  Tip culture from PICC which was removed 1/20 no growth.  Respiratory viral panel not detected.    1/23:  Interim reviewed, patient seen examined at bedside, states he is feeling a little stronger today, was able to walk around with physical therapy, states he had 2-3/10 frontal headache this morning that is now improved.  Hemodynamically stable, afebrile.  Labs reviewed, leukocytosis down to 15.9, lymphocytic predominance 12%, bands 2%, H&H 7.5/32.4, platelet count 312.  Magnesium 1.4, stable potassium, and kidney function.  CD4 count and viral load in process.    1/24:  Interim reviewed, patient seen examined at bedside, states he is feeling much better today, walk with PT, encouraged to sit in the chair.  Hemodynamically stable, afebrile.  White count down to 13.2, no left shift, H&H 7.3/22.2, platelet count 351, magnesium 1.6.  CD4 122, viral load still in process.    1/25:  Interim reviewed, patient seen examined at bedside.  States he is feeling good today, trying his best to cooperate with physical therapy, encouraged to get out of bed.  Hemodynamically stable, afebrile.  Labs reviewed, white count 13, no left shift, H&H 7.6/23.8, platelet count 334.  Stable kidney function.  Viral load 20 copies, almost undetectable.    1/26:  Interim reviewed, patient seen examined at bedside, states he threw up at noon, asking for food.  States he is feeling better, encouraged to get out of bed.  Physical therapy still recommending skilled nursing facilities, case management trying to find a place for him to go.  Labs reviewed, white count improved, stable kidney function, magnesium 1.7, improved.    1/27: interim reviewed, patient seen and examined at bedside. Patient had hypotension this morning, now improved. Seen by Psych, recommendations noted. EEG reviewed, no abnormalities.     ID CALLED BACK FOR LEUKOCYTOSIS:  1/31: Interim reviewed, he received 1 UI PRBC yesterday,  blood cultures x 1 sent this morning.   Patient seen and examined at bedside, remains orthostatic, was able to work with PT, on midodrine. Hemodynamically stable, afebrile. Labs reviewed, leukocytosis 13, left shift PMN: 81%, H/H 7.8/24.9, plt 305. Stable kidney function, hyponatremia 131.     2/1:  Interim reviewed, patient seen examined at bedside.  No acute complaints, awaiting LTAC authorization, as per case management notes, will [facility].  Labs reviewed, stable, likely reactive thrombocytosis yesterday from blood transfusion.    2/2:  Interim reviewed, patient seen examined at bedside, febrile 102.5, tachycardic, diaphoretic, complaining of chills.  He is also complaining of productive cough, will send respiratory culture.  Chest x-ray clear.  CT chest with right upper paratracheal adenopathy 2.8 x 1.5 cm.  No additional mediastinal, hilar or axillary adenopathy.  Right upper paratracheal adenopathy most likely secondary to infectious or inflammatory process.  IV access which was 12-day-old, removed today.  Blood cultures I urine culture in process.  Empirically started on vancomycin/cefepime IV.    2/3:  Interim reviewed, patient seen examined at bedside.  Hemodynamically stable, afebrile in the last 24 hours.  Blood cultures from yesterday 1/2 bottles grew Pseudomonas aeruginosa, sensitivities pending.  Likely from old IV access which was removed yesterday after 12 days.  Labs reviewed, white count 12, left shift 80.2%, H&H 8.5/25.5, platelet count 230.  Stable kidney function, magnesium 1.5, albumin 2.8.  Normal liver function.    EXAM & DIAGNOSTICS REVIEWED:   Vitals:     Temp:  [98 °F (36.7 °C)-102.5 °F (39.2 °C)]   Temp: 98 °F (36.7 °C) (02/03/23 0525)  Pulse: 92 (02/03/23 0525)  Resp: 18 (02/03/23 0525)  BP: 112/64 (02/03/23 0525)  SpO2: 98 % (02/03/23 0525)    Intake/Output Summary (Last 24 hours) at 2/3/2023 0916  Last data filed at 2/3/2023 0005  Gross per 24 hour   Intake 2929.03 ml   Output  1200 ml   Net 1729.03 ml         General:  Very thin, in NAD, cooperative, comfortable on room air  Eyes:  Anicteric, EOMI -  iris heterochromia/ post surgical L eye   ENT:  Dry oral mucosa, no thrush  Neck:  supple  Lungs: Clear to auscultation   Heart:  RRR, no gallop/murmur/rub noted  Abd:  Soft, NT, ND, normal BS, no masses or organomegaly appreciated.  :  Voids , no flank tenderness  Musc:  Joints without effusion, swelling, erythema, synovitis, with mild generalized muscle wasting.   Skin:  No rashes. Dry skin.  Neuro:             Arousable, speech fluent, face symmetric, moves all extremities, no focal weakness.   Psych:  Calm, cooperative  Lymphatic:        Extrem: No edema, erythema, phlebitis, cellulitis, warm and well perfused  VAD:  Peripheral IV access R arm 2/2  Isolation:  none  Wound: none      General Labs reviewed:  Recent Labs   Lab 02/02/23 0447 02/02/23  1650 02/03/23  0505   WBC 11.42 15.62* 12.32   HGB 7.9* 7.1* 8.5*   HCT 23.7* 21.5* 25.5*    248 230       Recent Labs   Lab 02/02/23  0447 02/02/23  1650 02/03/23  0505   * 132* 133*   K 3.6 3.1* 3.7    104 106   CO2 21* 20* 18*   BUN 14 16 17   CREATININE 1.1 1.2 1.1   CALCIUM 10.1 9.4 9.4   PROT 6.6 6.2 6.4   BILITOT 0.3 0.3 0.5   ALKPHOS 69 71 66   ALT 24 27 29   AST 25 27 26     No results for input(s): CRP in the last 168 hours.    Estimated Creatinine Clearance: 97.9 mL/min (based on SCr of 1.1 mg/dL).    Micro:  Microbiology Results (last 7 days)       Procedure Component Value Units Date/Time    Blood culture [[id number]]     Order Status: Sent Specimen: Blood     Blood culture [[id number]]     Order Status: Sent Specimen: Blood     Urine Culture High Risk [[id number]] Collected: 02/02/23 0931    Order Status: Completed Specimen: Urine, Clean Catch Updated: 02/03/23 0734     Urine Culture, Routine No growth to date    Narrative:      Indicated criteria for high risk culture:->Neutropenic  patient    Rapid Organism ID  by PCR (from Blood culture) [[id number]]  (Abnormal) Collected: 02/02/23 1001    Order Status: Completed Updated: 02/03/23 0635     Enterococcus faecials Not Detected     Enterococcus faecium Not Detected     Listeria Monocytogenes Not Detected     Staphylococcus spp. Not Detected     Staphylococcus aureus Not Detected     Staphylococcus epidermidis Not Detected     Staphylococcus lugdunensis Not Detected     Streptococcus species Not Detected     Streptococcus agalactiae Not Detected     Streptococcus pneumoniae Not Detected     Streptococcus pyogenes Not Detected     Acinetobacter calcoaceticus/baumannii complex Not Detected     Bacteroides fragilis Not Detected     Enterobacerales Not Detected     Enterobacter cloacae complex Not Detected     Escherichia Not Detected     Klebsiella aerogenes Not Detected     Klebsiella oxytoca Not Detected     Klebsiella pneumoniae group Not Detected     Proteus Not Detected     Salmonella sp Not Detected     Serratia marcescens Not Detected     Haemophilus influenzae Not Detected     Neisseria meningtidis Not Detected     Pseudomonas aeruginosa Detected     Stenotrophomonas maltophilia Not Detected     Candida albicans Not Detected     Candida auris Not Detected     Candida glabrata Not Detected     Candida krusei Not Detected     Candida Parapsilosis Not Detected     Candida Tropicalis Not Detected     Cryptococcus neoformans/gattii Not Detected     CTX-M Gene Not Detected     IMP Gene Not Detected     KPC  Not Detected     mcr-1  Test not applicable     mec A/C Test not applicable     mec A/C and MREJ (MRSA) Test not applicable     NDM Not Detected     OXA-48-like Test not applicable     van A/B Test not applicable     VIM Not Detected    Blood culture [[id number]] Collected: 02/02/23 1001    Order Status: Completed Specimen: Blood Updated: 02/03/23 0518     Blood Culture, Routine Gram stain aer bottle: Gram negative rods      Results called to and read back by: Kaitlynn Farah  RN on 1200 wing      02/03/2023  05:18 KS3    Culture, Respiratory with Gram Stain [[id number]]     Order Status: No result Specimen: Respiratory     Blood culture [[id number]] Collected: 01/31/23 0919    Order Status: Completed Specimen: Blood Updated: 02/02/23 1032     Blood Culture, Routine No Growth to date      No Growth to date      No Growth to date            Imaging Reviewed:   CXRs   CT chest with right upper paratracheal adenopathy most likely secondary to infectious or inflammatory process.    MRI brain 1/1    Cardiology:    IMPRESSION & PLAN     Pseudomonas aeruginosa bacteremia (1/2 bottles) likely secondary to old IV access, removed 2/2    2.  Hypomagnesemia, improved - last Mg 1.5   Electrolyte abnormalities likely from ampho B,it has a long half life    3. HIV/AIDS C3 - Cryptococcal meningitis, now controlled, s/p 6 weeks of ambisome and continues on high dose oral fluconazole   Cryptococcoma, stable   CD4 11/5 7 - VL 48,127 - repeat CD4 127 and VL 20 copies, almost undetectable   On Prezcobix/Descovy   PPD 1/14 negative    4. Cachexia with failure to thrive   On supplements     5. Pansinusitis, s/p Augmentin, improved  CRP 2.0    Recommendations:  Repeat blood cultures x 2 sets   Stop Vancomycin IV   Follow cultures   Continue Cefepime 2g IV q8h for Pseudomonas aeruginosa  Monitor and replace electrolytes  Continue Diflucan 800 mg per day for Crypto will need at least 12 months of therapy along with ART   Bactrim DS 1 tab daily, CD4 <200 for PJP   Continue Prezcobix/Descovy daily   Optimizing nutrition   PT/OT as tolerated  CM working at LTAC    Dr Schwab will be available over the weekend if needed     D/w patient, Dr Medina     Medical Decision Making during this encounter was  [_] Low Complexity  [_] Moderate Complexity  [xxx  ] High Complexity

## 2023-02-03 NOTE — PLAN OF CARE
02/03/23 1405   Discharge Reassessment   Assessment Type Discharge Planning Reassessment   Did the patient's condition or plan change since previous assessment? No   Discharge Plan discussed with: Patient   Communicated ZULMA with patient/caregiver Date not available/Unable to determine   Discharge Plan A Rehab   Discharge Plan B Long-term acute care facility (LTAC)   DME Needed Upon Discharge  none   Why the patient remains in the hospital Requires continued medical care     [facility] and [facility] are both reviewing this Pt.  Pt not medically clear for discharge.

## 2023-02-03 NOTE — PROGRESS NOTES
[facility] Medicine  Progress Note    Patient name: El Abreu  MRN: [id number]  Admit Date: 1/16/2023   LOS: 15 days     SUBJECTIVE:     Principal problem: AIDS    Interval History:  Spiked fever 102.5, more lethargic today, more diaphoretic today.  Concern for sepsis.  More anemic, order for 1 unit of blood.  Very orthostatic.  Started on vancomycin and cefepime.      Scheduled Meds:   ceFEPime (MAXIPIME) IVPB  2 g Intravenous Q8H    darunavir-cobicistat  1 tablet Oral Daily    emtricitabine-tenofovir 200-300 mg  1 tablet Oral Daily    enoxaparin  40 mg Subcutaneous Daily    EScitalopram oxalate  20 mg Oral Daily    ferrous sulfate  1 tablet Oral Daily    fluconazole  800 mg Oral Daily    fluticasone propionate  2 spray Each Nostril BID    folic acid  1 mg Oral Daily    insulin detemir U-100  10 Units Subcutaneous BID    levETIRAcetam  1,500 mg Oral BID    magnesium chloride  128 mg Oral Daily    megestroL  40 mg Oral Daily    midodrine  10 mg Oral TID WM    mirtazapine  15 mg Oral QHS    multivitamin  1 tablet Oral Daily    pantoprazole  40 mg Oral Before breakfast    potassium chloride  40 mEq Oral Q4H    sodium bicarbonate  650 mg Oral TID    sodium chloride  1 g Oral BID    sulfamethoxazole-trimethoprim 800-160mg  1 tablet Oral Daily    thiamine  100 mg Oral Daily    [START ON 2/3/2023] vancomycin (VANCOCIN) IVPB  1,250 mg Intravenous Q12H     Continuous Infusions:   lactated ringers 100 mL/hr at 02/01/23 1618       PRN Meds:sodium chloride, sodium chloride, acetaminophen, dextrose 10%, dextrose 10%, glucagon (human recombinant), glucose, glucose, HYDROcodone-acetaminophen, HYDROcodone-acetaminophen, insulin aspart U-100, lorazepam, magnesium sulfate IVPB, magnesium sulfate IVPB, melatonin, melatonin, naloxone, ondansetron, polyethylene glycol, potassium bicarbonate, potassium bicarbonate, potassium bicarbonate, prochlorperazine, simethicone, sodium chloride 0.9%, Pharmacy to dose  Vancomycin consult **AND** vancomycin - pharmacy to dose    Review of patient's allergies indicates:  No Known Allergies    Review of Systems: As per interval history    OBJECTIVE:     Vital Signs (Most Recent)  Temp: 99.2 °F (37.3 °C) (02/02/23 1600)  Pulse: 97 (02/02/23 1525)  Resp: 16 (02/02/23 1525)  BP: (!) 97/56 (02/02/23 1525)  SpO2: 99 % (02/02/23 1525)    Vital Signs Range (Last 24H):  Temp:  [97.8 °F (36.6 °C)-102.5 °F (39.2 °C)]   Pulse:  []   Resp:  [15-18]   BP: ()/(54-75)   SpO2:  [98 %-100 %]     I & O (Last 24H):  Intake/Output Summary (Last 24 hours) at 2/2/2023 1926  Last data filed at 2/2/2023 1732  Gross per 24 hour   Intake 2626.95 ml   Output 2150 ml   Net 476.95 ml           Physical Exam:  General:  Chronically ill-appearing gentleman, cachectic  Eyes: No conjunctival injection. No scleral icterus.  ENT: Hearing grossly intact. No discharge from ears. No nasal discharge.   Neck: Supple, trachea midline. No JVD  CVS: RRR. No LE edema BL  Lungs:  No tachypnea or accessory muscle use.  Clear to auscultation bilaterally  Abdomen:  Soft, nontender and nondistended.  No organomegaly  Neuro: AOx3. Moves all extremities. Follows commands. Responds appropriately       Laboratory:  I have reviewed all pertinent lab results within the past 24 hours.    Diagnostic Results:  Reviewed all imaging    ASSESSMENT/PLAN:     27-year-old very unfortunate gentleman who recently had very prolonged hospital stay due to cryptococcal meningitis requiring serial lumbar punctures, neuro syphilis and hospital stay was complicated by AC, GI bleed, malnutrition and severe deconditioning and was transition to LTAC.  He was sent to our emergency room by LTAC due to critical hypomagnesemia and anemia causing severe lethargy and unable to stand up.  Remains profoundly orthostatic    Active Hospital Problems    Diagnosis  POA    *AIDS [B20]  Yes    Fever [R50.9]  No    Weakness [R53.1]  Yes    Physical  deconditioning [R53.81]  Yes    Paroxysmal atrial fibrillation [I48.0]  Yes    Anemia [D64.9]  Yes    Hypokalemia [E87.6]  Yes    Hypomagnesemia [E83.42]  Yes    Seizures complicating infection [B99.9, R56.9]  Yes    Cryptococcal meningitis [B45.1]  Yes    Type 2 diabetes mellitus without retinopathy [E11.9]  Yes      Resolved Hospital Problems   No resolved problems to display.         Plan:   Very unfortunate case of AIDS with recent prolonged hospital stay for cryptococcal meningitis and neurosyphilis came with severe lethargy, anemia and critical hypomagnesemia  Anti-retroviral, antifungal and antibiotics as per Infectious Disease.  Remarkable improvement in CD4 count and viral load  One blood culture positive for coag-negative staph, PICC line was removed and sent for tissue culture. Noted discontinuation of daptomycin and increasing dose of Bactrim by Infectious Disease  Follow-up on repeat cultures  Remains on Bactrim for PJP prophylaxis and Augmentin for sinusitis  On Diflucan for recent cryptococcal meningitis  Aggressive p.o. nutrition, improve pulmonary toilet  Out of bed to chair  PT, OT  2/2:  Spiked fever 102.5, started on broad-spectrum antibiotics and cultured.  For 1 unit of blood. Right upper paratracheal adenopathy on CT chest, consider EUS-guided FNA  Avoid antihypertensives, aggressive electrolyte replacement  Frequent orthostatic vital signs, on midodrine  Check cortisol level      VTE Risk Mitigation (From admission, onward)           Ordered     Place CARMEN hose  Until discontinued         01/18/23 1830     enoxaparin injection 40 mg  Daily         01/16/23 210                        Department Hospital Medicine  [facility]  Benjamín Medina MD  Date of service: 02/02/2023

## 2023-02-03 NOTE — PT/OT/SLP PROGRESS
Physical Therapy Treatment    Patient Name:  El Abreu   MRN:  [id number]    Recommendations:     Discharge Recommendations: rehabilitation facility  Discharge Equipment Recommendations: bedside commode  Barriers to discharge:  increased assist with mobility    Assessment:     El Abreu is a 27 y.o. male admitted with a medical diagnosis of AIDS.  He presents with the following impairments/functional limitations: weakness, impaired endurance, impaired self care skills, impaired functional mobility, gait instability, impaired balance, decreased lower extremity function, impaired cardiopulmonary response to activity.    BP monitored throughout session.  HOB elevated: 109/65 HR 92  Sittin/73   Standin/65   Post ambulation in standin/61   Post ambulation in sittin/63     Pt agreeable to visit. Pt with no reports of dizziness this session. Pt with improvement in activity tolerance and BP this session. Pt's left lower arm noted to be swollen and warm were old IV access was. Pt reports tender to touch. Pt ambulated 60' x 2 with RW and min assist greatest during turns for balance and RW management.     Rehab Prognosis: Fair; patient would benefit from acute skilled PT services to address these deficits and reach maximum level of function.    Recent Surgery: * No surgery found *      Plan:     During this hospitalization, patient to be seen 5 x/week to address the identified rehab impairments via gait training, therapeutic activities, therapeutic exercises, neuromuscular re-education and progress toward the following goals:    Plan of Care Expires:  23    Subjective     Chief Complaint: left lower arm tender to touch  Patient/Family Comments/goals: to go home  Pain/Comfort:  Pain Rating 1: 0/10      Objective:     Communicated with RN prior to session.  Patient found HOB elevated with peripheral IV, telemetry, bed alarm upon PT entry to room.     General  Precautions: Standard, fall  Orthopedic Precautions: N/A  Braces: N/A  Respiratory Status: Room air     Functional Mobility:  Bed Mobility:     Supine to Sit: stand by assistance  Sit to Supine: stand by assistance  Transfers:     Sit to Stand:  minimum assistance with rolling walker  Gait: 2 x 60' with RW and min assist      AM-PAC 6 CLICK MOBILITY          Treatment & Education:  Pt educated on importance of time OOB, importance of intermittent mobility, safe techniques for transfers/ambulation, discharge recommendations/options, and use of call light for assistance and fall prevention.      Patient left HOB elevated with all lines intact, call button in reach, bed alarm on, and RN notified..    GOALS:   Multidisciplinary Problems       Physical Therapy Goals          Problem: Physical Therapy    Goal Priority Disciplines Outcome Goal Variances Interventions   Physical Therapy Goal     PT, PT/OT Ongoing, Progressing     Description: Goals to be met by: 23     Patient will increase functional independence with mobility by performin. Supine to sit with Supervision  2. Sit to stand transfer with Supervision  3. Bed to chair transfer with Supervision using Rolling Walker  4. Gait  x 150 feet with Supervision using Rolling Walker.                              Time Tracking:     PT Received On: 23  PT Start Time: 1434     PT Stop Time: 1458  PT Total Time (min): 24 min     Billable Minutes: Gait Training 10 and Therapeutic Activity 14    Treatment Type: Treatment  PT/PTA: PTA     PTA Visit Number: 3     2023

## 2023-02-04 LAB
ALBUMIN SERPL BCP-MCNC: 2.7 G/DL (ref 3.5–5.2)
ALP SERPL-CCNC: 68 U/L (ref 55–135)
ALT SERPL W/O P-5'-P-CCNC: 29 U/L (ref 10–44)
ANION GAP SERPL CALC-SCNC: 9 MMOL/L (ref 8–16)
AST SERPL-CCNC: 26 U/L (ref 10–40)
BASOPHILS # BLD AUTO: 0.04 K/UL (ref 0–0.2)
BASOPHILS NFR BLD: 0.3 % (ref 0–1.9)
BILIRUB SERPL-MCNC: 0.4 MG/DL (ref 0.1–1)
BUN SERPL-MCNC: 15 MG/DL (ref 6–20)
CALCIUM SERPL-MCNC: 9.7 MG/DL (ref 8.7–10.5)
CHLORIDE SERPL-SCNC: 107 MMOL/L (ref 95–110)
CO2 SERPL-SCNC: 18 MMOL/L (ref 23–29)
CORTIS SERPL-MCNC: 16.3 UG/DL
CORTIS SERPL-MCNC: 17.8 UG/DL
CORTIS SERPL-MCNC: 6 UG/DL
CORTIS SERPL-MCNC: 6.2 UG/DL
CREAT SERPL-MCNC: 0.9 MG/DL (ref 0.5–1.4)
DIFFERENTIAL METHOD: ABNORMAL
EOSINOPHIL # BLD AUTO: 0.1 K/UL (ref 0–0.5)
EOSINOPHIL NFR BLD: 1 % (ref 0–8)
ERYTHROCYTE [DISTWIDTH] IN BLOOD BY AUTOMATED COUNT: 14.5 % (ref 11.5–14.5)
EST. GFR  (NO RACE VARIABLE): >60 ML/MIN/1.73 M^2
GLUCOSE SERPL-MCNC: 115 MG/DL (ref 70–110)
GLUCOSE SERPL-MCNC: 126 MG/DL (ref 70–110)
GLUCOSE SERPL-MCNC: 152 MG/DL (ref 70–110)
GLUCOSE SERPL-MCNC: 73 MG/DL (ref 70–110)
GLUCOSE SERPL-MCNC: 75 MG/DL (ref 70–110)
HCT VFR BLD AUTO: 24.8 % (ref 40–54)
HGB BLD-MCNC: 8.2 G/DL (ref 14–18)
IMM GRANULOCYTES # BLD AUTO: 0.12 K/UL (ref 0–0.04)
IMM GRANULOCYTES NFR BLD AUTO: 1 % (ref 0–0.5)
LYMPHOCYTES # BLD AUTO: 1.7 K/UL (ref 1–4.8)
LYMPHOCYTES NFR BLD: 14.1 % (ref 18–48)
MAGNESIUM SERPL-MCNC: 1.6 MG/DL (ref 1.6–2.6)
MCH RBC QN AUTO: 27.8 PG (ref 27–31)
MCHC RBC AUTO-ENTMCNC: 33.1 G/DL (ref 32–36)
MCV RBC AUTO: 84 FL (ref 82–98)
MONOCYTES # BLD AUTO: 1.5 K/UL (ref 0.3–1)
MONOCYTES NFR BLD: 12.5 % (ref 4–15)
NEUTROPHILS # BLD AUTO: 8.4 K/UL (ref 1.8–7.7)
NEUTROPHILS NFR BLD: 71.1 % (ref 38–73)
NRBC BLD-RTO: 0 /100 WBC
OB PNL STL: POSITIVE
PHOSPHATE SERPL-MCNC: 3.5 MG/DL (ref 2.7–4.5)
PLATELET # BLD AUTO: 274 K/UL (ref 150–450)
PMV BLD AUTO: 8.2 FL (ref 9.2–12.9)
POTASSIUM SERPL-SCNC: 3.6 MMOL/L (ref 3.5–5.1)
PROT SERPL-MCNC: 6.2 G/DL (ref 6–8.4)
RBC # BLD AUTO: 2.95 M/UL (ref 4.6–6.2)
SODIUM SERPL-SCNC: 134 MMOL/L (ref 136–145)
WBC # BLD AUTO: 11.79 K/UL (ref 3.9–12.7)

## 2023-02-04 PROCEDURE — 85025 COMPLETE CBC W/AUTO DIFF WBC: CPT | Performed by: NURSE PRACTITIONER

## 2023-02-04 PROCEDURE — 82533 TOTAL CORTISOL: CPT | Performed by: INTERNAL MEDICINE

## 2023-02-04 PROCEDURE — 25000003 PHARM REV CODE 250: Performed by: STUDENT IN AN ORGANIZED HEALTH CARE EDUCATION/TRAINING PROGRAM

## 2023-02-04 PROCEDURE — 25000003 PHARM REV CODE 250: Performed by: HOSPITALIST

## 2023-02-04 PROCEDURE — 80053 COMPREHEN METABOLIC PANEL: CPT | Performed by: HOSPITALIST

## 2023-02-04 PROCEDURE — 36415 COLL VENOUS BLD VENIPUNCTURE: CPT | Performed by: NURSE PRACTITIONER

## 2023-02-04 PROCEDURE — 12000002 HC ACUTE/MED SURGE SEMI-PRIVATE ROOM

## 2023-02-04 PROCEDURE — 82272 OCCULT BLD FECES 1-3 TESTS: CPT | Performed by: INTERNAL MEDICINE

## 2023-02-04 PROCEDURE — 63600175 PHARM REV CODE 636 W HCPCS: Performed by: INTERNAL MEDICINE

## 2023-02-04 PROCEDURE — 63600175 PHARM REV CODE 636 W HCPCS: Performed by: NURSE PRACTITIONER

## 2023-02-04 PROCEDURE — 25000003 PHARM REV CODE 250: Performed by: INTERNAL MEDICINE

## 2023-02-04 PROCEDURE — 36415 COLL VENOUS BLD VENIPUNCTURE: CPT | Performed by: INTERNAL MEDICINE

## 2023-02-04 PROCEDURE — 63700000 PHARM REV CODE 250 ALT 637 W/O HCPCS: Performed by: INTERNAL MEDICINE

## 2023-02-04 PROCEDURE — 25000003 PHARM REV CODE 250: Performed by: NURSE PRACTITIONER

## 2023-02-04 PROCEDURE — 83735 ASSAY OF MAGNESIUM: CPT | Performed by: NURSE PRACTITIONER

## 2023-02-04 PROCEDURE — 63700000 PHARM REV CODE 250 ALT 637 W/O HCPCS: Performed by: NURSE PRACTITIONER

## 2023-02-04 PROCEDURE — 84100 ASSAY OF PHOSPHORUS: CPT | Performed by: HOSPITALIST

## 2023-02-04 RX ORDER — COSYNTROPIN 0.25 MG/ML
0.25 INJECTION, POWDER, FOR SOLUTION INTRAMUSCULAR; INTRAVENOUS ONCE
Status: COMPLETED | OUTPATIENT
Start: 2023-02-04 | End: 2023-02-04

## 2023-02-04 RX ADMIN — SODIUM BICARBONATE 650 MG TABLET 650 MG: at 09:02

## 2023-02-04 RX ADMIN — FLUCONAZOLE 800 MG: 100 TABLET ORAL at 09:02

## 2023-02-04 RX ADMIN — MIRTAZAPINE 15 MG: 15 TABLET, FILM COATED ORAL at 08:02

## 2023-02-04 RX ADMIN — MIDODRINE HYDROCHLORIDE 10 MG: 2.5 TABLET ORAL at 09:02

## 2023-02-04 RX ADMIN — CEFEPIME HYDROCHLORIDE 2 G: 2 INJECTION, SOLUTION INTRAVENOUS at 11:02

## 2023-02-04 RX ADMIN — SODIUM BICARBONATE 650 MG TABLET 650 MG: at 05:02

## 2023-02-04 RX ADMIN — LEVETIRACETAM 1500 MG: 500 TABLET, FILM COATED ORAL at 08:02

## 2023-02-04 RX ADMIN — FOLIC ACID 1 MG: 1 TABLET ORAL at 09:02

## 2023-02-04 RX ADMIN — COSYNTROPIN 0.25 MG: 0.25 INJECTION, POWDER, LYOPHILIZED, FOR SOLUTION INTRAVENOUS at 09:02

## 2023-02-04 RX ADMIN — INSULIN DETEMIR 10 UNITS: 100 INJECTION, SOLUTION SUBCUTANEOUS at 08:02

## 2023-02-04 RX ADMIN — ENOXAPARIN SODIUM 40 MG: 100 INJECTION SUBCUTANEOUS at 05:02

## 2023-02-04 RX ADMIN — SULFAMETHOXAZOLE AND TRIMETHOPRIM 1 TABLET: 800; 160 TABLET ORAL at 09:02

## 2023-02-04 RX ADMIN — SODIUM CHLORIDE TAB 1 GM 1 G: 1 TAB at 09:02

## 2023-02-04 RX ADMIN — LEVETIRACETAM 1500 MG: 500 TABLET, FILM COATED ORAL at 09:02

## 2023-02-04 RX ADMIN — THIAMINE HCL TAB 100 MG 100 MG: 100 TAB at 09:02

## 2023-02-04 RX ADMIN — EMTRICITABINE AND TENOFOVIR DISOPROXIL FUMARATE 1 TABLET: 200; 300 TABLET, FILM COATED ORAL at 09:02

## 2023-02-04 RX ADMIN — CEFEPIME HYDROCHLORIDE 2 G: 2 INJECTION, SOLUTION INTRAVENOUS at 07:02

## 2023-02-04 RX ADMIN — SODIUM CHLORIDE TAB 1 GM 1 G: 1 TAB at 08:02

## 2023-02-04 RX ADMIN — MIDODRINE HYDROCHLORIDE 10 MG: 2.5 TABLET ORAL at 11:02

## 2023-02-04 RX ADMIN — MEGESTROL ACETATE 40 MG: 20 TABLET ORAL at 09:02

## 2023-02-04 RX ADMIN — MAGNESIUM 64 MG (MAGNESIUM CHLORIDE) TABLET,DELAYED RELEASE 128 MG: at 09:02

## 2023-02-04 RX ADMIN — THERA TABS 1 TABLET: TAB at 09:02

## 2023-02-04 RX ADMIN — FLUTICASONE PROPIONATE 100 MCG: 50 SPRAY, METERED NASAL at 08:02

## 2023-02-04 RX ADMIN — DARUNAVIR ETHANOLATE AND COBICISTAT 1 TABLET: 800; 150 TABLET, FILM COATED ORAL at 09:02

## 2023-02-04 RX ADMIN — CEFEPIME HYDROCHLORIDE 2 G: 2 INJECTION, SOLUTION INTRAVENOUS at 04:02

## 2023-02-04 RX ADMIN — SODIUM BICARBONATE 650 MG TABLET 650 MG: at 08:02

## 2023-02-04 RX ADMIN — FERROUS SULFATE TAB 325 MG (65 MG ELEMENTAL FE) 1 EACH: 325 (65 FE) TAB at 09:02

## 2023-02-04 RX ADMIN — ESCITALOPRAM OXALATE 20 MG: 10 TABLET ORAL at 09:02

## 2023-02-04 RX ADMIN — MIDODRINE HYDROCHLORIDE 10 MG: 2.5 TABLET ORAL at 05:02

## 2023-02-04 NOTE — PROGRESS NOTES
[facility]  Progress Note    Patient name: El Abreu  MRN: [id number]  Admit Date: 1/16/2023   LOS: 16 days     SUBJECTIVE:     Principal problem: AIDS    Interval History:  Has been afebrile, blood culture growing Pseudomonas.      Scheduled Meds:   ceFEPime (MAXIPIME) IVPB  2 g Intravenous Q8H    darunavir-cobicistat  1 tablet Oral Daily    emtricitabine-tenofovir 200-300 mg  1 tablet Oral Daily    enoxaparin  40 mg Subcutaneous Daily    EScitalopram oxalate  20 mg Oral Daily    ferrous sulfate  1 tablet Oral Daily    fluconazole  800 mg Oral Daily    fluticasone propionate  2 spray Each Nostril BID    folic acid  1 mg Oral Daily    insulin detemir U-100  10 Units Subcutaneous BID    levETIRAcetam  1,500 mg Oral BID    magnesium chloride  128 mg Oral Daily    megestroL  40 mg Oral Daily    midodrine  10 mg Oral TID WM    mirtazapine  15 mg Oral QHS    multivitamin  1 tablet Oral Daily    pantoprazole  40 mg Oral Before breakfast    sodium bicarbonate  650 mg Oral TID    sodium chloride  1 g Oral BID    sulfamethoxazole-trimethoprim 800-160mg  1 tablet Oral Daily    thiamine  100 mg Oral Daily     Continuous Infusions:   lactated ringers 100 mL/hr at 02/01/23 1618       PRN Meds:sodium chloride, sodium chloride, acetaminophen, dextrose 10%, dextrose 10%, glucagon (human recombinant), glucose, glucose, HYDROcodone-acetaminophen, HYDROcodone-acetaminophen, insulin aspart U-100, lorazepam, magnesium sulfate IVPB, magnesium sulfate IVPB, melatonin, melatonin, naloxone, ondansetron, polyethylene glycol, potassium bicarbonate, potassium bicarbonate, potassium bicarbonate, prochlorperazine, simethicone, sodium chloride 0.9%    Review of patient's allergies indicates:  No Known Allergies    Review of Systems: As per interval history    OBJECTIVE:     Vital Signs (Most Recent)  Temp: 97.4 °F (36.3 °C) (02/03/23 1728)  Pulse: 98 (02/03/23 1728)  Resp: 18 (02/03/23 1728)  BP: 114/67  (02/03/23 1728)  SpO2: 99 % (02/03/23 1728)    Vital Signs Range (Last 24H):  Temp:  [97.4 °F (36.3 °C)-99.7 °F (37.6 °C)]   Pulse:  []   Resp:  [16-18]   BP: (103-118)/(54-72)   SpO2:  [98 %-100 %]     I & O (Last 24H):  Intake/Output Summary (Last 24 hours) at 2/3/2023 1940  Last data filed at 2/3/2023 1557  Gross per 24 hour   Intake 677.08 ml   Output 600 ml   Net 77.08 ml         Physical Exam:  General:  Chronically ill-appearing gentleman, cachectic  Eyes: No conjunctival injection. No scleral icterus.  ENT: Hearing grossly intact. No discharge from ears. No nasal discharge.   Neck: Supple, trachea midline. No JVD  CVS: RRR. No LE edema BL  Lungs:  No tachypnea or accessory muscle use.  Clear to auscultation bilaterally  Abdomen:  Soft, nontender and nondistended.  No organomegaly  Neuro: AOx3. Moves all extremities. Follows commands. Responds appropriately       Laboratory:  I have reviewed all pertinent lab results within the past 24 hours.    Diagnostic Results:  Reviewed all imaging    ASSESSMENT/PLAN:     27-year-old very unfortunate gentleman who recently had very prolonged hospital stay due to cryptococcal meningitis requiring serial lumbar punctures, neuro syphilis and hospital stay was complicated by AC, GI bleed, malnutrition and severe deconditioning and was transition to LTAC.  He was sent to our emergency room by LTAC due to critical hypomagnesemia and anemia causing severe lethargy and unable to stand up.  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         Plan:   Very unfortunate case of AIDS with recent prolonged hospital stay for cryptococcal meningitis and neurosyphilis came with severe lethargy, anemia and critical hypomagnesemia  Anti-retroviral, antifungal and antibiotics as per Infectious Disease.  Remarkable improvement in CD4 count and viral load  One blood culture positive for coag-negative staph, PICC line was removed and sent for tissue culture. Noted discontinuation of daptomycin and increasing dose of Bactrim by Infectious Disease  Follow-up on repeat cultures  Remains on Bactrim for PJP prophylaxis and Augmentin for sinusitis  Cefepime for PsM bacteremia  On Diflucan for recent cryptococcal meningitis  Aggressive p.o. nutrition, improve pulmonary toilet  Out of bed to chair  PT, OT   Right upper paratracheal adenopathy on CT chest, consider EUS-guided FNA  Avoid antihypertensives, aggressive electrolyte replacement  Frequent orthostatic vital signs, on midodrine        VTE Risk Mitigation (From admission, onward)           Ordered     Place CARMEN hose  Until discontinued         01/18/23 1830     enoxaparin injection 40 mg  Daily         01/16/23 2372                        Department Hospital Medicine  [facility]  Benjamín Medina MD  Date of service: 02/03/2023

## 2023-02-05 LAB
ALBUMIN SERPL BCP-MCNC: 2.7 G/DL (ref 3.5–5.2)
ALP SERPL-CCNC: 68 U/L (ref 55–135)
ALT SERPL W/O P-5'-P-CCNC: 27 U/L (ref 10–44)
ANION GAP SERPL CALC-SCNC: 8 MMOL/L (ref 8–16)
AST SERPL-CCNC: 21 U/L (ref 10–40)
BACTERIA BLD CULT: ABNORMAL
BACTERIA BLD CULT: NORMAL
BACTERIA UR CULT: NO GROWTH
BASOPHILS # BLD AUTO: 0.04 K/UL (ref 0–0.2)
BASOPHILS NFR BLD: 0.5 % (ref 0–1.9)
BILIRUB SERPL-MCNC: 0.4 MG/DL (ref 0.1–1)
BUN SERPL-MCNC: 12 MG/DL (ref 6–20)
CALCIUM SERPL-MCNC: 9.6 MG/DL (ref 8.7–10.5)
CHLORIDE SERPL-SCNC: 110 MMOL/L (ref 95–110)
CO2 SERPL-SCNC: 18 MMOL/L (ref 23–29)
CREAT SERPL-MCNC: 0.9 MG/DL (ref 0.5–1.4)
DIFFERENTIAL METHOD: ABNORMAL
EOSINOPHIL # BLD AUTO: 0.2 K/UL (ref 0–0.5)
EOSINOPHIL NFR BLD: 2.6 % (ref 0–8)
ERYTHROCYTE [DISTWIDTH] IN BLOOD BY AUTOMATED COUNT: 14.6 % (ref 11.5–14.5)
EST. GFR  (NO RACE VARIABLE): >60 ML/MIN/1.73 M^2
GLUCOSE SERPL-MCNC: 101 MG/DL (ref 70–110)
GLUCOSE SERPL-MCNC: 105 MG/DL (ref 70–110)
GLUCOSE SERPL-MCNC: 123 MG/DL (ref 70–110)
GLUCOSE SERPL-MCNC: 132 MG/DL (ref 70–110)
GLUCOSE SERPL-MCNC: 91 MG/DL (ref 70–110)
HCT VFR BLD AUTO: 26.1 % (ref 40–54)
HGB BLD-MCNC: 8.5 G/DL (ref 14–18)
HYPOCHROMIA BLD QL SMEAR: ABNORMAL
IMM GRANULOCYTES # BLD AUTO: 0.06 K/UL (ref 0–0.04)
IMM GRANULOCYTES NFR BLD AUTO: 0.7 % (ref 0–0.5)
LYMPHOCYTES # BLD AUTO: 1.5 K/UL (ref 1–4.8)
LYMPHOCYTES NFR BLD: 16.6 % (ref 18–48)
MAGNESIUM SERPL-MCNC: 1.2 MG/DL (ref 1.6–2.6)
MCH RBC QN AUTO: 27.8 PG (ref 27–31)
MCHC RBC AUTO-ENTMCNC: 32.6 G/DL (ref 32–36)
MCV RBC AUTO: 85 FL (ref 82–98)
MONOCYTES # BLD AUTO: 1.1 K/UL (ref 0.3–1)
MONOCYTES NFR BLD: 12.3 % (ref 4–15)
NEUTROPHILS # BLD AUTO: 5.9 K/UL (ref 1.8–7.7)
NEUTROPHILS NFR BLD: 67.3 % (ref 38–73)
NRBC BLD-RTO: 0 /100 WBC
PHOSPHATE SERPL-MCNC: 3.4 MG/DL (ref 2.7–4.5)
PLATELET # BLD AUTO: 292 K/UL (ref 150–450)
PLATELET BLD QL SMEAR: ABNORMAL
PMV BLD AUTO: 8.5 FL (ref 9.2–12.9)
POIKILOCYTOSIS BLD QL SMEAR: SLIGHT
POTASSIUM SERPL-SCNC: 3.8 MMOL/L (ref 3.5–5.1)
PROT SERPL-MCNC: 6.6 G/DL (ref 6–8.4)
RBC # BLD AUTO: 3.06 M/UL (ref 4.6–6.2)
SODIUM SERPL-SCNC: 136 MMOL/L (ref 136–145)
TARGETS BLD QL SMEAR: ABNORMAL
WBC # BLD AUTO: 8.76 K/UL (ref 3.9–12.7)

## 2023-02-05 PROCEDURE — 86592 SYPHILIS TEST NON-TREP QUAL: CPT | Performed by: INTERNAL MEDICINE

## 2023-02-05 PROCEDURE — 84100 ASSAY OF PHOSPHORUS: CPT | Performed by: HOSPITALIST

## 2023-02-05 PROCEDURE — 25000003 PHARM REV CODE 250: Performed by: STUDENT IN AN ORGANIZED HEALTH CARE EDUCATION/TRAINING PROGRAM

## 2023-02-05 PROCEDURE — 87899 AGENT NOS ASSAY W/OPTIC: CPT | Mod: 91 | Performed by: INTERNAL MEDICINE

## 2023-02-05 PROCEDURE — 83735 ASSAY OF MAGNESIUM: CPT | Performed by: NURSE PRACTITIONER

## 2023-02-05 PROCEDURE — 85025 COMPLETE CBC W/AUTO DIFF WBC: CPT | Performed by: NURSE PRACTITIONER

## 2023-02-05 PROCEDURE — 63700000 PHARM REV CODE 250 ALT 637 W/O HCPCS: Performed by: NURSE PRACTITIONER

## 2023-02-05 PROCEDURE — 25000003 PHARM REV CODE 250: Performed by: NURSE PRACTITIONER

## 2023-02-05 PROCEDURE — 87899 AGENT NOS ASSAY W/OPTIC: CPT | Performed by: INTERNAL MEDICINE

## 2023-02-05 PROCEDURE — 12000002 HC ACUTE/MED SURGE SEMI-PRIVATE ROOM

## 2023-02-05 PROCEDURE — 63700000 PHARM REV CODE 250 ALT 637 W/O HCPCS: Performed by: INTERNAL MEDICINE

## 2023-02-05 PROCEDURE — 99231 PR SUBSEQUENT HOSPITAL CARE,LEVL I: ICD-10-PCS | Mod: ,,, | Performed by: INTERNAL MEDICINE

## 2023-02-05 PROCEDURE — 25000003 PHARM REV CODE 250: Performed by: HOSPITALIST

## 2023-02-05 PROCEDURE — 63600175 PHARM REV CODE 636 W HCPCS: Performed by: NURSE PRACTITIONER

## 2023-02-05 PROCEDURE — 99231 SBSQ HOSP IP/OBS SF/LOW 25: CPT | Mod: ,,, | Performed by: INTERNAL MEDICINE

## 2023-02-05 PROCEDURE — 63600175 PHARM REV CODE 636 W HCPCS: Mod: TB,JG | Performed by: INTERNAL MEDICINE

## 2023-02-05 PROCEDURE — 80053 COMPREHEN METABOLIC PANEL: CPT | Performed by: HOSPITALIST

## 2023-02-05 PROCEDURE — 25000003 PHARM REV CODE 250: Performed by: INTERNAL MEDICINE

## 2023-02-05 PROCEDURE — 36415 COLL VENOUS BLD VENIPUNCTURE: CPT | Performed by: INTERNAL MEDICINE

## 2023-02-05 RX ADMIN — MEGESTROL ACETATE 40 MG: 20 TABLET ORAL at 09:02

## 2023-02-05 RX ADMIN — MIRTAZAPINE 15 MG: 15 TABLET, FILM COATED ORAL at 08:02

## 2023-02-05 RX ADMIN — MAGNESIUM 64 MG (MAGNESIUM CHLORIDE) TABLET,DELAYED RELEASE 128 MG: at 09:02

## 2023-02-05 RX ADMIN — FERROUS SULFATE TAB 325 MG (65 MG ELEMENTAL FE) 1 EACH: 325 (65 FE) TAB at 09:02

## 2023-02-05 RX ADMIN — DARUNAVIR ETHANOLATE AND COBICISTAT 1 TABLET: 800; 150 TABLET, FILM COATED ORAL at 09:02

## 2023-02-05 RX ADMIN — MIDODRINE HYDROCHLORIDE 10 MG: 2.5 TABLET ORAL at 12:02

## 2023-02-05 RX ADMIN — ESCITALOPRAM OXALATE 20 MG: 10 TABLET ORAL at 09:02

## 2023-02-05 RX ADMIN — SODIUM CHLORIDE TAB 1 GM 1 G: 1 TAB at 09:02

## 2023-02-05 RX ADMIN — EMTRICITABINE AND TENOFOVIR DISOPROXIL FUMARATE 1 TABLET: 200; 300 TABLET, FILM COATED ORAL at 09:02

## 2023-02-05 RX ADMIN — SULFAMETHOXAZOLE AND TRIMETHOPRIM 1 TABLET: 800; 160 TABLET ORAL at 09:02

## 2023-02-05 RX ADMIN — THIAMINE HCL TAB 100 MG 100 MG: 100 TAB at 09:02

## 2023-02-05 RX ADMIN — MIDODRINE HYDROCHLORIDE 10 MG: 2.5 TABLET ORAL at 07:02

## 2023-02-05 RX ADMIN — FLUCONAZOLE 800 MG: 100 TABLET ORAL at 09:02

## 2023-02-05 RX ADMIN — THERA TABS 1 TABLET: TAB at 09:02

## 2023-02-05 RX ADMIN — SODIUM CHLORIDE TAB 1 GM 1 G: 1 TAB at 08:02

## 2023-02-05 RX ADMIN — CEFEPIME HYDROCHLORIDE 2 G: 2 INJECTION, SOLUTION INTRAVENOUS at 08:02

## 2023-02-05 RX ADMIN — SODIUM BICARBONATE 650 MG TABLET 650 MG: at 08:02

## 2023-02-05 RX ADMIN — CEFEPIME HYDROCHLORIDE 2 G: 2 INJECTION, SOLUTION INTRAVENOUS at 04:02

## 2023-02-05 RX ADMIN — MIDODRINE HYDROCHLORIDE 10 MG: 2.5 TABLET ORAL at 05:02

## 2023-02-05 RX ADMIN — CEFEPIME HYDROCHLORIDE 2 G: 2 INJECTION, SOLUTION INTRAVENOUS at 12:02

## 2023-02-05 RX ADMIN — LEVETIRACETAM 1500 MG: 500 TABLET, FILM COATED ORAL at 09:02

## 2023-02-05 RX ADMIN — FOLIC ACID 1 MG: 1 TABLET ORAL at 09:02

## 2023-02-05 RX ADMIN — PANTOPRAZOLE SODIUM 40 MG: 40 TABLET, DELAYED RELEASE ORAL at 06:02

## 2023-02-05 RX ADMIN — LEVETIRACETAM 1500 MG: 500 TABLET, FILM COATED ORAL at 08:02

## 2023-02-05 RX ADMIN — ENOXAPARIN SODIUM 40 MG: 100 INJECTION SUBCUTANEOUS at 05:02

## 2023-02-05 RX ADMIN — SODIUM BICARBONATE 650 MG TABLET 650 MG: at 09:02

## 2023-02-05 NOTE — PROGRESS NOTES
[facility]  Progress Note    Patient name: El Abreu  MRN: [id number]  Admit Date: 1/16/2023   LOS: 17 days     SUBJECTIVE:     Principal problem: AIDS    Interval History:  Has been afebrile, repeat Bcx no longer growing Pseudomonas. Did cosyntropin stimulation test which was essentially normal.     Scheduled Meds:   ceFEPime (MAXIPIME) IVPB  2 g Intravenous Q8H    darunavir-cobicistat  1 tablet Oral Daily    emtricitabine-tenofovir 200-300 mg  1 tablet Oral Daily    enoxaparin  40 mg Subcutaneous Daily    EScitalopram oxalate  20 mg Oral Daily    ferrous sulfate  1 tablet Oral Daily    fluconazole  800 mg Oral Daily    fluticasone propionate  2 spray Each Nostril BID    folic acid  1 mg Oral Daily    insulin detemir U-100  10 Units Subcutaneous BID    levETIRAcetam  1,500 mg Oral BID    magnesium chloride  128 mg Oral Daily    megestroL  40 mg Oral Daily    midodrine  10 mg Oral TID WM    mirtazapine  15 mg Oral QHS    multivitamin  1 tablet Oral Daily    pantoprazole  40 mg Oral Before breakfast    sodium bicarbonate  650 mg Oral TID    sodium chloride  1 g Oral BID    sulfamethoxazole-trimethoprim 800-160mg  1 tablet Oral Daily    thiamine  100 mg Oral Daily     Continuous Infusions:   lactated ringers 100 mL/hr at 02/01/23 1618       PRN Meds:sodium chloride, sodium chloride, acetaminophen, dextrose 10%, dextrose 10%, glucagon (human recombinant), glucose, glucose, HYDROcodone-acetaminophen, HYDROcodone-acetaminophen, insulin aspart U-100, lorazepam, magnesium sulfate IVPB, magnesium sulfate IVPB, melatonin, melatonin, naloxone, ondansetron, polyethylene glycol, potassium bicarbonate, potassium bicarbonate, potassium bicarbonate, prochlorperazine, simethicone, sodium chloride 0.9%    Review of patient's allergies indicates:  No Known Allergies    Review of Systems: As per interval history    OBJECTIVE:     Vital Signs (Most Recent)  Temp: 98.3 °F (36.8 °C) (02/04/23  1717)  Pulse: 88 (02/04/23 1158)  Resp: 18 (02/04/23 1717)  BP: 115/70 (02/04/23 1717)  SpO2: 100 % (02/04/23 1717)    Vital Signs Range (Last 24H):  Temp:  [98.1 °F (36.7 °C)-99.5 °F (37.5 °C)]   Pulse:  [84-93]   Resp:  [18]   BP: ()/(48-74)   SpO2:  [96 %-100 %]     I & O (Last 24H):  Intake/Output Summary (Last 24 hours) at 2/4/2023 1846  Last data filed at 2/4/2023 0519  Gross per 24 hour   Intake --   Output 1100 ml   Net -1100 ml           Physical Exam:  General:  Chronically ill-appearing gentleman, cachectic  Eyes: No conjunctival injection. No scleral icterus.  ENT: Hearing grossly intact. No discharge from ears. No nasal discharge.   Neck: Supple, trachea midline. No JVD  CVS: RRR. No LE edema BL  Lungs:  No tachypnea or accessory muscle use.  Clear to auscultation bilaterally  Abdomen:  Soft, nontender and nondistended.  No organomegaly  Neuro: AOx3. Moves all extremities. Follows commands. Responds appropriately       Laboratory:  I have reviewed all pertinent lab results within the past 24 hours.    Diagnostic Results:  Reviewed all imaging    ASSESSMENT/PLAN:     27-year-old very unfortunate gentleman who recently had very prolonged hospital stay due to cryptococcal meningitis requiring serial lumbar punctures, neuro syphilis and hospital stay was complicated by AC, GI bleed, malnutrition and severe deconditioning and was transition to LTAC.  He was sent to our emergency room by LTAC due to critical hypomagnesemia and anemia causing severe lethargy and unable to stand up.  Remains profoundly orthostatic    Active Hospital Problems    Diagnosis  POA    *AIDS [B20]  Yes    Bacteremia [R78.81]  Unknown    Fever [R50.9]  No    Weakness [R53.1]  Yes    Physical deconditioning [R53.81]  Yes    Paroxysmal atrial fibrillation [I48.0]  Yes    Anemia [D64.9]  Yes    Hypokalemia [E87.6]  Yes    Hypomagnesemia [E83.42]  Yes    Seizures complicating infection [B99.9, R56.9]  Yes    Cryptococcal meningitis  [B45.1]  Yes    Type 2 diabetes mellitus without retinopathy [E11.9]  Yes      Resolved Hospital Problems   No resolved problems to display.         Plan:   -Very unfortunate case of AIDS with recent prolonged hospital stay for cryptococcal meningitis and neurosyphilis came with severe lethargy, anemia and critical hypomagnesemia, imroving  -Anti-retroviral, antifungal and antibiotics as per Infectious Disease.    -CD4 127 and VL 20 copies, almost undetectable  -Bcx 2/2/23 x 1 pseudomonas. Rpt Bcx negative, on cefepime  -Remains on Bactrim for PJP prophylaxis and Augmentin for sinusitis  -On Diflucan for recent cryptococcal meningitis  -Aggressive p.o. nutrition, improve pulmonary toilet  -Out of bed to chair  -PT, OT   -Right upper paratracheal adenopathy on CT chest, consider EUS-guided FNA  -Avoid antihypertensives, aggressive electrolyte replacement prn  -Frequent orthostatic vital signs, on midodrine  Dispo-dc pending ID plan for bactermia, CM working on placement  lovenox      VTE Risk Mitigation (From admission, onward)           Ordered     Place CARMEN hose  Until discontinued         01/18/23 1830     enoxaparin injection 40 mg  Daily         01/16/23 2101                        Department Hospital Medicine  [facility]  Benjamín Medina MD  Date of service: 02/04/2023

## 2023-02-05 NOTE — HOSPITAL COURSE
27 y.o. male with PMH  cryptococcal meningitis with cryptococcoma  in November, failing a 2 week course of amphotericin plus flucytosine followed by high-dose oral fluconazole .  s/p cryptococcal meningitis s/p 6 week course of AmBisome , s/p treatment  for neurosyphilis    He was sent from facility with the severe hypomagnesemia and elevated WBC and anemia.   Admitted to hospital and found Pseudomonas aeruginosa bacteremia (1/2 bottles) likely secondary to old IV access, removed 2/2.  hypoMg improved with supplement and continue his antivital and cryptococcal treatment .  He was not eating with cachexia and later gradually  improved nutrition and DC with appetite stimulant and total 3 more days course of Levaquin.  Nineteen facilities all denied acceptance and later patient is adamant to go home and requested to get discharge and discharged  and home health and physical therapy arranged .  Patient sure that his father is going to help and going to live with his father. ID increased bacrim dose to daily .

## 2023-02-05 NOTE — SUBJECTIVE & OBJECTIVE
Interval History:     Review of Systems  Objective:     Vital Signs (Most Recent):  Temp: 98.4 °F (36.9 °C) (02/05/23 1129)  Pulse: 91 (02/05/23 1129)  Resp: 20 (02/05/23 1129)  BP: 119/76 (02/05/23 1129)  SpO2: 99 % (02/05/23 1129) Vital Signs (24h Range):  Temp:  [98 °F (36.7 °C)-98.7 °F (37.1 °C)] 98.4 °F (36.9 °C)  Pulse:  [75-94] 91  Resp:  [18-20] 20  SpO2:  [98 %-100 %] 99 %  BP: (112-123)/(69-79) 119/76     Weight: 68 kg (149 lb 14.6 oz)  Body mass index is 18.74 kg/m².    Intake/Output Summary (Last 24 hours) at 2/5/2023 1553  Last data filed at 2/5/2023 1136  Gross per 24 hour   Intake --   Output 750 ml   Net -750 ml      Physical Exam  Constitutional:       General: He is not in acute distress.     Appearance: He is well-developed.   HENT:      Head: Normocephalic.   Eyes:      Pupils: Pupils are equal, round, and reactive to light.   Cardiovascular:      Rate and Rhythm: Normal rate and regular rhythm.   Pulmonary:      Effort: Pulmonary effort is normal. No respiratory distress.   Abdominal:      General: Abdomen is flat. There is no distension.      Tenderness: There is no abdominal tenderness.   Musculoskeletal:      Cervical back: Neck supple.   Skin:     General: Skin is warm.      Findings: No rash.   Neurological:      Mental Status: He is alert and oriented to person, place, and time.       Significant Labs: All pertinent labs within the past 24 hours have been reviewed.  CBC:   Recent Labs   Lab 02/04/23 0331 02/05/23  0525   WBC 11.79 8.76   HGB 8.2* 8.5*   HCT 24.8* 26.1*    292     CMP:   Recent Labs   Lab 02/04/23  0331 02/05/23  0525   * 136   K 3.6 3.8    110   CO2 18* 18*   GLU 73 105   BUN 15 12   CREATININE 0.9 0.9   CALCIUM 9.7 9.6   PROT 6.2 6.6   ALBUMIN 2.7* 2.7*   BILITOT 0.4 0.4   ALKPHOS 68 68   AST 26 21   ALT 29 27   ANIONGAP 9 8     Cardiac Markers: No results for input(s): CKMB, MYOGLOBIN, BNP, TROPISTAT in the last 48 hours.    Significant Imaging: I  have reviewed all pertinent imaging results/findings within the past 24 hours.

## 2023-02-05 NOTE — PROGRESS NOTES
Progress Note  Infectious Disease    Reason for Consult:  leukocytosis    HPI: El Abreu is a 27 y.o. 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  he was last admitted to [facility] November 30th with vomiting and headache, increased intracranial pressure.  This LP had a positive Sirisha ink opening pressure was 23.  He was then committed to a 6 week course of AmBisome completed 1/10, which was interrupted with AC but resumed and he was discharged to [facility] on January 10th.  He did require an additional LP for fluid removal and reduction of pressure on 12/13 (Sirisha ink was still positive but the culture was negative.  The last positive culture was 11/21).  He has been continued on fluconazole 800 mg daily since 01/11 and has been receiving Pneumocystis prophylaxis and appetite stimulants and antidepressants.  MRI brain on 01/01 showed stability of the right globus pallidus cryptococcal pseudocyst.  He was additionally treated for neurosyphilis 3 doses of Bicillin after an appropriate course of IV penicillin G.  He has not been eating well, withdrawn, seen by Psychiatry late December and started on risperidone.  He was seen in the [facility] emergency room 1/14 when his mandible became stuck open.  This was reduced in their emergency room he was sent back to the [facility].  He was sent to our emergency room yesterday for severe hypomagnesemia, 0.8 While in the emergency room he was noted to have a rising white blood cell count, from normal on 01/08 to 14 on 01/09 to 17 on 01/16 additionally his hemoglobin was 7.3.  An infectious workup was commenced and CRP was 2.0, lactic acid 0.9, procalcitonin 0.15.  Chest x-ray was negative, blood cultures are negative, respiratory PCR is not yet been  collected and he is not produce any sputum but this is not likely relevant..vanc and cefepime were begun.   Unfortunately our hospital is full and he has been waiting in the hallway outside the emergency room.  He is incontinent of urine, and urinalysis was negative.  Magnesium is now 1.7    He feels much improved, tells me that he ate breakfast and lunch. He does endorse some small amount of yellow sputum and he does find that his sinuses are congested. It is relevant to note that he had pansinusitis on MRI brain 1/1.     1/18: interim reviewed. He is eating well. He had a syncopal episode while working with PT. He states that this has happened before, but does not happen consistently when he gets up. He is still congested but not really expectorating. Sputum sample was not purulent(saliva) and culture has only normal burak. He is not receiving his HIV antivirals here(not on formulary) though these were available at [facility]. Repeated CXR and it is still clear.   1/20:  interim reviewed. WBC a little higher , peripheral smear noted but unsure of the relevance of the findings.  He is sleeping deeply, worked with physical therapy much more successfully today he is eating reasonably well and antivirals were resumed.  He endorses that his cough is improved.  Denies diarrhea. Blood culture from 1/16, has coag neg Staph in 1/4 bottles drawn from picc line. The picc line is 47 days old, placed 12/4.      1/21-22 (Deep): interim reviewed, discussed with Dr Ny, patient seen and examined at bedside.  States he vomited earlier this morning, he was feeling very nauseous.  He denies headaches, no fever or chills.  Slightly hypertensive, afebrile.  Adequate urinary output, 1 episode of stool recorded in the last 24 hours.  Labs reviewed, persistent leukocytosis 20.9, lymphocytic predominance 10%, no left shift, bands 10%, H&H 7.5/23.5, platelet count 248.  Stable kidney function, magnesium 1.2.  Micro reviewed, blood cultures  from 01/20 no growth to date, pending final.  Tip culture from PICC which was removed 1/20 no growth.  Respiratory viral panel not detected.    1/23:  Interim reviewed, patient seen examined at bedside, states he is feeling a little stronger today, was able to walk around with physical therapy, states he had 2-3/10 frontal headache this morning that is now improved.  Hemodynamically stable, afebrile.  Labs reviewed, leukocytosis down to 15.9, lymphocytic predominance 12%, bands 2%, H&H 7.5/32.4, platelet count 312.  Magnesium 1.4, stable potassium, and kidney function.  CD4 count and viral load in process.    1/24:  Interim reviewed, patient seen examined at bedside, states he is feeling much better today, walk with PT, encouraged to sit in the chair.  Hemodynamically stable, afebrile.  White count down to 13.2, no left shift, H&H 7.3/22.2, platelet count 351, magnesium 1.6.  CD4 122, viral load still in process.    1/25:  Interim reviewed, patient seen examined at bedside.  States he is feeling good today, trying his best to cooperate with physical therapy, encouraged to get out of bed.  Hemodynamically stable, afebrile.  Labs reviewed, white count 13, no left shift, H&H 7.6/23.8, platelet count 334.  Stable kidney function.  Viral load 20 copies, almost undetectable.    1/26:  Interim reviewed, patient seen examined at bedside, states he threw up at noon, asking for food.  States he is feeling better, encouraged to get out of bed.  Physical therapy still recommending skilled nursing facilities, case management trying to find a place for him to go.  Labs reviewed, white count improved, stable kidney function, magnesium 1.7, improved.    1/27: interim reviewed, patient seen and examined at bedside. Patient had hypotension this morning, now improved. Seen by Psych, recommendations noted. EEG reviewed, no abnormalities.     ID CALLED BACK FOR LEUKOCYTOSIS:  1/31: Interim reviewed, he received 1 UI PRBC yesterday,  blood cultures x 1 sent this morning.   Patient seen and examined at bedside, remains orthostatic, was able to work with PT, on midodrine. Hemodynamically stable, afebrile. Labs reviewed, leukocytosis 13, left shift PMN: 81%, H/H 7.8/24.9, plt 305. Stable kidney function, hyponatremia 131.     2/1:  Interim reviewed, patient seen examined at bedside.  No acute complaints, awaiting LTAC authorization, as per case management notes, will [facility].  Labs reviewed, stable, likely reactive thrombocytosis yesterday from blood transfusion.    2/2:  Interim reviewed, patient seen examined at bedside, febrile 102.5, tachycardic, diaphoretic, complaining of chills.  He is also complaining of productive cough, will send respiratory culture.  Chest x-ray clear.  CT chest with right upper paratracheal adenopathy 2.8 x 1.5 cm.  No additional mediastinal, hilar or axillary adenopathy.  Right upper paratracheal adenopathy most likely secondary to infectious or inflammatory process.  IV access which was 12-day-old, removed today.  Blood cultures I urine culture in process.  Empirically started on vancomycin/cefepime IV.    2/3:  Interim reviewed, patient seen examined at bedside.  Hemodynamically stable, afebrile in the last 24 hours.  Blood cultures from yesterday 1/2 bottles grew Pseudomonas aeruginosa, sensitivities pending.  Likely from old IV access which was removed yesterday after 12 days.  Labs reviewed, white count 12, left shift 80.2%, H&H 8.5/25.5, platelet count 230.  Stable kidney function, magnesium 1.5, albumin 2.8.  Normal liver function.  02/05/2023.  Dr. Schwab.  Patient denies any complaints.  He is working with PT.  He is looking forward to  more strength.  Repeat blood cultures are no growth to date       Antibiotics (From admission, onward)      Start     Stop Route Frequency Ordered    02/02/23 2000  cefepime in dextrose 5 % IVPB 2 g         -- IV Every 8 hours (non-standard times) 02/02/23 3889     "01/23/23 0900  sulfamethoxazole-trimethoprim 800-160mg per tablet 1 tablet        Note to Pharmacy: Ht: 6' 3" (1.905 m)  Wt: 52.3 kg (115 lb 3.2 oz)  Estimated Creatinine Clearance: 82.8 mL/min (based on SCr of 1 mg/dL).  Body mass index is 14.4 kg/m².    -- Oral Daily 01/22/23 1601           Antifungals (From admission, onward)      Start     Stop Route Frequency Ordered    01/17/23 0900  fluconazole tablet 800 mg         -- Oral Daily 01/16/23 2104          Antivirals (From admission, onward)          Stop Route Frequency     Prezcobix         -- Oral Daily     Truvada         -- Oral Daily            EXAM & DIAGNOSTICS REVIEWED:   Vitals:     Temp:  [98 °F (36.7 °C)-98.7 °F (37.1 °C)]   Temp: 98 °F (36.7 °C) (02/05/23 0731)  Pulse: 94 (02/05/23 0731)  Resp: 18 (02/05/23 0731)  BP: 123/79 (02/05/23 0731)  SpO2: 99 % (02/05/23 0731)  No intake or output data in the 24 hours ending 02/05/23 0947        General:  Very thin, in NAD, cooperative, comfortable on room air  Eyes:  Anicteric, EOMI -  iris heterochromia/ post surgical L eye   ENT:  Dry oral mucosa, no thrush  Neck:  supple  Lungs: Clear to auscultation   Heart:  RRR, no gallop/murmur/rub noted  Abd:  Soft, NT, ND, normal BS, no masses or organomegaly appreciated.  :  Voids , no flank tenderness  Musc:  Joints without effusion, swelling, erythema, synovitis, with mild generalized muscle wasting.   Skin:  No rashes. Dry skin.  Neuro:             Arousable, speech fluent, face symmetric, moves all extremities, no focal weakness.  Finger-to-nose normal.  Heel-to-shin normal.  Psych:  Calm, cooperative  Lymphatic:        Extrem: No edema, erythema, phlebitis, cellulitis, warm and well perfused  VAD:  Peripheral IV access R arm 2/2  Isolation:  none  Wound: none      General Labs reviewed:  Recent Labs   Lab 02/03/23  0505 02/04/23  0331 02/05/23  0525   WBC 12.32 11.79 8.76   HGB 8.5* 8.2* 8.5*   HCT 25.5* 24.8* 26.1*    274 292       Recent Labs   Lab " 02/03/23  0505 02/04/23  0331 02/05/23  0525   * 134* 136   K 3.7 3.6 3.8    107 110   CO2 18* 18* 18*   BUN 17 15 12   CREATININE 1.1 0.9 0.9   CALCIUM 9.4 9.7 9.6   PROT 6.4 6.2 6.6   BILITOT 0.5 0.4 0.4   ALKPHOS 66 68 68   ALT 29 29 27   AST 26 26 21     No results for input(s): CRP in the last 168 hours.    Estimated Creatinine Clearance: 119.6 mL/min (based on SCr of 0.9 mg/dL).    Micro:  Microbiology Results (last 7 days)       Procedure Component Value Units Date/Time    Blood culture [[id number]]  (Abnormal)  (Susceptibility) Collected: 02/02/23 1001    Order Status: Completed Specimen: Blood Updated: 02/05/23 0751     Blood Culture, Routine Gram stain aer bottle: Gram negative rods      Results called to and read back by: Kaitlynn Farah RN on 1200 wing      02/03/2023  05:18 KS3      PSEUDOMONAS AERUGINOSA    Urine Culture High Risk [[id number]] Collected: 02/02/23 0931    Order Status: Completed Specimen: Urine, Clean Catch Updated: 02/05/23 0741     Urine Culture, Routine No growth    Narrative:      Indicated criteria for high risk culture:->Neutropenic  patient    Cryptococcal antigen [[id number]] Collected: 02/05/23 0525    Order Status: Sent Specimen: Blood, Venous Updated: 02/05/23 0538    Blood culture [[id number]] Collected: 02/03/23 1910    Order Status: Completed Specimen: Blood from Peripheral, Left Hand Updated: 02/04/23 2032     Blood Culture, Routine No Growth to date      No Growth to date    Blood culture [[id number]] Collected: 01/31/23 0919    Order Status: Completed Specimen: Blood Updated: 02/04/23 1032     Blood Culture, Routine No Growth to date      No Growth to date      No Growth to date      No Growth to date      No Growth to date    Blood culture [[id number]] Collected: 02/03/23 0956    Order Status: Completed Specimen: Blood from Antecubital, Right Updated: 02/04/23 1032     Blood Culture, Routine No Growth to date      No Growth to date    Blood culture [[id number]]  Collected: 02/03/23 0950    Order Status: Completed Specimen: Blood from Antecubital, Left Updated: 02/04/23 1032     Blood Culture, Routine No Growth to date      No Growth to date    Rapid Organism ID by PCR (from Blood culture) [[id number]]  (Abnormal) Collected: 02/02/23 1001    Order Status: Completed Updated: 02/03/23 0635     Enterococcus faecials Not Detected     Enterococcus faecium Not Detected     Listeria Monocytogenes Not Detected     Staphylococcus spp. Not Detected     Staphylococcus aureus Not Detected     Staphylococcus epidermidis Not Detected     Staphylococcus lugdunensis Not Detected     Streptococcus species Not Detected     Streptococcus agalactiae Not Detected     Streptococcus pneumoniae Not Detected     Streptococcus pyogenes Not Detected     Acinetobacter calcoaceticus/baumannii complex Not Detected     Bacteroides fragilis Not Detected     Enterobacerales Not Detected     Enterobacter cloacae complex Not Detected     Escherichia Not Detected     Klebsiella aerogenes Not Detected     Klebsiella oxytoca Not Detected     Klebsiella pneumoniae group Not Detected     Proteus Not Detected     Salmonella sp Not Detected     Serratia marcescens Not Detected     Haemophilus influenzae Not Detected     Neisseria meningtidis Not Detected     Pseudomonas aeruginosa Detected     Stenotrophomonas maltophilia Not Detected     Candida albicans Not Detected     Candida auris Not Detected     Candida glabrata Not Detected     Candida krusei Not Detected     Candida Parapsilosis Not Detected     Candida Tropicalis Not Detected     Cryptococcus neoformans/gattii Not Detected     CTX-M Gene Not Detected     IMP Gene Not Detected     KPC  Not Detected     mcr-1  Test not applicable     mec A/C Test not applicable     mec A/C and MREJ (MRSA) Test not applicable     NDM Not Detected     OXA-48-like Test not applicable     van A/B Test not applicable     VIM Not Detected    Culture, Respiratory with Gram Stain  [[id number]]     Order Status: No result Specimen: Respiratory             Imaging Reviewed:   CXRs   CT chest with right upper paratracheal adenopathy most likely secondary to infectious or inflammatory process.    MRI brain 1/1    Cardiology:    IMPRESSION & PLAN     Pseudomonas aeruginosa bacteremia (1/2 bottles) likely secondary to old IV access, removed 2/2    2.  Hypomagnesemia, improved - last Mg 1.5   Electrolyte abnormalities likely from ampho B,it has a long half life    3. HIV/AIDS C3 - Cryptococcal meningitis, now controlled, s/p 6 weeks of ambisome and continues on high dose oral fluconazole   Cryptococcoma, stable   CD4 11/5 7 - VL 48,127 - repeat CD4 127 and VL 20 copies, almost undetectable   On Prezcobix/Descovy   PPD 1/14 negative    4. Cachexia with failure to thrive   On supplements     5. Pansinusitis, s/p Augmentin, improved  CRP 2.0    Recommendations:  Continue Cefepime 2g IV q8h for Pseudomonas aeruginosa bacteremia   discharge options will be either cefepime or a fluoroquinolone, but please notice patient is on Diflucan which interacts with fluoroquinolones.      Continue Diflucan 800 mg per day for Crypto will need at least 12 months of therapy along with ART   Bactrim DS 1 tab daily, CD4 <200 for PJP   Continue Prezcobix/Descovy daily       D/w patient, dr Alaniz    Medical Decision Making during this encounter was  [_] Low Complexity  [_] Moderate Complexity  [xxx  ] High Complexity

## 2023-02-05 NOTE — PROGRESS NOTES
[facility]  Progress Note    Patient Name: El Abreu  MRN: [id number]  Patient Class: IP- Inpatient   Admission Date: 1/16/2023  Length of Stay: 18 days  Attending Physician: Gary Alaniz MD  Primary Care Provider: [facility]        Subjective:     Principal Problem:AIDS        HPI:  El Abreu is a 27-year-old male with chronic medical problems including HIV/aids and diabetes mellitus who recently had a long hospitalization at [facility] with cryptococcal meningitis and neurosyphilis.  Hospitalization was complicated by acute kidney injury, GI bleed, and malnutrition and conditioning and he was transitioned to TCU for continued treatment of fungal meningitis and rehab. He was sent to [facility] Emergency room for evaluation of low magnesium hemoglobin.  Patient is sitting up in bed eating a [employer] chicken sandwich.  He states he is very weak in his unable to stand.  He said he has been in bed for weeks.  He denies headache vision changes, denies shortness of breath but states that he is had a cough brownish base sputum.  He denies fevers or chills, states he gets dizzy when he tries to stand or sit up.  Denies any trouble voiding or diarrhea.  Magnesium was replaced, initially 0.8, last 1 today 1.4, WBC elevated at 17.7, hemoglobin 7.9, in the last 8 days range was 8.2-9.2.  Potassium decreased at 3.5, glucose 135, creatinine 1.0 and estimated GFR greater than 60.  Creatinine clearance 82.8.  Vital signs with temperature 97.6°, heart rate 83, respiratory rate 23, blood pressure 109/64 and O2 sat 100.  Chest x-ray with nothing acute.  He was started on cefepime, vancomycin and lactated Ringer's bolus.  ED physician spoke with Infectious Disease doctor at [facility] who recommended transfer there but they were on diversion.  Patient will be admitted here for treatment and we will try to transfer as  able.    He originally presented to [facility] with lethargy and headaches on 11/05.  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  He was initially started on amphotericin B and flucytosine for 14 days of induction, broad-spectrum antibiotics including ceftriaxone twice daily and acyclovir.  CSF VDRL negative but treponemal antibodies on CSF were positive CD4 count 7, HIV viral load 48,000 K, genotype [id number]. R: DDI, hepatitis-B C and B antigen negative, GC chlamydia negative toxoplasma negative, CMV PCR negative.  He was also placed on atovaquone initially and then changed to Bactrim DS 3 times weekly for PJP prophylaxis.  After completing 14 days of induction with amphotericin and flucytosine he was switched to 800 mg oral daily fluconazole for 8 weeks through January 13 for consolidation and then was to continue fluconazole 200 mg for long-term suppression.  He also completed 10 days of IV ceftriaxone and then 4 days of IV penicillin Recommended starting ART as outpatient after treatment with cryptococcal meningitis for 4-5 weeks.  He was started on Keppra and Vimpat seizures.  He was discharged from [facility] on 11/21 and returned 11/30 with headache in Sirisha ink stain was still positive and was found to only be on 400 of fluconazole instead of 800 a day.  He was found have crypto and was started again on amphotericin B and flucytosine for 6 weeks.  Apparently he was noncompliant in the hospital and eventually did switch to AmBisome and fluconazole 800 orally until January 11th.  He was started on ART  but was noncompliant so it was held again for a time and he was seen by psychiatry and meds were adjusted.   He had problems with kidney function and was switched to atovaquone.  He repeated a course of weekly Bicillin LA injections x3 doses and completed  treatment on 01/03.  Symtuza. (Descovy + Prezcobix) was started on 12/23.  He was discharged to skilled nursing to continue treatment after improving renal failure, nausea, coffee-ground and was also seen by Psychiatry.          Overview/Hospital Course:  Assumed care   Patient wants to go home if LTAC is not ok  He mobilize in the room with walker           Interval History:     Review of Systems  Objective:     Vital Signs (Most Recent):  Temp: 98.4 °F (36.9 °C) (02/05/23 1129)  Pulse: 91 (02/05/23 1129)  Resp: 20 (02/05/23 1129)  BP: 119/76 (02/05/23 1129)  SpO2: 99 % (02/05/23 1129) Vital Signs (24h Range):  Temp:  [98 °F (36.7 °C)-98.7 °F (37.1 °C)] 98.4 °F (36.9 °C)  Pulse:  [75-94] 91  Resp:  [18-20] 20  SpO2:  [98 %-100 %] 99 %  BP: (112-123)/(69-79) 119/76     Weight: 68 kg (149 lb 14.6 oz)  Body mass index is 18.74 kg/m².    Intake/Output Summary (Last 24 hours) at 2/5/2023 1553  Last data filed at 2/5/2023 1136  Gross per 24 hour   Intake --   Output 750 ml   Net -750 ml      Physical Exam  Constitutional:       General: He is not in acute distress.     Appearance: He is well-developed.   HENT:      Head: Normocephalic.   Eyes:      Pupils: Pupils are equal, round, and reactive to light.   Cardiovascular:      Rate and Rhythm: Normal rate and regular rhythm.   Pulmonary:      Effort: Pulmonary effort is normal. No respiratory distress.   Abdominal:      General: Abdomen is flat. There is no distension.      Tenderness: There is no abdominal tenderness.   Musculoskeletal:      Cervical back: Neck supple.   Skin:     General: Skin is warm.      Findings: No rash.   Neurological:      Mental Status: He is alert and oriented to person, place, and time.       Significant Labs: All pertinent labs within the past 24 hours have been reviewed.  CBC:   Recent Labs   Lab 02/04/23  0331 02/05/23  0525   WBC 11.79 8.76   HGB 8.2* 8.5*   HCT 24.8* 26.1*    292     CMP:   Recent Labs   Lab 02/04/23  1243  02/05/23  0525   * 136   K 3.6 3.8    110   CO2 18* 18*   GLU 73 105   BUN 15 12   CREATININE 0.9 0.9   CALCIUM 9.7 9.6   PROT 6.2 6.6   ALBUMIN 2.7* 2.7*   BILITOT 0.4 0.4   ALKPHOS 68 68   AST 26 21   ALT 29 27   ANIONGAP 9 8     Cardiac Markers: No results for input(s): CKMB, MYOGLOBIN, BNP, TROPISTAT in the last 48 hours.    Significant Imaging: I have reviewed all pertinent imaging results/findings within the past 24 hours.      Assessment/Plan:        Active Hospital Problems    Diagnosis    *AIDS    Bacteremia    Fever    Weakness    Physical deconditioning    Paroxysmal atrial fibrillation    Anemia    Hypokalemia    Hypomagnesemia    Seizures complicating infection    Cryptococcal meningitis    Type 2 diabetes mellitus without retinopathy       Plan:   Diflucan for  cryptococcal meningitis    s/p neurosyphilis treatment   -CD4 127 and VL 20   -Bcx 2/2/23 x 1 pseudomonas. Rpt Bcx negative, on cefepime  -Remains on Bactrim for PJP prophylaxis and Augmentin for sinusitis  -Right upper paratracheal adenopathy on CT chest, consider EUS-guided FNA as outpatient   -Frequent orthostatic vital signs, on midodrine  Awaited LTAC placement . Patient wants to go home with home IV   He said his father will take care         VTE Risk Mitigation (From admission, onward)         Ordered     Place CARMEN hose  Until discontinued         01/18/23 1830     enoxaparin injection 40 mg  Daily         01/16/23 2104                Discharge Planning   ZULMA: 2/3/2023     Code Status: Full Code   Is the patient medically ready for discharge?:     Reason for patient still in hospital (select all that apply): Treatment  Discharge Plan A: Rehab   Discharge Delays: (!) Change in Medical Condition              Gary Alaniz MD  Department of Hospital Medicine   [facility]

## 2023-02-06 LAB
ALBUMIN SERPL BCP-MCNC: 2.9 G/DL (ref 3.5–5.2)
ALP SERPL-CCNC: 74 U/L (ref 55–135)
ALT SERPL W/O P-5'-P-CCNC: 24 U/L (ref 10–44)
ANION GAP SERPL CALC-SCNC: 9 MMOL/L (ref 8–16)
ANISOCYTOSIS BLD QL SMEAR: SLIGHT
AST SERPL-CCNC: 20 U/L (ref 10–40)
BASOPHILS # BLD AUTO: 0.03 K/UL (ref 0–0.2)
BASOPHILS NFR BLD: 0.3 % (ref 0–1.9)
BILIRUB SERPL-MCNC: 0.4 MG/DL (ref 0.1–1)
BUN SERPL-MCNC: 11 MG/DL (ref 6–20)
CALCIUM SERPL-MCNC: 9.9 MG/DL (ref 8.7–10.5)
CHLORIDE SERPL-SCNC: 107 MMOL/L (ref 95–110)
CO2 SERPL-SCNC: 18 MMOL/L (ref 23–29)
CREAT SERPL-MCNC: 0.8 MG/DL (ref 0.5–1.4)
DIFFERENTIAL METHOD: ABNORMAL
EOSINOPHIL # BLD AUTO: 0.3 K/UL (ref 0–0.5)
EOSINOPHIL NFR BLD: 3.1 % (ref 0–8)
ERYTHROCYTE [DISTWIDTH] IN BLOOD BY AUTOMATED COUNT: 14.4 % (ref 11.5–14.5)
EST. GFR  (NO RACE VARIABLE): >60 ML/MIN/1.73 M^2
GLUCOSE SERPL-MCNC: 100 MG/DL (ref 70–110)
GLUCOSE SERPL-MCNC: 111 MG/DL (ref 70–110)
GLUCOSE SERPL-MCNC: 114 MG/DL (ref 70–110)
GLUCOSE SERPL-MCNC: 88 MG/DL (ref 70–110)
GLUCOSE SERPL-MCNC: 91 MG/DL (ref 70–110)
HCT VFR BLD AUTO: 27.5 % (ref 40–54)
HGB BLD-MCNC: 9.1 G/DL (ref 14–18)
IMM GRANULOCYTES # BLD AUTO: 0.13 K/UL (ref 0–0.04)
IMM GRANULOCYTES NFR BLD AUTO: 1.5 % (ref 0–0.5)
LYMPHOCYTES # BLD AUTO: 2 K/UL (ref 1–4.8)
LYMPHOCYTES NFR BLD: 22.7 % (ref 18–48)
MAGNESIUM SERPL-MCNC: 1.3 MG/DL (ref 1.6–2.6)
MCH RBC QN AUTO: 28 PG (ref 27–31)
MCHC RBC AUTO-ENTMCNC: 33.1 G/DL (ref 32–36)
MCV RBC AUTO: 85 FL (ref 82–98)
MONOCYTES # BLD AUTO: 1 K/UL (ref 0.3–1)
MONOCYTES NFR BLD: 11.8 % (ref 4–15)
NEUTROPHILS # BLD AUTO: 5.4 K/UL (ref 1.8–7.7)
NEUTROPHILS NFR BLD: 60.6 % (ref 38–73)
NRBC BLD-RTO: 0 /100 WBC
PHOSPHATE SERPL-MCNC: 3.4 MG/DL (ref 2.7–4.5)
PLATELET # BLD AUTO: 317 K/UL (ref 150–450)
PLATELET BLD QL SMEAR: ABNORMAL
PMV BLD AUTO: 8 FL (ref 9.2–12.9)
POTASSIUM SERPL-SCNC: 3.9 MMOL/L (ref 3.5–5.1)
PROT SERPL-MCNC: 6.9 G/DL (ref 6–8.4)
RBC # BLD AUTO: 3.25 M/UL (ref 4.6–6.2)
RPR SER QL: NORMAL
SODIUM SERPL-SCNC: 134 MMOL/L (ref 136–145)
WBC # BLD AUTO: 8.82 K/UL (ref 3.9–12.7)

## 2023-02-06 PROCEDURE — 25000003 PHARM REV CODE 250: Performed by: STUDENT IN AN ORGANIZED HEALTH CARE EDUCATION/TRAINING PROGRAM

## 2023-02-06 PROCEDURE — 25000003 PHARM REV CODE 250: Performed by: INTERNAL MEDICINE

## 2023-02-06 PROCEDURE — 36415 COLL VENOUS BLD VENIPUNCTURE: CPT | Performed by: NURSE PRACTITIONER

## 2023-02-06 PROCEDURE — 97530 THERAPEUTIC ACTIVITIES: CPT

## 2023-02-06 PROCEDURE — 25000003 PHARM REV CODE 250: Performed by: NURSE PRACTITIONER

## 2023-02-06 PROCEDURE — 99233 SBSQ HOSP IP/OBS HIGH 50: CPT | Mod: ,,, | Performed by: STUDENT IN AN ORGANIZED HEALTH CARE EDUCATION/TRAINING PROGRAM

## 2023-02-06 PROCEDURE — 99233 PR SUBSEQUENT HOSPITAL CARE,LEVL III: ICD-10-PCS | Mod: ,,, | Performed by: STUDENT IN AN ORGANIZED HEALTH CARE EDUCATION/TRAINING PROGRAM

## 2023-02-06 PROCEDURE — 63600175 PHARM REV CODE 636 W HCPCS: Performed by: NURSE PRACTITIONER

## 2023-02-06 PROCEDURE — 93010 ELECTROCARDIOGRAM REPORT: CPT | Mod: ,,, | Performed by: SPECIALIST

## 2023-02-06 PROCEDURE — 93005 ELECTROCARDIOGRAM TRACING: CPT | Performed by: SPECIALIST

## 2023-02-06 PROCEDURE — 93010 EKG 12-LEAD: ICD-10-PCS | Mod: ,,, | Performed by: SPECIALIST

## 2023-02-06 PROCEDURE — 85025 COMPLETE CBC W/AUTO DIFF WBC: CPT | Performed by: NURSE PRACTITIONER

## 2023-02-06 PROCEDURE — 63600175 PHARM REV CODE 636 W HCPCS: Mod: TB,JG | Performed by: INTERNAL MEDICINE

## 2023-02-06 PROCEDURE — 25000003 PHARM REV CODE 250: Performed by: HOSPITALIST

## 2023-02-06 PROCEDURE — 97116 GAIT TRAINING THERAPY: CPT

## 2023-02-06 PROCEDURE — 83735 ASSAY OF MAGNESIUM: CPT | Performed by: NURSE PRACTITIONER

## 2023-02-06 PROCEDURE — 63700000 PHARM REV CODE 250 ALT 637 W/O HCPCS: Performed by: NURSE PRACTITIONER

## 2023-02-06 PROCEDURE — 12000002 HC ACUTE/MED SURGE SEMI-PRIVATE ROOM

## 2023-02-06 PROCEDURE — 84100 ASSAY OF PHOSPHORUS: CPT | Performed by: HOSPITALIST

## 2023-02-06 PROCEDURE — 63700000 PHARM REV CODE 250 ALT 637 W/O HCPCS: Performed by: INTERNAL MEDICINE

## 2023-02-06 PROCEDURE — 80053 COMPREHEN METABOLIC PANEL: CPT | Performed by: HOSPITALIST

## 2023-02-06 PROCEDURE — 97535 SELF CARE MNGMENT TRAINING: CPT

## 2023-02-06 RX ADMIN — THERA TABS 1 TABLET: TAB at 09:02

## 2023-02-06 RX ADMIN — DARUNAVIR ETHANOLATE AND COBICISTAT 1 TABLET: 800; 150 TABLET, FILM COATED ORAL at 10:02

## 2023-02-06 RX ADMIN — MIDODRINE HYDROCHLORIDE 10 MG: 2.5 TABLET ORAL at 05:02

## 2023-02-06 RX ADMIN — MIDODRINE HYDROCHLORIDE 10 MG: 2.5 TABLET ORAL at 01:02

## 2023-02-06 RX ADMIN — LEVETIRACETAM 1500 MG: 500 TABLET, FILM COATED ORAL at 08:02

## 2023-02-06 RX ADMIN — MIDODRINE HYDROCHLORIDE 10 MG: 2.5 TABLET ORAL at 07:02

## 2023-02-06 RX ADMIN — SODIUM BICARBONATE 650 MG TABLET 650 MG: at 04:02

## 2023-02-06 RX ADMIN — CEFEPIME HYDROCHLORIDE 2 G: 2 INJECTION, SOLUTION INTRAVENOUS at 05:02

## 2023-02-06 RX ADMIN — MAGNESIUM 64 MG (MAGNESIUM CHLORIDE) TABLET,DELAYED RELEASE 128 MG: at 09:02

## 2023-02-06 RX ADMIN — FOLIC ACID 1 MG: 1 TABLET ORAL at 09:02

## 2023-02-06 RX ADMIN — SODIUM BICARBONATE 650 MG TABLET 650 MG: at 09:02

## 2023-02-06 RX ADMIN — CEFEPIME HYDROCHLORIDE 2 G: 2 INJECTION, SOLUTION INTRAVENOUS at 08:02

## 2023-02-06 RX ADMIN — ENOXAPARIN SODIUM 40 MG: 100 INJECTION SUBCUTANEOUS at 04:02

## 2023-02-06 RX ADMIN — EMTRICITABINE AND TENOFOVIR DISOPROXIL FUMARATE 1 TABLET: 200; 300 TABLET, FILM COATED ORAL at 10:02

## 2023-02-06 RX ADMIN — SODIUM CHLORIDE TAB 1 GM 1 G: 1 TAB at 08:02

## 2023-02-06 RX ADMIN — SULFAMETHOXAZOLE AND TRIMETHOPRIM 1 TABLET: 800; 160 TABLET ORAL at 09:02

## 2023-02-06 RX ADMIN — SODIUM BICARBONATE 650 MG TABLET 650 MG: at 08:02

## 2023-02-06 RX ADMIN — CEFEPIME HYDROCHLORIDE 2 G: 2 INJECTION, SOLUTION INTRAVENOUS at 01:02

## 2023-02-06 RX ADMIN — LEVETIRACETAM 1500 MG: 500 TABLET, FILM COATED ORAL at 09:02

## 2023-02-06 RX ADMIN — THIAMINE HCL TAB 100 MG 100 MG: 100 TAB at 09:02

## 2023-02-06 RX ADMIN — PANTOPRAZOLE SODIUM 40 MG: 40 TABLET, DELAYED RELEASE ORAL at 05:02

## 2023-02-06 RX ADMIN — FLUCONAZOLE 800 MG: 100 TABLET ORAL at 09:02

## 2023-02-06 RX ADMIN — FLUTICASONE PROPIONATE 100 MCG: 50 SPRAY, METERED NASAL at 09:02

## 2023-02-06 RX ADMIN — MIRTAZAPINE 15 MG: 15 TABLET, FILM COATED ORAL at 08:02

## 2023-02-06 RX ADMIN — FERROUS SULFATE TAB 325 MG (65 MG ELEMENTAL FE) 1 EACH: 325 (65 FE) TAB at 09:02

## 2023-02-06 RX ADMIN — MEGESTROL ACETATE 40 MG: 20 TABLET ORAL at 10:02

## 2023-02-06 RX ADMIN — ESCITALOPRAM OXALATE 20 MG: 10 TABLET ORAL at 09:02

## 2023-02-06 RX ADMIN — SODIUM CHLORIDE TAB 1 GM 1 G: 1 TAB at 09:02

## 2023-02-06 NOTE — PROGRESS NOTES
Progress Note  Infectious Disease    Reason for Consult:  leukocytosis    HPI: El Abreu is a 27 y.o. 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  he was last admitted to [facility] November 30th with vomiting and headache, increased intracranial pressure.  This LP had a positive Sirisha ink opening pressure was 23.  He was then committed to a 6 week course of AmBisome completed 1/10, which was interrupted with AC but resumed and he was discharged to [facility] on January 10th.  He did require an additional LP for fluid removal and reduction of pressure on 12/13 (Sirisha ink was still positive but the culture was negative.  The last positive culture was 11/21).  He has been continued on fluconazole 800 mg daily since 01/11 and has been receiving Pneumocystis prophylaxis and appetite stimulants and antidepressants.  MRI brain on 01/01 showed stability of the right globus pallidus cryptococcal pseudocyst.  He was additionally treated for neurosyphilis 3 doses of Bicillin after an appropriate course of IV penicillin G.  He has not been eating well, withdrawn, seen by Psychiatry late December and started on risperidone.  He was seen in the [facility] emergency room 1/14 when his mandible became stuck open.  This was reduced in their emergency room he was sent back to the [facility].  He was sent to our emergency room yesterday for severe hypomagnesemia, 0.8 While in the emergency room he was noted to have a rising white blood cell count, from normal on 01/08 to 14 on 01/09 to 17 on 01/16 additionally his hemoglobin was 7.3.  An infectious workup was commenced and CRP was 2.0, lactic acid 0.9, procalcitonin 0.15.  Chest x-ray was negative, blood cultures are negative, respiratory PCR is not yet been  collected and he is not produce any sputum but this is not likely relevant..vanc and cefepime were begun.   Unfortunately our hospital is full and he has been waiting in the hallway outside the emergency room.  He is incontinent of urine, and urinalysis was negative.  Magnesium is now 1.7    He feels much improved, tells me that he ate breakfast and lunch. He does endorse some small amount of yellow sputum and he does find that his sinuses are congested. It is relevant to note that he had pansinusitis on MRI brain 1/1.     1/18: interim reviewed. He is eating well. He had a syncopal episode while working with PT. He states that this has happened before, but does not happen consistently when he gets up. He is still congested but not really expectorating. Sputum sample was not purulent(saliva) and culture has only normal burak. He is not receiving his HIV antivirals here(not on formulary) though these were available at [facility]. Repeated CXR and it is still clear.   1/20:  interim reviewed. WBC a little higher , peripheral smear noted but unsure of the relevance of the findings.  He is sleeping deeply, worked with physical therapy much more successfully today he is eating reasonably well and antivirals were resumed.  He endorses that his cough is improved.  Denies diarrhea. Blood culture from 1/16, has coag neg Staph in 1/4 bottles drawn from picc line. The picc line is 47 days old, placed 12/4.      1/21-22 (Deep): interim reviewed, discussed with Dr Ny, patient seen and examined at bedside.  States he vomited earlier this morning, he was feeling very nauseous.  He denies headaches, no fever or chills.  Slightly hypertensive, afebrile.  Adequate urinary output, 1 episode of stool recorded in the last 24 hours.  Labs reviewed, persistent leukocytosis 20.9, lymphocytic predominance 10%, no left shift, bands 10%, H&H 7.5/23.5, platelet count 248.  Stable kidney function, magnesium 1.2.  Micro reviewed, blood cultures  from 01/20 no growth to date, pending final.  Tip culture from PICC which was removed 1/20 no growth.  Respiratory viral panel not detected.    1/23:  Interim reviewed, patient seen examined at bedside, states he is feeling a little stronger today, was able to walk around with physical therapy, states he had 2-3/10 frontal headache this morning that is now improved.  Hemodynamically stable, afebrile.  Labs reviewed, leukocytosis down to 15.9, lymphocytic predominance 12%, bands 2%, H&H 7.5/32.4, platelet count 312.  Magnesium 1.4, stable potassium, and kidney function.  CD4 count and viral load in process.    1/24:  Interim reviewed, patient seen examined at bedside, states he is feeling much better today, walk with PT, encouraged to sit in the chair.  Hemodynamically stable, afebrile.  White count down to 13.2, no left shift, H&H 7.3/22.2, platelet count 351, magnesium 1.6.  CD4 122, viral load still in process.    1/25:  Interim reviewed, patient seen examined at bedside.  States he is feeling good today, trying his best to cooperate with physical therapy, encouraged to get out of bed.  Hemodynamically stable, afebrile.  Labs reviewed, white count 13, no left shift, H&H 7.6/23.8, platelet count 334.  Stable kidney function.  Viral load 20 copies, almost undetectable.    1/26:  Interim reviewed, patient seen examined at bedside, states he threw up at noon, asking for food.  States he is feeling better, encouraged to get out of bed.  Physical therapy still recommending skilled nursing facilities, case management trying to find a place for him to go.  Labs reviewed, white count improved, stable kidney function, magnesium 1.7, improved.    1/27: interim reviewed, patient seen and examined at bedside. Patient had hypotension this morning, now improved. Seen by Psych, recommendations noted. EEG reviewed, no abnormalities.     ID CALLED BACK FOR LEUKOCYTOSIS:  1/31: Interim reviewed, he received 1 UI PRBC yesterday,  blood cultures x 1 sent this morning.   Patient seen and examined at bedside, remains orthostatic, was able to work with PT, on midodrine. Hemodynamically stable, afebrile. Labs reviewed, leukocytosis 13, left shift PMN: 81%, H/H 7.8/24.9, plt 305. Stable kidney function, hyponatremia 131.     2/1:  Interim reviewed, patient seen examined at bedside.  No acute complaints, awaiting LTAC authorization, as per case management notes, [facility].  Labs reviewed, stable, likely reactive thrombocytosis yesterday from blood transfusion.    2/2:  Interim reviewed, patient seen examined at bedside, febrile 102.5, tachycardic, diaphoretic, complaining of chills.  He is also complaining of productive cough, will send respiratory culture.  Chest x-ray clear.  CT chest with right upper paratracheal adenopathy 2.8 x 1.5 cm.  No additional mediastinal, hilar or axillary adenopathy.  Right upper paratracheal adenopathy most likely secondary to infectious or inflammatory process.  IV access which was 12-day-old, removed today.  Blood cultures I urine culture in process.  Empirically started on vancomycin/cefepime IV.    2/3:  Interim reviewed, patient seen examined at bedside.  Hemodynamically stable, afebrile in the last 24 hours.  Blood cultures from yesterday 1/2 bottles grew Pseudomonas aeruginosa, sensitivities pending.  Likely from old IV access which was removed yesterday after 12 days.  Labs reviewed, white count 12, left shift 80.2%, H&H 8.5/25.5, platelet count 230.  Stable kidney function, magnesium 1.5, albumin 2.8.  Normal liver function.  02/05/2023.  Dr. Schwab.  Patient denies any complaints.  He is working with PT.  He is looking forward to  more strength.  Repeat blood cultures are no growth to date     2/6 (Deep): interim reviewed, patient seen and examined at bedside, states he wants to go home, he is drinking Glucerna, had a bath this morning. CM working on placement, due to insurance seems like  "patient will go home with PT. Labs reviewed, stable WBC< kidney and liver function. FOBT positive. EKG Qtc 408 ms, normal. Repeat blood cultures no growth to date, pending final.      Antibiotics (From admission, onward)      Start     Stop Route Frequency Ordered    02/02/23 2000  cefepime in dextrose 5 % IVPB 2 g         -- IV Every 8 hours (non-standard times) 02/02/23 1209    01/23/23 0900  sulfamethoxazole-trimethoprim 800-160mg per tablet 1 tablet        Note to Pharmacy: Ht: 6' 3" (1.905 m)  Wt: 52.3 kg (115 lb 3.2 oz)  Estimated Creatinine Clearance: 82.8 mL/min (based on SCr of 1 mg/dL).  Body mass index is 14.4 kg/m².    -- Oral Daily 01/22/23 1601           Antifungals (From admission, onward)      Start     Stop Route Frequency Ordered    01/17/23 0900  fluconazole tablet 800 mg         -- Oral Daily 01/16/23 2104          Antivirals (From admission, onward)          Stop Route Frequency     Prezcobix         -- Oral Daily     Truvada         -- Oral Daily            EXAM & DIAGNOSTICS REVIEWED:   Vitals:     Temp:  [97.7 °F (36.5 °C)-99.1 °F (37.3 °C)]   Temp: 97.7 °F (36.5 °C) (02/06/23 1100)  Pulse: 80 (02/06/23 1100)  Resp: 18 (02/06/23 1100)  BP: 114/84 (02/06/23 1100)  SpO2: 100 % (02/06/23 1100)    Intake/Output Summary (Last 24 hours) at 2/6/2023 1408  Last data filed at 2/6/2023 0554  Gross per 24 hour   Intake --   Output 800 ml   Net -800 ml           General:  Thin, in NAD, cooperative, comfortable on room air  Eyes:  Anicteric, EOMI -  iris heterochromia/ post surgical L eye   ENT:  Moist mucous membranes, no oral thrush  Neck:  supple  Lungs: Clear to auscultation   Heart:  RRR, no gallop/murmur/rub noted  Abd:  Soft, NT, ND, normal BS, no masses or organomegaly appreciated.  :  Voids , no flank tenderness  Musc:  Joints without effusion, swelling, erythema, synovitis, with mild generalized muscle wasting.   Skin:  No rashes. Dry skin.  Neuro:             Arousable, speech fluent, face " symmetric, moves all extremities, no focal weakness.  Finger-to-nose normal.  Heel-to-shin normal.  Psych:  Calm, cooperative  Lymphatic:        Extrem: No edema, erythema, phlebitis, cellulitis, warm and well perfused  VAD:  Peripheral IV access R arm 2/2  Isolation:  none  Wound: none      General Labs reviewed:  Recent Labs   Lab 02/04/23 0331 02/05/23 0525 02/06/23 0514   WBC 11.79 8.76 8.82   HGB 8.2* 8.5* 9.1*   HCT 24.8* 26.1* 27.5*    292 317       Recent Labs   Lab 02/04/23 0331 02/05/23 0525 02/06/23 0514   * 136 134*   K 3.6 3.8 3.9    110 107   CO2 18* 18* 18*   BUN 15 12 11   CREATININE 0.9 0.9 0.8   CALCIUM 9.7 9.6 9.9   PROT 6.2 6.6 6.9   BILITOT 0.4 0.4 0.4   ALKPHOS 68 68 74   ALT 29 27 24   AST 26 21 20     No results for input(s): CRP in the last 168 hours.    Estimated Creatinine Clearance: 134.6 mL/min (based on SCr of 0.8 mg/dL).    Micro:  Microbiology Results (last 7 days)       Procedure Component Value Units Date/Time    Blood culture [[id number]] Collected: 02/03/23 0950    Order Status: Completed Specimen: Blood from Antecubital, Left Updated: 02/06/23 1032     Blood Culture, Routine No Growth to date      No Growth to date      No Growth to date      No Growth to date    Blood culture [[id number]] Collected: 02/03/23 0956    Order Status: Completed Specimen: Blood from Antecubital, Right Updated: 02/06/23 1032     Blood Culture, Routine No Growth to date      No Growth to date      No Growth to date      No Growth to date    Blood culture [[id number]] Collected: 02/03/23 1910    Order Status: Completed Specimen: Blood from Peripheral, Left Hand Updated: 02/05/23 2032     Blood Culture, Routine No Growth to date      No Growth to date      No Growth to date    Blood culture [[id number]] Collected: 01/31/23 0919    Order Status: Completed Specimen: Blood Updated: 02/05/23 1032     Blood Culture, Routine No growth after 5 days.    Blood culture [[id number]]  (Abnormal)   (Susceptibility) Collected: 02/02/23 1001    Order Status: Completed Specimen: Blood Updated: 02/05/23 0751     Blood Culture, Routine Gram stain aer bottle: Gram negative rods      Results called to and read back by: Kaitlynn Farah RN on 1200 wing      02/03/2023  05:18 KS3      PSEUDOMONAS AERUGINOSA    Urine Culture High Risk [[id number]] Collected: 02/02/23 0931    Order Status: Completed Specimen: Urine, Clean Catch Updated: 02/05/23 0741     Urine Culture, Routine No growth    Narrative:      Indicated criteria for high risk culture:->Neutropenic  patient    Cryptococcal antigen [[id number]] Collected: 02/05/23 0525    Order Status: Sent Specimen: Blood, Venous Updated: 02/05/23 0538    Rapid Organism ID by PCR (from Blood culture) [[id number]]  (Abnormal) Collected: 02/02/23 1001    Order Status: Completed Updated: 02/03/23 0635     Enterococcus faecials Not Detected     Enterococcus faecium Not Detected     Listeria Monocytogenes Not Detected     Staphylococcus spp. Not Detected     Staphylococcus aureus Not Detected     Staphylococcus epidermidis Not Detected     Staphylococcus lugdunensis Not Detected     Streptococcus species Not Detected     Streptococcus agalactiae Not Detected     Streptococcus pneumoniae Not Detected     Streptococcus pyogenes Not Detected     Acinetobacter calcoaceticus/baumannii complex Not Detected     Bacteroides fragilis Not Detected     Enterobacerales Not Detected     Enterobacter cloacae complex Not Detected     Escherichia Not Detected     Klebsiella aerogenes Not Detected     Klebsiella oxytoca Not Detected     Klebsiella pneumoniae group Not Detected     Proteus Not Detected     Salmonella sp Not Detected     Serratia marcescens Not Detected     Haemophilus influenzae Not Detected     Neisseria meningtidis Not Detected     Pseudomonas aeruginosa Detected     Stenotrophomonas maltophilia Not Detected     Candida albicans Not Detected     Candida auris Not Detected     Candida  glabrata Not Detected     Candida krusei Not Detected     Candida Parapsilosis Not Detected     Candida Tropicalis Not Detected     Cryptococcus neoformans/gattii Not Detected     CTX-M Gene Not Detected     IMP Gene Not Detected     KPC  Not Detected     mcr-1  Test not applicable     mec A/C Test not applicable     mec A/C and MREJ (MRSA) Test not applicable     NDM Not Detected     OXA-48-like Test not applicable     van A/B Test not applicable     VIM Not Detected    Culture, Respiratory with Gram Stain [[id number]]     Order Status: No result Specimen: Respiratory            Blood Culture, Routine Gram stain aer bottle: Gram negative rods     Results called to and read back by: Kaitlynn Farah RN on 1200 wing     02/03/2023  05:18 KS3     PSEUDOMONAS AERUGINOSA Abnormal    Susceptibility     Pseudomonas aeruginosa     CULTURE, BLOOD     Cefepime <=2 mcg/mL Sensitive     Ciprofloxacin <=1 mcg/mL Sensitive     Gentamicin <=4 mcg/mL Sensitive     Levofloxacin <=2 mcg/mL Sensitive     Meropenem <=1 mcg/mL Sensitive     Piperacillin/Tazo <=16 mcg/mL Sensitive     Tobramycin <=4 mcg/mL Sensitive            Imaging Reviewed:   CXRs   CT chest with right upper paratracheal adenopathy most likely secondary to infectious or inflammatory process.    MRI brain 1/1    Cardiology: EKG Qtc 408ms, normal     IMPRESSION & PLAN     Pseudomonas aeruginosa bacteremia (1/2 bottles) likely secondary to old IV access, removed 2/2  Repeat blood cultures x 3 no growth to date, repeat set 2/5 in process    2.  Hypomagnesemia, improved - last Mg 1.3   Electrolyte abnormalities likely from ampho B,it has a long half life    3. HIV/AIDS C3 - Cryptococcal meningitis, now controlled, s/p 6 weeks of ambisome, on consolidation therapy on high dose oral fluconazole   Cryptococcoma, stable   CD4 11/5 7 - VL 48,127 - repeat CD4 127 and VL 20 copies, almost undetectable   On Prezcobix/Descovy   PPD 1/14 negative    4. Cachexia with failure to  thrive, slowly improving   On supplements     5. Pansinusitis, s/p Augmentin, improved  CRP 2.0    Recommendations:  Continue Cefepime 2g IV q8h for Pseudomonas aeruginosa bacteremia while inpatient   Upon discharge, Levaquin 750mg PO daily for 4 more days, complete 7 days of treatment, end date 2/9/2023  Continue Diflucan 800 mg per day for Cryptococcus, needs at least 12 months of therapy along with ART   Bactrim DS 1 tab daily, CD4 <200 for PJP   Continue Prezcobix/Descovy daily   Follows with ID in [place] [facility] working on placement vs home w/ PT  Aspiration precautions     Will be available if needed, thank you    D/w patient, Dr Alaniz    Medical Decision Making during this encounter was  [_] Low Complexity  [_] Moderate Complexity  [xxx  ] High Complexity

## 2023-02-06 NOTE — PT/OT/SLP PROGRESS
Occupational Therapy   Treatment    Name: El Abreu  MRN: [id number]  Admitting Diagnosis:  AIDS       Recommendations:     Discharge Recommendations: rehabilitation facility  Discharge Equipment Recommendations:  bedside commode  Barriers to discharge:  None    Assessment:     El Abreu is a 27 y.o. male with a medical diagnosis of AIDS.  Pt agreeable to OT therapy session this AM. Performance deficits affecting function are weakness, impaired endurance, impaired self care skills, impaired functional mobility, gait instability, impaired balance, decreased safety awareness, impaired cardiopulmonary response to activity. Pt required cues throughout session for initiation of tasks.    Rehab Prognosis:  Good; patient would benefit from acute skilled OT services to address these deficits and reach maximum level of function.       Plan:     Patient to be seen 3 x/week to address the above listed problems via self-care/home management, therapeutic exercises, therapeutic activities  Plan of Care Expires: 02/17/23  Plan of Care Reviewed with: patient    Subjective     Pain/Comfort:  Pain Rating 1: 0/10    Objective:     Communicated with: nursing prior to session.  Patient found HOB elevated with bed alarm, peripheral IV, telemetry upon OT entry to room.    General Precautions: Standard, fall    Orthopedic Precautions:N/A  Braces: N/A  Respiratory Status: Room air     Occupational Performance:     Bed Mobility:    Patient completed Supine to Sit with stand by assistance  Patient completed Sit to Supine with stand by assistance     Functional Mobility/Transfers:  Patient completed Sit <> Stand Transfer with contact guard assistance  with  rolling walker     Activities of Daily Living:  Bathing: stand by assistance seated EOB to bath upper body, upper legs, and periarea; assist needed to clean perianal area in standing with BUE support on RW (pt did not require assist for standing balance while OT assisted with  this)  Upper Body Dressing: minimum assistance to doff soiled gown and don clean gown  Lower Body Dressing: maximal assistance in standing to doff soiled brief and don clean brief in standing, pt with BUE support on RW and pt did not need any assist from OT for standing balance    Treatment & Education:  Pt educated on role of OT/POC, importance of OOB/EOB activity, use of call bell, and safety during ADLs, transfers, and functional mobility.    Patient left HOB elevated with all lines intact, call button in reach, bed alarm on, and RN notified    GOALS:   Multidisciplinary Problems       Occupational Therapy Goals          Problem: Occupational Therapy    Goal Priority Disciplines Outcome Interventions   Occupational Therapy Goal     OT, PT/OT Ongoing, Progressing    Description: Goals to be met by: 2/17/23     Patient will increase functional independence with ADLs by performing:    UE Dressing with Supervision.  LE Dressing with Supervision and Assistive Devices as needed.  Grooming while standing at sink with Supervision.  Toileting from bedside commode with Supervision for hygiene and clothing management.   Toilet transfer to bedside commode with Supervision.  Upper extremity exercise program x10 reps per handout, with supervision.                         Time Tracking:     OT Date of Treatment: 02/06/23  OT Start Time: 1031  OT Stop Time: 1103  OT Total Time (min): 32 min    Billable Minutes:Self Care/Home Management 32    OT/JACOB: OT          2/6/2023

## 2023-02-06 NOTE — PT/OT/SLP PROGRESS
Physical Therapy Treatment    Patient Name:  El Abreu   MRN:  [id number]    Recommendations:     Discharge Recommendations: rehabilitation facility  Discharge Equipment Recommendations: bedside commode  Barriers to discharge: None    Assessment:     El Abreu is a 27 y.o. male admitted with a medical diagnosis of AIDS.  He presents with the following impairments/functional limitations: weakness, impaired endurance, impaired self care skills, impaired functional mobility, gait instability, impaired balance, decreased upper extremity function, decreased lower extremity function, impaired skin, impaired cardiopulmonary response to activity.    Pt found supine in bed with covers over head.  He is very lethargic and stoic, but agrees to work with PT, states he would like to shower today.  BP monitored throughout for each position change to assess orthostatics.  Vitals as follows:supine 122/81, sitting /69, standing 108/70, following gait 114/72.  Supervision and extra time for bed mobiltiy and transfers.  Gait 2x70' w/RW and CGA. Continue to progress as able.    Rehab Prognosis: Fair; patient would benefit from acute skilled PT services to address these deficits and reach maximum level of function.    Recent Surgery: * No surgery found *      Plan:     During this hospitalization, patient to be seen 5 x/week to address the identified rehab impairments via gait training, therapeutic activities, therapeutic exercises and progress toward the following goals:    Plan of Care Expires:  03/02/23    Subjective     Chief Complaint: tired  Patient/Family Comments/goals: get better  Pain/Comfort:  Pain Rating 1: 0/10      Objective:     Communicated with RN prior to session.  Patient found HOB elevated with bed alarm, peripheral IV, blood pressure cuff upon PT entry to room.     General Precautions: Standard, fall  Orthopedic Precautions: N/A  Braces: N/A  Respiratory Status: Room air     Functional  Mobility:  Bed Mobility:     Supine to Sit: supervision  Sit to Supine: supervision  Transfers:     Sit to Stand:  minimum assistance with rolling walker  Gait: 2x70' w/RW      AM-PAC 6 CLICK MOBILITY  Turning over in bed (including adjusting bedclothes, sheets and blankets)?: 4  Sitting down on and standing up from a chair with arms (e.g., wheelchair, bedside commode, etc.): 3  Moving from lying on back to sitting on the side of the bed?: 4  Moving to and from a bed to a chair (including a wheelchair)?: 3  Need to walk in hospital room?: 3  Climbing 3-5 steps with a railing?: 1  Basic Mobility Total Score: 18       Treatment & Education:  Pt was educated on the following: call light use, importance of OOB activity and functional mobility to negate the negative effects of prolonged bed rest during this hospitalization, safe transfers/ambulation and discharge planning recommendations/options.     Patient left HOB elevated with all lines intact, call button in reach, RN notified, and RN and extender present..    GOALS:   Multidisciplinary Problems       Physical Therapy Goals          Problem: Physical Therapy    Goal Priority Disciplines Outcome Goal Variances Interventions   Physical Therapy Goal     PT, PT/OT Ongoing, Progressing     Description: Goals to be met by: 23     Patient will increase functional independence with mobility by performin. Supine to sit with Supervision  2. Sit to stand transfer with Supervision  3. Bed to chair transfer with Supervision using Rolling Walker  4. Gait  x 150 feet with Supervision using Rolling Walker.                              Time Tracking:     PT Received On: 23  PT Start Time: 1008     PT Stop Time: 1032  PT Total Time (min): 24 min     Billable Minutes: Gait Training 12 and Therapeutic Activity 12    Treatment Type: Treatment  PT/PTA: PT     PTA Visit Number: 3     2023

## 2023-02-06 NOTE — SUBJECTIVE & OBJECTIVE
Interval History:     Review of Systems  Objective:     Vital Signs (Most Recent):  Temp: 97.7 °F (36.5 °C) (02/06/23 1100)  Pulse: 80 (02/06/23 1100)  Resp: 18 (02/06/23 1100)  BP: 114/84 (02/06/23 1100)  SpO2: 100 % (02/06/23 1100) Vital Signs (24h Range):  Temp:  [97.7 °F (36.5 °C)-99.1 °F (37.3 °C)] 97.7 °F (36.5 °C)  Pulse:  [80-91] 80  Resp:  [18] 18  SpO2:  [99 %-100 %] 100 %  BP: ()/(57-84) 114/84     Weight: 68 kg (149 lb 14.6 oz)  Body mass index is 18.74 kg/m².    Intake/Output Summary (Last 24 hours) at 2/6/2023 1411  Last data filed at 2/6/2023 0554  Gross per 24 hour   Intake --   Output 800 ml   Net -800 ml      Physical Exam  Constitutional:       General: He is not in acute distress.     Appearance: He is well-developed.   HENT:      Head: Normocephalic.   Eyes:      Pupils: Pupils are equal, round, and reactive to light.   Cardiovascular:      Rate and Rhythm: Normal rate and regular rhythm.   Pulmonary:      Effort: Pulmonary effort is normal. No respiratory distress.   Abdominal:      General: Abdomen is flat. There is no distension.      Tenderness: There is no abdominal tenderness.   Musculoskeletal:      Cervical back: Neck supple.   Skin:     Findings: No rash.   Neurological:      Mental Status: He is alert and oriented to person, place, and time.   Psychiatric:         Mood and Affect: Mood normal.       Significant Labs: All pertinent labs within the past 24 hours have been reviewed.  CMP:   Recent Labs   Lab 02/05/23  0525 02/06/23  0514    134*   K 3.8 3.9    107   CO2 18* 18*    100   BUN 12 11   CREATININE 0.9 0.8   CALCIUM 9.6 9.9   PROT 6.6 6.9   ALBUMIN 2.7* 2.9*   BILITOT 0.4 0.4   ALKPHOS 68 74   AST 21 20   ALT 27 24   ANIONGAP 8 9     Cardiac Markers: No results for input(s): CKMB, MYOGLOBIN, BNP, TROPISTAT in the last 48 hours.  Coagulation: No results for input(s): PT, INR, APTT in the last 48 hours.    Significant Imaging: I have reviewed all  pertinent imaging results/findings within the past 24 hours.

## 2023-02-06 NOTE — PLAN OF CARE
02/06/23 1200   Discharge Reassessment   Assessment Type Discharge Planning Reassessment   Did the patient's condition or plan change since previous assessment? No   Discharge Plan discussed with: Parent(s)   Name(s) and Number(s) El Valerio [phone]   Communicated ZULMA with patient/caregiver Date not available/Unable to determine   Discharge Plan A Rehab   Discharge Plan B Skilled Nursing Facility   DME Needed Upon Discharge  none   Discharge Barriers Identified None   Why the patient remains in the hospital Placement issues   Post-Acute Status   Post-Acute Authorization Placement   Post-Acute Placement Status Referrals Sent   Coverage BCBS   Patient choice form signed by patient/caregiver List with quality metrics by geographic area provided     CM spoke with Pt parents about rehab denial and referrals being sent to skilled nursing facility in their area. CM to follow.

## 2023-02-06 NOTE — CARE UPDATE
CIARAN spoke with Mishel of [facility] via secure chat; states that [facility] has denied auth for LTAC.  CIARAN spoke with Vangie of [facility] and she states they can't take Patient and feels he is better suited for LTAC not rehab.

## 2023-02-06 NOTE — PROGRESS NOTES
[facility]  Adult Nutrition   Progress Note (Follow-Up)    SUMMARY      Recommendations:   1. Continue current diet as tolerated.  2. Send only Vanilla Glucerna . Discontinue Khurram BID as patient refuses.  3. Recommend adding Severe Malnutrition to problem list.      Goals:   Goals: 1. Patient to consume >/= EEN / EPN with po meals and supplements. 2. No further weight loss. 3. Labs trend to target range.Progressing    Dietitian Rounds Brief  Patient eating better. Dislikes Khurram and any flavor of Glucerna but Banilla. Patient last BM was 2/05/23. Patient dc is pending. RD to continue to [facility] for status change.    Diet order: Diabetic 2200 kcal    Oral Supplement: Glucerna  with meals    % Intake of Estimated Energy Needs: 50 - 75 %  % Meal Intake: 50 - 75 %    Estimated/Assessed Needs  Weight Used For Calorie Calculations: 52.2 kg (115 lb 1.3 oz)  Energy Calorie Requirements (kcal): 5269-3553 (35-40)     Protein Requirements:  (1.5-2.0)  Weight Used For Protein Calculations: 52.2 kg (115 lb 1.3 oz)     Estimated Fluid Requirement Method: RDA Method  RDA Method (mL): 1827       Weight History:  Wt Readings from Last 5 Encounters:   02/03/23 68 kg (149 lb 14.6 oz)   02/02/23 70.8 kg (156 lb)   01/15/23 52.3 kg (115 lb 3.2 oz)   01/13/23 61 kg (134 lb 7.7 oz)   01/02/23 60.1 kg (132 lb 7.9 oz)      Reason for Assessment  Reason For Assessment: consult (diet educaion trigger-- pneumonia and diabetes)  Diagnosis: infection/sepsis, other (see comments), pulmonary disease (Pneumonia)  Relevant Medical History: diabetes, AIDS, PAF, fungal meningitis    Medications:Pertinent Medications Reviewed  Scheduled Meds:   ceFEPime (MAXIPIME) IVPB  2 g Intravenous Q8H    darunavir-cobicistat  1 tablet Oral Daily    emtricitabine-tenofovir 200-300 mg  1 tablet Oral Daily    enoxaparin  40 mg Subcutaneous Daily    EScitalopram oxalate  20 mg Oral Daily    ferrous sulfate  1 tablet Oral Daily    fluconazole   800 mg Oral Daily    fluticasone propionate  2 spray Each Nostril BID    folic acid  1 mg Oral Daily    insulin detemir U-100  10 Units Subcutaneous BID    levETIRAcetam  1,500 mg Oral BID    magnesium chloride  128 mg Oral Daily    megestroL  40 mg Oral Daily    midodrine  10 mg Oral TID WM    mirtazapine  15 mg Oral QHS    multivitamin  1 tablet Oral Daily    pantoprazole  40 mg Oral Before breakfast    sodium bicarbonate  650 mg Oral TID    sodium chloride  1 g Oral BID    sulfamethoxazole-trimethoprim 800-160mg  1 tablet Oral Daily    thiamine  100 mg Oral Daily     Continuous Infusions:  PRN Meds:.sodium chloride, acetaminophen, dextrose 10%, dextrose 10%, glucagon (human recombinant), glucose, glucose, HYDROcodone-acetaminophen, HYDROcodone-acetaminophen, insulin aspart U-100, lorazepam, magnesium sulfate IVPB, magnesium sulfate IVPB, melatonin, melatonin, naloxone, ondansetron, polyethylene glycol, potassium bicarbonate, potassium bicarbonate, potassium bicarbonate, prochlorperazine, simethicone, sodium chloride 0.9%    Labs: Pertinent Labs Reviewed  Clinical Chemistry:  Recent Labs   Lab 02/04/23  0331 02/05/23  0525 02/06/23  0514   * 136 134*   K 3.6 3.8 3.9    110 107   CO2 18* 18* 18*   GLU 73 105 100   BUN 15 12 11   CREATININE 0.9 0.9 0.8   CALCIUM 9.7 9.6 9.9   PROT 6.2 6.6 6.9   ALBUMIN 2.7* 2.7* 2.9*   BILITOT 0.4 0.4 0.4   ALKPHOS 68 68 74   AST 26 21 20   ALT 29 27 24   ANIONGAP 9 8 9   MG 1.6 1.2* 1.3*   PHOS 3.5 3.4 3.4     CBC:   Recent Labs   Lab 02/06/23  0514   WBC 8.82   RBC 3.25*   HGB 9.1*   HCT 27.5*      MCV 85   MCH 28.0   MCHC 33.1     Monitor and Evaluation  Food and Nutrient Intake: energy intake, food and beverage intake  Food and Nutrient Adminstration: diet order  Knowledge/Beliefs/Attitudes: food and nutrition knowledge/skill  Physical Activity and Function: nutrition-related ADLs and IADLs  Anthropometric Measurements: weight, weight change, body mass  index  Biochemical Data, Medical Tests and Procedures: electrolyte and renal panel, gastrointestinal profile, glucose/endocrine profile, inflammatory profile, lipid profile  Nutrition-Focused Physical Findings: overall appearance     Nutrition Risk  Level of Risk/Frequency of Follow-up: moderate - high     Nutrition Follow-Up  RD Follow-up?: Yes    Teetee Cabrales RD 02/06/2023 5:06 PM

## 2023-02-06 NOTE — PROGRESS NOTES
[facility] Medicine  Progress Note    Patient Name: El Abreu  MRN: [id number]  Patient Class: IP- Inpatient   Admission Date: 1/16/2023  Length of Stay: 19 days  Attending Physician: Gary Alaniz MD  Primary Care Provider: [facility]        Subjective:     Principal Problem:AIDS        HPI:  El Abreu is a 27-year-old male with chronic medical problems including HIV/aids and diabetes mellitus who recently had a long hospitalization at [facility] with cryptococcal meningitis and neurosyphilis.  Hospitalization was complicated by acute kidney injury, GI bleed, and malnutrition and conditioning and he was transitioned to TCU for continued treatment of fungal meningitis and rehab. He was sent to [facility] Emergency room for evaluation of low magnesium hemoglobin.  Patient is sitting up in bed eating a [employer] chicken sandwich.  He states he is very weak in his unable to stand.  He said he has been in bed for weeks.  He denies headache vision changes, denies shortness of breath but states that he is had a cough brownish base sputum.  He denies fevers or chills, states he gets dizzy when he tries to stand or sit up.  Denies any trouble voiding or diarrhea.  Magnesium was replaced, initially 0.8, last 1 today 1.4, WBC elevated at 17.7, hemoglobin 7.9, in the last 8 days range was 8.2-9.2.  Potassium decreased at 3.5, glucose 135, creatinine 1.0 and estimated GFR greater than 60.  Creatinine clearance 82.8.  Vital signs with temperature 97.6°, heart rate 83, respiratory rate 23, blood pressure 109/64 and O2 sat 100.  Chest x-ray with nothing acute.  He was started on cefepime, vancomycin and lactated Ringer's bolus.  ED physician spoke with Infectious Disease doctor at [facility] who recommended transfer there but they were on diversion.  Patient will be admitted here for treatment and we will try to transfer as  able.    He originally presented to [facility] with lethargy and headaches on 11/05.  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  He was initially started on amphotericin B and flucytosine for 14 days of induction, broad-spectrum antibiotics including ceftriaxone twice daily and acyclovir.  CSF VDRL negative but treponemal antibodies on CSF were positive CD4 count 7, HIV viral load 48,000 K, genotype [id number]. R: DDI, hepatitis-B C and B antigen negative, GC chlamydia negative toxoplasma negative, CMV PCR negative.  He was also placed on atovaquone initially and then changed to Bactrim DS 3 times weekly for PJP prophylaxis.  After completing 14 days of induction with amphotericin and flucytosine he was switched to 800 mg oral daily fluconazole for 8 weeks through January 13 for consolidation and then was to continue fluconazole 200 mg for long-term suppression.  He also completed 10 days of IV ceftriaxone and then 4 days of IV penicillin Recommended starting ART as outpatient after treatment with cryptococcal meningitis for 4-5 weeks.  He was started on Keppra and Vimpat seizures.  He was discharged from [facility] on 11/21 and returned 11/30 with headache in [place]isha ink stain was still positive and was found to only be on 400 of fluconazole instead of 800 a day.  He was found have crypto and was started again on amphotericin B and flucytosine for 6 weeks.  Apparently he was noncompliant in the hospital and eventually did switch to AmBisome and fluconazole 800 orally until January 11th.  He was started on ART  but was noncompliant so it was held again for a time and he was seen by psychiatry and meds were adjusted.   He had problems with kidney function and was switched to atovaquone.  He repeated a course of weekly Bicillin LA injections x3 doses and completed  treatment on 01/03.  Symtuza. (Descovy + Prezcobix) was started on 12/23.  He was discharged to skilled nursing to continue treatment after improving renal failure, nausea, coffee-ground and was also seen by Psychiatry.          Overview/Hospital Course:  Assumed care   Patient wants to go home if LTAC is not ok  He mobilize in the room with walker     2/6  No CP or SOB  Wants to go home  SNF and LTAC all denied. D/w ID . Possible DC with PO levaquin   BP drop at exertion             Interval History:     Review of Systems  Objective:     Vital Signs (Most Recent):  Temp: 97.7 °F (36.5 °C) (02/06/23 1100)  Pulse: 80 (02/06/23 1100)  Resp: 18 (02/06/23 1100)  BP: 114/84 (02/06/23 1100)  SpO2: 100 % (02/06/23 1100) Vital Signs (24h Range):  Temp:  [97.7 °F (36.5 °C)-99.1 °F (37.3 °C)] 97.7 °F (36.5 °C)  Pulse:  [80-91] 80  Resp:  [18] 18  SpO2:  [99 %-100 %] 100 %  BP: ()/(57-84) 114/84     Weight: 68 kg (149 lb 14.6 oz)  Body mass index is 18.74 kg/m².    Intake/Output Summary (Last 24 hours) at 2/6/2023 1411  Last data filed at 2/6/2023 0554  Gross per 24 hour   Intake --   Output 800 ml   Net -800 ml      Physical Exam  Constitutional:       General: He is not in acute distress.     Appearance: He is well-developed.   HENT:      Head: Normocephalic.   Eyes:      Pupils: Pupils are equal, round, and reactive to light.   Cardiovascular:      Rate and Rhythm: Normal rate and regular rhythm.   Pulmonary:      Effort: Pulmonary effort is normal. No respiratory distress.   Abdominal:      General: Abdomen is flat. There is no distension.      Tenderness: There is no abdominal tenderness.   Musculoskeletal:      Cervical back: Neck supple.   Skin:     Findings: No rash.   Neurological:      Mental Status: He is alert and oriented to person, place, and time.   Psychiatric:         Mood and Affect: Mood normal.       Significant Labs: All pertinent labs within the past 24 hours have been reviewed.  CMP:   Recent Labs    Lab 02/05/23  0525 02/06/23  0514    134*   K 3.8 3.9    107   CO2 18* 18*    100   BUN 12 11   CREATININE 0.9 0.8   CALCIUM 9.6 9.9   PROT 6.6 6.9   ALBUMIN 2.7* 2.9*   BILITOT 0.4 0.4   ALKPHOS 68 74   AST 21 20   ALT 27 24   ANIONGAP 8 9     Cardiac Markers: No results for input(s): CKMB, MYOGLOBIN, BNP, TROPISTAT in the last 48 hours.  Coagulation: No results for input(s): PT, INR, APTT in the last 48 hours.    Significant Imaging: I have reviewed all pertinent imaging results/findings within the past 24 hours.      Assessment/Plan:      Active Hospital Problems    Diagnosis    *AIDS    Bacteremia    Fever    Weakness    Physical deconditioning    Paroxysmal atrial fibrillation    Anemia    Hypokalemia    Hypomagnesemia    Seizures complicating infection    Cryptococcal meningitis    Type 2 diabetes mellitus without retinopathy       Plan:   Diflucan for  cryptococcal meningitis    s/p neurosyphilis treatment   -CD4 127 and VL 20   -Bcx 2/2/23 x 1 pseudomonas. Rpt Bcx negative, on cefepime until discharge and PO levaquin to follow   -Remains on Bactrim for PJP prophylaxis   -Right upper paratracheal adenopathy on CT chest, consider EUS-guided FNA as outpatient   -Frequent orthostatic vital signs, on midodrine  Awaited LTAC placement and denied by LTAC and SNF   . Patient wants to go home with home IV and possible DC home with HH and PT   He said his father will take care     VTE Risk Mitigation (From admission, onward)         Ordered     Place CARMEN hose  Until discontinued         01/18/23 1830     enoxaparin injection 40 mg  Daily         01/16/23 2104                Discharge Planning   ZULMA: 2/7/2023     Code Status: Full Code   Is the patient medically ready for discharge?:     Reason for patient still in hospital (select all that apply): Treatment  Discharge Plan A: Rehab   Discharge Delays: (!) Change in Medical Condition              Gary Alaniz MD  Department of Hospital  Medicine   [facility]

## 2023-02-06 NOTE — PLAN OF CARE
Problem: Oral Intake Inadequate  Goal: Improved Oral Intake  Outcome: Ongoing, Progressing  Intervention: Promote and Optimize Oral Intake  Flowsheets (Taken 2/6/2023 1710)  Oral Nutrition Promotion: calorie-dense liquids provided

## 2023-02-07 VITALS
BODY MASS INDEX: 18.64 KG/M2 | HEIGHT: 75 IN | WEIGHT: 149.94 LBS | RESPIRATION RATE: 16 BRPM | OXYGEN SATURATION: 97 % | TEMPERATURE: 99 F | SYSTOLIC BLOOD PRESSURE: 123 MMHG | HEART RATE: 109 BPM | DIASTOLIC BLOOD PRESSURE: 74 MMHG

## 2023-02-07 LAB — GLUCOSE SERPL-MCNC: 89 MG/DL (ref 70–110)

## 2023-02-07 PROCEDURE — 63600175 PHARM REV CODE 636 W HCPCS: Mod: TB,JG | Performed by: INTERNAL MEDICINE

## 2023-02-07 PROCEDURE — 63700000 PHARM REV CODE 250 ALT 637 W/O HCPCS: Performed by: INTERNAL MEDICINE

## 2023-02-07 PROCEDURE — 63700000 PHARM REV CODE 250 ALT 637 W/O HCPCS: Performed by: NURSE PRACTITIONER

## 2023-02-07 PROCEDURE — 97530 THERAPEUTIC ACTIVITIES: CPT

## 2023-02-07 PROCEDURE — 25000003 PHARM REV CODE 250: Performed by: STUDENT IN AN ORGANIZED HEALTH CARE EDUCATION/TRAINING PROGRAM

## 2023-02-07 PROCEDURE — 25000003 PHARM REV CODE 250: Performed by: INTERNAL MEDICINE

## 2023-02-07 PROCEDURE — 63600175 PHARM REV CODE 636 W HCPCS: Performed by: NURSE PRACTITIONER

## 2023-02-07 PROCEDURE — 25000003 PHARM REV CODE 250: Performed by: HOSPITALIST

## 2023-02-07 PROCEDURE — 25000003 PHARM REV CODE 250: Performed by: NURSE PRACTITIONER

## 2023-02-07 RX ORDER — MIRTAZAPINE 15 MG/1
15 TABLET, FILM COATED ORAL NIGHTLY
Qty: 30 TABLET | Refills: 0 | Status: SHIPPED | OUTPATIENT
Start: 2023-02-07 | End: 2023-02-07 | Stop reason: SDUPTHER

## 2023-02-07 RX ORDER — METOCLOPRAMIDE 10 MG/1
10 TABLET ORAL
Qty: 120 TABLET | Refills: 0 | Status: SHIPPED | OUTPATIENT
Start: 2023-02-07

## 2023-02-07 RX ORDER — ONDANSETRON 8 MG/1
8 TABLET, ORALLY DISINTEGRATING ORAL EVERY 8 HOURS
Qty: 30 TABLET | Refills: 0 | Status: SHIPPED | OUTPATIENT
Start: 2023-02-07 | End: 2023-02-17

## 2023-02-07 RX ORDER — MEGESTROL ACETATE 40 MG/1
40 TABLET ORAL DAILY
Qty: 30 TABLET | Refills: 11 | Status: SHIPPED | OUTPATIENT
Start: 2023-02-07 | End: 2024-02-07

## 2023-02-07 RX ORDER — CYPROHEPTADINE HYDROCHLORIDE 4 MG/1
4 TABLET ORAL 3 TIMES DAILY
Qty: 160 TABLET | Refills: 0 | Status: SHIPPED | OUTPATIENT
Start: 2023-02-07 | End: 2023-02-07 | Stop reason: SDUPTHER

## 2023-02-07 RX ORDER — FLUCONAZOLE 200 MG/1
800 TABLET ORAL DAILY
Qty: 120 TABLET | Refills: 1 | Status: SHIPPED | OUTPATIENT
Start: 2023-02-07 | End: 2023-02-07 | Stop reason: SDUPTHER

## 2023-02-07 RX ORDER — LEVOFLOXACIN 750 MG/1
750 TABLET ORAL DAILY
Qty: 3 TABLET | Refills: 0 | Status: SHIPPED | OUTPATIENT
Start: 2023-02-07 | End: 2023-03-02

## 2023-02-07 RX ORDER — SULFAMETHOXAZOLE AND TRIMETHOPRIM 800; 160 MG/1; MG/1
1 TABLET ORAL DAILY
Qty: 90 TABLET | Refills: 0 | Status: SHIPPED | OUTPATIENT
Start: 2023-02-08 | End: 2023-03-02

## 2023-02-07 RX ORDER — LEVOFLOXACIN 750 MG/1
750 TABLET ORAL DAILY
Qty: 3 TABLET | Refills: 0 | Status: SHIPPED | OUTPATIENT
Start: 2023-02-07 | End: 2023-02-07 | Stop reason: SDUPTHER

## 2023-02-07 RX ORDER — MIDODRINE HYDROCHLORIDE 10 MG/1
10 TABLET ORAL
Qty: 120 TABLET | Refills: 0 | Status: SHIPPED | OUTPATIENT
Start: 2023-02-07 | End: 2023-02-07 | Stop reason: SDUPTHER

## 2023-02-07 RX ORDER — LEVETIRACETAM 750 MG/1
1500 TABLET ORAL 2 TIMES DAILY
Qty: 120 TABLET | Refills: 1 | Status: SHIPPED | OUTPATIENT
Start: 2023-02-07 | End: 2024-02-07

## 2023-02-07 RX ORDER — MIDODRINE HYDROCHLORIDE 10 MG/1
10 TABLET ORAL
Qty: 120 TABLET | Refills: 0 | Status: ON HOLD | OUTPATIENT
Start: 2023-02-07 | End: 2023-05-14 | Stop reason: HOSPADM

## 2023-02-07 RX ORDER — FLUCONAZOLE 200 MG/1
800 TABLET ORAL DAILY
Qty: 120 TABLET | Refills: 1 | Status: SHIPPED | OUTPATIENT
Start: 2023-02-07 | End: 2023-03-02 | Stop reason: SDUPTHER

## 2023-02-07 RX ORDER — MIRTAZAPINE 15 MG/1
15 TABLET, FILM COATED ORAL NIGHTLY
Qty: 30 TABLET | Refills: 0 | Status: SHIPPED | OUTPATIENT
Start: 2023-02-07 | End: 2024-01-25

## 2023-02-07 RX ORDER — CYPROHEPTADINE HYDROCHLORIDE 4 MG/1
4 TABLET ORAL 3 TIMES DAILY
Qty: 160 TABLET | Refills: 0 | Status: SHIPPED | OUTPATIENT
Start: 2023-02-07

## 2023-02-07 RX ORDER — SULFAMETHOXAZOLE AND TRIMETHOPRIM 800; 160 MG/1; MG/1
1 TABLET ORAL DAILY
Qty: 90 TABLET | Refills: 0 | Status: SHIPPED | OUTPATIENT
Start: 2023-02-08 | End: 2023-02-07 | Stop reason: SDUPTHER

## 2023-02-07 RX ADMIN — EMTRICITABINE AND TENOFOVIR DISOPROXIL FUMARATE 1 TABLET: 200; 300 TABLET, FILM COATED ORAL at 09:02

## 2023-02-07 RX ADMIN — FERROUS SULFATE TAB 325 MG (65 MG ELEMENTAL FE) 1 EACH: 325 (65 FE) TAB at 09:02

## 2023-02-07 RX ADMIN — CEFEPIME HYDROCHLORIDE 2 G: 2 INJECTION, SOLUTION INTRAVENOUS at 12:02

## 2023-02-07 RX ADMIN — PANTOPRAZOLE SODIUM 40 MG: 40 TABLET, DELAYED RELEASE ORAL at 06:02

## 2023-02-07 RX ADMIN — SODIUM CHLORIDE TAB 1 GM 1 G: 1 TAB at 09:02

## 2023-02-07 RX ADMIN — ESCITALOPRAM OXALATE 20 MG: 10 TABLET ORAL at 09:02

## 2023-02-07 RX ADMIN — LEVETIRACETAM 1500 MG: 500 TABLET, FILM COATED ORAL at 09:02

## 2023-02-07 RX ADMIN — SULFAMETHOXAZOLE AND TRIMETHOPRIM 1 TABLET: 800; 160 TABLET ORAL at 09:02

## 2023-02-07 RX ADMIN — THIAMINE HCL TAB 100 MG 100 MG: 100 TAB at 09:02

## 2023-02-07 RX ADMIN — ONDANSETRON 4 MG: 2 INJECTION INTRAMUSCULAR; INTRAVENOUS at 11:02

## 2023-02-07 RX ADMIN — MEGESTROL ACETATE 40 MG: 20 TABLET ORAL at 09:02

## 2023-02-07 RX ADMIN — FLUCONAZOLE 800 MG: 100 TABLET ORAL at 09:02

## 2023-02-07 RX ADMIN — FLUTICASONE PROPIONATE 100 MCG: 50 SPRAY, METERED NASAL at 09:02

## 2023-02-07 RX ADMIN — MIDODRINE HYDROCHLORIDE 10 MG: 2.5 TABLET ORAL at 12:02

## 2023-02-07 RX ADMIN — MAGNESIUM 64 MG (MAGNESIUM CHLORIDE) TABLET,DELAYED RELEASE 128 MG: at 09:02

## 2023-02-07 RX ADMIN — FOLIC ACID 1 MG: 1 TABLET ORAL at 09:02

## 2023-02-07 RX ADMIN — MIDODRINE HYDROCHLORIDE 10 MG: 2.5 TABLET ORAL at 09:02

## 2023-02-07 RX ADMIN — THERA TABS 1 TABLET: TAB at 09:02

## 2023-02-07 RX ADMIN — DARUNAVIR ETHANOLATE AND COBICISTAT 1 TABLET: 800; 150 TABLET, FILM COATED ORAL at 09:02

## 2023-02-07 RX ADMIN — SODIUM BICARBONATE 650 MG TABLET 650 MG: at 09:02

## 2023-02-07 RX ADMIN — CEFEPIME HYDROCHLORIDE 2 G: 2 INJECTION, SOLUTION INTRAVENOUS at 04:02

## 2023-02-07 NOTE — PLAN OF CARE
Pt cleared for discharge from case management    [facility] initially accept pt but SW received call from [facility] unable to accpet pt because out of service area.  [facility] unable to accept.  HH orders sent to 4 other agencies, pending acceptance.    PCP appt for 2-16-23 at 8:15am, on S    Dmitriy at [facility] approved BSC and SW delivered and father signed for delivery.     02/07/23 1516   Final Note   Assessment Type Final Discharge Note   Anticipated Discharge Disposition Home-Health   What phone number can be called within the next 1-3 days to see how you are doing after discharge?   ([phone])   Hospital Resources/Appts/Education Provided Appointments scheduled and added to AVS   Post-Acute Status   Post-Acute Authorization Home Health   Post-Acute Placement Status Set-up Complete/Auth obtained

## 2023-02-07 NOTE — DISCHARGE SUMMARY
[facility] Medicine  Discharge Summary      Patient Name: El Abreu  MRN: [id number]  [facility]: [id number]  Patient Class: IP- Inpatient  Admission Date: 1/16/2023  Hospital Length of Stay: 20 days  Discharge Date and Time:  02/07/2023 4:43 PM  Attending Physician: Gary Alaniz MD   Discharging Provider: Gary Alaniz MD  Primary Care Provider: [facility]    Primary Care Team: Networked reference to record PCT     HPI:   El Abreu is a 27-year-old male with chronic medical problems including HIV/aids and diabetes mellitus who recently had a long hospitalization at [facility] with cryptococcal meningitis and neurosyphilis.  Hospitalization was complicated by acute kidney injury, GI bleed, and malnutrition and conditioning and he was transitioned to TCU for continued treatment of fungal meningitis and rehab. He was sent to [facility] Emergency room for evaluation of low magnesium hemoglobin.  Patient is sitting up in bed eating a [employer] chicken sandwich.  He states he is very weak in his unable to stand.  He said he has been in bed for weeks.  He denies headache vision changes, denies shortness of breath but states that he is had a cough brownish base sputum.  He denies fevers or chills, states he gets dizzy when he tries to stand or sit up.  Denies any trouble voiding or diarrhea.  Magnesium was replaced, initially 0.8, last 1 today 1.4, WBC elevated at 17.7, hemoglobin 7.9, in the last 8 days range was 8.2-9.2.  Potassium decreased at 3.5, glucose 135, creatinine 1.0 and estimated GFR greater than 60.  Creatinine clearance 82.8.  Vital signs with temperature 97.6°, heart rate 83, respiratory rate 23, blood pressure 109/64 and O2 sat 100.  Chest x-ray with nothing acute.  He was started on cefepime, vancomycin and lactated Ringer's bolus.  ED physician spoke with Infectious Disease doctor at [facility] who recommended  transfer there but they were on diversion.  Patient will be admitted here for treatment and we will try to transfer as able.    He originally presented to [facility] with lethargy and headaches on 11/05.  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  He was initially started on amphotericin B and flucytosine for 14 days of induction, broad-spectrum antibiotics including ceftriaxone twice daily and acyclovir.  CSF VDRL negative but treponemal antibodies on CSF were positive CD4 count 7, HIV viral load 48,000 K, genotype [id number]. R: DDI, hepatitis-B C and B antigen negative, GC chlamydia negative toxoplasma negative, CMV PCR negative.  He was also placed on atovaquone initially and then changed to Bactrim DS 3 times weekly for PJP prophylaxis.  After completing 14 days of induction with amphotericin and flucytosine he was switched to 800 mg oral daily fluconazole for 8 weeks through January 13 for consolidation and then was to continue fluconazole 200 mg for long-term suppression.  He also completed 10 days of IV ceftriaxone and then 4 days of IV penicillin Recommended starting ART as outpatient after treatment with cryptococcal meningitis for 4-5 weeks.  He was started on Keppra and Vimpat seizures.  He was discharged from [facility] on 11/21 and returned 11/30 with headache in Sirisha ink stain was still positive and was found to only be on 400 of fluconazole instead of 800 a day.  He was found have crypto and was started again on amphotericin B and flucytosine for 6 weeks.  Apparently he was noncompliant in the hospital and eventually did switch to AmBisome and fluconazole 800 orally until January 11th.  He was started on ART  but was noncompliant so it was held again for a time and he was seen by psychiatry and meds were adjusted.   He had problems with kidney  function and was switched to atovaquone.  He repeated a course of weekly Bicillin LA injections x3 doses and completed treatment on 01/03.  Symtuza. (Descovy + Prezcobix) was started on 12/23.  He was discharged to skilled nursing to continue treatment after improving renal failure, nausea, coffee-ground and was also seen by Psychiatry.          * No surgery found *      Hospital Course:    27 y.o. male with PMH  cryptococcal meningitis with cryptococcoma  in November, failing a 2 week course of amphotericin plus flucytosine followed by high-dose oral fluconazole .  s/p cryptococcal meningitis s/p 6 week course of AmBisome , s/p treatment  for neurosyphilis    He was sent from facility with the severe hypomagnesemia and elevated WBC and anemia.   Admitted to hospital and found Pseudomonas aeruginosa bacteremia (1/2 bottles) likely secondary to old IV access, removed 2/2.  hypoMg improved with supplement and continue his antivital and cryptococcal treatment .  He was not eating with cachexia and later gradually  improved nutrition and DC with appetite stimulant and total 3 more days course of Levaquin.  Nineteen facilities all denied acceptance and later patient is adamant to go home and requested to get discharge and discharged  and home health and physical therapy arranged .  Patient sure that his father is going to help and going to live with his father. ID increased bacrim dose to daily .          Goals of Care Treatment Preferences:  Code Status: Full Code          What is most important right now is to focus on remaining as independent as possible, improvement in condition but with limits to invasive therapies.  Accordingly, we have decided that the best plan to meet the patient's goals includes continuing with treatment.      Consults:   Consults (From admission, onward)        Status Ordering Provider     Inpatient consult to Psychiatry  Once        Provider:  Lucia Keating MD    Acknowledged RIKKI  SHAQ     Inpatient consult to Neurology  Once        Provider:  Charlie Adair MD    Completed SHAQ BRANDT     Inpatient consult to   Once        Provider:  (Not yet assigned)    Completed LATIA YU consult to case management  Once        Provider:  (Not yet assigned)    Completed BLANCA RICH     Inpatient consult to Infectious Diseases  Once        Provider:  Sofia Ny MD    Completed CATHI MURRAY     Inpatient consult to Registered Dietitian/Nutritionist  Once        Provider:  (Not yet assigned)    Completed CATHI MURRAY     Inpatient consult to Internal Medicine  Once        Provider:  Cathi Murray NP    Acknowledged CASA DUDLEY JR     Inpatient consult to Infectious Diseases  Once        Provider:  Sofia Ny MD    Completed CASA DUDLEY JR          No new Assessment & Plan notes have been filed under this hospital service since the last note was generated.  Service: Hospital Medicine    Final Active Diagnoses:    Diagnosis Date Noted POA    PRINCIPAL PROBLEM:  AIDS [B20] 11/06/2022 Yes    Bacteremia [R78.81] 02/03/2023 Unknown    Fever [R50.9] 02/02/2023 No    Weakness [R53.1] 01/17/2023 Yes    Physical deconditioning [R53.81] 01/16/2023 Yes    Paroxysmal atrial fibrillation [I48.0] 01/16/2023 Yes    Anemia [D64.9] 12/29/2022 Yes    Hypokalemia [E87.6] 12/16/2022 Yes    Hypomagnesemia [E83.42] 12/10/2022 Yes    Seizures complicating infection [B99.9, R56.9] 11/06/2022 Yes    Cryptococcal meningitis [B45.1] 11/05/2022 Yes    Type 2 diabetes mellitus without retinopathy [E11.9] 05/18/2014 Yes      Problems Resolved During this Admission:       Discharged Condition: good    Disposition: Home-Health Care [facility]    Follow Up:   Follow-up Information     [facility]. Go to.    Specialty: Family Medicine  Why: APPOINTMENT:  February 16, 2023 at 8:15am  Hospital Follow up  Contact  "information:  [address] [facility] [address]  [phone]             Radha Elias MD. Go in 1 week(s).    Specialty: Infectious Diseases  Why: In Basket message sent to Dr. Adame's staff. Office to contact you with appointment information.  Contact information:  [address] [facility]  [address]  [facility] [address]  [phone]             Miller Rush MD Follow up in 2 week(s).    Specialty: Infectious Diseases  Contact information:  [address] [place] [address]  [phone]             [facility] Follow up.    Why: Home Health  Contact information:  [address] [facility], [address]  [place] [address]  [phone]                     Patient Instructions:      COMMODE FOR HOME USE     Order Specific Question Answer Comments   Type: Standard    Height: 6' 3" (1.905 m)    Weight: 70.9 kg (156 lb 4.9 oz)    Does patient have medical equipment at home? walker, rolling    Length of need (1-99 months): 99      Ambulatory referral/consult to Home Health   Standing Status: Future   Referral Priority: Routine Referral Type: Home Health   Referral Reason: Specialty Services Required   Requested Specialty: Home Health Services   Number of Visits Requested: 1     Ambulatory referral/consult to Home Health   Standing Status: Future   Referral Priority: Routine Referral Type: Home Health   Referral Reason: Specialty Services Required   Requested Specialty: Home Health Services   Number of Visits Requested: 1     Diet Adult Regular     Activity as tolerated       Significant Diagnostic Studies: Labs:   CMP   Recent Labs   Lab 02/06/23  0514   *   K 3.9      CO2 18*      BUN 11   CREATININE 0.8   CALCIUM 9.9   PROT 6.9   ALBUMIN 2.9*   BILITOT 0.4   ALKPHOS 74   AST 20   ALT 24   ANIONGAP 9    and CBC   Recent Labs   Lab 02/06/23  0514   WBC 8.82   HGB 9.1*   HCT 27.5*          Pending Diagnostic Studies:     None       "   Medications:  Reconciled Home Medications:      Medication List      START taking these medications    levoFLOXacin 750 MG tablet  Commonly known as: LEVAQUIN  Take 1 tablet (750 mg total) by mouth once daily.     midodrine 10 MG tablet  Commonly known as: PROAMATINE  Take 1 tablet (10 mg total) by mouth 3 (three) times daily with meals.     mirtazapine 15 MG tablet  Commonly known as: REMERON  Take 1 tablet (15 mg total) by mouth every evening.        CHANGE how you take these medications    cyproheptadine 4 mg tablet  Commonly known as: PERIACTIN  Take 1 tablet (4 mg total) by mouth 3 (three) times daily.  What changed: when to take this     insulin aspart U-100 100 unit/mL (3 mL) Inpn pen  Commonly known as: NovoLOG  Inject 15 Units into the skin 3 (three) times daily.  What changed:   · how much to take  · when to take this     risperiDONE 0.5 MG Tab  Commonly known as: RISPERDAL  Take 1 tablet (0.5 mg total) by mouth once daily.  What changed: Another medication with the same name was removed. Continue taking this medication, and follow the directions you see here.     sulfamethoxazole-trimethoprim 800-160mg 800-160 mg Tab  Commonly known as: BACTRIM DS  Take 1 tablet by mouth once daily.  Start taking on: February 8, 2023  What changed: when to take this        CONTINUE taking these medications    acetaminophen 650 MG Tbsr  Commonly known as: TYLENOL  Take 650 mg by mouth every 4 (four) hours as needed.     blood sugar diagnostic Strp  To check BG 4 times daily, and PRN hypoglycemia to use with insurance preferred meter     blood-glucose meter Misc  Commonly known as: ONETOUCH VERIO METER  To check Blood sugars 4 times daily  , DX E 11.65     darunavir-cobicistat 800-150 mg-mg Tab tablet  Commonly known as: PREZCOBIX  Take 1 tablet by mouth once daily.     emtricitabine-tenofovir alafen 200-25 mg Tab  Commonly known as: DESCOVY  Take 1 tablet by mouth once daily.     EScitalopram oxalate 20 MG  tablet  Commonly known as: LEXAPRO  Take 1 tablet (20 mg total) by mouth once daily.     ferrous gluconate 324 MG tablet  Commonly known as: FERGON  Take 650 mg by mouth every other day.     fluconazole 200 MG Tab  Commonly known as: DIFLUCAN  Take 4 tablets (800 mg total) by mouth once daily.     fluticasone propionate 50 mcg/actuation nasal spray  Commonly known as: FLONASE  2 sprays by Each Nostril route 2 (two) times a day.     folic acid 1 MG tablet  Commonly known as: FOLVITE  Take 1 tablet (1 mg total) by mouth once daily.     glucagon 1 mg/mL Solr  Inject 1 mg into the muscle as needed.     glucose 4 GM chewable tablet  Take 16-24 g by mouth as needed for Low blood sugar.     HYDROcodone-acetaminophen 5-325 mg per tablet  Commonly known as: NORCO  Take 1 tablet by mouth every 4 (four) hours as needed for Pain.     insulin detemir U-100 100 unit/mL (3 mL) Inpn pen  Commonly known as: Levemir FLEXTOUCH  Inject 10 Units into the skin 2 (two) times daily.     lancets Misc  To check BG 4 times daily, and PRN low blood sugars to use with insurance preferred meter     levETIRAcetam 750 MG Tab  Commonly known as: KEPPRA  Take 2 tablets (1,500 mg total) by mouth 2 (two) times daily.     LOVENOX 40 mg/0.4 mL Syrg  Generic drug: enoxaparin  Inject 40 mg into the skin once daily.     MAALOX ADVANCED 200-200-20 mg/5 mL Susp  Generic drug: aluminum-magnesium hydroxide-simethicone  Take 30 mLs by mouth every 4 (four) hours as needed.     megestroL 40 MG Tab  Commonly known as: MEGACE  Take 1 tablet (40 mg total) by mouth once daily.     melatonin 3 mg tablet  Commonly known as: MELATIN  Take 6 mg by mouth nightly as needed for Insomnia.     metoclopramide HCl 10 MG tablet  Commonly known as: REGLAN  Take 1 tablet (10 mg total) by mouth 4 (four) times daily with meals and nightly.     ondansetron 8 MG Tbdl  Commonly known as: ZOFRAN-ODT  Take 1 tablet (8 mg total) by mouth every 8 (eight) hours. for 10 days     ONE DAILY  MULTIVITAMIN per tablet  Generic drug: multivitamin  Take 1 tablet by mouth once daily.     pantoprazole 40 MG tablet  Commonly known as: PROTONIX  Take 1 tablet (40 mg total) by mouth once daily.     potassium bicarbonate disintegrating tablet  Commonly known as: K-LYTE  Take 50 mEq by mouth once daily. Dissolve tab in 4oz cold water prior to administration     sodium bicarbonate 650 MG tablet  Take 2 tablets (1,300 mg total) by mouth 3 (three) times daily.     thiamine 100 MG tablet  Take 100 mg by mouth once daily.        STOP taking these medications    cetirizine 10 MG tablet  Commonly known as: ZYRTEC     Lactobacillus rhamnosus GG 10 billion cell capsule  Commonly known as: CULTURELLE            Indwelling Lines/Drains at time of discharge:   Lines/Drains/Airways     None               General: Patient resting comfortably in no acute distress. Appears as stated age. Calm  Eyes: EOM intact. No conjunctivae injection. No scleral icterus.  ENT: Hearing grossly intact. No discharge from ears. No nasal discharge.   CVS: RRR. No LE edema BL.  Lungs:Good breath sounds. No accessory muscle use. No acute respiratory distress  Neuro: non focal , Follows commands. Responds appropriately      Time spent on the discharge of patient: 44 minutes         Gary Alaniz MD  Department of Hospital Medicine  [facility]

## 2023-02-07 NOTE — NURSING
Patient and parents given discharge instructions understanding verbalized. IV discontinued patient tolerated well. Patient discharged via wheelchair to personal vehicle.

## 2023-02-07 NOTE — PLAN OF CARE
ELEAZAR sent updated clinicals and HH to [facility] via [facility].     02/07/23 2016   Post-Acute Status   Post-Acute Authorization Home Health   Post-Acute Placement Status Referrals Sent

## 2023-02-07 NOTE — PT/OT/SLP PROGRESS
"Physical Therapy Treatment    Patient Name:  El Abreu   MRN:  [id number]    Recommendations:     Discharge Recommendations: rehabilitation facility  Discharge Equipment Recommendations: bedside commode  Barriers to discharge: None    Assessment:     El Abreu is a 27 y.o. male admitted with a medical diagnosis of AIDS.  He presents with the following impairments/functional limitations: weakness, impaired endurance, impaired self care skills, impaired functional mobility, gait instability, impaired balance, decreased safety awareness, impaired cardiopulmonary response to activity.    Pt found supine in bed and agreeable to PT. He has very flat affect and states he is "just very tired".  Orthostatic vitals taken and recorded as follows:  supine before activity 116/72 pulse 97. Sitting EOB 99/66 pulse 128, second sitting reading after several minutes 120/70 pulse 130.  Standing 89/60 pulse 150.  Returned to supine 123/74 pulse 153.  Pt reports exhausted and then begins to gag and HOB raised then + emesis x2, gown and sheet/blanket changed and notified RN and housekeeping.  Pt with poor tolerance to activity today.    Rehab Prognosis: Fair; patient would benefit from acute skilled PT services to address these deficits and reach maximum level of function.    Recent Surgery: * No surgery found *      Plan:     During this hospitalization, patient to be seen 5 x/week to address the identified rehab impairments via gait training, therapeutic activities, therapeutic exercises and progress toward the following goals:    Plan of Care Expires:  03/02/23    Subjective     Chief Complaint: weak/tired  Patient/Family Comments/goals: go home  Pain/Comfort:  Pain Rating 1: 0/10      Objective:     Communicated with RN prior to session.  Patient found supine with bed alarm, peripheral IV, telemetry upon PT entry to room.     General Precautions: Standard, fall  Orthopedic Precautions: N/A  Braces: N/A  Respiratory Status: " Room air     Functional Mobility:  Bed Mobility:     Supine to Sit: minimum assistance  Sit to Supine: minimum assistance  Transfers:     Sit to Stand:  moderate assistance with rolling walker      AM-PAC 6 CLICK MOBILITY  Turning over in bed (including adjusting bedclothes, sheets and blankets)?: 4  Sitting down on and standing up from a chair with arms (e.g., wheelchair, bedside commode, etc.): 3  Moving from lying on back to sitting on the side of the bed?: 4  Moving to and from a bed to a chair (including a wheelchair)?: 3  Need to walk in hospital room?: 3  Climbing 3-5 steps with a railing?: 1  Basic Mobility Total Score: 18       Treatment & Education:  Pt was educated on the following: call light use, importance of OOB activity and functional mobility to negate the negative effects of prolonged bed rest during this hospitalization, safe transfers/ambulation and discharge planning recommendations/options.     Patient left HOB elevated with all lines intact, call button in reach, bed alarm on, RN notified, and RN present..    GOALS:   Multidisciplinary Problems       Physical Therapy Goals          Problem: Physical Therapy    Goal Priority Disciplines Outcome Goal Variances Interventions   Physical Therapy Goal     PT, PT/OT Ongoing, Progressing     Description: Goals to be met by: 23     Patient will increase functional independence with mobility by performin. Supine to sit with Supervision  2. Sit to stand transfer with Supervision  3. Bed to chair transfer with Supervision using Rolling Walker  4. Gait  x 150 feet with Supervision using Rolling Walker.                              Time Tracking:     PT Received On: 23  PT Start Time: 1138     PT Stop Time: 1148  PT Total Time (min): 10 min     Billable Minutes: Therapeutic Activity 10    Treatment Type: Treatment  PT/PTA: PT     PTA Visit Number: 3     2023

## 2023-02-08 ENCOUNTER — SPECIALTY PHARMACY (OUTPATIENT)
Dept: PHARMACY | Facility: CLINIC | Age: 28
End: 2023-02-08
Payer: COMMERCIAL

## 2023-02-08 ENCOUNTER — TELEPHONE (OUTPATIENT)
Dept: INFECTIOUS DISEASES | Facility: CLINIC | Age: 28
End: 2023-02-08
Payer: COMMERCIAL

## 2023-02-08 DIAGNOSIS — B20 AIDS: Primary | ICD-10-CM

## 2023-02-08 LAB
BACTERIA BLD CULT: NORMAL

## 2023-02-08 NOTE — TELEPHONE ENCOUNTER
----- Message from Eliza Ly sent at 2/8/2023 12:15 PM CST -----  Regarding: appt needed  Name of Who is Calling: Arlin Abreu      What is the request in detail: Pt needs hospital follow up appt with the provider. Unable to schedule. Please advise.      Can the clinic reply by [facility]: no       What Number to Call Back if not in [facility]: [phone]

## 2023-02-08 NOTE — PLAN OF CARE
Patient not accepted by HH in [facility]; sent referral to following:   [facility]  [facility]  [facility]  [facility]  [facility]  [facility]   [facility]

## 2023-02-08 NOTE — TELEPHONE ENCOUNTER
Prezcobix/Descovy.Therapy continuation. [employer]BS and medicaid. Refill too soon. Last dispense 2/7/23 at [facility] 1501 upon discharge. Verify with Laura at [facility] pending medication order to be dispense with $0 for today shipment. Advise if any issue dispensing or obtaining to contact [facility].    Welcome call.unable to leave message.Keeping referral open upon dispensing at [facility].

## 2023-02-09 NOTE — TELEPHONE ENCOUNTER
Unable to contact patient along with confirming with Yvonne Pharmacist at [facility]- patient would like there along with his other medication and able to dispense/order.Closing out at [facility].

## 2023-02-10 LAB
CRYPTOC AG SER QL IA.RAPID: ABNORMAL
CRYPTOC AG SER QL IA.RAPID: POSITIVE

## 2023-02-10 PROCEDURE — G0180 PR HOME HEALTH MD CERTIFICATION: ICD-10-PCS | Mod: ,,, | Performed by: INTERNAL MEDICINE

## 2023-02-10 PROCEDURE — G0180 MD CERTIFICATION HHA PATIENT: HCPCS | Mod: ,,, | Performed by: INTERNAL MEDICINE

## 2023-02-13 NOTE — PT/OT/SLP DISCHARGE
Occupational Therapy Discharge Summary    El Abreu  MRN: [id number]   Principal Problem: AIDS      Patient Discharged from acute Occupational Therapy on 2/72/23.  Please refer to prior OT note dated 2/6/23 for functional status.    Assessment:      Patient appropriate for care in another setting.    Objective:     GOALS:   Multidisciplinary Problems       Occupational Therapy Goals          Problem: Occupational Therapy    Goal Priority Disciplines Outcome Interventions   Occupational Therapy Goal     OT, PT/OT Ongoing, Progressing    Description: Goals to be met by: 2/17/23     Patient will increase functional independence with ADLs by performing:    UE Dressing with Supervision.  LE Dressing with Supervision and Assistive Devices as needed.  Grooming while standing at sink with Supervision.  Toileting from bedside commode with Supervision for hygiene and clothing management.   Toilet transfer to bedside commode with Supervision.  Upper extremity exercise program x10 reps per handout, with supervision.                         Reasons for Discontinuation of Therapy Services  Transfer to alternate level of care.      Plan:     Patient Discharged to: Home with Home Health Service    2/13/2023

## 2023-02-14 NOTE — PHYSICIAN QUERY
PT Name: El Abreu  MR #: [id number]     DOCUMENTATION CLARIFICATION     CDS/: Alyssa Bazzi               Contact information:[phone]  This form is a permanent document in the medical record.     Query Date: February 14, 2023    By submitting this query, we are merely seeking further clarification of documentation.  Please utilize your independent clinical judgment when addressing the question(s) below.  The Medical Record contains the following:  Indicators Supporting Clinical Findings Location in Medical Record    Temp 102.5 - 02/02 Vitals    Labs    WBC -15.62  Lactic Acid  - 1.1   Epic Lab 02/02    Culture(s) Pseudomonas aeruginosa - blood culture Epic Lab - microbiology    Bacteremia or Sepsis / Septic Spiked fever 102.5, more lethargic today, more diaphoretic today. Concern for sepsis. Very orthostatic.  Started on vancomycin and cefepime.    Pseudomonas aeruginosa bacteremia (1/2 bottles) likely secondary to old IV access, removed 2/2 IM PN 02/02        Infectious Disease PN 02/03 - 02/06    Known or Suspected Source of Infection documented Pseudomonas aeruginosa bacteremia (1/2 bottles) likely secondary to old IV access, removed 2/2 Infectious Disease PN 02/03 - 02/06    Medication IV vancomycin 02/02-02/03  IV cefepime 02/02-02/07 MAR        Provider, please clarify diagnosis associated with above clinical findings:    [ x  ] Bacteremia, Pseudomonas aeruginosa bacteremia likely secondary to old IV access, NO SEPSIS   [   ] SEPSIS, due to Pseudomonas aeruginosa bacteremia likely secondary to old IV access,   [   ] Sepsis, please specify associated infection and organism

## 2023-02-15 NOTE — PLAN OF CARE
Patient accepted by [facility]  ([phone]).  Per Aicha, Pt was admitted on 2/10/2023.  No further needs addressed at this time.     02/15/23 1143   Final Note   Assessment Type Final Discharge Note   Anticipated Discharge Disposition Home-Health   Post-Acute Status   Post-Acute Authorization Home Health;HME   HME Status Set-up Complete/Auth obtained   Home Health Status Set-up Complete/Auth obtained   Discharge Delays None known at this time

## 2023-02-16 NOTE — PHYSICIAN QUERY
PT Name: El Abreu  MR #: [id number]    DOCUMENTATION CLARIFICATION     CDS/: Alyssa Bazzi               Contact information: [phone]    This form is a permanent document in the medical record.     Query Date: February 16, 2023    By submitting this query, we are merely seeking further clarification of documentation.. Please utilize your independent clinical judgment when addressing the question(s) below.    The medical record contains the following:   Indicators  Supporting Clinical Findings Location in Medical Record    Energy Intake < 75% for > 1 month Registered Dietitian PN 01/18    Weight Loss 13% wt loss within 3 months Registered Dietitian PN 01/18    Fat Loss severe body fat loss  Registered Dietitian PN 01/18    Muscle Loss muscle wasting visible in upper and lower extremities; temporal wasting Registered Dietitian PN 01/18    Reduced  Strength (by dynamometer)  reduced  strength Registered Dietitian PN 01/18    BMI 14.4 Registered Dietitian PN 01/18    Registered Dietician Diagnosis Pt meets criteria for Severe Chronic Malnutrition    Consider adding Severe Malnutrition to problem list. Registered Dietitian PN 01/18    Treatment Added Glucerna with meals and Khurram BID as per pta.     Continue appetite stimulant, thiamine, folic acid, MVI and probiotic per prior regimen Registered Dietitian PN 01/18         Provider, please specify diagnosis or diagnoses associated with above clinical findings.    [  ] Mild Malnutrition    [ x ] Moderate Malnutrition     [  ] Severe Malnutrition -    [  ] Malnutrition, Unspecified degree

## 2023-02-17 ENCOUNTER — TELEPHONE (OUTPATIENT)
Dept: NEPHROLOGY | Facility: CLINIC | Age: 28
End: 2023-02-17
Payer: COMMERCIAL

## 2023-02-17 NOTE — TELEPHONE ENCOUNTER
----- Message from Karissa Marie sent at 2/17/2023 10:02 AM CST -----  Contact: yesy  Type: Needs Medical Advice  Who Called:  Yesy with [facility]  Best Call Back Number: [phone]  Additional Information: patient had a fall this week- lost balance and landed on bottom- no injuries

## 2023-02-17 NOTE — TELEPHONE ENCOUNTER
Spoke to yesy and the nurse and PT did go and see the pt yesterday, everything is stable per Yesy, just wanted to inform us.

## 2023-02-22 ENCOUNTER — TELEPHONE (OUTPATIENT)
Dept: INFECTIOUS DISEASES | Facility: CLINIC | Age: 28
End: 2023-02-22
Payer: COMMERCIAL

## 2023-02-22 NOTE — TELEPHONE ENCOUNTER
----- Message from Britta Rausch sent at 2/22/2023 11:03 AM CST -----  Contact: Brandy- [facility]  Type: Needs Medical Advice    Who Called:  Alyssa     Pharmacy name and phone #:    [id number] - [address] [address]  Phone: [phone] Fax: [phone]     Best Call Back Number: [phone] (home)       Additional Information: Brandy from [facility] called to request blood glucose monitor

## 2023-03-02 ENCOUNTER — OFFICE VISIT (OUTPATIENT)
Dept: INFECTIOUS DISEASES | Facility: CLINIC | Age: 28
End: 2023-03-02
Payer: COMMERCIAL

## 2023-03-02 VITALS
HEART RATE: 138 BPM | WEIGHT: 130.75 LBS | DIASTOLIC BLOOD PRESSURE: 85 MMHG | BODY MASS INDEX: 16.26 KG/M2 | HEIGHT: 75 IN | TEMPERATURE: 98 F | SYSTOLIC BLOOD PRESSURE: 111 MMHG

## 2023-03-02 DIAGNOSIS — B20 AIDS: Primary | ICD-10-CM

## 2023-03-02 PROCEDURE — 99214 OFFICE O/P EST MOD 30 MIN: CPT | Mod: S$GLB,,, | Performed by: INTERNAL MEDICINE

## 2023-03-02 PROCEDURE — 99213 OFFICE O/P EST LOW 20 MIN: CPT | Mod: PBBFAC,PO | Performed by: INTERNAL MEDICINE

## 2023-03-02 PROCEDURE — 3008F BODY MASS INDEX DOCD: CPT | Mod: CPTII,S$GLB,, | Performed by: INTERNAL MEDICINE

## 2023-03-02 PROCEDURE — 3074F SYST BP LT 130 MM HG: CPT | Mod: CPTII,S$GLB,, | Performed by: INTERNAL MEDICINE

## 2023-03-02 PROCEDURE — 3079F PR MOST RECENT DIASTOLIC BLOOD PRESSURE 80-89 MM HG: ICD-10-PCS | Mod: CPTII,S$GLB,, | Performed by: INTERNAL MEDICINE

## 2023-03-02 PROCEDURE — 99999 PR PBB SHADOW E&M-EST. PATIENT-LVL III: ICD-10-PCS | Mod: PBBFAC,,, | Performed by: INTERNAL MEDICINE

## 2023-03-02 PROCEDURE — 3044F PR MOST RECENT HEMOGLOBIN A1C LEVEL <7.0%: ICD-10-PCS | Mod: CPTII,S$GLB,, | Performed by: INTERNAL MEDICINE

## 2023-03-02 PROCEDURE — 1111F DSCHRG MED/CURRENT MED MERGE: CPT | Mod: CPTII,S$GLB,, | Performed by: INTERNAL MEDICINE

## 2023-03-02 PROCEDURE — 3008F PR BODY MASS INDEX (BMI) DOCUMENTED: ICD-10-PCS | Mod: CPTII,S$GLB,, | Performed by: INTERNAL MEDICINE

## 2023-03-02 PROCEDURE — 1159F PR MEDICATION LIST DOCUMENTED IN MEDICAL RECORD: ICD-10-PCS | Mod: CPTII,S$GLB,, | Performed by: INTERNAL MEDICINE

## 2023-03-02 PROCEDURE — 3044F HG A1C LEVEL LT 7.0%: CPT | Mod: CPTII,S$GLB,, | Performed by: INTERNAL MEDICINE

## 2023-03-02 PROCEDURE — 1159F MED LIST DOCD IN RCRD: CPT | Mod: CPTII,S$GLB,, | Performed by: INTERNAL MEDICINE

## 2023-03-02 PROCEDURE — 1160F RVW MEDS BY RX/DR IN RCRD: CPT | Mod: CPTII,S$GLB,, | Performed by: INTERNAL MEDICINE

## 2023-03-02 PROCEDURE — 1160F PR REVIEW ALL MEDS BY PRESCRIBER/CLIN PHARMACIST DOCUMENTED: ICD-10-PCS | Mod: CPTII,S$GLB,, | Performed by: INTERNAL MEDICINE

## 2023-03-02 PROCEDURE — 3079F DIAST BP 80-89 MM HG: CPT | Mod: CPTII,S$GLB,, | Performed by: INTERNAL MEDICINE

## 2023-03-02 PROCEDURE — 3074F PR MOST RECENT SYSTOLIC BLOOD PRESSURE < 130 MM HG: ICD-10-PCS | Mod: CPTII,S$GLB,, | Performed by: INTERNAL MEDICINE

## 2023-03-02 PROCEDURE — 1111F PR DISCHARGE MEDS RECONCILED W/ CURRENT OUTPATIENT MED LIST: ICD-10-PCS | Mod: CPTII,S$GLB,, | Performed by: INTERNAL MEDICINE

## 2023-03-02 PROCEDURE — 99999 PR PBB SHADOW E&M-EST. PATIENT-LVL III: CPT | Mod: PBBFAC,,, | Performed by: INTERNAL MEDICINE

## 2023-03-02 PROCEDURE — 99214 PR OFFICE/OUTPT VISIT, EST, LEVL IV, 30-39 MIN: ICD-10-PCS | Mod: S$GLB,,, | Performed by: INTERNAL MEDICINE

## 2023-03-02 RX ORDER — FLUCONAZOLE 200 MG/1
800 TABLET ORAL DAILY
Qty: 120 TABLET | Refills: 3 | Status: SHIPPED | OUTPATIENT
Start: 2023-03-02 | End: 2023-04-27

## 2023-03-02 RX ORDER — NYSTATIN 100000 [USP'U]/ML
4 SUSPENSION ORAL 4 TIMES DAILY
Qty: 160 ML | Refills: 0 | Status: SHIPPED | OUTPATIENT
Start: 2023-03-02 | End: 2023-03-12

## 2023-03-02 RX ORDER — SULFAMETHOXAZOLE AND TRIMETHOPRIM 400; 80 MG/1; MG/1
1 TABLET ORAL 2 TIMES DAILY
Qty: 20 TABLET | Refills: 0 | Status: SHIPPED | OUTPATIENT
Start: 2023-03-02 | End: 2023-03-12

## 2023-03-06 NOTE — PROGRESS NOTES
Patient ID: El Abreu is a 27 y.o. male.    Subjective:         Chief Complaint: Follow-up (Hospital follow up )    HPI    AAM MSM diagnosed with AIDS Oct 2022 CD4: 7. Cryptococcoma receiving Ampho + Fluc  Neurosyphilis, received PenG  HIV GT: R: DDI    ART  - Symtuza     Interval HPI:  - Patient is very cachectic. Poor oral intake.   - Apparently compliant with medications although none were brought to the clinic.   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    Diabetes mellitus     type 2 x 2 months ago dx//    Neurosyphilis 11/06/2022    Paroxysmal atrial fibrillation     Seizures     Strabismus age 4-5//    os since accident     Vision abnormalities     left eye       Past Surgical History:   Procedure Laterality Date    CATARACT EXTRACTION W/  INTRAOCULAR LENS IMPLANT Left age 8    CIRCUMCISION      EYE SURGERY  4-6 yrs of age    toy hit eye os//       Review of patient's allergies indicates:  No Known Allergies    Family History   Problem Relation Age of Onset    Diabetes Mother     Diabetes Father     Hypertension Father     Diabetes Paternal Grandmother     Hypertension Paternal Grandmother     Diabetes Paternal Grandfather     Cancer Paternal Grandfather     Stroke Paternal Grandfather     Blindness Paternal Uncle     Amblyopia Neg Hx     Cataracts Neg Hx     Glaucoma Neg Hx     Macular degeneration Neg Hx     Retinal detachment Neg Hx     Strabismus Neg Hx     Thyroid disease Neg Hx        Social History     Socioeconomic History    Marital status: Single   Tobacco Use    Smoking status: Never   Substance and Sexual Activity    Alcohol use: No    Drug use: No     Social Determinants of Health     Financial Resource Strain: Low Risk     Difficulty of Paying Living Expenses: Not very hard   Food Insecurity: No Food Insecurity    Worried About Running Out of Food in the Last Year: Never true    Ran Out of Food in the  Last Year: Never true   Transportation Needs: No Transportation Needs    Lack of Transportation (Medical): No    Lack of Transportation (Non-Medical): No   Physical Activity: Sufficiently Active    Days of Exercise per Week: 7 days    Minutes of Exercise per Session: 30 min   Stress: No Stress Concern Present    Feeling of Stress : Not at all   Social Connections: Socially Isolated    Frequency of Communication with Friends and Family: More than three times a week    Frequency of Social Gatherings with Friends and Family: More than three times a week    Attends Bahai Services: Never    Active Member of Clubs or Organizations: No    Marital Status: Never    Housing Stability: Unknown    Unable to Pay for Housing in the Last Year: No    Number of Places Lived in the Last Year: 1       Current Outpatient Medications   Medication Instructions    acetaminophen (TYLENOL) 650 mg, Oral, Every 4 hours PRN    aluminum-magnesium hydroxide-simethicone (MAALOX) 200-200-20 mg/5 mL Susp 30 mLs, Oral, Every 4 hours PRN    blood sugar diagnostic Strp To check BG 4 times daily, and PRN hypoglycemia to use with insurance preferred meter    blood-glucose meter (ONETOUCH VERIO METER) Misc To check Blood sugars 4 times daily  , DX E 11.65    cyproheptadine (PERIACTIN) 4 mg, Oral, 3 times daily    darunavir-cobicistat (PREZCOBIX) 800-150 mg-mg Tab tablet 1 tablet, Oral, Daily    emtricitabine-tenofovir alafen (DESCOVY) 200-25 mg Tab 1 tablet, Oral, Daily    enoxaparin (LOVENOX) 40 mg, Subcutaneous, Daily    EScitalopram oxalate (LEXAPRO) 20 mg, Oral, Daily    ferrous gluconate (FERGON) 650 mg, Oral, Every other day    fluconazole (DIFLUCAN) 800 mg, Oral, Daily    fluticasone propionate (FLONASE) 50 mcg/actuation nasal spray 2 sprays, Each Nostril, 2 times daily    folic acid (FOLVITE) 1 mg, Oral, Daily    glucagon 1 mg, Intramuscular, As needed (PRN)    glucose 16-24 g, Oral, As needed (PRN)    HYDROcodone-acetaminophen (NORCO)  5-325 mg per tablet 1 tablet, Oral, Every 4 hours PRN    insulin aspart U-100 (NOVOLOG) 15 Units, Subcutaneous, 3 times daily    insulin detemir U-100 (LEVEMIR FLEXTOUCH) 10 Units, Subcutaneous, 2 times daily    lancets Misc To check BG 4 times daily, and PRN low blood sugars to use with insurance preferred meter    levETIRAcetam (KEPPRA) 1,500 mg, Oral, 2 times daily    megestroL (MEGACE) 40 mg, Oral, Daily    melatonin (MELATIN) 6 mg, Oral, Nightly PRN    metoclopramide HCl (REGLAN) 10 mg, Oral, 4 times daily with meals & nightly    midodrine (PROAMATINE) 10 mg, Oral, 3 times daily with meals    mirtazapine (REMERON) 15 mg, Oral, Nightly    multivitamin (THERAGRAN) per tablet 1 tablet, Oral, Daily    nystatin (MYCOSTATIN) 400,000 Units, Oral, 4 times daily    pantoprazole (PROTONIX) 40 mg, Oral, Daily    potassium bicarbonate (K-LYTE) disintegrating tablet 50 mEq, Oral, Daily, Dissolve tab in 4oz cold water prior to administration    risperiDONE (RISPERDAL) 0.5 mg, Oral, Daily    sodium bicarbonate 1,300 mg, Oral, 3 times daily    sulfamethoxazole-trimethoprim 400-80mg (BACTRIM) 400-80 mg per tablet 1 tablet, Oral, 2 times daily    thiamine 100 mg, Oral, Daily         Review of Systems   Constitutional:  Positive for fatigue and unexpected weight change. Negative for activity change, chills, diaphoresis and fever.   HENT:  Positive for mouth sores. Negative for sore throat.    Eyes:  Negative for photophobia and visual disturbance.   Respiratory:  Negative for cough and shortness of breath.    Cardiovascular:  Negative for chest pain and leg swelling.   Gastrointestinal:  Negative for abdominal distention, abdominal pain, nausea and vomiting.   Genitourinary:  Negative for difficulty urinating, dysuria and flank pain.   Musculoskeletal:  Positive for arthralgias.   Skin:  Negative for color change and rash.   Allergic/Immunologic: Negative for immunocompromised state.   Neurological:  Positive for weakness.  Negative for dizziness and headaches.   Psychiatric/Behavioral:  The patient is not nervous/anxious.          Objective:     Vitals:    03/02/23 1149   BP: 111/85   Pulse: (!) 138   Temp: 98 °F (36.7 °C)       Body mass index is 16.34 kg/m².         Physical Exam  Vitals reviewed.   Constitutional:       General: He is not in acute distress.     Appearance: Normal appearance. He is well-developed. He is ill-appearing.      Comments: Cachectic    HENT:      Head: Normocephalic and atraumatic.      Right Ear: External ear normal.      Left Ear: External ear normal.      Nose: Nose normal.   Eyes:      General: No scleral icterus.        Right eye: No discharge.         Left eye: No discharge.      Pupils: Pupils are equal, round, and reactive to light.   Cardiovascular:      Rate and Rhythm: Normal rate and regular rhythm.      Heart sounds: Normal heart sounds. No murmur heard.  Pulmonary:      Effort: Pulmonary effort is normal. No respiratory distress.      Breath sounds: Normal breath sounds. No wheezing.   Abdominal:      General: There is no distension.      Palpations: Abdomen is soft.      Tenderness: There is no abdominal tenderness.   Musculoskeletal:         General: Normal range of motion.      Cervical back: Normal range of motion and neck supple. No rigidity.   Lymphadenopathy:      Cervical: No cervical adenopathy.   Skin:     General: Skin is warm and dry.      Coloration: Skin is not jaundiced.   Neurological:      Mental Status: He is alert and oriented to person, place, and time.   Psychiatric:         Mood and Affect: Affect is flat.         Speech: Speech normal.         Behavior: Behavior is slowed.         Judgment: Judgment normal.         Assessment:           El was seen today for follow-up.    Diagnoses and all orders for this visit:    AIDS  -     darunavir-cobicistat (PREZCOBIX) 800-150 mg-mg Tab tablet; Take 1 tablet by mouth once daily.  -     emtricitabine-tenofovir alafen (DESCOVY)  200-25 mg Tab; Take 1 tablet by mouth once daily.  -     fluconazole (DIFLUCAN) 200 MG Tab; Take 4 tablets (800 mg total) by mouth once daily.  -     sulfamethoxazole-trimethoprim 400-80mg (BACTRIM) 400-80 mg per tablet; Take 1 tablet by mouth 2 (two) times daily. for 10 days  -     nystatin (MYCOSTATIN) 100,000 unit/mL suspension; Take 4 mLs (400,000 Units total) by mouth 4 (four) times daily. for 10 days         Plan:     - Recently released form LTACH  - Patient taking his meds but cant recall which and how they are taking them. Advised to bring them to clinic  - Complaining of intermittent thrush and bad taste in his mouth. Will start Nystatin  - Cont Bactrim daily for PJP prophylaxis  - Cont Fluconazole 800mg daily for cryptococcoma for 12 months (may consider decreasing to 400mg at a later date or stay as it is if he tolerates) Until 12/23  - Cont Symtuza for HIV  - RTC in 4 weeks     Greater than 30 minutes was spent on this encounter, which included: review of recent encounters, review and interpretation of labs/images, obtaining pertinent history, performing a physical examination, counseling and educating the patient/family/caregiver, ordering medications/tests, documenting in the electronic health record, and coordinating care with necessary providers.    Miller Rush MD  Infectious Diseases

## 2023-03-12 ENCOUNTER — EXTERNAL HOME HEALTH (OUTPATIENT)
Dept: HOME HEALTH SERVICES | Facility: HOSPITAL | Age: 28
End: 2023-03-12
Payer: COMMERCIAL

## 2023-04-05 ENCOUNTER — TELEPHONE (OUTPATIENT)
Dept: INFECTIOUS DISEASES | Facility: CLINIC | Age: 28
End: 2023-04-05
Payer: MEDICAID

## 2023-04-05 NOTE — TELEPHONE ENCOUNTER
Per Dr. Rush - reschedule 4/11/23 clinic - called pt, rescheduled 4/11/23 follow up to 4/12/23 at 1030 am with patient

## 2023-04-10 PROBLEM — K92.0 COFFEE GROUND EMESIS: Status: RESOLVED | Noted: 2022-12-10 | Resolved: 2023-04-10

## 2023-04-10 PROBLEM — N17.9 AKI (ACUTE KIDNEY INJURY): Status: RESOLVED | Noted: 2022-12-26 | Resolved: 2023-04-10

## 2023-04-12 ENCOUNTER — TELEPHONE (OUTPATIENT)
Dept: INFECTIOUS DISEASES | Facility: CLINIC | Age: 28
End: 2023-04-12
Payer: MEDICAID

## 2023-04-12 NOTE — TELEPHONE ENCOUNTER
Returned call to pt, pt had no transportation today, rescheduled appt to Wednesday 4/19/23 at 11:30 am.

## 2023-04-12 NOTE — TELEPHONE ENCOUNTER
----- Message from Michelle Jara sent at 4/12/2023 11:07 AM CDT -----  Contact: pt  Type:  Reschedule Appointment Request    Caller is requesting a sooner appointment.  Caller declined first available appointment listed below.  Caller will not accept being placed on the waitlist and is requesting a message be sent to doctor.  Name of Caller:pt  When is the first available appointment?Missed appt 4/12  Symptoms:HIV f/u  Would the patient rather a call back or a response via [facility]? call  Best Call Back Number:[phone] (home)     Additional Information: Pt states that he missed appt and need to reschedule. Please advise thank you

## 2023-04-20 ENCOUNTER — OFFICE VISIT (OUTPATIENT)
Dept: INFECTIOUS DISEASES | Facility: CLINIC | Age: 28
End: 2023-04-20
Payer: MEDICAID

## 2023-04-20 ENCOUNTER — LAB VISIT (OUTPATIENT)
Dept: LAB | Facility: HOSPITAL | Age: 28
End: 2023-04-20
Attending: INTERNAL MEDICINE
Payer: MEDICAID

## 2023-04-20 ENCOUNTER — TELEPHONE (OUTPATIENT)
Dept: INFECTIOUS DISEASES | Facility: CLINIC | Age: 28
End: 2023-04-20
Payer: MEDICAID

## 2023-04-20 VITALS — HEIGHT: 75 IN | BODY MASS INDEX: 17.66 KG/M2 | WEIGHT: 142 LBS

## 2023-04-20 DIAGNOSIS — B20 AIDS: Primary | ICD-10-CM

## 2023-04-20 DIAGNOSIS — L08.9 SOFT TISSUE INFECTION: ICD-10-CM

## 2023-04-20 DIAGNOSIS — B20 AIDS: ICD-10-CM

## 2023-04-20 LAB
ALBUMIN SERPL BCP-MCNC: 3.6 G/DL (ref 3.5–5.2)
ALP SERPL-CCNC: 80 U/L (ref 55–135)
ALT SERPL W/O P-5'-P-CCNC: 24 U/L (ref 10–44)
ANION GAP SERPL CALC-SCNC: 15 MMOL/L (ref 8–16)
AST SERPL-CCNC: 14 U/L (ref 10–40)
BASOPHILS # BLD AUTO: 0.04 K/UL (ref 0–0.2)
BASOPHILS NFR BLD: 0.3 % (ref 0–1.9)
BILIRUB SERPL-MCNC: 0.4 MG/DL (ref 0.1–1)
BUN SERPL-MCNC: 11 MG/DL (ref 6–20)
CALCIUM SERPL-MCNC: 11 MG/DL (ref 8.7–10.5)
CHLORIDE SERPL-SCNC: 105 MMOL/L (ref 95–110)
CO2 SERPL-SCNC: 18 MMOL/L (ref 23–29)
CREAT SERPL-MCNC: 1 MG/DL (ref 0.5–1.4)
DIFFERENTIAL METHOD: ABNORMAL
EOSINOPHIL # BLD AUTO: 0.6 K/UL (ref 0–0.5)
EOSINOPHIL NFR BLD: 4.5 % (ref 0–8)
ERYTHROCYTE [DISTWIDTH] IN BLOOD BY AUTOMATED COUNT: 14.1 % (ref 11.5–14.5)
EST. GFR  (NO RACE VARIABLE): >60 ML/MIN/1.73 M^2
GLUCOSE SERPL-MCNC: 149 MG/DL (ref 70–110)
HCT VFR BLD AUTO: 29.6 % (ref 40–54)
HGB BLD-MCNC: 9.2 G/DL (ref 14–18)
IMM GRANULOCYTES # BLD AUTO: 0.06 K/UL (ref 0–0.04)
IMM GRANULOCYTES NFR BLD AUTO: 0.5 % (ref 0–0.5)
LYMPHOCYTES # BLD AUTO: 2.1 K/UL (ref 1–4.8)
LYMPHOCYTES NFR BLD: 16.5 % (ref 18–48)
MCH RBC QN AUTO: 28.3 PG (ref 27–31)
MCHC RBC AUTO-ENTMCNC: 31.1 G/DL (ref 32–36)
MCV RBC AUTO: 91 FL (ref 82–98)
MONOCYTES # BLD AUTO: 0.8 K/UL (ref 0.3–1)
MONOCYTES NFR BLD: 6.1 % (ref 4–15)
NEUTROPHILS # BLD AUTO: 9.3 K/UL (ref 1.8–7.7)
NEUTROPHILS NFR BLD: 72.1 % (ref 38–73)
NRBC BLD-RTO: 0 /100 WBC
PLATELET # BLD AUTO: 390 K/UL (ref 150–450)
PMV BLD AUTO: 9.8 FL (ref 9.2–12.9)
POTASSIUM SERPL-SCNC: 3.3 MMOL/L (ref 3.5–5.1)
PROT SERPL-MCNC: 8.2 G/DL (ref 6–8.4)
RBC # BLD AUTO: 3.25 M/UL (ref 4.6–6.2)
SODIUM SERPL-SCNC: 138 MMOL/L (ref 136–145)
WBC # BLD AUTO: 12.93 K/UL (ref 3.9–12.7)

## 2023-04-20 PROCEDURE — 3044F HG A1C LEVEL LT 7.0%: CPT | Mod: CPTII,,, | Performed by: INTERNAL MEDICINE

## 2023-04-20 PROCEDURE — 85025 COMPLETE CBC W/AUTO DIFF WBC: CPT | Performed by: INTERNAL MEDICINE

## 2023-04-20 PROCEDURE — 99999 PR PBB SHADOW E&M-EST. PATIENT-LVL III: ICD-10-PCS | Mod: PBBFAC,,, | Performed by: INTERNAL MEDICINE

## 2023-04-20 PROCEDURE — 87536 HIV-1 QUANT&REVRSE TRNSCRPJ: CPT | Performed by: INTERNAL MEDICINE

## 2023-04-20 PROCEDURE — 3044F PR MOST RECENT HEMOGLOBIN A1C LEVEL <7.0%: ICD-10-PCS | Mod: CPTII,,, | Performed by: INTERNAL MEDICINE

## 2023-04-20 PROCEDURE — 99214 OFFICE O/P EST MOD 30 MIN: CPT | Mod: S$PBB,,, | Performed by: INTERNAL MEDICINE

## 2023-04-20 PROCEDURE — 1159F PR MEDICATION LIST DOCUMENTED IN MEDICAL RECORD: ICD-10-PCS | Mod: CPTII,,, | Performed by: INTERNAL MEDICINE

## 2023-04-20 PROCEDURE — 1159F MED LIST DOCD IN RCRD: CPT | Mod: CPTII,,, | Performed by: INTERNAL MEDICINE

## 2023-04-20 PROCEDURE — 99999 PR PBB SHADOW E&M-EST. PATIENT-LVL III: CPT | Mod: PBBFAC,,, | Performed by: INTERNAL MEDICINE

## 2023-04-20 PROCEDURE — 86361 T CELL ABSOLUTE COUNT: CPT | Performed by: INTERNAL MEDICINE

## 2023-04-20 PROCEDURE — 80053 COMPREHEN METABOLIC PANEL: CPT | Performed by: INTERNAL MEDICINE

## 2023-04-20 PROCEDURE — 36415 COLL VENOUS BLD VENIPUNCTURE: CPT | Mod: PO | Performed by: INTERNAL MEDICINE

## 2023-04-20 PROCEDURE — 3008F PR BODY MASS INDEX (BMI) DOCUMENTED: ICD-10-PCS | Mod: CPTII,,, | Performed by: INTERNAL MEDICINE

## 2023-04-20 PROCEDURE — 99213 OFFICE O/P EST LOW 20 MIN: CPT | Mod: PBBFAC,PO | Performed by: INTERNAL MEDICINE

## 2023-04-20 PROCEDURE — 99214 PR OFFICE/OUTPT VISIT, EST, LEVL IV, 30-39 MIN: ICD-10-PCS | Mod: S$PBB,,, | Performed by: INTERNAL MEDICINE

## 2023-04-20 PROCEDURE — 1160F RVW MEDS BY RX/DR IN RCRD: CPT | Mod: CPTII,,, | Performed by: INTERNAL MEDICINE

## 2023-04-20 PROCEDURE — 1160F PR REVIEW ALL MEDS BY PRESCRIBER/CLIN PHARMACIST DOCUMENTED: ICD-10-PCS | Mod: CPTII,,, | Performed by: INTERNAL MEDICINE

## 2023-04-20 PROCEDURE — 3008F BODY MASS INDEX DOCD: CPT | Mod: CPTII,,, | Performed by: INTERNAL MEDICINE

## 2023-04-20 RX ORDER — CEPHALEXIN 500 MG/1
500 CAPSULE ORAL EVERY 6 HOURS
Qty: 28 CAPSULE | Refills: 0 | Status: SHIPPED | OUTPATIENT
Start: 2023-04-20 | End: 2023-04-27

## 2023-04-20 NOTE — PROGRESS NOTES
Patient ID: El Abreu is a 27 y.o. male.    Subjective:         Chief Complaint: HIV Positive/AIDS (Follow up )    HPI    AAM MSM diagnosed with AIDS Oct 2022 CD4: 7. Cryptococcoma receiving Ampho + Fluc  Neurosyphilis, received PenG  HIV GT: R: DDI    ART  - Symtuza     Interval HPI:  - Patient is very cachectic. Poor oral intake.   - Apparently compliant with medications although none were brought to the clinic.   - complaining of neck swelling and abscess, tender to the touch.  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    Diabetes mellitus     type 2 x 2 months ago dx//    Neurosyphilis 11/06/2022    Paroxysmal atrial fibrillation     Seizures     Strabismus age 4-5//    os since accident     Vision abnormalities     left eye       Past Surgical History:   Procedure Laterality Date    CATARACT EXTRACTION W/  INTRAOCULAR LENS IMPLANT Left age 8    CIRCUMCISION      EYE SURGERY  4-5 yrs of age    toy hit eye os//       Review of patient's allergies indicates:  No Known Allergies    Family History   Problem Relation Age of Onset    Diabetes Mother     Diabetes Father     Hypertension Father     Diabetes Paternal Grandmother     Hypertension Paternal Grandmother     Diabetes Paternal Grandfather     Cancer Paternal Grandfather     Stroke Paternal Grandfather     Blindness Paternal Uncle     Amblyopia Neg Hx     Cataracts Neg Hx     Glaucoma Neg Hx     Macular degeneration Neg Hx     Retinal detachment Neg Hx     Strabismus Neg Hx     Thyroid disease Neg Hx        Social History     Socioeconomic History    Marital status: Single   Tobacco Use    Smoking status: Never   Substance and Sexual Activity    Alcohol use: No    Drug use: No     Social Determinants of Health     Financial Resource Strain: Low Risk     Difficulty of Paying Living Expenses: Not very hard   Food Insecurity: No Food Insecurity    Worried About Running  Out of Food in the Last Year: Never true    Ran Out of Food in the Last Year: Never true   Transportation Needs: No Transportation Needs    Lack of Transportation (Medical): No    Lack of Transportation (Non-Medical): No   Physical Activity: Sufficiently Active    Days of Exercise per Week: 7 days    Minutes of Exercise per Session: 30 min   Stress: No Stress Concern Present    Feeling of Stress : Not at all   Social Connections: Socially Isolated    Frequency of Communication with Friends and Family: More than three times a week    Frequency of Social Gatherings with Friends and Family: More than three times a week    Attends Anabaptist Services: Never    Active Member of Clubs or Organizations: No    Marital Status: Never    Housing Stability: Unknown    Unable to Pay for Housing in the Last Year: No    Number of Places Lived in the Last Year: 1       Current Outpatient Medications   Medication Instructions    acetaminophen (TYLENOL) 650 mg, Oral, Every 4 hours PRN    aluminum-magnesium hydroxide-simethicone (MAALOX) 200-200-20 mg/5 mL Susp 30 mLs, Oral, Every 4 hours PRN    blood sugar diagnostic Strp To check BG 4 times daily, and PRN hypoglycemia to use with insurance preferred meter    blood-glucose meter (ONETOUCH VERIO METER) Misc To check Blood sugars 4 times daily  , DX E 11.65    cephALEXin (KEFLEX) 500 mg, Oral, Every 6 hours    cyproheptadine (PERIACTIN) 4 mg, Oral, 3 times daily    enoxaparin (LOVENOX) 40 mg, Subcutaneous, Daily    EScitalopram oxalate (LEXAPRO) 20 mg, Oral, Daily    ferrous gluconate (FERGON) 650 mg, Oral, Every other day    fluconazole (DIFLUCAN) 800 mg, Oral, Daily    fluticasone propionate (FLONASE) 50 mcg/actuation nasal spray 2 sprays, Each Nostril, 2 times daily    folic acid (FOLVITE) 1 mg, Oral, Daily    glucagon 1 mg, Intramuscular, As needed (PRN)    glucose 16-24 g, Oral, As needed (PRN)    HYDROcodone-acetaminophen (NORCO) 5-325 mg per tablet 1 tablet, Oral, Every 4  hours PRN    insulin aspart U-100 (NOVOLOG) 15 Units, Subcutaneous, 3 times daily    insulin detemir U-100 (Levemir) 10 Units, Subcutaneous, 2 times daily    lancets Misc To check BG 4 times daily, and PRN low blood sugars to use with insurance preferred meter    levETIRAcetam (KEPPRA) 1,500 mg, Oral, 2 times daily    megestroL (MEGACE) 40 mg, Oral, Daily    melatonin (MELATIN) 6 mg, Oral, Nightly PRN    metoclopramide HCl (REGLAN) 10 mg, Oral, 4 times daily with meals & nightly    midodrine (PROAMATINE) 10 mg, Oral, 3 times daily with meals    mirtazapine (REMERON) 15 mg, Oral, Nightly    multivitamin (THERAGRAN) per tablet 1 tablet, Oral, Daily    pantoprazole (PROTONIX) 40 mg, Oral, Daily    potassium bicarbonate (K-LYTE) disintegrating tablet 50 mEq, Oral, Daily, Dissolve tab in 4oz cold water prior to administration    risperiDONE (RISPERDAL) 0.5 mg, Oral, Daily    sodium bicarbonate 1,300 mg, Oral, 3 times daily    thiamine 100 mg, Oral, Daily         Review of Systems   Constitutional:  Positive for fatigue and unexpected weight change. Negative for activity change, chills, diaphoresis and fever.   HENT:  Positive for mouth sores. Negative for sore throat.    Eyes:  Negative for photophobia and visual disturbance.   Respiratory:  Negative for cough and shortness of breath.    Cardiovascular:  Negative for chest pain and leg swelling.   Gastrointestinal:  Negative for abdominal distention, abdominal pain, nausea and vomiting.   Genitourinary:  Negative for difficulty urinating, dysuria and flank pain.   Musculoskeletal:  Positive for arthralgias.   Skin:  Negative for color change and rash.   Allergic/Immunologic: Negative for immunocompromised state.   Neurological:  Positive for weakness. Negative for dizziness and headaches.   Psychiatric/Behavioral:  The patient is not nervous/anxious.          Objective:     There were no vitals filed for this visit.      Body mass index is 17.75 kg/m².         Physical  Exam  Vitals reviewed.   Constitutional:       General: He is not in acute distress.     Appearance: Normal appearance. He is well-developed. He is ill-appearing.      Comments: Cachectic    HENT:      Head: Normocephalic and atraumatic.      Right Ear: External ear normal.      Left Ear: External ear normal.      Nose: Nose normal.   Eyes:      General: No scleral icterus.        Right eye: No discharge.         Left eye: No discharge.      Pupils: Pupils are equal, round, and reactive to light.   Cardiovascular:      Rate and Rhythm: Normal rate and regular rhythm.      Heart sounds: Normal heart sounds. No murmur heard.  Pulmonary:      Effort: Pulmonary effort is normal. No respiratory distress.      Breath sounds: Normal breath sounds. No wheezing.   Abdominal:      General: There is no distension.      Palpations: Abdomen is soft.      Tenderness: There is no abdominal tenderness.   Musculoskeletal:         General: Normal range of motion.      Cervical back: Normal range of motion and neck supple. No rigidity.   Lymphadenopathy:      Cervical: No cervical adenopathy.   Skin:     General: Skin is warm and dry.      Coloration: Skin is not jaundiced.   Neurological:      Mental Status: He is alert and oriented to person, place, and time.   Psychiatric:         Mood and Affect: Affect is flat.         Speech: Speech normal.         Behavior: Behavior is slowed.         Judgment: Judgment normal.         Assessment:           El was seen today for hiv positive/aids.    Diagnoses and all orders for this visit:    AIDS  -     CD4 T-[place] Cells; Future  -     HIV RNA, Quantitative, PCR; Future  -     CBC Auto Differential; Future  -     Comprehensive Metabolic Panel; Future    Soft tissue infection  -     cephALEXin (KEFLEX) 500 MG capsule; Take 1 capsule (500 mg total) by mouth every 6 (six) hours. for 7 days           Plan:     - Back of the neck abscess. Already on Bactrim, will start Keflex  - If he does  not improve, and become worse, then will need to be opened and perhaps debride it.   - Cont Bactrim daily for PJP prophylaxis  - Cont Fluconazole 800mg daily for cryptococcoma for 12 months (may consider decreasing to 400mg at a later date or stay as it is if he tolerates) Until 12/23  - Cont Symtuza for HIV  - RTC in 4 weeks     Greater than 30 minutes was spent on this encounter, which included: review of recent encounters, review and interpretation of labs/images, obtaining pertinent history, performing a physical examination, counseling and educating the patient/family/caregiver, ordering medications/tests, documenting in the electronic health record, and coordinating care with necessary providers.    Miller Rush MD  Infectious Diseases

## 2023-04-20 NOTE — TELEPHONE ENCOUNTER
Called patient at 11:20 about appointment patient stated he was on the way. Patient is 20 minutes late (11:50AM) to his appointment and will need to be rescheduled per .  Nurse will call to reschedule after talking to  about missed appointment. No voicemail set up when called 2nd time

## 2023-04-21 LAB
CD3+CD4+ CELLS # BLD: 104 CELLS/UL (ref 300–1400)
CD3+CD4+ CELLS NFR BLD: 4.6 % (ref 28–57)
HIV1 RNA # SERPL NAA+PROBE: NOT DETECTED COPIES/ML
HIV1 RNA SERPL QL NAA+PROBE: NOT DETECTED

## 2023-04-29 ENCOUNTER — DOCUMENT SCAN (OUTPATIENT)
Dept: HOME HEALTH SERVICES | Facility: HOSPITAL | Age: 28
End: 2023-04-29
Payer: MEDICAID

## 2023-05-04 ENCOUNTER — OFFICE VISIT (OUTPATIENT)
Dept: INFECTIOUS DISEASES | Facility: CLINIC | Age: 28
End: 2023-05-04
Payer: MEDICAID

## 2023-05-04 VITALS
HEIGHT: 75 IN | TEMPERATURE: 98 F | DIASTOLIC BLOOD PRESSURE: 79 MMHG | WEIGHT: 144.81 LBS | BODY MASS INDEX: 18 KG/M2 | SYSTOLIC BLOOD PRESSURE: 115 MMHG | HEART RATE: 115 BPM

## 2023-05-04 DIAGNOSIS — B20 AIDS: Primary | ICD-10-CM

## 2023-05-04 PROCEDURE — 99214 PR OFFICE/OUTPT VISIT, EST, LEVL IV, 30-39 MIN: ICD-10-PCS | Mod: S$PBB,,, | Performed by: INTERNAL MEDICINE

## 2023-05-04 PROCEDURE — 3078F PR MOST RECENT DIASTOLIC BLOOD PRESSURE < 80 MM HG: ICD-10-PCS | Mod: CPTII,,, | Performed by: INTERNAL MEDICINE

## 2023-05-04 PROCEDURE — 3008F PR BODY MASS INDEX (BMI) DOCUMENTED: ICD-10-PCS | Mod: CPTII,,, | Performed by: INTERNAL MEDICINE

## 2023-05-04 PROCEDURE — 1160F RVW MEDS BY RX/DR IN RCRD: CPT | Mod: CPTII,,, | Performed by: INTERNAL MEDICINE

## 2023-05-04 PROCEDURE — 3074F SYST BP LT 130 MM HG: CPT | Mod: CPTII,,, | Performed by: INTERNAL MEDICINE

## 2023-05-04 PROCEDURE — 1159F PR MEDICATION LIST DOCUMENTED IN MEDICAL RECORD: ICD-10-PCS | Mod: CPTII,,, | Performed by: INTERNAL MEDICINE

## 2023-05-04 PROCEDURE — 3074F PR MOST RECENT SYSTOLIC BLOOD PRESSURE < 130 MM HG: ICD-10-PCS | Mod: CPTII,,, | Performed by: INTERNAL MEDICINE

## 2023-05-04 PROCEDURE — 3044F HG A1C LEVEL LT 7.0%: CPT | Mod: CPTII,,, | Performed by: INTERNAL MEDICINE

## 2023-05-04 PROCEDURE — 1159F MED LIST DOCD IN RCRD: CPT | Mod: CPTII,,, | Performed by: INTERNAL MEDICINE

## 2023-05-04 PROCEDURE — 3008F BODY MASS INDEX DOCD: CPT | Mod: CPTII,,, | Performed by: INTERNAL MEDICINE

## 2023-05-04 PROCEDURE — 1160F PR REVIEW ALL MEDS BY PRESCRIBER/CLIN PHARMACIST DOCUMENTED: ICD-10-PCS | Mod: CPTII,,, | Performed by: INTERNAL MEDICINE

## 2023-05-04 PROCEDURE — 99214 OFFICE O/P EST MOD 30 MIN: CPT | Mod: S$PBB,,, | Performed by: INTERNAL MEDICINE

## 2023-05-04 PROCEDURE — 99213 OFFICE O/P EST LOW 20 MIN: CPT | Mod: PBBFAC,PO | Performed by: INTERNAL MEDICINE

## 2023-05-04 PROCEDURE — 99999 PR PBB SHADOW E&M-EST. PATIENT-LVL III: ICD-10-PCS | Mod: PBBFAC,,, | Performed by: INTERNAL MEDICINE

## 2023-05-04 PROCEDURE — 3078F DIAST BP <80 MM HG: CPT | Mod: CPTII,,, | Performed by: INTERNAL MEDICINE

## 2023-05-04 PROCEDURE — 3044F PR MOST RECENT HEMOGLOBIN A1C LEVEL <7.0%: ICD-10-PCS | Mod: CPTII,,, | Performed by: INTERNAL MEDICINE

## 2023-05-04 PROCEDURE — 99999 PR PBB SHADOW E&M-EST. PATIENT-LVL III: CPT | Mod: PBBFAC,,, | Performed by: INTERNAL MEDICINE

## 2023-05-04 RX ORDER — DARUNAVIR, COBICISTAT, EMTRICITABINE, AND TENOFOVIR ALAFENAMIDE 800; 150; 200; 10 MG/1; MG/1; MG/1; MG/1
1 TABLET, FILM COATED ORAL DAILY
Qty: 30 TABLET | Refills: 6 | Status: SHIPPED | OUTPATIENT
Start: 2023-05-04 | End: 2023-08-15 | Stop reason: SDUPTHER

## 2023-05-04 RX ORDER — SULFAMETHOXAZOLE AND TRIMETHOPRIM 800; 160 MG/1; MG/1
1 TABLET ORAL DAILY
Qty: 30 TABLET | Refills: 6 | Status: ON HOLD | OUTPATIENT
Start: 2023-05-04 | End: 2023-05-14 | Stop reason: HOSPADM

## 2023-05-04 NOTE — PROGRESS NOTES
Patient ID: El Abreu is a 27 y.o. male.    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 Cryptococcoma receiving Ampho + Fluc  Neurosyphilis, received PenG  HIV GT: R: DDI    ART  - Symtuza     Interval HPI:  - Patient is cachectic but improving.   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    Diabetes mellitus     type 2 x 2 months ago dx//    Neurosyphilis 11/06/2022    Paroxysmal atrial fibrillation     Seizures     Strabismus age 4-5//    os since accident     Vision abnormalities     left eye       Past Surgical History:   Procedure Laterality Date    CATARACT EXTRACTION W/  INTRAOCULAR LENS IMPLANT Left age 8    CIRCUMCISION      EYE SURGERY  4-6 yrs of age    toy hit eye os//       Review of patient's allergies indicates:  No Known Allergies    Family History   Problem Relation Age of Onset    Diabetes Mother     Diabetes Father     Hypertension Father     Diabetes Paternal Grandmother     Hypertension Paternal Grandmother     Diabetes Paternal Grandfather     Cancer Paternal Grandfather     Stroke Paternal Grandfather     Blindness Paternal Uncle     Amblyopia Neg Hx     Cataracts Neg Hx     Glaucoma Neg Hx     Macular degeneration Neg Hx     Retinal detachment Neg Hx     Strabismus Neg Hx     Thyroid disease Neg Hx        Social History     Socioeconomic History    Marital status: Single   Tobacco Use    Smoking status: Never   Substance and Sexual Activity    Alcohol use: No    Drug use: No     Social Determinants of Health     Financial Resource Strain: Low Risk     Difficulty of Paying Living Expenses: Not very hard   Food Insecurity: No Food Insecurity    Worried About Running Out of Food in the Last Year: Never true    Ran Out of Food in the Last Year: Never true    Transportation Needs: No Transportation Needs    Lack of Transportation (Medical): No    Lack of Transportation (Non-Medical): No   Physical Activity: Sufficiently Active    Days of Exercise per Week: 7 days    Minutes of Exercise per Session: 30 min   Stress: No Stress Concern Present    Feeling of Stress : Not at all   Social Connections: Socially Isolated    Frequency of Communication with Friends and Family: More than three times a week    Frequency of Social Gatherings with Friends and Family: More than three times a week    Attends Synagogue Services: Never    Active Member of Clubs or Organizations: No    Marital Status: Never    Housing Stability: Unknown    Unable to Pay for Housing in the Last Year: No    Number of Places Lived in the Last Year: 1       Current Outpatient Medications   Medication Instructions    acetaminophen (TYLENOL) 650 mg, Oral, Every 4 hours PRN    aluminum-magnesium hydroxide-simethicone (MAALOX) 200-200-20 mg/5 mL Susp 30 mLs, Oral, Every 4 hours PRN    blood sugar diagnostic Strp To check BG 4 times daily, and PRN hypoglycemia to use with insurance preferred meter    blood-glucose meter (ONETOUCH VERIO METER) Misc To check Blood sugars 4 times daily  , DX E 11.65    cyproheptadine (PERIACTIN) 4 mg, Oral, 3 times daily    darunavir-ольга-emtri-tenof ala (SYMTUZA) [phone] mg Tab 1 tablet, Oral, Daily    enoxaparin (LOVENOX) 40 mg, Subcutaneous, Daily    EScitalopram oxalate (LEXAPRO) 20 mg, Oral, Daily    ferrous gluconate (FERGON) 650 mg, Oral, Every other day    fluticasone propionate (FLONASE) 50 mcg/actuation nasal spray 2 sprays, Each Nostril, 2 times daily    folic acid (FOLVITE) 1 mg, Oral, Daily    glucagon 1 mg, Intramuscular, As needed (PRN)    glucose 16-24 g, Oral, As needed (PRN)    HYDROcodone-acetaminophen (NORCO) 5-325 mg per tablet 1 tablet, Oral, Every 4 hours PRN    insulin aspart U-100 (NOVOLOG) 15 Units, Subcutaneous, 3 times daily    insulin  detemir U-100 (Levemir) 10 Units, Subcutaneous, 2 times daily    lancets Misc To check BG 4 times daily, and PRN low blood sugars to use with insurance preferred meter    levETIRAcetam (KEPPRA) 1,500 mg, Oral, 2 times daily    megestroL (MEGACE) 40 mg, Oral, Daily    melatonin (MELATIN) 6 mg, Oral, Nightly PRN    metoclopramide HCl (REGLAN) 10 mg, Oral, 4 times daily with meals & nightly    midodrine (PROAMATINE) 10 mg, Oral, 3 times daily with meals    mirtazapine (REMERON) 15 mg, Oral, Nightly    multivitamin (THERAGRAN) per tablet 1 tablet, Oral, Daily    pantoprazole (PROTONIX) 40 mg, Oral, Daily    potassium bicarbonate (K-LYTE) disintegrating tablet 50 mEq, Oral, Daily, Dissolve tab in 4oz cold water prior to administration    risperiDONE (RISPERDAL) 0.5 mg, Oral, Daily    sodium bicarbonate 1,300 mg, Oral, 3 times daily    sulfamethoxazole-trimethoprim 800-160mg (BACTRIM DS) 800-160 mg Tab 1 tablet, Oral, Daily    thiamine 100 mg, Oral, Daily         Review of Systems   Constitutional:  Negative for activity change, chills, diaphoresis, fatigue, fever and unexpected weight change.   HENT:  Negative for sore throat.    Eyes:  Negative for photophobia and visual disturbance.   Respiratory:  Negative for cough and shortness of breath.    Cardiovascular:  Negative for chest pain and leg swelling.   Gastrointestinal:  Negative for abdominal distention, abdominal pain, nausea and vomiting.   Genitourinary:  Negative for difficulty urinating, dysuria and flank pain.   Musculoskeletal:  Negative for arthralgias.   Skin:  Negative for color change and rash.   Allergic/Immunologic: Negative for immunocompromised state.   Neurological:  Negative for dizziness and headaches.   Psychiatric/Behavioral:  The patient is not nervous/anxious.          Objective:     Vitals:    05/04/23 0945   BP: 115/79   Pulse: (!) 115   Temp: 97.8 °F (36.6 °C)         Body mass index is 18.1 kg/m².         Physical Exam  Vitals reviewed.    Constitutional:       General: He is not in acute distress.     Appearance: Normal appearance. He is well-developed. He is not ill-appearing.      Comments: Cachectic    HENT:      Head: Normocephalic and atraumatic.      Right Ear: External ear normal.      Left Ear: External ear normal.      Nose: Nose normal.   Eyes:      General: No scleral icterus.        Right eye: No discharge.         Left eye: No discharge.      Pupils: Pupils are equal, round, and reactive to light.   Cardiovascular:      Rate and Rhythm: Normal rate and regular rhythm.      Heart sounds: Normal heart sounds. No murmur heard.  Pulmonary:      Effort: Pulmonary effort is normal. No respiratory distress.      Breath sounds: Normal breath sounds. No wheezing.   Abdominal:      General: There is no distension.      Palpations: Abdomen is soft.      Tenderness: There is no abdominal tenderness.   Musculoskeletal:         General: Normal range of motion.      Cervical back: Normal range of motion and neck supple. No rigidity.   Lymphadenopathy:      Cervical: No cervical adenopathy.   Skin:     General: Skin is warm and dry.      Coloration: Skin is not jaundiced.   Neurological:      Mental Status: He is alert and oriented to person, place, and time.   Psychiatric:         Mood and Affect: Affect is not flat.         Speech: Speech normal.         Behavior: Behavior is slowed.         Judgment: Judgment normal.         Assessment:           El was seen today for follow-up.    Diagnoses and all orders for this visit:    AIDS  -     darunavir-ольга-emtri-tenof ala (SYMTUZA) [phone] mg Tab; Take 1 tablet by mouth once daily.  -     sulfamethoxazole-trimethoprim 800-160mg (BACTRIM DS) 800-160 mg Tab; Take 1 tablet by mouth once daily.  -     Comprehensive Metabolic Panel; Future  -     CBC Auto Differential; Future  -     CD4 T-Hammett Cells; Future  -     HIV RNA, Quantitative, PCR; Future             Plan:     - Back of the neck  abscess. Already on Bactrim, will start Keflex  - If he does not improve, and become worse, then will need to be opened and perhaps debride it.   - Cont Bactrim daily for PJP prophylaxis  - Cont Fluconazole 800mg daily for cryptococcoma for 12 months (may consider decreasing to 400mg at a later date or stay as it is if he tolerates) Until 12/23  - Cont Symtuza for HIV  - RTC in 3 months     Greater than 30 minutes was spent on this encounter, which included: review of recent encounters, review and interpretation of labs/images, obtaining pertinent history, performing a physical examination, counseling and educating the patient/family/caregiver, ordering medications/tests, documenting in the electronic health record, and coordinating care with necessary providers.    Miller Rush MD  Infectious Diseases

## 2023-05-10 PROBLEM — G93.6 VASOGENIC BRAIN EDEMA: Status: ACTIVE | Noted: 2023-05-10

## 2023-05-10 PROBLEM — R56.9 SEIZURE: Status: ACTIVE | Noted: 2023-05-10

## 2023-05-11 PROBLEM — Z21 HIV (HUMAN IMMUNODEFICIENCY VIRUS INFECTION): Status: ACTIVE | Noted: 2022-11-06

## 2023-05-11 PROBLEM — R51.9 HEADACHE: Status: ACTIVE | Noted: 2023-05-11

## 2023-05-11 PROBLEM — Z86.19 HISTORY OF CRYPTOCOCCAL MENINGITIS: Status: ACTIVE | Noted: 2023-05-11

## 2023-07-07 NOTE — PT/OT/SLP PROGRESS
Occupational Therapy   Treatment    Name: El Abreu  MRN: [id number]  Admitting Diagnosis:  AIDS       Recommendations:     Discharge Recommendations: rehabilitation facility, nursing facility, skilled, LTACH (long-term acute care hospital)  Discharge Equipment Recommendations:  bedside commode  Barriers to discharge:  None    Assessment:     El Abreu is a 27 y.o. male with a medical diagnosis of AIDS.  Pt agreeable to OT therapy session this AM. Performance deficits affecting function are weakness, impaired endurance, impaired self care skills, impaired functional mobility, gait instability, impaired balance, impaired cardiopulmonary response to activity.     Rehab Prognosis:  Fair; patient would benefit from acute skilled OT services to address these deficits and reach maximum level of function.       Plan:     Patient to be seen 3 x/week to address the above listed problems via self-care/home management, therapeutic activities, therapeutic exercises  Plan of Care Expires: 02/17/23  Plan of Care Reviewed with: patient    Subjective     Pain/Comfort:  Pain Rating 1: 0/10    Objective:     Communicated with: nursing prior to session.  Patient found HOB elevated with bed alarm, telemetry, peripheral IV upon OT entry to room.    General Precautions: Standard, fall    Orthopedic Precautions:N/A  Braces: N/A  Respiratory Status: Room air     Occupational Performance:     Therapeutic Exercise:  BUE exercises bed level with red tband 2 x 10 reps in all major planes with SBA; pt tolerated well    Treatment & Education:  Pt educated on role of OT/POC, importance of OOB/EOB activity, UE exercises, use of call bell, and safety during ADLs, transfers, and functional mobility.    Patient left HOB elevated with all lines intact, call button in reach, and bed alarm on    GOALS:   Multidisciplinary Problems       Occupational Therapy Goals          Problem: Occupational Therapy    Goal Priority Disciplines Outcome  Interventions   Occupational Therapy Goal     OT, PT/OT Ongoing, Progressing    Description: Goals to be met by: 2/17/23     Patient will increase functional independence with ADLs by performing:    UE Dressing with Supervision.  LE Dressing with Supervision and Assistive Devices as needed.  Grooming while standing at sink with Supervision.  Toileting from bedside commode with Supervision for hygiene and clothing management.   Toilet transfer to bedside commode with Supervision.  Upper extremity exercise program x10 reps per handout, with supervision.                         Time Tracking:     OT Date of Treatment: 01/30/23  OT Start Time: 1013  OT Stop Time: 1022  OT Total Time (min): 9 min    Billable Minutes:Therapeutic Exercise 9    OT/JACOB: OT          1/30/2023   skin normal color for race, warm, dry and intact.

## 2023-07-31 ENCOUNTER — LAB VISIT (OUTPATIENT)
Dept: LAB | Facility: HOSPITAL | Age: 28
End: 2023-07-31
Attending: INTERNAL MEDICINE
Payer: MEDICAID

## 2023-07-31 DIAGNOSIS — B20 AIDS: ICD-10-CM

## 2023-07-31 LAB
ALBUMIN SERPL BCP-MCNC: 3.8 G/DL (ref 3.5–5.2)
ALP SERPL-CCNC: 91 U/L (ref 55–135)
ALT SERPL W/O P-5'-P-CCNC: 23 U/L (ref 10–44)
ANION GAP SERPL CALC-SCNC: 8 MMOL/L (ref 8–16)
AST SERPL-CCNC: 20 U/L (ref 10–40)
BASOPHILS # BLD AUTO: 0.01 K/UL (ref 0–0.2)
BASOPHILS NFR BLD: 0.2 % (ref 0–1.9)
BILIRUB SERPL-MCNC: 0.4 MG/DL (ref 0.1–1)
BUN SERPL-MCNC: 12 MG/DL (ref 6–20)
CALCIUM SERPL-MCNC: 9.4 MG/DL (ref 8.7–10.5)
CHLORIDE SERPL-SCNC: 107 MMOL/L (ref 95–110)
CO2 SERPL-SCNC: 22 MMOL/L (ref 23–29)
CREAT SERPL-MCNC: 1 MG/DL (ref 0.5–1.4)
DIFFERENTIAL METHOD: ABNORMAL
EOSINOPHIL # BLD AUTO: 0.5 K/UL (ref 0–0.5)
EOSINOPHIL NFR BLD: 8.4 % (ref 0–8)
ERYTHROCYTE [DISTWIDTH] IN BLOOD BY AUTOMATED COUNT: 14.4 % (ref 11.5–14.5)
EST. GFR  (NO RACE VARIABLE): >60 ML/MIN/1.73 M^2
GLUCOSE SERPL-MCNC: 133 MG/DL (ref 70–110)
HCT VFR BLD AUTO: 32.3 % (ref 40–54)
HGB BLD-MCNC: 10.2 G/DL (ref 14–18)
IMM GRANULOCYTES # BLD AUTO: 0.04 K/UL (ref 0–0.04)
IMM GRANULOCYTES NFR BLD AUTO: 0.7 % (ref 0–0.5)
LYMPHOCYTES # BLD AUTO: 1.3 K/UL (ref 1–4.8)
LYMPHOCYTES NFR BLD: 22.1 % (ref 18–48)
MCH RBC QN AUTO: 26.6 PG (ref 27–31)
MCHC RBC AUTO-ENTMCNC: 31.6 G/DL (ref 32–36)
MCV RBC AUTO: 84 FL (ref 82–98)
MONOCYTES # BLD AUTO: 0.5 K/UL (ref 0.3–1)
MONOCYTES NFR BLD: 8.6 % (ref 4–15)
NEUTROPHILS # BLD AUTO: 3.6 K/UL (ref 1.8–7.7)
NEUTROPHILS NFR BLD: 60 % (ref 38–73)
NRBC BLD-RTO: 0 /100 WBC
PLATELET # BLD AUTO: 275 K/UL (ref 150–450)
PMV BLD AUTO: 10.1 FL (ref 9.2–12.9)
POTASSIUM SERPL-SCNC: 3.8 MMOL/L (ref 3.5–5.1)
PROT SERPL-MCNC: 7.7 G/DL (ref 6–8.4)
RBC # BLD AUTO: 3.84 M/UL (ref 4.6–6.2)
SODIUM SERPL-SCNC: 137 MMOL/L (ref 136–145)
WBC # BLD AUTO: 5.92 K/UL (ref 3.9–12.7)

## 2023-07-31 PROCEDURE — 80053 COMPREHEN METABOLIC PANEL: CPT | Performed by: INTERNAL MEDICINE

## 2023-07-31 PROCEDURE — 36415 COLL VENOUS BLD VENIPUNCTURE: CPT | Mod: PO | Performed by: INTERNAL MEDICINE

## 2023-07-31 PROCEDURE — 87536 HIV-1 QUANT&REVRSE TRNSCRPJ: CPT | Performed by: INTERNAL MEDICINE

## 2023-07-31 PROCEDURE — 85025 COMPLETE CBC W/AUTO DIFF WBC: CPT | Performed by: INTERNAL MEDICINE

## 2023-07-31 PROCEDURE — 86361 T CELL ABSOLUTE COUNT: CPT | Performed by: INTERNAL MEDICINE

## 2023-08-01 LAB
CD3+CD4+ CELLS # BLD: 29 CELLS/UL (ref 300–1400)
CD3+CD4+ CELLS NFR BLD: 2.1 % (ref 28–57)

## 2023-08-04 ENCOUNTER — TELEPHONE (OUTPATIENT)
Dept: INFECTIOUS DISEASES | Facility: CLINIC | Age: 28
End: 2023-08-04
Payer: MEDICAID

## 2023-08-04 LAB
HIV1 RNA # SERPL NAA+PROBE: ABNORMAL COPIES/ML
HIV1 RNA SERPL NAA+PROBE-LOG#: 4.45 LOG COPIES/ML
HIV1 RNA SERPL QL NAA+PROBE: DETECTED

## 2023-08-15 ENCOUNTER — OFFICE VISIT (OUTPATIENT)
Dept: INFECTIOUS DISEASES | Facility: CLINIC | Age: 28
End: 2023-08-15
Payer: MEDICAID

## 2023-08-15 VITALS
TEMPERATURE: 98 F | DIASTOLIC BLOOD PRESSURE: 61 MMHG | WEIGHT: 182.13 LBS | HEART RATE: 74 BPM | BODY MASS INDEX: 22.76 KG/M2 | SYSTOLIC BLOOD PRESSURE: 108 MMHG

## 2023-08-15 DIAGNOSIS — B20 AIDS: Primary | ICD-10-CM

## 2023-08-15 PROCEDURE — 99999 PR PBB SHADOW E&M-EST. PATIENT-LVL V: CPT | Mod: PBBFAC,,, | Performed by: INTERNAL MEDICINE

## 2023-08-15 PROCEDURE — 3044F PR MOST RECENT HEMOGLOBIN A1C LEVEL <7.0%: ICD-10-PCS | Mod: CPTII,,, | Performed by: INTERNAL MEDICINE

## 2023-08-15 PROCEDURE — 99214 PR OFFICE/OUTPT VISIT, EST, LEVL IV, 30-39 MIN: ICD-10-PCS | Mod: S$PBB,,, | Performed by: INTERNAL MEDICINE

## 2023-08-15 PROCEDURE — 3074F SYST BP LT 130 MM HG: CPT | Mod: CPTII,,, | Performed by: INTERNAL MEDICINE

## 2023-08-15 PROCEDURE — 1160F RVW MEDS BY RX/DR IN RCRD: CPT | Mod: CPTII,,, | Performed by: INTERNAL MEDICINE

## 2023-08-15 PROCEDURE — 1160F PR REVIEW ALL MEDS BY PRESCRIBER/CLIN PHARMACIST DOCUMENTED: ICD-10-PCS | Mod: CPTII,,, | Performed by: INTERNAL MEDICINE

## 2023-08-15 PROCEDURE — 3078F PR MOST RECENT DIASTOLIC BLOOD PRESSURE < 80 MM HG: ICD-10-PCS | Mod: CPTII,,, | Performed by: INTERNAL MEDICINE

## 2023-08-15 PROCEDURE — 99999 PR PBB SHADOW E&M-EST. PATIENT-LVL V: ICD-10-PCS | Mod: PBBFAC,,, | Performed by: INTERNAL MEDICINE

## 2023-08-15 PROCEDURE — 1159F PR MEDICATION LIST DOCUMENTED IN MEDICAL RECORD: ICD-10-PCS | Mod: CPTII,,, | Performed by: INTERNAL MEDICINE

## 2023-08-15 PROCEDURE — 99214 OFFICE O/P EST MOD 30 MIN: CPT | Mod: S$PBB,,, | Performed by: INTERNAL MEDICINE

## 2023-08-15 PROCEDURE — 1159F MED LIST DOCD IN RCRD: CPT | Mod: CPTII,,, | Performed by: INTERNAL MEDICINE

## 2023-08-15 PROCEDURE — 3078F DIAST BP <80 MM HG: CPT | Mod: CPTII,,, | Performed by: INTERNAL MEDICINE

## 2023-08-15 PROCEDURE — 3008F BODY MASS INDEX DOCD: CPT | Mod: CPTII,,, | Performed by: INTERNAL MEDICINE

## 2023-08-15 PROCEDURE — 3044F HG A1C LEVEL LT 7.0%: CPT | Mod: CPTII,,, | Performed by: INTERNAL MEDICINE

## 2023-08-15 PROCEDURE — 3008F PR BODY MASS INDEX (BMI) DOCUMENTED: ICD-10-PCS | Mod: CPTII,,, | Performed by: INTERNAL MEDICINE

## 2023-08-15 PROCEDURE — 99215 OFFICE O/P EST HI 40 MIN: CPT | Mod: PBBFAC,PO | Performed by: INTERNAL MEDICINE

## 2023-08-15 PROCEDURE — 3074F PR MOST RECENT SYSTOLIC BLOOD PRESSURE < 130 MM HG: ICD-10-PCS | Mod: CPTII,,, | Performed by: INTERNAL MEDICINE

## 2023-08-15 RX ORDER — DARUNAVIR, COBICISTAT, EMTRICITABINE, AND TENOFOVIR ALAFENAMIDE 800; 150; 200; 10 MG/1; MG/1; MG/1; MG/1
1 TABLET, FILM COATED ORAL DAILY
Qty: 30 TABLET | Refills: 6 | Status: SHIPPED | OUTPATIENT
Start: 2023-08-15 | End: 2024-01-25 | Stop reason: SDUPTHER

## 2023-08-15 RX ORDER — FLUCONAZOLE 200 MG/1
800 TABLET ORAL DAILY
Qty: 120 TABLET | Refills: 6 | Status: SHIPPED | OUTPATIENT
Start: 2023-08-15 | End: 2024-01-25 | Stop reason: SDUPTHER

## 2023-08-15 RX ORDER — DAPSONE 100 MG/1
100 TABLET ORAL DAILY
Qty: 30 TABLET | Refills: 0 | Status: SHIPPED | OUTPATIENT
Start: 2023-08-15 | End: 2024-01-25 | Stop reason: SDUPTHER

## 2023-08-15 NOTE — PROGRESS NOTES
Patient ID: El Abreu is a 27 y.o. male.    Subjective:         Chief Complaint: Follow-up    HPI    [facility] diagnosed with AIDS Oct 2022 CD4: 7. Cryptococcoma receiving Ampho + Fluc  Neurosyphilis, received PenG  HIV GT: R: DDI    ART  - Symtuza     Interval HPI:  - patient has stopped all his medications, likely secondary to depression/anxiety.  - today he feels well, asymptomatic.    - Denies fever, chills, night sweats, cough.    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    Diabetes mellitus     type 2 x 2 months ago dx//    Neurosyphilis 11/06/2022    Paroxysmal atrial fibrillation     Seizures     Strabismus age 5-5//    os since accident     Vision abnormalities     left eye       Past Surgical History:   Procedure Laterality Date    CATARACT EXTRACTION W/  INTRAOCULAR LENS IMPLANT Left age 8    CIRCUMCISION      EYE SURGERY  4-6 yrs of age    toy hit eye os//       Review of patient's allergies indicates:  No Known Allergies    Family History   Problem Relation Age of Onset    Diabetes Mother     Diabetes Father     Hypertension Father     Diabetes Paternal Grandmother     Hypertension Paternal Grandmother     Diabetes Paternal Grandfather     Cancer Paternal Grandfather     Stroke Paternal Grandfather     Blindness Paternal Uncle     Amblyopia Neg Hx     Cataracts Neg Hx     Glaucoma Neg Hx     Macular degeneration Neg Hx     Retinal detachment Neg Hx     Strabismus Neg Hx     Thyroid disease Neg Hx        Social History     Socioeconomic History    Marital status: Single   Tobacco Use    Smoking status: Never   Substance and Sexual Activity    Alcohol use: No    Drug use: No     Social Determinants of Health     Financial Resource Strain: Medium Risk (5/11/2023)    Overall Financial Resource Strain (CARDIA)     Difficulty of Paying Living Expenses: Somewhat hard   Food Insecurity: No Food Insecurity  (5/11/2023)    Hunger Vital Sign     Worried About Running Out of Food in the Last Year: Never true     Ran Out of Food in the Last Year: Never true   Transportation Needs: No Transportation Needs (5/11/2023)    PRAPARE - Transportation     Lack of Transportation (Medical): No     Lack of Transportation (Non-Medical): No   Physical Activity: Unknown (5/11/2023)    Exercise Vital Sign     Days of Exercise per Week: Patient refused     Minutes of Exercise per Session: Patient refused   Stress: No Stress Concern Present (5/11/2023)    [employer] [place] [employer] - Occupational Stress Questionnaire     Feeling of Stress : Not at all   Social Connections: Socially Isolated (5/11/2023)    Social Connection and Isolation Panel [NHANES]     Frequency of Communication with Friends and Family: More than three times a week     Frequency of Social Gatherings with Friends and Family: More than three times a week     Attends Nondenominational Services: Never     Active Member of Clubs or Organizations: No     Attends Club or Organization Meetings: Never     Marital Status: Never    Housing Stability: Low Risk  (5/11/2023)    Housing Stability Vital Sign     Unable to Pay for Housing in the Last Year: No     Number of Places Lived in the Last Year: 1     Unstable Housing in the Last Year: No       Current Outpatient Medications   Medication Instructions    acetaminophen (TYLENOL) 650 mg, Oral, Every 4 hours PRN    aluminum-magnesium hydroxide-simethicone (MAALOX) 200-200-20 mg/5 mL Susp 30 mLs, Oral, Every 4 hours PRN    blood sugar diagnostic Strp To check BG 4 times daily, and PRN hypoglycemia to use with insurance preferred meter    blood-glucose meter (ONETOUCH VERIO METER) Misc To check Blood sugars 4 times daily  , DX E 11.65    cyproheptadine (PERIACTIN) 4 mg, Oral, 3 times daily    dapsone 100 mg, Oral, Daily    darunavir-ольга-emtri-tenof ala (SYMTUZA) [phone] mg Tab 1 tablet, Oral, Daily    enoxaparin  (LOVENOX) 40 mg, Subcutaneous, Daily    ferrous gluconate (FERGON) 650 mg, Oral, Every other day    fluconazole (DIFLUCAN) 800 mg, Oral, Daily    fluticasone propionate (FLONASE) 50 mcg/actuation nasal spray 2 sprays, Each Nostril, 2 times daily    folic acid (FOLVITE) 1 mg, Oral, Daily    glucagon 1 mg, Intramuscular, As needed (PRN)    glucose 16-24 g, Oral, As needed (PRN)    HYDROcodone-acetaminophen (NORCO) 5-325 mg per tablet 1 tablet, Oral, Every 4 hours PRN    insulin aspart U-100 (NOVOLOG) 15 Units, Subcutaneous, 3 times daily    insulin detemir U-100 (Levemir) 10 Units, Subcutaneous, 2 times daily    lancets Misc To check BG 4 times daily, and PRN low blood sugars to use with insurance preferred meter    levETIRAcetam (KEPPRA) 1,500 mg, Oral, 2 times daily    megestroL (MEGACE) 40 mg, Oral, Daily    melatonin (MELATIN) 6 mg, Oral, Nightly PRN    metoclopramide HCl (REGLAN) 10 mg, Oral, 4 times daily with meals & nightly    mirtazapine (REMERON) 15 mg, Oral, Nightly    multivitamin (THERAGRAN) per tablet 1 tablet, Oral, Daily    pantoprazole (PROTONIX) 40 mg, Oral, Daily    potassium bicarbonate (K-LYTE) disintegrating tablet 50 mEq, Oral, Daily, Dissolve tab in 4oz cold water prior to administration    predniSONE (DELTASONE) 20 MG tablet Take 3 tablets by mouth daily for 5 days then 2 tabs daily for 5 days then 1 tab daily for 5 days then 1/2 tab daily for 5 days.    risperiDONE (RISPERDAL) 0.5 mg, Oral, Daily    sodium bicarbonate 1,300 mg, Oral, 3 times daily    thiamine 100 mg, Oral, Daily         Review of Systems   Constitutional:  Negative for activity change, chills, diaphoresis, fatigue, fever and unexpected weight change.   HENT:  Negative for sore throat.    Eyes:  Negative for photophobia and visual disturbance.   Respiratory:  Negative for cough and shortness of breath.    Cardiovascular:  Negative for chest pain and leg swelling.   Gastrointestinal:  Negative for abdominal distention,  abdominal pain, nausea and vomiting.   Genitourinary:  Negative for difficulty urinating, dysuria and flank pain.   Musculoskeletal:  Negative for arthralgias.   Skin:  Negative for color change and rash.   Allergic/Immunologic: Negative for immunocompromised state.   Neurological:  Negative for dizziness and headaches.   Psychiatric/Behavioral:  The patient is not nervous/anxious.            Objective:     Vitals:    08/15/23 1029   BP: 108/61   Pulse: 74   Temp: 98.1 °F (36.7 °C)         Body mass index is 22.76 kg/m².         Physical Exam  Vitals reviewed.   Constitutional:       General: He is not in acute distress.     Appearance: Normal appearance. He is well-developed. He is not ill-appearing.   HENT:      Head: Normocephalic and atraumatic.      Right Ear: External ear normal.      Left Ear: External ear normal.      Nose: Nose normal.   Eyes:      General: No scleral icterus.        Right eye: No discharge.         Left eye: No discharge.      Pupils: Pupils are equal, round, and reactive to light.   Cardiovascular:      Rate and Rhythm: Normal rate and regular rhythm.      Heart sounds: Normal heart sounds. No murmur heard.  Pulmonary:      Effort: Pulmonary effort is normal. No respiratory distress.      Breath sounds: Normal breath sounds. No wheezing.   Abdominal:      General: There is no distension.      Palpations: Abdomen is soft.      Tenderness: There is no abdominal tenderness.   Musculoskeletal:         General: Normal range of motion.      Cervical back: Normal range of motion and neck supple. No rigidity.   Lymphadenopathy:      Cervical: No cervical adenopathy.   Skin:     General: Skin is warm and dry.      Coloration: Skin is not jaundiced.   Neurological:      Mental Status: He is alert and oriented to person, place, and time.   Psychiatric:         Mood and Affect: Affect is not flat.         Speech: Speech normal.         Behavior: Behavior is slowed.         Judgment: Judgment normal.            Assessment:           El was seen today for follow-up.    Diagnoses and all orders for this visit:    AIDS  -     darunavir-ольга-emtri-tenof ala (SYMTUZA) [phone] mg Tab; Take 1 tablet by mouth once daily.  -     fluconazole (DIFLUCAN) 200 MG Tab; Take 4 tablets (800 mg total) by mouth once daily.  -     dapsone 100 MG Tab; Take 1 tablet (100 mg total) by mouth once daily.        Plan:     - Patient is feeling well so he should resume his previous regimen.  - Advised that compliance is important especially in somebody with AIDS  - Cont Bactrim daily for PJP prophylaxis  - Cont Fluconazole 800mg daily for cryptococcoma for 12 months  - Cont Symtuza for HIV  - RTC in 2 months  - Labs in 6 weeks       Greater than 30 minutes was spent on this encounter, which included: review of recent encounters, review and interpretation of labs/images, obtaining pertinent history, performing a physical examination, counseling and educating the patient/family/caregiver, ordering medications/tests, documenting in the electronic health record, and coordinating care with necessary providers.    Miller Rush MD  Infectious Diseases

## 2023-10-11 ENCOUNTER — TELEPHONE (OUTPATIENT)
Dept: INFECTIOUS DISEASES | Facility: CLINIC | Age: 28
End: 2023-10-11

## 2023-10-11 NOTE — TELEPHONE ENCOUNTER
----- Message from Vinny Al sent at 10/11/2023  2:28 PM CDT -----  Regarding: Appt  Contact: [phone]  Patient is calling to reschedule miss appointment. Please contact pt

## 2024-01-19 ENCOUNTER — TELEPHONE (OUTPATIENT)
Dept: INFECTIOUS DISEASES | Facility: CLINIC | Age: 29
End: 2024-01-19
Payer: MEDICAID

## 2024-01-19 NOTE — TELEPHONE ENCOUNTER
Returned call to patient , rescheduled 1/16/24 to 1/25/24 at 1130 am.  Also wants to discuss rash on his face, has medication by another md.

## 2024-01-19 NOTE — TELEPHONE ENCOUNTER
----- Message from Aga Diaz sent at 1/19/2024  2:50 PM CST -----  Contact: self  Type: Needs Medical Advice  Who Called:  Patient    Best Call Back Number: [phone]      Additional Information: Pt states he would like to speak with office has some questions.Please call back

## 2024-01-25 ENCOUNTER — OFFICE VISIT (OUTPATIENT)
Dept: INFECTIOUS DISEASES | Facility: CLINIC | Age: 29
End: 2024-01-25
Payer: MEDICAID

## 2024-01-25 ENCOUNTER — TELEPHONE (OUTPATIENT)
Dept: INFECTIOUS DISEASES | Facility: CLINIC | Age: 29
End: 2024-01-25
Payer: MEDICAID

## 2024-01-25 ENCOUNTER — LAB VISIT (OUTPATIENT)
Dept: LAB | Facility: HOSPITAL | Age: 29
End: 2024-01-25
Attending: INTERNAL MEDICINE
Payer: MEDICAID

## 2024-01-25 VITALS
DIASTOLIC BLOOD PRESSURE: 68 MMHG | HEART RATE: 90 BPM | WEIGHT: 156.94 LBS | SYSTOLIC BLOOD PRESSURE: 122 MMHG | HEIGHT: 75 IN | TEMPERATURE: 98 F | BODY MASS INDEX: 19.51 KG/M2

## 2024-01-25 DIAGNOSIS — B20 AIDS: ICD-10-CM

## 2024-01-25 DIAGNOSIS — B20 AIDS: Primary | ICD-10-CM

## 2024-01-25 LAB
ALBUMIN SERPL BCP-MCNC: 3.5 G/DL (ref 3.5–5.2)
ALP SERPL-CCNC: 78 U/L (ref 55–135)
ALT SERPL W/O P-5'-P-CCNC: 14 U/L (ref 10–44)
ANION GAP SERPL CALC-SCNC: 13 MMOL/L (ref 8–16)
AST SERPL-CCNC: 24 U/L (ref 10–40)
BILIRUB SERPL-MCNC: 0.4 MG/DL (ref 0.1–1)
BUN SERPL-MCNC: 13 MG/DL (ref 6–20)
CALCIUM SERPL-MCNC: 8.9 MG/DL (ref 8.7–10.5)
CHLORIDE SERPL-SCNC: 106 MMOL/L (ref 95–110)
CO2 SERPL-SCNC: 23 MMOL/L (ref 23–29)
CREAT SERPL-MCNC: 0.9 MG/DL (ref 0.5–1.4)
EST. GFR  (NO RACE VARIABLE): >60 ML/MIN/1.73 M^2
GLUCOSE SERPL-MCNC: 97 MG/DL (ref 70–110)
POTASSIUM SERPL-SCNC: 3.5 MMOL/L (ref 3.5–5.1)
PROT SERPL-MCNC: 7 G/DL (ref 6–8.4)
SODIUM SERPL-SCNC: 142 MMOL/L (ref 136–145)

## 2024-01-25 PROCEDURE — 1160F RVW MEDS BY RX/DR IN RCRD: CPT | Mod: CPTII,,, | Performed by: INTERNAL MEDICINE

## 2024-01-25 PROCEDURE — 99213 OFFICE O/P EST LOW 20 MIN: CPT | Mod: PBBFAC,PO | Performed by: INTERNAL MEDICINE

## 2024-01-25 PROCEDURE — 3078F DIAST BP <80 MM HG: CPT | Mod: CPTII,,, | Performed by: INTERNAL MEDICINE

## 2024-01-25 PROCEDURE — 3074F SYST BP LT 130 MM HG: CPT | Mod: CPTII,,, | Performed by: INTERNAL MEDICINE

## 2024-01-25 PROCEDURE — 36415 COLL VENOUS BLD VENIPUNCTURE: CPT | Mod: PO | Performed by: INTERNAL MEDICINE

## 2024-01-25 PROCEDURE — 80053 COMPREHEN METABOLIC PANEL: CPT | Performed by: INTERNAL MEDICINE

## 2024-01-25 PROCEDURE — 99214 OFFICE O/P EST MOD 30 MIN: CPT | Mod: S$PBB,,, | Performed by: INTERNAL MEDICINE

## 2024-01-25 PROCEDURE — 99999 PR PBB SHADOW E&M-EST. PATIENT-LVL III: CPT | Mod: PBBFAC,,, | Performed by: INTERNAL MEDICINE

## 2024-01-25 PROCEDURE — 3008F BODY MASS INDEX DOCD: CPT | Mod: CPTII,,, | Performed by: INTERNAL MEDICINE

## 2024-01-25 PROCEDURE — 1159F MED LIST DOCD IN RCRD: CPT | Mod: CPTII,,, | Performed by: INTERNAL MEDICINE

## 2024-01-25 PROCEDURE — 86361 T CELL ABSOLUTE COUNT: CPT | Performed by: INTERNAL MEDICINE

## 2024-01-25 PROCEDURE — 87536 HIV-1 QUANT&REVRSE TRNSCRPJ: CPT | Performed by: INTERNAL MEDICINE

## 2024-01-25 PROCEDURE — 85025 COMPLETE CBC W/AUTO DIFF WBC: CPT | Performed by: INTERNAL MEDICINE

## 2024-01-25 PROCEDURE — 86403 PARTICLE AGGLUT ANTBDY SCRN: CPT | Performed by: INTERNAL MEDICINE

## 2024-01-25 RX ORDER — DAPSONE 100 MG/1
100 TABLET ORAL DAILY
Qty: 30 TABLET | Refills: 6 | Status: SHIPPED | OUTPATIENT
Start: 2024-01-25 | End: 2024-08-22

## 2024-01-25 RX ORDER — DARUNAVIR, COBICISTAT, EMTRICITABINE, AND TENOFOVIR ALAFENAMIDE 800; 150; 200; 10 MG/1; MG/1; MG/1; MG/1
1 TABLET, FILM COATED ORAL DAILY
Qty: 30 TABLET | Refills: 6 | Status: SHIPPED | OUTPATIENT
Start: 2024-01-25

## 2024-01-25 RX ORDER — FLUCONAZOLE 200 MG/1
800 TABLET ORAL DAILY
Qty: 120 TABLET | Refills: 6 | Status: SHIPPED | OUTPATIENT
Start: 2024-01-25

## 2024-01-25 NOTE — PROGRESS NOTES
Patient ID: El Abreu is a 28 y.o. male.    Subjective:         Chief Complaint: HIV Positive/AIDS and Follow-up    HPI    [facility] diagnosed with AIDS Oct 2022 CD4: 7. Cryptococcoma receiving Ampho + Fluc  Neurosyphilis, received PenG  HIV GT: R: DDI    ART  - Symtuza     Interval HPI:  - Patient has stopped all his medications since May last year  - The patient tells me that he is feeling poorly.  - Extensive skin lesions which are pruritic especially at night.  - Denies fever, chills, night sweats, cough.      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    Diabetes mellitus     type 2 x 2 months ago dx//    Neurosyphilis 11/06/2022    Paroxysmal atrial fibrillation     Seizures     Strabismus age 4-5//    os since accident     Vision abnormalities     left eye       Past Surgical History:   Procedure Laterality Date    CATARACT EXTRACTION W/  INTRAOCULAR LENS IMPLANT Left age 8    CIRCUMCISION      EYE SURGERY  4-6 yrs of age    toy hit eye os//       Review of patient's allergies indicates:  No Known Allergies    Family History   Problem Relation Age of Onset    Diabetes Mother     Diabetes Father     Hypertension Father     Diabetes Paternal Grandmother     Hypertension Paternal Grandmother     Diabetes Paternal Grandfather     Cancer Paternal Grandfather     Stroke Paternal Grandfather     Blindness Paternal Uncle     Amblyopia Neg Hx     Cataracts Neg Hx     Glaucoma Neg Hx     Macular degeneration Neg Hx     Retinal detachment Neg Hx     Strabismus Neg Hx     Thyroid disease Neg Hx        Social History     Socioeconomic History    Marital status: Single   Tobacco Use    Smoking status: Never   Substance and Sexual Activity    Alcohol use: No    Drug use: No     Social Determinants of Health     Financial Resource Strain: Low Risk  (11/6/2023)    Overall Financial Resource Strain (CARDIA)     Difficulty of Paying Living  Expenses: Not very hard   Food Insecurity: No Food Insecurity (11/6/2023)    Hunger Vital Sign     Worried About Running Out of Food in the Last Year: Never true     Ran Out of Food in the Last Year: Never true   Transportation Needs: No Transportation Needs (11/6/2023)    PRAPARE - Transportation     Lack of Transportation (Medical): No     Lack of Transportation (Non-Medical): No   Physical Activity: Inactive (11/6/2023)    Exercise Vital Sign     Days of Exercise per Week: 0 days     Minutes of Exercise per Session: 0 min   Stress: Stress Concern Present (11/6/2023)    Citizen of [place] of Occupational Health - Occupational Stress Questionnaire     Feeling of Stress : To some extent   Social Connections: Socially Isolated (11/6/2023)    Social Connection and Isolation Panel [NHANES]     Frequency of Communication with Friends and Family: Three times a week     Frequency of Social Gatherings with Friends and Family: Three times a week     Attends Holiness Services: Never     Active Member of Clubs or Organizations: No     Attends Club or Organization Meetings: Never     Marital Status: Never    Housing Stability: Low Risk  (11/6/2023)    Housing Stability Vital Sign     Unable to Pay for Housing in the Last Year: No     Number of Places Lived in the Last Year: 1     Unstable Housing in the Last Year: No       Current Outpatient Medications   Medication Instructions    acetaminophen (TYLENOL) 650 mg, Oral, Every 4 hours PRN    aluminum-magnesium hydroxide-simethicone (MAALOX) 200-200-20 mg/5 mL Susp 30 mLs, Oral, Every 4 hours PRN    blood sugar diagnostic Strp To check BG 4 times daily, and PRN hypoglycemia to use with insurance preferred meter    blood-glucose meter (ONETOUCH VERIO METER) Misc To check Blood sugars 4 times daily  , DX E 11.65    cyproheptadine (PERIACTIN) 4 mg, Oral, 3 times daily    dapsone 100 mg, Oral, Daily    darunavir-ольга-emtri-tenof ala (SYMTUZA) 035-420-517-10 mg Tab 1 tablet,  Oral, Daily    enoxaparin (LOVENOX) 40 mg, Subcutaneous, Daily    ferrous gluconate (FERGON) 650 mg, Oral, Every other day    fluconazole (DIFLUCAN) 800 mg, Oral, Daily    fluticasone propionate (FLONASE) 50 mcg/actuation nasal spray 2 sprays, Each Nostril, 2 times daily    folic acid (FOLVITE) 1 mg, Oral, Daily    glucagon 1 mg, Intramuscular, As needed (PRN)    glucose 16-24 g, Oral, As needed (PRN)    HYDROcodone-acetaminophen (NORCO) 5-325 mg per tablet 1 tablet, Oral, Every 4 hours PRN    insulin aspart U-100 (NOVOLOG) 15 Units, Subcutaneous, 3 times daily    insulin detemir U-100 (Levemir) 10 Units, Subcutaneous, 2 times daily    lancets Misc To check BG 4 times daily, and PRN low blood sugars to use with insurance preferred meter    levETIRAcetam (KEPPRA) 1,500 mg, Oral, 2 times daily    megestroL (MEGACE) 40 mg, Oral, Daily    melatonin (MELATIN) 6 mg, Oral, Nightly PRN    metoclopramide HCl (REGLAN) 10 mg, Oral, 4 times daily with meals & nightly    mirtazapine (REMERON) 15 mg, Oral, Nightly    multivitamin (THERAGRAN) per tablet 1 tablet, Oral, Daily    pantoprazole (PROTONIX) 40 mg, Oral, Daily    permethrin (NIX) 1 % liquid Topical (Top), Once, Apply all of your body neck down after shower.  Sleep with liquid overnight and then shower the following day.  Repeat in 1 week    potassium bicarbonate (K-LYTE) disintegrating tablet 50 mEq, Oral, Daily, Dissolve tab in 4oz cold water prior to administration    predniSONE (DELTASONE) 20 MG tablet Take 3 tablets by mouth daily for 5 days then 2 tabs daily for 5 days then 1 tab daily for 5 days then 1/2 tab daily for 5 days.    risperiDONE (RISPERDAL) 0.5 mg, Oral, Daily    sodium bicarbonate 1,300 mg, Oral, 3 times daily    thiamine 100 mg, Oral, Daily    triamcinolone acetonide 0.1% (KENALOG) 0.1 % cream Topical (Top), 2 times daily         Review of Systems   Constitutional:  Negative for activity change, chills, diaphoresis, fatigue, fever and unexpected  weight change.   HENT:  Negative for sore throat.    Eyes:  Negative for photophobia and visual disturbance.   Respiratory:  Negative for cough and shortness of breath.    Cardiovascular:  Negative for chest pain and leg swelling.   Gastrointestinal:  Negative for abdominal distention, abdominal pain, nausea and vomiting.   Genitourinary:  Negative for difficulty urinating, dysuria and flank pain.   Musculoskeletal:  Negative for arthralgias.   Skin:  Negative for color change and rash.   Allergic/Immunologic: Negative for immunocompromised state.   Neurological:  Negative for dizziness and headaches.   Psychiatric/Behavioral:  The patient is not nervous/anxious.            Objective:     Vitals:    01/25/24 1120   BP: 122/68   Pulse: 90   Temp: 98.4 °F (36.9 °C)         Body mass index is 19.62 kg/m².         Physical Exam  Vitals reviewed.   Constitutional:       General: He is not in acute distress.     Appearance: Normal appearance. He is well-developed. He is not ill-appearing.   HENT:      Head: Normocephalic and atraumatic.      Right Ear: External ear normal.      Left Ear: External ear normal.      Nose: Nose normal.   Eyes:      General: No scleral icterus.        Right eye: No discharge.         Left eye: No discharge.      Pupils: Pupils are equal, round, and reactive to light.   Cardiovascular:      Rate and Rhythm: Normal rate and regular rhythm.      Heart sounds: Normal heart sounds. No murmur heard.  Pulmonary:      Effort: Pulmonary effort is normal. No respiratory distress.      Breath sounds: Normal breath sounds. No wheezing.   Abdominal:      General: There is no distension.      Palpations: Abdomen is soft.      Tenderness: There is no abdominal tenderness.   Musculoskeletal:         General: Normal range of motion.      Cervical back: Normal range of motion and neck supple. No rigidity.   Lymphadenopathy:      Cervical: No cervical adenopathy.   Skin:     General: Skin is warm and dry.       Coloration: Skin is not jaundiced.   Neurological:      Mental Status: He is alert and oriented to person, place, and time.   Psychiatric:         Mood and Affect: Affect is not flat.         Speech: Speech normal.         Behavior: Behavior is slowed.         Judgment: Judgment normal.           Assessment:           El was seen today for hiv positive/aids and follow-up.    Diagnoses and all orders for this visit:    AIDS  -     permethrin (NIX) 1 % liquid; Apply topically once. Apply all of your body neck down after shower.  Sleep with liquid overnight and then shower the following day.  Repeat in 1 week for 1 dose  -     Cryptococcal antigen, blood; Future  -     fluconazole (DIFLUCAN) 200 MG Tab; Take 4 tablets (800 mg total) by mouth once daily.  -     dapsone 100 MG Tab; Take 1 tablet (100 mg total) by mouth once daily.  -     darunavir-ольга-emtri-tenof ala (SYMTUZA) 610-219-853-10 mg Tab; Take 1 tablet by mouth once daily.          Plan:     - New problem.    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  Will start permethrin   - Will hold corticosteroids in the meantime   - Advised patient that the treatment for his problems are treating his HIV otherwise patient will have poor prognosis.    - Cont Dapsone daily for PJP prophylaxis  - Cont Fluconazole 800mg daily for cryptococcoma for 12 months  - Cont Symtuza for HIV  - Labs today  - RTC in 1 month       Greater than 30 minutes was spent on this encounter, which included: review of recent encounters, review and interpretation of labs/images, obtaining pertinent history, performing a physical examination, counseling and educating the patient/family/caregiver, ordering medications/tests, documenting in the electronic health record, and coordinating care with necessary providers.    Miller Rush MD  Infectious Diseases

## 2024-01-26 LAB
BASOPHILS # BLD AUTO: 0.02 K/UL (ref 0–0.2)
BASOPHILS NFR BLD: 0.3 % (ref 0–1.9)
CRYPTOC AG SER QL LA: ABNORMAL
DIFFERENTIAL METHOD BLD: ABNORMAL
EOSINOPHIL # BLD AUTO: 1.7 K/UL (ref 0–0.5)
EOSINOPHIL NFR BLD: 28.5 % (ref 0–8)
ERYTHROCYTE [DISTWIDTH] IN BLOOD BY AUTOMATED COUNT: 15.2 % (ref 11.5–14.5)
HCT VFR BLD AUTO: 31.9 % (ref 40–54)
HGB BLD-MCNC: 10.3 G/DL (ref 14–18)
HIV1 RNA # SERPL NAA+PROBE: ABNORMAL COPIES/ML
HIV1 RNA SERPL NAA+PROBE-LOG#: 4.52 LOG COPIES/ML
HIV1 RNA SERPL QL NAA+PROBE: DETECTED
IMM GRANULOCYTES # BLD AUTO: 0.01 K/UL (ref 0–0.04)
IMM GRANULOCYTES NFR BLD AUTO: 0.2 % (ref 0–0.5)
LYMPHOCYTES # BLD AUTO: 0.8 K/UL (ref 1–4.8)
LYMPHOCYTES NFR BLD: 13.5 % (ref 18–48)
MCH RBC QN AUTO: 26.9 PG (ref 27–31)
MCHC RBC AUTO-ENTMCNC: 32.3 G/DL (ref 32–36)
MCV RBC AUTO: 83 FL (ref 82–98)
MONOCYTES # BLD AUTO: 0.6 K/UL (ref 0.3–1)
MONOCYTES NFR BLD: 10.1 % (ref 4–15)
NEUTROPHILS # BLD AUTO: 2.8 K/UL (ref 1.8–7.7)
NEUTROPHILS NFR BLD: 47.4 % (ref 38–73)
NRBC BLD-RTO: 0 /100 WBC
PLATELET # BLD AUTO: 287 K/UL (ref 150–450)
PMV BLD AUTO: 9.5 FL (ref 9.2–12.9)
RBC # BLD AUTO: 3.83 M/UL (ref 4.6–6.2)
WBC # BLD AUTO: 5.85 K/UL (ref 3.9–12.7)

## 2024-01-29 LAB
CD3+CD4+ CELLS # BLD: 14 CELLS/UL (ref 300–1400)
CD3+CD4+ CELLS NFR BLD: 1.9 % (ref 28–57)

## 2024-08-12 ENCOUNTER — TELEPHONE (OUTPATIENT)
Dept: INFECTIOUS DISEASES | Facility: CLINIC | Age: 29
End: 2024-08-12
Payer: MEDICAID

## 2024-08-12 DIAGNOSIS — B20 AIDS: ICD-10-CM

## 2024-08-12 RX ORDER — DARUNAVIR, COBICISTAT, EMTRICITABINE, AND TENOFOVIR ALAFENAMIDE 800; 150; 200; 10 MG/1; MG/1; MG/1; MG/1
1 TABLET, FILM COATED ORAL DAILY
Qty: 30 TABLET | Refills: 6 | Status: SHIPPED | OUTPATIENT
Start: 2024-08-12 | End: 2024-08-14 | Stop reason: SDUPTHER

## 2024-08-12 NOTE — TELEPHONE ENCOUNTER
Spoke to pt and he is out of his medications. Pt last seen 1/24/24. Pt is planing to go to [place] to visit his mother and will be back in [place] in October. Requesting a virtual visit, since he is out of medications. Please advise.

## 2024-08-12 NOTE — TELEPHONE ENCOUNTER
----- Message from Akua Sal sent at 8/12/2024 10:21 AM CDT -----  Type: Needs Medical Advice  Who Called:  pt     Best Call Back Number: [phone] (home)     Additional Information: pt requesting call back please advise

## 2024-08-14 DIAGNOSIS — B20 AIDS: ICD-10-CM

## 2024-08-14 RX ORDER — DARUNAVIR, COBICISTAT, EMTRICITABINE, AND TENOFOVIR ALAFENAMIDE 800; 150; 200; 10 MG/1; MG/1; MG/1; MG/1
1 TABLET, FILM COATED ORAL DAILY
Qty: 30 TABLET | Refills: 3 | Status: SHIPPED | OUTPATIENT
Start: 2024-08-14

## 2024-08-14 NOTE — TELEPHONE ENCOUNTER
Dr. Rush:     Please send his HIV medications to [facility] in [facility]. I have added the pharmacy to his chart.               Spoke to pt and made him an apt in October to come see Dr Rush in person, he will be back from [place] by then.

## 2024-08-26 ENCOUNTER — TELEPHONE (OUTPATIENT)
Dept: INFECTIOUS DISEASES | Facility: CLINIC | Age: 29
End: 2024-08-26
Payer: MEDICAID

## 2024-08-26 NOTE — TELEPHONE ENCOUNTER
----- Message from Maggi Garcia sent at 8/26/2024 10:14 AM CDT -----  Contact: Dameon Xiao calling to confirm dx info on pt please call   [phone]

## 2024-08-26 NOTE — TELEPHONE ENCOUNTER
Spoke to dr nieto. Pt is admitted to a hospital in [place]. He was trying to see if pt had started back on his medication prescribed by Dr. Rush. Informed him that it was sent into the pharmacy last week but the pharmacy is located in [place], so not sure if pt was able to get it and has started it that fast. I gave him the pharmacy phone number and he is going to call them.

## 2024-09-14 NOTE — PHYSICIAN QUERY
PT Name: El Abreu  MR #: [id number]    DOCUMENTATION CLARIFICATION      CDS/: Alyssa Bazzi               Contact information: [phone]    This form is a permanent document in the medical record.      Query Date: February 16, 2023    By submitting this query, we are merely seeking further clarification of documentation. Please utilize your independent clinical judgment when addressing the question(s) below.    The Medical Record contains the following:   Indicators  Supporting Clinical Findings Location in Medical Record    Anemia documented Anemia H&P, PN, DS    H&H 01/16 - 7.9 / 24.1  01/17 - 7.3 / 22.3  01/30 - 7.5 / 23.2  02/02 - 7.9 / 23.7 Epic Lab    GI bleeding documented patient had episodes of bleeding, coffee-ground emesis due to any fungal therapy, this was resolved, monitor for bleeding and transfuse as needed    Occult Blood, Stool - positive H&P              Lab 02/04    Transfusion(s) 1 Unit-RBC 1/30 and 02/02 Transfusion Record  Phys order    Acute/Chronic illness HIV/AIDS,  hypomagnesemia,  H&P, PN, DS     Provider, please specify the type of anemia that the patient exhibited for this admission.  Please select all that apply.     [ x  ] Acute blood loss anemia    [  x ] Iron deficiency anemia    [   ] Chronic blood loss anemia     [   ] Anemia due to HIV/AIDS       [   ] Anemia, unspecified                                                                                                      0

## 2024-10-31 ENCOUNTER — LAB VISIT (OUTPATIENT)
Dept: LAB | Facility: HOSPITAL | Age: 29
End: 2024-10-31
Attending: INTERNAL MEDICINE
Payer: MEDICAID

## 2024-10-31 ENCOUNTER — OFFICE VISIT (OUTPATIENT)
Dept: INFECTIOUS DISEASES | Facility: CLINIC | Age: 29
End: 2024-10-31
Payer: MEDICAID

## 2024-10-31 VITALS
DIASTOLIC BLOOD PRESSURE: 65 MMHG | RESPIRATION RATE: 15 BRPM | BODY MASS INDEX: 24.42 KG/M2 | HEIGHT: 75 IN | TEMPERATURE: 89 F | HEART RATE: 85 BPM | SYSTOLIC BLOOD PRESSURE: 117 MMHG | WEIGHT: 196.44 LBS

## 2024-10-31 DIAGNOSIS — B20 AIDS: Primary | ICD-10-CM

## 2024-10-31 DIAGNOSIS — B20 AIDS: ICD-10-CM

## 2024-10-31 LAB
ALBUMIN SERPL BCP-MCNC: 3.8 G/DL (ref 3.5–5.2)
ALP SERPL-CCNC: 61 U/L (ref 40–150)
ALT SERPL W/O P-5'-P-CCNC: 15 U/L (ref 10–44)
ANION GAP SERPL CALC-SCNC: 9 MMOL/L (ref 8–16)
AST SERPL-CCNC: 28 U/L (ref 10–40)
BASOPHILS # BLD AUTO: 0.04 K/UL (ref 0–0.2)
BASOPHILS NFR BLD: 0.5 % (ref 0–1.9)
BILIRUB SERPL-MCNC: 0.3 MG/DL (ref 0.1–1)
BUN SERPL-MCNC: 14 MG/DL (ref 6–20)
CALCIUM SERPL-MCNC: 9.5 MG/DL (ref 8.7–10.5)
CHLORIDE SERPL-SCNC: 105 MMOL/L (ref 95–110)
CO2 SERPL-SCNC: 23 MMOL/L (ref 23–29)
CREAT SERPL-MCNC: 1 MG/DL (ref 0.5–1.4)
DIFFERENTIAL METHOD BLD: ABNORMAL
EOSINOPHIL # BLD AUTO: 1.2 K/UL (ref 0–0.5)
EOSINOPHIL NFR BLD: 15 % (ref 0–8)
ERYTHROCYTE [DISTWIDTH] IN BLOOD BY AUTOMATED COUNT: 15.1 % (ref 11.5–14.5)
EST. GFR  (NO RACE VARIABLE): >60 ML/MIN/1.73 M^2
GLUCOSE SERPL-MCNC: 116 MG/DL (ref 70–110)
HCT VFR BLD AUTO: 34.1 % (ref 40–54)
HGB BLD-MCNC: 11.1 G/DL (ref 14–18)
IMM GRANULOCYTES # BLD AUTO: 0.02 K/UL (ref 0–0.04)
IMM GRANULOCYTES NFR BLD AUTO: 0.3 % (ref 0–0.5)
LYMPHOCYTES # BLD AUTO: 2.5 K/UL (ref 1–4.8)
LYMPHOCYTES NFR BLD: 31.3 % (ref 18–48)
MCH RBC QN AUTO: 28.5 PG (ref 27–31)
MCHC RBC AUTO-ENTMCNC: 32.6 G/DL (ref 32–36)
MCV RBC AUTO: 88 FL (ref 82–98)
MONOCYTES # BLD AUTO: 0.8 K/UL (ref 0.3–1)
MONOCYTES NFR BLD: 10.2 % (ref 4–15)
NEUTROPHILS # BLD AUTO: 3.4 K/UL (ref 1.8–7.7)
NEUTROPHILS NFR BLD: 42.7 % (ref 38–73)
NRBC BLD-RTO: 0 /100 WBC
PLATELET # BLD AUTO: 311 K/UL (ref 150–450)
PMV BLD AUTO: 9.3 FL (ref 9.2–12.9)
POTASSIUM SERPL-SCNC: 3.9 MMOL/L (ref 3.5–5.1)
PROT SERPL-MCNC: 8.2 G/DL (ref 6–8.4)
RBC # BLD AUTO: 3.89 M/UL (ref 4.6–6.2)
SODIUM SERPL-SCNC: 137 MMOL/L (ref 136–145)
WBC # BLD AUTO: 7.98 K/UL (ref 3.9–12.7)

## 2024-10-31 PROCEDURE — 86403 PARTICLE AGGLUT ANTBDY SCRN: CPT | Performed by: INTERNAL MEDICINE

## 2024-10-31 PROCEDURE — 3078F DIAST BP <80 MM HG: CPT | Mod: CPTII,,, | Performed by: INTERNAL MEDICINE

## 2024-10-31 PROCEDURE — 99213 OFFICE O/P EST LOW 20 MIN: CPT | Mod: PBBFAC,PO | Performed by: INTERNAL MEDICINE

## 2024-10-31 PROCEDURE — 99214 OFFICE O/P EST MOD 30 MIN: CPT | Mod: S$PBB,,, | Performed by: INTERNAL MEDICINE

## 2024-10-31 PROCEDURE — 3008F BODY MASS INDEX DOCD: CPT | Mod: CPTII,,, | Performed by: INTERNAL MEDICINE

## 2024-10-31 PROCEDURE — 99999 PR PBB SHADOW E&M-EST. PATIENT-LVL III: CPT | Mod: PBBFAC,,, | Performed by: INTERNAL MEDICINE

## 2024-10-31 PROCEDURE — 36415 COLL VENOUS BLD VENIPUNCTURE: CPT | Mod: PO | Performed by: INTERNAL MEDICINE

## 2024-10-31 PROCEDURE — 1159F MED LIST DOCD IN RCRD: CPT | Mod: CPTII,,, | Performed by: INTERNAL MEDICINE

## 2024-10-31 PROCEDURE — 80053 COMPREHEN METABOLIC PANEL: CPT | Performed by: INTERNAL MEDICINE

## 2024-10-31 PROCEDURE — 1160F RVW MEDS BY RX/DR IN RCRD: CPT | Mod: CPTII,,, | Performed by: INTERNAL MEDICINE

## 2024-10-31 PROCEDURE — 3074F SYST BP LT 130 MM HG: CPT | Mod: CPTII,,, | Performed by: INTERNAL MEDICINE

## 2024-10-31 PROCEDURE — 87536 HIV-1 QUANT&REVRSE TRNSCRPJ: CPT | Performed by: INTERNAL MEDICINE

## 2024-10-31 PROCEDURE — 85025 COMPLETE CBC W/AUTO DIFF WBC: CPT | Performed by: INTERNAL MEDICINE

## 2024-10-31 PROCEDURE — 86361 T CELL ABSOLUTE COUNT: CPT | Performed by: INTERNAL MEDICINE

## 2024-10-31 RX ORDER — FLUCONAZOLE 200 MG/1
800 TABLET ORAL DAILY
Qty: 120 TABLET | Refills: 6 | Status: ON HOLD | OUTPATIENT
Start: 2024-10-31

## 2024-10-31 RX ORDER — DAPSONE 100 MG/1
100 TABLET ORAL DAILY
Qty: 30 TABLET | Refills: 6 | Status: ON HOLD | OUTPATIENT
Start: 2024-10-31 | End: 2025-05-29

## 2024-10-31 RX ORDER — DARUNAVIR, COBICISTAT, EMTRICITABINE, AND TENOFOVIR ALAFENAMIDE 800; 150; 200; 10 MG/1; MG/1; MG/1; MG/1
1 TABLET, FILM COATED ORAL DAILY
Qty: 30 TABLET | Refills: 6 | Status: ON HOLD | OUTPATIENT
Start: 2024-10-31

## 2024-10-31 NOTE — PROGRESS NOTES
Patient ID: El Abreu is a 29 y.o. male.    Subjective:         Chief Complaint: HIV Positive/AIDS (10 mo f/u)    HPI    [facility] MSM diagnosed with AIDS Oct 2022 CD4: 7. Cryptococcoma receiving Ampho + Fluc  Neurosyphilis, received PenG  HIV GT: R: DDI    ART  - Symtuza     Interval HPI:  - Patient restarted meds at the end of August.   - he tells me that he is feeling well.    - Denies fever chills night sweats headaches nausea vomiting blurry vision.      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    Diabetes mellitus     type 2 x 2 months ago dx//    Neurosyphilis 11/06/2022    Paroxysmal atrial fibrillation     Seizures     Strabismus age 5-5//    os since accident     Vision abnormalities     left eye       Past Surgical History:   Procedure Laterality Date    CATARACT EXTRACTION W/  INTRAOCULAR LENS IMPLANT Left age 8    CIRCUMCISION      EYE SURGERY  4-6 yrs of age    toy hit eye os//       Review of patient's allergies indicates:  No Known Allergies    Family History   Problem Relation Name Age of Onset    Diabetes Mother      Diabetes Father      Hypertension Father      Diabetes Paternal Grandmother      Hypertension Paternal Grandmother      Diabetes Paternal Grandfather      Cancer Paternal Grandfather      Stroke Paternal Grandfather      Blindness Paternal Uncle      Amblyopia Neg Hx      Cataracts Neg Hx      Glaucoma Neg Hx      Macular degeneration Neg Hx      Retinal detachment Neg Hx      Strabismus Neg Hx      Thyroid disease Neg Hx         Social History     Socioeconomic History    Marital status: Single   Tobacco Use    Smoking status: Never   Substance and Sexual Activity    Alcohol use: No    Drug use: No     Social Drivers of Health     Financial Resource Strain: Low Risk  (11/6/2023)    Overall Financial Resource Strain (CARDIA)     Difficulty of Paying Living Expenses: Not very hard   Food Insecurity: Unknown  (11/6/2023)    Hunger Vital Sign     Ran Out of Food in the Last Year: Never true   Transportation Needs: No Transportation Needs (11/6/2023)    PRAPARE - Transportation     Lack of Transportation (Medical): No     Lack of Transportation (Non-Medical): No   Physical Activity: Inactive (11/6/2023)    Exercise Vital Sign     Days of Exercise per Week: 0 days     Minutes of Exercise per Session: 0 min   Stress: Stress Concern Present (11/6/2023)    [facility] [place] of Occupational Health - Occupational Stress Questionnaire     Feeling of Stress : To some extent   Housing Stability: Low Risk  (11/6/2023)    Housing Stability Vital Sign     Unable to Pay for Housing in the Last Year: No     Number of Places Lived in the Last Year: 1     Unstable Housing in the Last Year: No       Current Outpatient Medications   Medication Instructions    acetaminophen (TYLENOL) 650 mg, Every 4 hours PRN    aluminum-magnesium hydroxide-simethicone (MAALOX) 200-200-20 mg/5 mL Susp 30 mLs, Every 4 hours PRN    blood sugar diagnostic Strp To check BG 4 times daily, and PRN hypoglycemia to use with insurance preferred meter    blood-glucose meter (ONETOUCH VERIO METER) Misc To check Blood sugars 4 times daily  , DX E 11.65    cyproheptadine (PERIACTIN) 4 mg, Oral, 3 times daily    dapsone 100 mg, Oral, Daily    darunavir-ольга-emtri-tenof ala (SYMTUZA) 036-665-445-10 mg Tab 1 tablet, Oral, Daily    enoxaparin (LOVENOX) 40 mg, Subcutaneous, Daily    ferrous gluconate (FERGON) 650 mg, Oral, Every other day    fluconazole (DIFLUCAN) 800 mg, Oral, Daily    fluticasone propionate (FLONASE) 50 mcg/actuation nasal spray 2 sprays, Each Nostril, 2 times daily    folic acid (FOLVITE) 1 mg, Oral, Daily    glucagon 1 mg, Intramuscular, As needed (PRN)    glucose 16-24 g, Oral, As needed (PRN)    HYDROcodone-acetaminophen (NORCO) 5-325 mg per tablet 1 tablet, Oral, Every 4 hours PRN    insulin aspart U-100 (NOVOLOG) 15 Units, Subcutaneous, 3 times daily     insulin detemir U-100 (Levemir) 10 Units, Subcutaneous, 2 times daily    lancets Misc To check BG 4 times daily, and PRN low blood sugars to use with insurance preferred meter    levETIRAcetam (KEPPRA) 1,500 mg, Oral, 2 times daily    megestroL (MEGACE) 40 mg, Oral, Daily    melatonin (MELATIN) 6 mg, Oral, Nightly PRN    metoclopramide HCl (REGLAN) 10 mg, Oral, 4 times daily with meals & nightly    mirtazapine (REMERON) 15 mg, Oral, Nightly    multivitamin (THERAGRAN) per tablet 1 tablet, Oral, Daily    pantoprazole (PROTONIX) 40 mg, Oral, Daily    potassium bicarbonate (K-LYTE) disintegrating tablet 50 mEq, Oral, Daily, Dissolve tab in 4oz cold water prior to administration    predniSONE (DELTASONE) 20 MG tablet Take 3 tablets by mouth daily for 5 days then 2 tabs daily for 5 days then 1 tab daily for 5 days then 1/2 tab daily for 5 days.    risperiDONE (RISPERDAL) 0.5 mg, Oral, Daily    sodium bicarbonate 1,300 mg, Oral, 3 times daily    thiamine 100 mg, Oral, Daily    triamcinolone acetonide 0.1% (KENALOG) 0.1 % cream Topical (Top), 2 times daily         Review of Systems   Constitutional:  Negative for activity change, chills, diaphoresis, fatigue, fever and unexpected weight change.   HENT:  Negative for sore throat.    Eyes:  Negative for photophobia and visual disturbance.   Respiratory:  Negative for cough and shortness of breath.    Cardiovascular:  Negative for chest pain and leg swelling.   Gastrointestinal:  Negative for abdominal distention, abdominal pain, nausea and vomiting.   Genitourinary:  Negative for difficulty urinating, dysuria and flank pain.   Musculoskeletal:  Negative for arthralgias.   Skin:  Negative for color change and rash.   Allergic/Immunologic: Negative for immunocompromised state.   Neurological:  Negative for dizziness and headaches.   Psychiatric/Behavioral:  The patient is not nervous/anxious.            Objective:     Vitals:    10/31/24 1130   BP: 117/65   Pulse: 85    Resp: 15   Temp: (!) 89.1 °F (31.7 °C)         Body mass index is 24.55 kg/m².         Physical Exam  Vitals reviewed.   Constitutional:       General: He is not in acute distress.     Appearance: Normal appearance. He is well-developed. He is not ill-appearing.   HENT:      Head: Normocephalic and atraumatic.      Right Ear: External ear normal.      Left Ear: External ear normal.      Nose: Nose normal.   Eyes:      General: No scleral icterus.        Right eye: No discharge.         Left eye: No discharge.      Pupils: Pupils are equal, round, and reactive to light.   Cardiovascular:      Rate and Rhythm: Normal rate and regular rhythm.      Heart sounds: Normal heart sounds. No murmur heard.  Pulmonary:      Effort: Pulmonary effort is normal. No respiratory distress.      Breath sounds: Normal breath sounds. No wheezing.   Abdominal:      General: There is no distension.      Palpations: Abdomen is soft.      Tenderness: There is no abdominal tenderness.   Musculoskeletal:         General: Normal range of motion.      Cervical back: Normal range of motion and neck supple. No rigidity.   Lymphadenopathy:      Cervical: No cervical adenopathy.   Skin:     General: Skin is warm and dry.      Coloration: Skin is not jaundiced.   Neurological:      Mental Status: He is alert and oriented to person, place, and time.   Psychiatric:         Mood and Affect: Affect is not flat.         Speech: Speech normal.         Behavior: Behavior is slowed.         Judgment: Judgment normal.           Assessment:           El was seen today for hiv positive/aids.    Diagnoses and all orders for this visit:    AIDS  -     fluconazole (DIFLUCAN) 200 MG Tab; Take 4 tablets (800 mg total) by mouth once daily.  -     darunavir-ольга-emtri-tenof ala (SYMTUZA) 148-639-903-10 mg Tab; Take 1 tablet by mouth once daily.  -     dapsone 100 MG Tab; Take 1 tablet (100 mg total) by mouth once daily.  -     Comprehensive Metabolic Panel;  Future  -     CBC Auto Differential; Future  -     CD4 T-[place] Cells; Future  -     HIV RNA, Quantitative, PCR; Future  -     Cryptococcal antigen, blood; Future      Plan:     - Apparently patient restarted his ART 1 month ago.    - Labs today   - Cont Dapsone daily for PJP prophylaxis  - Cont Fluconazole 800mg daily for cryptococcoma for 12 months  - Cont Symtuza for HIV  - RTC in 1 month    PS:  HIV RNA grossly positive.  We will communicate with patient to recommend compliance       Greater than 30 minutes was spent on this encounter, which included: review of recent encounters, review and interpretation of labs/images, obtaining pertinent history, performing a physical examination, counseling and educating the patient/family/caregiver, ordering medications/tests, documenting in the electronic health record, and coordinating care with necessary providers.    Miller Rush MD  Infectious Diseases

## 2024-11-01 LAB
CD3+CD4+ CELLS # BLD: 27 CELLS/UL (ref 300–1400)
CD3+CD4+ CELLS NFR BLD: 1 % (ref 28–57)
CRYPTOC AG SER QL LA: ABNORMAL
HIV1 RNA # SERPL NAA+PROBE: ABNORMAL COPIES/ML
HIV1 RNA SERPL NAA+PROBE-LOG#: 4.09 LOG COPIES/ML
HIV1 RNA SERPL QL NAA+PROBE: DETECTED

## 2024-11-08 PROBLEM — D84.81 IMMUNOCOMPROMISED STATUS ASSOCIATED WITH INFECTION: Status: ACTIVE | Noted: 2022-11-06

## 2024-11-08 PROBLEM — L03.116 CELLULITIS OF LEFT LOWER EXTREMITY: Status: ACTIVE | Noted: 2024-11-08

## 2024-11-11 PROBLEM — Z73.6 LIMITATION OF ACTIVITY DUE TO DISABILITY: Status: ACTIVE | Noted: 2024-11-11

## 2024-11-11 PROBLEM — L02.416 ABSCESS OF LEFT KNEE: Status: ACTIVE | Noted: 2024-11-08

## 2024-12-05 ENCOUNTER — OFFICE VISIT (OUTPATIENT)
Dept: INFECTIOUS DISEASES | Facility: CLINIC | Age: 29
End: 2024-12-05
Payer: MEDICAID

## 2024-12-05 VITALS
HEIGHT: 75 IN | WEIGHT: 201.94 LBS | TEMPERATURE: 98 F | BODY MASS INDEX: 25.11 KG/M2 | HEART RATE: 78 BPM | SYSTOLIC BLOOD PRESSURE: 126 MMHG | DIASTOLIC BLOOD PRESSURE: 73 MMHG

## 2024-12-05 DIAGNOSIS — B20 AIDS: Primary | ICD-10-CM

## 2024-12-05 PROCEDURE — 3008F BODY MASS INDEX DOCD: CPT | Mod: CPTII,,, | Performed by: INTERNAL MEDICINE

## 2024-12-05 PROCEDURE — 99214 OFFICE O/P EST MOD 30 MIN: CPT | Mod: S$PBB,,, | Performed by: INTERNAL MEDICINE

## 2024-12-05 PROCEDURE — 1111F DSCHRG MED/CURRENT MED MERGE: CPT | Mod: CPTII,,, | Performed by: INTERNAL MEDICINE

## 2024-12-05 PROCEDURE — 99999 PR PBB SHADOW E&M-EST. PATIENT-LVL IV: CPT | Mod: PBBFAC,,, | Performed by: INTERNAL MEDICINE

## 2024-12-05 PROCEDURE — 3074F SYST BP LT 130 MM HG: CPT | Mod: CPTII,,, | Performed by: INTERNAL MEDICINE

## 2024-12-05 PROCEDURE — 1159F MED LIST DOCD IN RCRD: CPT | Mod: CPTII,,, | Performed by: INTERNAL MEDICINE

## 2024-12-05 PROCEDURE — 3078F DIAST BP <80 MM HG: CPT | Mod: CPTII,,, | Performed by: INTERNAL MEDICINE

## 2024-12-05 PROCEDURE — 99214 OFFICE O/P EST MOD 30 MIN: CPT | Mod: PBBFAC,PO | Performed by: INTERNAL MEDICINE

## 2024-12-05 RX ORDER — DARUNAVIR, COBICISTAT, EMTRICITABINE, AND TENOFOVIR ALAFENAMIDE 800; 150; 200; 10 MG/1; MG/1; MG/1; MG/1
1 TABLET, FILM COATED ORAL DAILY
Qty: 30 TABLET | Refills: 6 | Status: SHIPPED | OUTPATIENT
Start: 2024-12-05

## 2024-12-05 NOTE — PROGRESS NOTES
Patient ID: El Abreu is a 29 y.o. male.    Subjective:         Chief Complaint: Follow-up (Aids and urinary infection )    HPI    [facility] diagnosed with AIDS Oct 2022 CD4: 7. Cryptococcoma receiving Ampho + Fluc  Neurosyphilis, received PenG  HIV GT: R: DDI    ART  - Symtuza     Interval HPI:  - patient has difficulties taking ART.  - he tells me that he is feeling well.    - Denies fever chills night sweats headaches nausea vomiting blurry vision.      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    Diabetes mellitus     type 2 x 2 months ago dx//    Neurosyphilis 11/06/2022    Paroxysmal atrial fibrillation     Seizures     Strabismus age 4-5//    os since accident     Vision abnormalities     left eye       Past Surgical History:   Procedure Laterality Date    CATARACT EXTRACTION W/  INTRAOCULAR LENS IMPLANT Left age 8    CIRCUMCISION      EYE SURGERY  4-5 yrs of age    toy hit eye os//       Review of patient's allergies indicates:  No Known Allergies    Family History   Problem Relation Name Age of Onset    Diabetes Mother      Diabetes Father      Hypertension Father      Diabetes Paternal Grandmother      Hypertension Paternal Grandmother      Diabetes Paternal Grandfather      Cancer Paternal Grandfather      Stroke Paternal Grandfather      Blindness Paternal Uncle      Amblyopia Neg Hx      Cataracts Neg Hx      Glaucoma Neg Hx      Macular degeneration Neg Hx      Retinal detachment Neg Hx      Strabismus Neg Hx      Thyroid disease Neg Hx         Social History     Socioeconomic History    Marital status: Single   Tobacco Use    Smoking status: Every Day     Types: Cigars   Substance and Sexual Activity    Alcohol use: No    Drug use: No     Social Drivers of Health     Financial Resource Strain: Low Risk  (11/11/2024)    Overall Financial Resource Strain (CARDIA)     Difficulty of Paying Living Expenses: Not very hard    Food Insecurity: No Food Insecurity (11/11/2024)    Hunger Vital Sign     Worried About Running Out of Food in the Last Year: Never true     Ran Out of Food in the Last Year: Never true   Transportation Needs: No Transportation Needs (11/11/2024)    TRANSPORTATION NEEDS     Transportation : No   Physical Activity: Inactive (11/6/2023)    Exercise Vital Sign     Days of Exercise per Week: 0 days     Minutes of Exercise per Session: 0 min   Stress: Stress Concern Present (11/11/2024)    [employer] - Occupational Stress Questionnaire     Feeling of Stress : To some extent   Housing Stability: Low Risk  (11/11/2024)    Housing Stability Vital Sign     Unable to Pay for Housing in the Last Year: No     Homeless in the Last Year: No       Current Outpatient Medications   Medication Instructions    blood sugar diagnostic Strp To check BG 3 times daily, to use with insurance preferred meter    blood-glucose meter kit To check BG 3 times daily, to use with insurance preferred meter    cyproheptadine (PERIACTIN) 4 mg, Oral, 3 times daily    dapsone 100 mg, Oral, Daily    darunavir-ольга-emtri-tenof ala (SYMTUZA) [phone] mg Tab 1 tablet, Oral, Daily    fluconazole (DIFLUCAN) 800 mg, Oral, Daily    folic acid (FOLVITE) 1 mg, Oral, Daily    insulin aspart U-100 (NOVOLOG) 15 Units, Subcutaneous, 3 times daily    insulin detemir U-100 (Levemir) 10 Units, Subcutaneous, 2 times daily    lancets Misc To check BG 3 times daily, to use with insurance preferred meter    levETIRAcetam (KEPPRA) 1,500 mg, Oral, 2 times daily    megestroL (MEGACE) 40 mg, Oral, Daily    mirtazapine (REMERON) 15 mg, Oral, Nightly    pantoprazole (PROTONIX) 40 mg, Oral, Daily    risperiDONE (RISPERDAL) 0.5 mg, Oral, Daily    sodium bicarbonate 1,300 mg, Oral, 3 times daily    triamcinolone acetonide 0.1% (KENALOG) 0.1 % cream Topical (Top), 2 times daily         Review of Systems   Constitutional:  Negative for activity  change, chills, diaphoresis, fatigue, fever and unexpected weight change.   HENT:  Negative for sore throat.    Eyes:  Negative for photophobia and visual disturbance.   Respiratory:  Negative for cough and shortness of breath.    Cardiovascular:  Negative for chest pain and leg swelling.   Gastrointestinal:  Negative for abdominal distention, abdominal pain, nausea and vomiting.   Genitourinary:  Negative for difficulty urinating, dysuria and flank pain.   Musculoskeletal:  Negative for arthralgias.   Skin:  Negative for color change and rash.   Allergic/Immunologic: Negative for immunocompromised state.   Neurological:  Negative for dizziness and headaches.   Psychiatric/Behavioral:  The patient is not nervous/anxious.            Objective:     Vitals:    12/05/24 1104   BP: 126/73   Pulse: 78   Temp: 98.1 °F (36.7 °C)         Body mass index is 25.24 kg/m².         Physical Exam  Vitals reviewed.   Constitutional:       General: He is not in acute distress.     Appearance: Normal appearance. He is well-developed. He is not ill-appearing.   HENT:      Head: Normocephalic and atraumatic.      Right Ear: External ear normal.      Left Ear: External ear normal.      Nose: Nose normal.   Eyes:      General: No scleral icterus.        Right eye: No discharge.         Left eye: No discharge.      Pupils: Pupils are equal, round, and reactive to light.   Cardiovascular:      Rate and Rhythm: Normal rate and regular rhythm.      Heart sounds: Normal heart sounds. No murmur heard.  Pulmonary:      Effort: Pulmonary effort is normal. No respiratory distress.      Breath sounds: Normal breath sounds. No wheezing.   Abdominal:      General: There is no distension.      Palpations: Abdomen is soft.      Tenderness: There is no abdominal tenderness.   Musculoskeletal:         General: Normal range of motion.      Cervical back: Normal range of motion and neck supple. No rigidity.   Lymphadenopathy:      Cervical: No cervical  adenopathy.   Skin:     General: Skin is warm and dry.      Coloration: Skin is not jaundiced.   Neurological:      Mental Status: He is alert and oriented to person, place, and time.   Psychiatric:         Mood and Affect: Affect is not flat.         Speech: Speech normal.         Behavior: Behavior is slowed.         Judgment: Judgment normal.           Assessment:           El was seen today for follow-up.    Diagnoses and all orders for this visit:    AIDS  -     darunavir-ольга-emtri-tenof ala (SYMTUZA) [phone] mg Tab; Take 1 tablet by mouth once daily.  -     Prior authorization Order  -     Nursing communication  -     cabotegravir-rilpivirine 400 mg/2 mL- 600 mg/2 mL injection 4 mL        Plan:     Noncompliant with medication.    We will attempt to give patient Cabenuva injectable  In the meantime continue with previous  - Cont Dapsone daily for PJP prophylaxis  - Cont Fluconazole 800mg daily for cryptococcoma for 12 months  - Cont Symtuza for HIV  - RTC in 1 month     Greater than 30 minutes was spent on this encounter, which included: review of recent encounters, review and interpretation of labs/images, obtaining pertinent history, performing a physical examination, counseling and educating the patient/family/caregiver, ordering medications/tests, documenting in the electronic health record, and coordinating care with necessary providers.    Miller Rush MD  Infectious Diseases

## 2025-01-13 ENCOUNTER — TELEPHONE (OUTPATIENT)
Dept: INFECTIOUS DISEASES | Facility: CLINIC | Age: 30
End: 2025-01-13
Payer: MEDICAID

## 2025-01-13 DIAGNOSIS — B20 AIDS: ICD-10-CM

## 2025-01-13 RX ORDER — DAPSONE 100 MG/1
100 TABLET ORAL DAILY
Qty: 30 TABLET | Refills: 6 | Status: SHIPPED | OUTPATIENT
Start: 2025-01-13 | End: 2025-08-11

## 2025-01-13 RX ORDER — DARUNAVIR, COBICISTAT, EMTRICITABINE, AND TENOFOVIR ALAFENAMIDE 800; 150; 200; 10 MG/1; MG/1; MG/1; MG/1
1 TABLET, FILM COATED ORAL DAILY
Qty: 30 TABLET | Refills: 6 | Status: SHIPPED | OUTPATIENT
Start: 2025-01-13

## 2025-01-13 RX ORDER — FLUCONAZOLE 200 MG/1
800 TABLET ORAL DAILY
Qty: 120 TABLET | Refills: 6 | Status: SHIPPED | OUTPATIENT
Start: 2025-01-13

## 2025-01-13 NOTE — TELEPHONE ENCOUNTER
Returned call to Debra at [facility] who states pt called/came to [facility] today upset and crying that he has been off his medication and had not heard from our office.   Informed Debra that per Dr. Rush's 12/5/24 progress note:  Noncompliant with medication.    We will attempt to give patient Cabenuva injectable  In the meantime continue with previous  - Cont Dapsone daily for PJP prophylaxis  - Cont Fluconazole 800mg daily for cryptococcoma for 12 months  - Cont Symtuza for HIV  - RTC in 1 month    NOTE: pt was denied Cabenuva by his insurance and I attempt to contact patient, but so far calls go straight to ,  left with no return call.  Debra asked what pharmacy medications are being sent to, informed [facility] in [place], which is the patient's preferred pharmacy.    She will inform her supervisor.

## 2025-01-13 NOTE — PROGRESS NOTES
Spoke with [facility], Nga who is Mr. Banegas feels PCP.      It appears that patient is not taking his medications and states that he has not received them either.  Last time seen was December 5th and prior to that was October 31st.  Symtuza, fluconazole and dapsone were all sent to his preferred pharmacy in [place].    After discussing with Nga, we agreed to switch all his medications to [facility].  Additionally she will apply for a owen and enroll patient to Cabenuva  My personal contact number was given to [facility] with updates.    Nursing staff to try to communicate with patient and reschedule an appt

## 2025-01-13 NOTE — TELEPHONE ENCOUNTER
----- Message from [facility] sent at 1/13/2025 11:24 AM CST -----  Type:  Needs Medical Advice    Who Called: Debra kelley/BRITTANEY  Symptoms (please be specific):  How long has patient had these symptoms:    Pharmacy name and phone #:    Would the patient rather a call back or a response via [facility]? call back/  Best Call Back Number: [phone] ext [phone]  Additional Information: Speak to staff in reference to pt and meds  Please advise  Thanks

## 2025-01-16 ENCOUNTER — TELEPHONE (OUTPATIENT)
Dept: INFECTIOUS DISEASES | Facility: CLINIC | Age: 30
End: 2025-01-16
Payer: MEDICAID

## 2025-01-16 NOTE — TELEPHONE ENCOUNTER
----- Message from [place] sent at 1/16/2025  1:14 PM CST -----  Regarding: Appt. Request  Type:Appointment Request    Caller is requesting an appointment:    Name of Caller:pt     Symptoms:monthly shot     Would the patient rather a call back or a response via [facility]? Call back     Best Call Back Number:[phone]      Additional Information: please call to advise, Thank You.

## 2025-01-16 NOTE — TELEPHONE ENCOUNTER
Returned call to patient, discussed that Cabenuva injection was denied by his insurance, also discussed Dr. Rush's discussed on 1/13/25 with his PCP  at [facility] and she will attempt getting Cabenuva through a program there. Pt states he has not been on his oral meds as he has not gotten them yet because [facility] not [facility] them. Discussed that Dr. Rush sent them to [facility] on 1/13/25, Patient states [facility] did call him yesterday and he should receive. PO meds in the mail by Friday    Will call him back with follow up appt with Dr. Rush

## 2025-02-04 ENCOUNTER — TELEPHONE (OUTPATIENT)
Dept: INFECTIOUS DISEASES | Facility: CLINIC | Age: 30
End: 2025-02-04
Payer: MEDICAID

## 2025-02-04 NOTE — TELEPHONE ENCOUNTER
----- Message from Cathi sent at 2/4/2025 10:36 AM CST -----   Type:  Needs Medical Advice    Who Called:  [place]    Would the patient rather a call back or a response via [facility]? call  Best Call Back Number: [phone] ext [phone]      Additional Information:  states needs call back about PT Please call back to advise. Thank you!

## 2025-02-04 NOTE — TELEPHONE ENCOUNTER
Returned call to Debra at [facility], they were able to obtain Cabenuva for patient and started injections yesterday. Also, they will take over this case going forward for HIV as well as oral meds.     Dr. Rush informed.